# Patient Record
Sex: FEMALE | Race: WHITE | NOT HISPANIC OR LATINO | URBAN - METROPOLITAN AREA
[De-identification: names, ages, dates, MRNs, and addresses within clinical notes are randomized per-mention and may not be internally consistent; named-entity substitution may affect disease eponyms.]

---

## 2018-02-23 ENCOUNTER — EMERGENCY (EMERGENCY)
Facility: HOSPITAL | Age: 83
LOS: 0 days | Discharge: HOME | End: 2018-02-23
Attending: EMERGENCY MEDICINE

## 2018-02-23 VITALS
DIASTOLIC BLOOD PRESSURE: 85 MMHG | HEIGHT: 64 IN | RESPIRATION RATE: 18 BRPM | TEMPERATURE: 97 F | HEART RATE: 49 BPM | WEIGHT: 100.09 LBS | OXYGEN SATURATION: 97 % | SYSTOLIC BLOOD PRESSURE: 193 MMHG

## 2018-02-23 VITALS — HEART RATE: 42 BPM

## 2018-02-23 DIAGNOSIS — Z79.899 OTHER LONG TERM (CURRENT) DRUG THERAPY: ICD-10-CM

## 2018-02-23 DIAGNOSIS — M79.89 OTHER SPECIFIED SOFT TISSUE DISORDERS: ICD-10-CM

## 2018-02-23 DIAGNOSIS — I10 ESSENTIAL (PRIMARY) HYPERTENSION: ICD-10-CM

## 2018-02-23 DIAGNOSIS — I44.2 ATRIOVENTRICULAR BLOCK, COMPLETE: ICD-10-CM

## 2018-02-23 LAB
ALBUMIN SERPL ELPH-MCNC: 4 G/DL — SIGNIFICANT CHANGE UP (ref 3–5.5)
ALP SERPL-CCNC: 85 U/L — SIGNIFICANT CHANGE UP (ref 30–115)
ALT FLD-CCNC: 50 U/L — HIGH (ref 0–41)
ANION GAP SERPL CALC-SCNC: 9 MMOL/L — SIGNIFICANT CHANGE UP (ref 7–14)
AST SERPL-CCNC: 50 U/L — HIGH (ref 0–41)
B-TYPE NATRIURETIC PEPTIDE BNP RESULT: 926 PG/ML — HIGH (ref 0–99)
BASOPHILS # BLD AUTO: 0.06 K/UL — SIGNIFICANT CHANGE UP (ref 0–0.2)
BASOPHILS NFR BLD AUTO: 0.6 % — SIGNIFICANT CHANGE UP (ref 0–1)
BILIRUB SERPL-MCNC: 1.1 MG/DL — SIGNIFICANT CHANGE UP (ref 0.2–1.2)
BUN SERPL-MCNC: 23 MG/DL — HIGH (ref 10–20)
CALCIUM SERPL-MCNC: 9 MG/DL — SIGNIFICANT CHANGE UP (ref 8.5–10.1)
CHLORIDE SERPL-SCNC: 104 MMOL/L — SIGNIFICANT CHANGE UP (ref 98–110)
CO2 SERPL-SCNC: 22 MMOL/L — SIGNIFICANT CHANGE UP (ref 17–32)
CREAT SERPL-MCNC: 1.1 MG/DL — SIGNIFICANT CHANGE UP (ref 0.7–1.5)
EOSINOPHIL # BLD AUTO: 0.05 K/UL — SIGNIFICANT CHANGE UP (ref 0–0.7)
EOSINOPHIL NFR BLD AUTO: 0.5 % — SIGNIFICANT CHANGE UP (ref 0–8)
GLUCOSE SERPL-MCNC: 113 MG/DL — HIGH (ref 70–110)
HCT VFR BLD CALC: 38.8 % — SIGNIFICANT CHANGE UP (ref 37–47)
HGB BLD-MCNC: 12.4 G/DL — LOW (ref 14–18)
IMM GRANULOCYTES NFR BLD AUTO: 0.5 % — HIGH (ref 0.1–0.3)
LYMPHOCYTES # BLD AUTO: 1.18 K/UL — LOW (ref 1.2–3.4)
LYMPHOCYTES # BLD AUTO: 12.3 % — LOW (ref 20.5–51.1)
MCHC RBC-ENTMCNC: 28.6 PG — SIGNIFICANT CHANGE UP (ref 27–31)
MCHC RBC-ENTMCNC: 32 G/DL — SIGNIFICANT CHANGE UP (ref 32–37)
MCV RBC AUTO: 89.6 FL — SIGNIFICANT CHANGE UP (ref 81–91)
MONOCYTES # BLD AUTO: 0.68 K/UL — HIGH (ref 0.1–0.6)
MONOCYTES NFR BLD AUTO: 7.1 % — SIGNIFICANT CHANGE UP (ref 1.7–9.3)
NEUTROPHILS # BLD AUTO: 7.6 K/UL — HIGH (ref 1.4–6.5)
NEUTROPHILS NFR BLD AUTO: 79 % — HIGH (ref 42.2–75.2)
NRBC # BLD: 0 /100 WBCS — SIGNIFICANT CHANGE UP (ref 0–0)
PLATELET # BLD AUTO: 255 K/UL — SIGNIFICANT CHANGE UP (ref 130–400)
POTASSIUM SERPL-MCNC: 4.2 MMOL/L — SIGNIFICANT CHANGE UP (ref 3.5–5)
POTASSIUM SERPL-SCNC: 4.2 MMOL/L — SIGNIFICANT CHANGE UP (ref 3.5–5)
PROT SERPL-MCNC: 7.3 G/DL — SIGNIFICANT CHANGE UP (ref 6–8)
RBC # BLD: 4.33 M/UL — SIGNIFICANT CHANGE UP (ref 4.2–5.4)
RBC # FLD: 15.4 % — HIGH (ref 11.5–14.5)
SODIUM SERPL-SCNC: 135 MMOL/L — SIGNIFICANT CHANGE UP (ref 135–146)
WBC # BLD: 9.62 K/UL — SIGNIFICANT CHANGE UP (ref 4.8–10.8)
WBC # FLD AUTO: 9.62 K/UL — SIGNIFICANT CHANGE UP (ref 4.8–10.8)

## 2018-02-23 NOTE — ED PROVIDER NOTE - ATTENDING CONTRIBUTION TO CARE
87 yo F PMHx noted presents with c/o b/l LE swelling R>L x 3 weeks.  Pt was seen by PMD last week who recommended a LE duplex.  She was also given Rx for Lasix which she did not fill yet, no CP, no SOB, no fevers, no abd pain.  On exam pt in NAD AAO x 3, steady gait, + keenan, Lungs with slight exp wheeze at base, + b/l LE edema R>L, no calf tendeerness

## 2018-02-23 NOTE — ED PROVIDER NOTE - NS ED ROS FT
Review of Systems    Constitutional: (-) fever (-) night sweats (-) chills  Eyes: (-) change in vision (-) photophobia (-) eye pain  Cardiovascular: (-) syncope (-) chest pain (-) orthopnea (-) palpitations  Respiratory: (-) SOB (-) cough   GI: (-) abdominal pain (-) dark stools (-) N/V (-) diarrhea  Integumentary: (-) rash (-) redness   Neurological:  (-) focal deficit (-) altered mental status

## 2018-02-23 NOTE — ED ADULT NURSE NOTE - EXPLANATION OF PATIENT'S REASON FOR LEAVING
Pt. does not want to te admitted. Verbalized: "I have a beautiful life and when God wants to take me home, then I'll go."

## 2018-02-23 NOTE — ED PROVIDER NOTE - REFUSAL OF SERVICE, MDM
Patient here for peripheral edema. EKG showed third degree, understands diagnosis and plan for admission however refuses to stay. understands risk of death.

## 2018-02-23 NOTE — ED PROVIDER NOTE - PROGRESS NOTE DETAILS
EVERTON Contreras: I was directly involved in the care and management of this patient while supervising PA Fellow EKG shows new 3rd degree heart block. Plan to admit patient. Patient refuses to stay. Understands diagnosis and need for admission for pacemaker. Understands risks of leaving AMA including severe disability and death. Understands return precautions. EKG shows new 3rd degree heart block. Plan to admit patient. Patient refuses to stay. Understands diagnosis and need for admission for pacemaker. Understands risks of leaving AMA including severe disability and death. Understands return precautions. A+Ox3, capable of making own medical decisions.

## 2018-02-23 NOTE — ED PROVIDER NOTE - PHYSICAL EXAMINATION
Physical Exam    Vital Signs: I have reviewed the initial vital signs.  Constitutional: well-nourished, appears stated age, no acute distress  Eyes: PERRLA, EOM intact, RAPD absent, and symmetrical lids.  Cardiovascular: +extra heart sound, +S1/S2, no murmurs, regular rate, regular rhythm, well-perfused extremities  Respiratory: unlabored respiratory effort, clear to auscultation bilaterally, speaks in full sentences  Gastrointestinal: soft, non-tender abdomen, no pulsatile mass

## 2018-02-23 NOTE — ED PROVIDER NOTE - OBJECTIVE STATEMENT
Pt. is an 87 yo f with pmhx sig for HTN and previous h/o leg swelling reports with b/l leg swelling 3 weeks in duration she was previously seen by her PMD one week ago who wanted her to go for a doppler study. The leg swelling has not improved or gotten worse. It is made better by elevation. Not associated with CP, SOB, diaphoresis, or palpitations. Pt. is an 87 yo f with pmhx sig for HTN and previous h/o leg swelling reports with b/l leg swelling 3 weeks in duration she was previously seen by her PMD one week ago who wanted her to go for a doppler study. The leg swelling has not improved or gotten worse. It is made better by elevation. Not associated with CP, SOB, diaphoresis, syncope, or palpitations.

## 2018-02-23 NOTE — ED PROVIDER NOTE - MEDICAL DECISION MAKING DETAILS
EKG with 3rd degree heart block. Rec admission at this time for further work up, monitoring and pacemaker, The patient wishes to leave against medical advice.  I have discussed the risks, benefits and alternatives (including the possibility of worsening of disease, pain, permanent disability, and death) with the patient and her family: son and spouse ).  The patient voices understanding of these risks, benefits, and alternatives and still wishes to sign out against medical advice.  The patient is awake, alert, oriented  x 3 and has demonstrated capacity to refuse/direct care.  I have advised the patient that they can and should return immediately should they develop any worse/different/additional symptoms, or if they change their mind and want to continue their care. Pt was given a copy of results including EKG. Will stop her Calcium channel blocker

## 2018-03-21 ENCOUNTER — INPATIENT (INPATIENT)
Facility: HOSPITAL | Age: 83
LOS: 2 days | Discharge: HOME HEALTH | End: 2018-03-24
Attending: INTERNAL MEDICINE | Admitting: INTERNAL MEDICINE

## 2018-03-21 VITALS
RESPIRATION RATE: 20 BRPM | TEMPERATURE: 98 F | SYSTOLIC BLOOD PRESSURE: 196 MMHG | DIASTOLIC BLOOD PRESSURE: 79 MMHG | OXYGEN SATURATION: 97 % | HEART RATE: 42 BPM

## 2018-03-21 PROBLEM — Z00.00 ENCOUNTER FOR PREVENTIVE HEALTH EXAMINATION: Status: ACTIVE | Noted: 2018-03-21

## 2018-03-21 LAB
ALBUMIN SERPL ELPH-MCNC: 4.1 G/DL — SIGNIFICANT CHANGE UP (ref 3.5–5.2)
ALP SERPL-CCNC: 79 U/L — SIGNIFICANT CHANGE UP (ref 30–115)
ALT FLD-CCNC: 22 U/L — SIGNIFICANT CHANGE UP (ref 0–41)
ANION GAP SERPL CALC-SCNC: 13 MMOL/L — SIGNIFICANT CHANGE UP (ref 7–14)
APTT BLD: 21.8 SEC — CRITICAL LOW (ref 27–39.2)
AST SERPL-CCNC: 23 U/L — SIGNIFICANT CHANGE UP (ref 0–41)
BASOPHILS # BLD AUTO: 0.07 K/UL — SIGNIFICANT CHANGE UP (ref 0–0.2)
BASOPHILS NFR BLD AUTO: 0.7 % — SIGNIFICANT CHANGE UP (ref 0–1)
BILIRUB SERPL-MCNC: 0.5 MG/DL — SIGNIFICANT CHANGE UP (ref 0.2–1.2)
BUN SERPL-MCNC: 30 MG/DL — HIGH (ref 10–20)
CALCIUM SERPL-MCNC: 8.8 MG/DL — SIGNIFICANT CHANGE UP (ref 8.5–10.1)
CHLORIDE SERPL-SCNC: 100 MMOL/L — SIGNIFICANT CHANGE UP (ref 98–110)
CK MB CFR SERPL CALC: 2.7 NG/ML — SIGNIFICANT CHANGE UP (ref 0.6–6.3)
CK SERPL-CCNC: 74 U/L — SIGNIFICANT CHANGE UP (ref 0–225)
CO2 SERPL-SCNC: 27 MMOL/L — SIGNIFICANT CHANGE UP (ref 17–32)
CREAT SERPL-MCNC: 1.1 MG/DL — SIGNIFICANT CHANGE UP (ref 0.7–1.5)
EOSINOPHIL # BLD AUTO: 0.08 K/UL — SIGNIFICANT CHANGE UP (ref 0–0.7)
EOSINOPHIL NFR BLD AUTO: 0.8 % — SIGNIFICANT CHANGE UP (ref 0–8)
GLUCOSE SERPL-MCNC: 175 MG/DL — HIGH (ref 70–110)
HCT VFR BLD CALC: 38.5 % — SIGNIFICANT CHANGE UP (ref 37–47)
HGB BLD-MCNC: 12.3 G/DL — SIGNIFICANT CHANGE UP (ref 12–16)
IMM GRANULOCYTES NFR BLD AUTO: 0.5 % — HIGH (ref 0.1–0.3)
INR BLD: 1.03 RATIO — SIGNIFICANT CHANGE UP (ref 0.65–1.3)
LYMPHOCYTES # BLD AUTO: 1.1 K/UL — LOW (ref 1.2–3.4)
LYMPHOCYTES # BLD AUTO: 11.3 % — LOW (ref 20.5–51.1)
MCHC RBC-ENTMCNC: 28.6 PG — SIGNIFICANT CHANGE UP (ref 27–31)
MCHC RBC-ENTMCNC: 31.9 G/DL — LOW (ref 32–37)
MCV RBC AUTO: 89.5 FL — SIGNIFICANT CHANGE UP (ref 81–99)
MONOCYTES # BLD AUTO: 0.71 K/UL — HIGH (ref 0.1–0.6)
MONOCYTES NFR BLD AUTO: 7.3 % — SIGNIFICANT CHANGE UP (ref 1.7–9.3)
NEUTROPHILS # BLD AUTO: 7.71 K/UL — HIGH (ref 1.4–6.5)
NEUTROPHILS NFR BLD AUTO: 79.4 % — HIGH (ref 42.2–75.2)
NRBC # BLD: 0 /100 WBCS — SIGNIFICANT CHANGE UP (ref 0–0)
PLATELET # BLD AUTO: 235 K/UL — SIGNIFICANT CHANGE UP (ref 130–400)
POTASSIUM SERPL-MCNC: 3.6 MMOL/L — SIGNIFICANT CHANGE UP (ref 3.5–5)
POTASSIUM SERPL-SCNC: 3.6 MMOL/L — SIGNIFICANT CHANGE UP (ref 3.5–5)
PROT SERPL-MCNC: 6.9 G/DL — SIGNIFICANT CHANGE UP (ref 6–8)
PROTHROM AB SERPL-ACNC: 11.1 SEC — SIGNIFICANT CHANGE UP (ref 9.95–12.87)
RBC # BLD: 4.3 M/UL — SIGNIFICANT CHANGE UP (ref 4.2–5.4)
RBC # FLD: 15.3 % — HIGH (ref 11.5–14.5)
SODIUM SERPL-SCNC: 140 MMOL/L — SIGNIFICANT CHANGE UP (ref 135–146)
TROPONIN T SERPL-MCNC: <0.01 NG/ML — SIGNIFICANT CHANGE UP
WBC # BLD: 9.72 K/UL — SIGNIFICANT CHANGE UP (ref 4.8–10.8)
WBC # FLD AUTO: 9.72 K/UL — SIGNIFICANT CHANGE UP (ref 4.8–10.8)

## 2018-03-21 RX ORDER — SODIUM CHLORIDE 9 MG/ML
3 INJECTION INTRAMUSCULAR; INTRAVENOUS; SUBCUTANEOUS ONCE
Qty: 0 | Refills: 0 | Status: COMPLETED | OUTPATIENT
Start: 2018-03-21 | End: 2018-03-21

## 2018-03-21 RX ORDER — MORPHINE SULFATE 50 MG/1
2 CAPSULE, EXTENDED RELEASE ORAL ONCE
Qty: 0 | Refills: 0 | Status: DISCONTINUED | OUTPATIENT
Start: 2018-03-21 | End: 2018-03-21

## 2018-03-21 RX ORDER — FUROSEMIDE 40 MG
20 TABLET ORAL DAILY
Qty: 0 | Refills: 0 | Status: DISCONTINUED | OUTPATIENT
Start: 2018-03-21 | End: 2018-03-22

## 2018-03-21 RX ORDER — LOSARTAN POTASSIUM 100 MG/1
100 TABLET, FILM COATED ORAL DAILY
Qty: 0 | Refills: 0 | Status: DISCONTINUED | OUTPATIENT
Start: 2018-03-21 | End: 2018-03-22

## 2018-03-21 RX ORDER — VERAPAMIL HCL 240 MG
1 CAPSULE, EXTENDED RELEASE PELLETS 24 HR ORAL
Qty: 0 | Refills: 0 | COMMUNITY

## 2018-03-21 RX ORDER — OXYCODONE AND ACETAMINOPHEN 5; 325 MG/1; MG/1
1 TABLET ORAL ONCE
Qty: 0 | Refills: 0 | Status: DISCONTINUED | OUTPATIENT
Start: 2018-03-21 | End: 2018-03-21

## 2018-03-21 RX ORDER — SERTRALINE 25 MG/1
50 TABLET, FILM COATED ORAL DAILY
Qty: 0 | Refills: 0 | Status: DISCONTINUED | OUTPATIENT
Start: 2018-03-21 | End: 2018-03-22

## 2018-03-21 RX ORDER — KETOROLAC TROMETHAMINE 30 MG/ML
15 SYRINGE (ML) INJECTION EVERY 6 HOURS
Qty: 0 | Refills: 0 | Status: DISCONTINUED | OUTPATIENT
Start: 2018-03-21 | End: 2018-03-22

## 2018-03-21 RX ORDER — ACETAMINOPHEN 500 MG
650 TABLET ORAL EVERY 6 HOURS
Qty: 0 | Refills: 0 | Status: DISCONTINUED | OUTPATIENT
Start: 2018-03-21 | End: 2018-03-22

## 2018-03-21 RX ADMIN — SODIUM CHLORIDE 3 MILLILITER(S): 9 INJECTION INTRAMUSCULAR; INTRAVENOUS; SUBCUTANEOUS at 16:45

## 2018-03-21 RX ADMIN — Medication 20 MILLIGRAM(S): at 22:21

## 2018-03-21 RX ADMIN — OXYCODONE AND ACETAMINOPHEN 1 TABLET(S): 5; 325 TABLET ORAL at 16:47

## 2018-03-21 RX ADMIN — MORPHINE SULFATE 2 MILLIGRAM(S): 50 CAPSULE, EXTENDED RELEASE ORAL at 23:50

## 2018-03-21 RX ADMIN — OXYCODONE AND ACETAMINOPHEN 1 TABLET(S): 5; 325 TABLET ORAL at 22:56

## 2018-03-21 RX ADMIN — OXYCODONE AND ACETAMINOPHEN 1 TABLET(S): 5; 325 TABLET ORAL at 23:44

## 2018-03-21 RX ADMIN — LOSARTAN POTASSIUM 100 MILLIGRAM(S): 100 TABLET, FILM COATED ORAL at 22:21

## 2018-03-21 RX ADMIN — SERTRALINE 50 MILLIGRAM(S): 25 TABLET, FILM COATED ORAL at 22:20

## 2018-03-21 NOTE — ED ADULT TRIAGE NOTE - CHIEF COMPLAINT QUOTE
Pt S/P trip and fall at home injured left  shoulder no head injury no LOC ,no blood thinners medication .

## 2018-03-21 NOTE — H&P ADULT - NSHPPHYSICALEXAM_GEN_ALL_CORE
Physical Examination:   General: Patient laying in bed, NAD  Head: NC/AT  Neck: No midline neck tenderness or deformity to palpation  Resp: Lung sounds clear to auscultation  CVS: bradycardic, regular, S1 S2 no murmurs noted.  Abd: Soft, NT/ND  Neuro: Alert and oriented x 3, no focal deficits noted  Extrems: warm, trace edema to ankles. R shoulder with pain to palpation, no gross deformity or crepitus noted. All extrems with 2+ distal pulses.

## 2018-03-21 NOTE — H&P ADULT - ASSESSMENT
88 year old female w/ HTN now presents with third degree heart block    # Third degree heart block 88 year old female w/ HTN now presents with third degree heart block    # Third degree heart block  - BP stable, stop verapamil  - check TSH  - plan for permanent pacemaker tomorrow (as per Dr. Urbina)  - NPO after midnight  - pacing pads placed, keep atropine at the bedside  - 2d echo pending    # Right arm pain r/o fracture  - sling placed in ED  - pending xray read    # HTN  - c/w losartan, lasix    # DVT ppx    # FULL CODE

## 2018-03-21 NOTE — ED PROVIDER NOTE - OBJECTIVE STATEMENT
Patient is an 89 y/o female w/ pmhx of HTN, recently diagnosed complete heart block previously refusing pacemaker placement (signed out AMA from ED on 2/23/18 after presenting with heart failure symptoms and found to be in CHB), who presents to the ED s/p syncope/fall today. As per patient, she was ambulating and then suddenly woke up on floor. Does not know how she fell. After fall, has pain on right side of body/shoulder. No other complaints. Unknown if hit head. Not on anticoagulation/aspirin. Currently, denies headache, neck/back pain, numbness/tingling of extremities, current dizziness/lightheadedness, shortness of breath, chest pain, abdominal pain, or any other complaints.  Patient has been having discussions with family and cardiologist Dr. Rodriguez about eventual pacemaker placement. I had long discussion now with patient regarding her good quality of life for 88 year old and the importance of being admitted to hospital with likely pacemaker placement and she is agreeable.

## 2018-03-21 NOTE — CONSULT NOTE ADULT - ASSESSMENT
Complete heart block  - ongoing for one month  - syncopized today  - CCU monitoring  - NPO after midnight for DDD PPM placement tomorrow  - 2d echo to assess LV function Complete heart block  - ongoing for one month  - syncopized today  - CCU monitoring  - NPO after midnight for DDD PPM placement tomorrow  Risk and benefits explained to patient and daughter   - 2d echo to assess LV function

## 2018-03-21 NOTE — CONSULT NOTE ADULT - SUBJECTIVE AND OBJECTIVE BOX
Date of Admission: 3/21/2018    CHIEF COMPLAINT: syncope    HISTORY OF PRESENT ILLNESS: 88yFemalabril with PMH below presented to the hospital for syncopal episode today for which she hit the right side of her body. trauma workup revealed a fractured right humerus. She has had complete heart block as an outpatient for the last month. She had refusaed admission at that time but has been followed by her cardiologist Dr. Amin who has been urging her to come in for PPM. She denies shortness of breath, denies chest pain.     PAST MEDICAL & SURGICAL HISTORY:  Bradycardia  HTN (hypertension)    HEALTH ISSUES - PROBLEM Dx:        FAMILY HISTORY:    Allergies    No Known Allergies    Intolerances    	  Home Medications:  furosemide 20 mg oral tablet: 1 tab(s) orally once a day (21 Mar 2018 16:27)  losartan 100 mg oral tablet: 1 tab(s) orally once a day (21 Mar 2018 16:27)  mesalamine 800 mg oral delayed release tablet: 2 tab(s) orally 3 times a day (21 Mar 2018 16:27)  sertraline 50 mg oral tablet: 1 tab(s) orally once a day (21 Mar 2018 16:27)    MEDICATIONS  (STANDING):    MEDICATIONS  (PRN):              SOCIAL HISTORY:    [ ] Non-smoker  [ ] Smoker  [ ] Alcohol      REVIEW OF SYSTEMS:  CONSTITUTIONAL: No fever, weight loss, or fatigue  CARDIOLOGY: See HPI  RESPIRATORY: See HPI  NEUROLOGICAL: NO weakness, no focal deficits to report.  ENDOCRINOLOGICAL: no recent change in diabetic medications.   GI: no BRBPR, no N,V,diarrhea.    PSYCHIATRY: normal mood and affect  HEENT: no nasal discharge, no ecchymosis  SKIN: no ecchymosis, no breakdown  MUSCULOSKELETAL: Full range of motion x4.      PHYSICAL EXAM:  T(C): 36.7 (03-21-18 @ 16:17), Max: 36.7 (03-21-18 @ 16:17)  HR: 42 (03-21-18 @ 16:17) (42 - 42)  BP: 196/79 (03-21-18 @ 16:17) (196/79 - 196/79)  RR: 20 (03-21-18 @ 16:17) (20 - 20)  SpO2: 97% (03-21-18 @ 16:17) (97% - 97%)  Wt(kg): --  I&O's Summary    Daily     Daily     General Appearance: Normal	  Cardiovascular: regular but slow S1 S2, No JVD, No murmurs, No edema  Respiratory: Lungs clear to auscultation	  Psychiatry: A & O x 3, Mood & affect appropriate  Gastrointestinal:  Soft, Non-tender  Skin: No rashes, No ecchymoses, No cyanosis	  Neurologic: Non-focal  Extremities: Normal range of motion, No clubbing, cyanosis or edema  Vascular: Peripheral pulses palpable 2+ bilaterally        LABS:	 	                          12.3   9.72  )-----------( 235      ( 21 Mar 2018 16:53 )             38.5     03-21    140  |  100  |  30<H>  ----------------------------<  175<H>  3.6   |  27  |  1.1    Ca    8.8      21 Mar 2018 16:53    TPro  x   /  Alb  4.1  /  TBili  0.5  /  DBili  x   /  AST  23  /  ALT  22  /  AlkPhos  79  03-21    CARDIAC MARKERS ( 21 Mar 2018 16:53 )  x     / <0.01 ng/mL / 74 U/L / x     / 2.7 ng/mL      PT/INR - ( 21 Mar 2018 16:53 )   PT: 11.10 sec;   INR: 1.03 ratio         PTT - ( 21 Mar 2018 16:53 )  PTT:21.8 sec    proBNP:   Lipid Profile:   HgA1c:   TSH:       CARDIAC MARKERS:            TELEMETRY EVENTS: 	 complete heart block   ECG:  complete heart block with narrow QRS escape rhythm  RADIOLOGY: no cp dz  OTHER: 	    PREVIOUS DIAGNOSTIC TESTING:    [ ] Echocardiogram:  [ ]  Catheterization:  [ ] Stress Test:  	  	  ASSESSMENT/PLAN:

## 2018-03-21 NOTE — H&P ADULT - NSHPLABSRESULTS_GEN_ALL_CORE
12.3   9.72  )-----------( 235      ( 21 Mar 2018 16:53 )             38.5     140  |  100  |  30<H>  ----------------------------<  175<H>  3.6   |  27  |  1.1    Ca    8.8      21 Mar 2018 16:53    TPro  x   /  Alb  4.1  /  TBili  0.5  /  DBili  x   /  AST  23  /  ALT  22  /  AlkPhos  79  03-21          PT/INR - ( 21 Mar 2018 16:53 )   PT: 11.10 sec;   INR: 1.03 ratio         PTT - ( 21 Mar 2018 16:53 )  PTT:21.8 sec      CARDIAC MARKERS ( 21 Mar 2018 16:53 )  x     / <0.01 ng/mL / 74 U/L / x     / 2.7 ng/mL

## 2018-03-21 NOTE — H&P ADULT - ATTENDING COMMENTS
Pt seen and examined independently of resident, agree with above history, physical exam, assessment and plan  Addendum  1-Fall/Syncope    third degree AV block  seen by EP  need pacemaker  continue CCU monitoring  2- Rt Humeral head fracture  s/p sling  ortho eval  3- HTN  continue  Losartan

## 2018-03-21 NOTE — ED PROVIDER NOTE - CRITICAL CARE PROVIDED
interpretation of diagnostic studies/consultation with other physicians/documentation/consult w/ pt's family directly relating to pts condition/additional history taking/direct patient care (not related to procedure)

## 2018-03-21 NOTE — ED PROVIDER NOTE - PHYSICAL EXAMINATION
General: Patient laying in bed, NAD  Head: NC/AT  Neck: No midline neck tenderness or deformity to palpation  Resp: Lung sounds clear to auscultation  CVS: bradycardic, regular, S1 S2 no murmurs noted.  Abd: Soft, NT/ND  Neuro: Alert and oriented x 3, no focal deficits noted  Extrems: warm, trace edema to ankles. R shoulder with pain to palpation, no gross deformity or crepitus noted. All extrems with 2+ distal pulses.

## 2018-03-21 NOTE — H&P ADULT - HISTORY OF PRESENT ILLNESS
88 year old female w/ pmhx of HTN and recently diagnosed complete heart block 2 weeks prior at ED-[facility]. At that time pt presents w/ symptoms of heart failure and found to be in third degree heart block. However at the time pt left AMA      Patient is an 87 y/o female w/ pmhx of HTN, recently diagnosed complete heart block previously refusing pacemaker placement (signed out AMA from ED on 2/23/18 after presenting with heart failure symptoms and found to be in CHB), who presents to the ED s/p syncope/fall today. As per patient, she was ambulating and then suddenly woke up on floor. Does not know how she fell. After fall, has pain on right side of body/shoulder. No other complaints. Unknown if hit head. Not on anticoagulation/aspirin. Currently, denies headache, neck/back pain, numbness/tingling of extremities, current dizziness/lightheadedness, shortness of breath, chest pain, abdominal pain, or any other complaints.  Patient has been having discussions with family and cardiologist Dr. Rodriguez about eventual pacemaker placement. I had long discussion now with patient regarding her good quality of life for 88 year old and the importance of being admitted to hospital with likely pacemaker placement and she is agreeable. 88 year old female w/ pmhx of HTN and recently diagnosed complete heart block 2 weeks prior at ED-[facility]. At that time pt presents w/ symptoms of heart failure and found to be in third degree heart block. However at the time pt left AMA. Of note pt was taking verapamil / losartan / mesalamine / lasix / sertraline      Patient is an 89 y/o female w/ pmhx of HTN, recently diagnosed complete heart block previously refusing pacemaker placement (signed out AMA from ED on 2/23/18 after presenting with heart failure symptoms and found to be in CHB), who presents to the ED s/p syncope/fall today. As per patient, she was ambulating and then suddenly woke up on floor. Does not know how she fell. After fall, has pain on right side of body/shoulder. No other complaints. Unknown if hit head. Not on anticoagulation/aspirin. Currently, denies headache, neck/back pain, numbness/tingling of extremities, current dizziness/lightheadedness, shortness of breath, chest pain, abdominal pain, or any other complaints.  Patient has been having discussions with family and cardiologist Dr. Rodriguez about eventual pacemaker placement. I had long discussion now with patient regarding her good quality of life for 88 year old and the importance of being admitted to hospital with likely pacemaker placement and she is agreeable. 88 year old female w/ pmhx of HTN and recently diagnosed complete heart block 1 month prior at ED-[facility]. At that time pt presents w/ symptoms of heart failure (lower extremity edema) and found to be in third degree heart block. However at the time pt left AMA. Of note pt was taking verapamil / losartan / mesalamine / lasix / sertraline - which she has continued to take since. Since she states the she had been feeling fine, however today had an episode where she was talking to her  and then foudn herself on the floor. As per family she fell and has no recollection of the actual event or any prodromal symptoms. Denies any dizziness, chest pain, abdominal pain. Yesterday pt also saw her cardiology Dr. Rodriguez who noted she was still in third degree AV block and discussed pacemaker placement.     Dr. Urbina saw pt in ED and plans to place pacemaker tomorrow.  Pt has refused a TVP.

## 2018-03-21 NOTE — ED PROVIDER NOTE - CARE PLAN
Principal Discharge DX:	Complete heart block  Secondary Diagnosis:	Closed fracture of head of right humerus, initial encounter

## 2018-03-21 NOTE — ED PROVIDER NOTE - PROGRESS NOTE DETAILS
sound out to dr. moreno pending xrays, labs, and conversation with sarita/collette for possible PPM Patient with complete heart block, Dr. Urbina at bedside who will plan for pacemaker placement tomorrow, approves patient for CCU. Patient refusing TVP placement in the ED.

## 2018-03-21 NOTE — ED PROVIDER NOTE - MEDICAL DECISION MAKING DETAILS
recent history of bradycardia with referral yesterday for pacemaker, she used to be on verpamil for HTN but that was discontinued, her cardiologist is jacinta. Today she was walking, had unexplained fall that was per patient not perciptated by slipping/tripping but patient also denies lightheadedness, diaphroesis, or near sycnopal episodes. however she can not expalin how sh fell, did not hit head, no loc, fell onto right side. she has pain to posterior/right shoulder/humerus, but is not focally tender in those spots. she is able to have nml rom of fingers/ wrist/elbow but pain is exacerbated by doing so. no chest wall tendnerss, abd soft, no head injury, heart sounds are bradycardic. plan is to work up for keenan cardia as possible cause of fall, xrays of ext/sling if needed, ekg, and enzymes. Will discuss with collette for PPM placement.

## 2018-03-21 NOTE — CONSULT NOTE ADULT - SUBJECTIVE AND OBJECTIVE BOX
Date of Admission: 3/21/2018    CHIEF COMPLAINT: syncope    HISTORY OF PRESENT ILLNESS: 88yFemalabril with PMH below presented to the hospital for syncopal episode today for which she hit the right side of her body. trauma workup revealed a fractured right humerus. She has had complete heart block as an outpatient for the last month. She had refusaed admission at that time but has been followed by her cardiologist Dr. Amin who has been urging her to come in for PPM. She denies shortness of breath, denies chest pain.     PAST MEDICAL & SURGICAL HISTORY:  Bradycardia  HTN (hypertension)    HEALTH ISSUES - PROBLEM Dx:        FAMILY HISTORY:    Allergies    No Known Allergies    Intolerances    	  Home Medications:  furosemide 20 mg oral tablet: 1 tab(s) orally once a day (21 Mar 2018 16:27)  losartan 100 mg oral tablet: 1 tab(s) orally once a day (21 Mar 2018 16:27)  mesalamine 800 mg oral delayed release tablet: 2 tab(s) orally 3 times a day (21 Mar 2018 16:27)  sertraline 50 mg oral tablet: 1 tab(s) orally once a day (21 Mar 2018 16:27)    MEDICATIONS  (STANDING):    MEDICATIONS  (PRN):              SOCIAL HISTORY:    [ ] Non-smoker  [ ] Smoker  [ ] Alcohol      REVIEW OF SYSTEMS:  CONSTITUTIONAL: No fever, weight loss, or fatigue  CARDIOLOGY: See HPI  RESPIRATORY: See HPI  NEUROLOGICAL: NO weakness, no focal deficits to report.  ENDOCRINOLOGICAL: no recent change in diabetic medications.   GI: no BRBPR, no N,V,diarrhea.    PSYCHIATRY: normal mood and affect  HEENT: no nasal discharge, no ecchymosis  SKIN: no ecchymosis, no breakdown  MUSCULOSKELETAL: Full range of motion x4.      PHYSICAL EXAM:  T(C): 36.7 (03-21-18 @ 16:17), Max: 36.7 (03-21-18 @ 16:17)  HR: 42 (03-21-18 @ 16:17) (42 - 42)  BP: 196/79 (03-21-18 @ 16:17) (196/79 - 196/79)  RR: 20 (03-21-18 @ 16:17) (20 - 20)  SpO2: 97% (03-21-18 @ 16:17) (97% - 97%)  Wt(kg): --  I&O's Summary    Daily     Daily     General Appearance: Normal	  Cardiovascular: regular but slow S1 S2, No JVD, No murmurs, No edema  Respiratory: Lungs clear to auscultation	  Psychiatry: A & O x 3, Mood & affect appropriate  Gastrointestinal:  Soft, Non-tender  Skin: No rashes, No ecchymoses, No cyanosis	  Neurologic: Non-focal  Extremities: Normal range of motion, No clubbing, cyanosis or edema  Vascular: Peripheral pulses palpable 2+ bilaterally        LABS:	 	                          12.3   9.72  )-----------( 235      ( 21 Mar 2018 16:53 )             38.5     03-21    140  |  100  |  30<H>  ----------------------------<  175<H>  3.6   |  27  |  1.1    Ca    8.8      21 Mar 2018 16:53    TPro  x   /  Alb  4.1  /  TBili  0.5  /  DBili  x   /  AST  23  /  ALT  22  /  AlkPhos  79  03-21    CARDIAC MARKERS ( 21 Mar 2018 16:53 )  x     / <0.01 ng/mL / 74 U/L / x     / 2.7 ng/mL      PT/INR - ( 21 Mar 2018 16:53 )   PT: 11.10 sec;   INR: 1.03 ratio         PTT - ( 21 Mar 2018 16:53 )  PTT:21.8 sec    proBNP:   Lipid Profile:   HgA1c:   TSH:       CARDIAC MARKERS:            TELEMETRY EVENTS: 	 complete heart block   ECG:  complete heart block with narrow QRS escape rhythm  RADIOLOGY: no cp dz  OTHER: 	    PREVIOUS DIAGNOSTIC TESTING:    [ ] Echocardiogram:  [ ]  Catheterization:  [ ] Stress Test:  	  	  ASSESSMENT/PLAN: Date of Admission: 3/21/2018    CHIEF COMPLAINT: syncope    HISTORY OF PRESENT ILLNESS: 88yFemale with PMH below presented to the hospital for syncopal episode today for which she hit the right side of her body. trauma workup revealed a fractured right humerus. She has had complete heart block as an outpatient for the last month. She had refusaed admission at that time but has been followed by her cardiologist Dr. Amin who has been urging her to come in for PPM. She denies shortness of breath, denies chest pain.     EKG SR with CHB    PAST MEDICAL & SURGICAL HISTORY:  Bradycardia  HTN (hypertension)    HEALTH ISSUES - PROBLEM Dx:        FAMILY HISTORY: No SCD    Allergies    No Known Allergies    Intolerances    	  Home Medications:  furosemide 20 mg oral tablet: 1 tab(s) orally once a day (21 Mar 2018 16:27)  losartan 100 mg oral tablet: 1 tab(s) orally once a day (21 Mar 2018 16:27)  mesalamine 800 mg oral delayed release tablet: 2 tab(s) orally 3 times a day (21 Mar 2018 16:27)  sertraline 50 mg oral tablet: 1 tab(s) orally once a day (21 Mar 2018 16:27)    MEDICATIONS  (STANDING):    MEDICATIONS  (PRN):              SOCIAL HISTORY:    [X ] Non-smoker  [ ] Smoker  [ ] Alcohol      REVIEW OF SYSTEMS:  CONSTITUTIONAL: No fever, weight loss, or fatigue  CARDIOLOGY: See HPI  RESPIRATORY: See HPI  NEUROLOGICAL: NO weakness, no focal deficits to report.  ENDOCRINOLOGICAL: no recent change in diabetic medications.   GI: no BRBPR, no N,V,diarrhea.    PSYCHIATRY: normal mood and affect  HEENT: no nasal discharge, no ecchymosis  SKIN: no ecchymosis, no breakdown  MUSCULOSKELETAL: Full range of motion x4.      PHYSICAL EXAM:  T(C): 36.7 (03-21-18 @ 16:17), Max: 36.7 (03-21-18 @ 16:17)  HR: 42 (03-21-18 @ 16:17) (42 - 42)  BP: 196/79 (03-21-18 @ 16:17) (196/79 - 196/79)  RR: 20 (03-21-18 @ 16:17) (20 - 20)  SpO2: 97% (03-21-18 @ 16:17) (97% - 97%)  Wt(kg): --  I&O's Summary    Daily     Daily     General Appearance: Normal	  Cardiovascular: regular but slow S1 S2, No JVD, No murmurs, No edema  Respiratory: Lungs clear to auscultation	  Psychiatry: A & O x 3, Mood & affect appropriate  Gastrointestinal:  Soft, Non-tender  Skin: No rashes, No ecchymoses, No cyanosis	  Neurologic: Non-focal  Extremities: Normal range of motion, No clubbing, cyanosis or edema  Vascular: Peripheral pulses palpable 2+ bilaterally        LABS:	 	                          12.3   9.72  )-----------( 235      ( 21 Mar 2018 16:53 )             38.5     03-21    140  |  100  |  30<H>  ----------------------------<  175<H>  3.6   |  27  |  1.1    Ca    8.8      21 Mar 2018 16:53    TPro  x   /  Alb  4.1  /  TBili  0.5  /  DBili  x   /  AST  23  /  ALT  22  /  AlkPhos  79  03-21    CARDIAC MARKERS ( 21 Mar 2018 16:53 )  x     / <0.01 ng/mL / 74 U/L / x     / 2.7 ng/mL      PT/INR - ( 21 Mar 2018 16:53 )   PT: 11.10 sec;   INR: 1.03 ratio         PTT - ( 21 Mar 2018 16:53 )  PTT:21.8 sec    proBNP:   Lipid Profile:   HgA1c:   TSH:       CARDIAC MARKERS:            TELEMETRY EVENTS: 	 complete heart block   ECG:  complete heart block with narrow QRS escape rhythm  RADIOLOGY: no cp dz  OTHER: 	      	  ASSESSMENT/PLAN:

## 2018-03-22 LAB
ALBUMIN SERPL ELPH-MCNC: 3.6 G/DL — SIGNIFICANT CHANGE UP (ref 3.5–5.2)
ALP SERPL-CCNC: 67 U/L — SIGNIFICANT CHANGE UP (ref 30–115)
ALT FLD-CCNC: 18 U/L — SIGNIFICANT CHANGE UP (ref 0–41)
ANION GAP SERPL CALC-SCNC: 13 MMOL/L — SIGNIFICANT CHANGE UP (ref 7–14)
AST SERPL-CCNC: 21 U/L — SIGNIFICANT CHANGE UP (ref 0–41)
BASOPHILS # BLD AUTO: 0.04 K/UL — SIGNIFICANT CHANGE UP (ref 0–0.2)
BASOPHILS NFR BLD AUTO: 0.4 % — SIGNIFICANT CHANGE UP (ref 0–1)
BILIRUB SERPL-MCNC: 0.6 MG/DL — SIGNIFICANT CHANGE UP (ref 0.2–1.2)
BUN SERPL-MCNC: 29 MG/DL — HIGH (ref 10–20)
CALCIUM SERPL-MCNC: 8.3 MG/DL — LOW (ref 8.5–10.1)
CHLORIDE SERPL-SCNC: 101 MMOL/L — SIGNIFICANT CHANGE UP (ref 98–110)
CO2 SERPL-SCNC: 25 MMOL/L — SIGNIFICANT CHANGE UP (ref 17–32)
CREAT SERPL-MCNC: 1.1 MG/DL — SIGNIFICANT CHANGE UP (ref 0.7–1.5)
EOSINOPHIL # BLD AUTO: 0.1 K/UL — SIGNIFICANT CHANGE UP (ref 0–0.7)
EOSINOPHIL NFR BLD AUTO: 0.9 % — SIGNIFICANT CHANGE UP (ref 0–8)
GLUCOSE SERPL-MCNC: 129 MG/DL — HIGH (ref 70–110)
HCT VFR BLD CALC: 37.6 % — SIGNIFICANT CHANGE UP (ref 37–47)
HGB BLD-MCNC: 12 G/DL — SIGNIFICANT CHANGE UP (ref 12–16)
IMM GRANULOCYTES NFR BLD AUTO: 0.5 % — HIGH (ref 0.1–0.3)
LYMPHOCYTES # BLD AUTO: 1.42 K/UL — SIGNIFICANT CHANGE UP (ref 1.2–3.4)
LYMPHOCYTES # BLD AUTO: 12.9 % — LOW (ref 20.5–51.1)
MAGNESIUM SERPL-MCNC: 1.9 MG/DL — SIGNIFICANT CHANGE UP (ref 1.8–2.4)
MCHC RBC-ENTMCNC: 28.3 PG — SIGNIFICANT CHANGE UP (ref 27–31)
MCHC RBC-ENTMCNC: 31.9 G/DL — LOW (ref 32–37)
MCV RBC AUTO: 88.7 FL — SIGNIFICANT CHANGE UP (ref 81–99)
MONOCYTES # BLD AUTO: 0.93 K/UL — HIGH (ref 0.1–0.6)
MONOCYTES NFR BLD AUTO: 8.4 % — SIGNIFICANT CHANGE UP (ref 1.7–9.3)
NEUTROPHILS # BLD AUTO: 8.48 K/UL — HIGH (ref 1.4–6.5)
NEUTROPHILS NFR BLD AUTO: 76.9 % — HIGH (ref 42.2–75.2)
PLATELET # BLD AUTO: 193 K/UL — SIGNIFICANT CHANGE UP (ref 130–400)
POTASSIUM SERPL-MCNC: 4 MMOL/L — SIGNIFICANT CHANGE UP (ref 3.5–5)
POTASSIUM SERPL-SCNC: 4 MMOL/L — SIGNIFICANT CHANGE UP (ref 3.5–5)
PROT SERPL-MCNC: 6 G/DL — SIGNIFICANT CHANGE UP (ref 6–8)
RBC # BLD: 4.24 M/UL — SIGNIFICANT CHANGE UP (ref 4.2–5.4)
RBC # FLD: 15.4 % — HIGH (ref 11.5–14.5)
SODIUM SERPL-SCNC: 139 MMOL/L — SIGNIFICANT CHANGE UP (ref 135–146)
WBC # BLD: 11.02 K/UL — HIGH (ref 4.8–10.8)
WBC # FLD AUTO: 11.02 K/UL — HIGH (ref 4.8–10.8)

## 2018-03-22 RX ORDER — AMLODIPINE BESYLATE 2.5 MG/1
5 TABLET ORAL ONCE
Qty: 0 | Refills: 0 | Status: COMPLETED | OUTPATIENT
Start: 2018-03-22 | End: 2018-03-22

## 2018-03-22 RX ORDER — CEFAZOLIN SODIUM 1 G
1000 VIAL (EA) INJECTION EVERY 8 HOURS
Qty: 0 | Refills: 0 | Status: DISCONTINUED | OUTPATIENT
Start: 2018-03-22 | End: 2018-03-22

## 2018-03-22 RX ORDER — CEFAZOLIN SODIUM 1 G
VIAL (EA) INJECTION
Qty: 0 | Refills: 0 | Status: DISCONTINUED | OUTPATIENT
Start: 2018-03-22 | End: 2018-03-23

## 2018-03-22 RX ORDER — CEFAZOLIN SODIUM 1 G
1000 VIAL (EA) INJECTION ONCE
Qty: 0 | Refills: 0 | Status: COMPLETED | OUTPATIENT
Start: 2018-03-22 | End: 2018-03-22

## 2018-03-22 RX ORDER — LOSARTAN POTASSIUM 100 MG/1
100 TABLET, FILM COATED ORAL DAILY
Qty: 0 | Refills: 0 | Status: DISCONTINUED | OUTPATIENT
Start: 2018-03-22 | End: 2018-03-22

## 2018-03-22 RX ORDER — AMLODIPINE BESYLATE 2.5 MG/1
5 TABLET ORAL DAILY
Qty: 0 | Refills: 0 | Status: DISCONTINUED | OUTPATIENT
Start: 2018-03-22 | End: 2018-03-22

## 2018-03-22 RX ORDER — ACETAMINOPHEN 500 MG
325 TABLET ORAL EVERY 4 HOURS
Qty: 0 | Refills: 0 | Status: DISCONTINUED | OUTPATIENT
Start: 2018-03-22 | End: 2018-03-24

## 2018-03-22 RX ORDER — LOSARTAN POTASSIUM 100 MG/1
50 TABLET, FILM COATED ORAL EVERY 12 HOURS
Qty: 0 | Refills: 0 | Status: DISCONTINUED | OUTPATIENT
Start: 2018-03-22 | End: 2018-03-22

## 2018-03-22 RX ORDER — ALPRAZOLAM 0.25 MG
0.12 TABLET ORAL ONCE
Qty: 0 | Refills: 0 | Status: DISCONTINUED | OUTPATIENT
Start: 2018-03-22 | End: 2018-03-22

## 2018-03-22 RX ORDER — CEFAZOLIN SODIUM 1 G
1000 VIAL (EA) INJECTION EVERY 8 HOURS
Qty: 0 | Refills: 0 | Status: DISCONTINUED | OUTPATIENT
Start: 2018-03-23 | End: 2018-03-23

## 2018-03-22 RX ORDER — LOSARTAN POTASSIUM 100 MG/1
100 TABLET, FILM COATED ORAL DAILY
Qty: 0 | Refills: 0 | Status: DISCONTINUED | OUTPATIENT
Start: 2018-03-22 | End: 2018-03-24

## 2018-03-22 RX ORDER — OXYCODONE AND ACETAMINOPHEN 5; 325 MG/1; MG/1
1 TABLET ORAL EVERY 6 HOURS
Qty: 0 | Refills: 0 | Status: DISCONTINUED | OUTPATIENT
Start: 2018-03-22 | End: 2018-03-22

## 2018-03-22 RX ORDER — HEPARIN SODIUM 5000 [USP'U]/ML
5000 INJECTION INTRAVENOUS; SUBCUTANEOUS EVERY 12 HOURS
Qty: 0 | Refills: 0 | Status: DISCONTINUED | OUTPATIENT
Start: 2018-03-22 | End: 2018-03-24

## 2018-03-22 RX ADMIN — Medication 20 MILLIGRAM(S): at 05:09

## 2018-03-22 RX ADMIN — AMLODIPINE BESYLATE 5 MILLIGRAM(S): 2.5 TABLET ORAL at 16:54

## 2018-03-22 RX ADMIN — MORPHINE SULFATE 2 MILLIGRAM(S): 50 CAPSULE, EXTENDED RELEASE ORAL at 00:09

## 2018-03-22 RX ADMIN — SERTRALINE 50 MILLIGRAM(S): 25 TABLET, FILM COATED ORAL at 11:24

## 2018-03-22 RX ADMIN — LOSARTAN POTASSIUM 100 MILLIGRAM(S): 100 TABLET, FILM COATED ORAL at 10:32

## 2018-03-22 RX ADMIN — LOSARTAN POTASSIUM 100 MILLIGRAM(S): 100 TABLET, FILM COATED ORAL at 05:09

## 2018-03-22 RX ADMIN — Medication 15 MILLIGRAM(S): at 05:08

## 2018-03-22 RX ADMIN — Medication 100 MILLIGRAM(S): at 23:36

## 2018-03-22 RX ADMIN — Medication 15 MILLIGRAM(S): at 04:42

## 2018-03-22 RX ADMIN — Medication 0.12 MILLIGRAM(S): at 11:24

## 2018-03-22 RX ADMIN — AMLODIPINE BESYLATE 5 MILLIGRAM(S): 2.5 TABLET ORAL at 20:04

## 2018-03-22 NOTE — PRE-ANESTHESIA EVALUATION ADULT - NSANTHOSAYNRD_GEN_A_CORE
No. TIFFANY screening performed.  STOP BANG Legend: 0-2 = LOW Risk; 3-4 = INTERMEDIATE Risk; 5-8 = HIGH Risk

## 2018-03-22 NOTE — BRIEF OPERATIVE NOTE - PROCEDURE
<<-----Click on this checkbox to enter Procedure Pacemaker implantation, permanent  03/22/2018  DDD PPM IMPLANT  Active  ADENIKE

## 2018-03-22 NOTE — CONSULT NOTE ADULT - SUBJECTIVE AND OBJECTIVE BOX
Surgeon: /Josefina/ Andrae    Consult requesting by:    HISTORY OF PRESENT ILLNESS:  88y Female  HPI:  87 y/o Female with PMH below significant for known [facility] Patient presenting  to ED [place] for syncopal fall with LOC and no HT. Trauma workup revealed a fractured right humerus. Cardiologist Dr. Amin has been following outpatient and has been urging her to come in for PPM. She denies shortness of breath, denies chest pain.      PAST MEDICAL & SURGICAL HISTORY:  Bradycardia  HTN (hypertension)  No significant past surgical history      MEDICATIONS  (STANDING):  furosemide    Tablet 20 milliGRAM(s) Oral daily  losartan 100 milliGRAM(s) Oral daily  sertraline 50 milliGRAM(s) Oral daily    MEDICATIONS  (PRN):  ketorolac   Injectable 15 milliGRAM(s) IV Push every 6 hours PRN Mild Pain (1 - 3)  oxyCODONE    5 mG/acetaminophen 325 mG 1 Tablet(s) Oral every 6 hours PRN Moderate Pain (4 - 6)    Antiplatelet therapy:  N/A    Allergies    No Known Allergies          SOCIAL HISTORY:  Smoker: [ ] Yes  [x] No        PACK YEARS:                         WHEN QUIT?  ETOH use: [ ] Yes  [ ] No              FREQUENCY / QUANTITY:  Ilicit Drug use:  [ ] Yes  [ ] No  Occupation:  Lives with:      FAMILY HISTORY:  No pertinent family history in first degree relatives      Review of Systems  CONSTITUTIONAL:  Fevers[ ] chills[ ] sweats[ ] fatigue[ ] weight loss[ ] weight gain [ ]                                     NEGATIVE [X ]   NEURO:  paresthesias[ ] seizures [ ]  syncope [ ]  confusion [ ]                                                                                NEGATIVE[ X]   EYES: glasses[ ]  blurry vision[ ]  discharge[ ] pain[ ] glaucoma [ ]                                                                          NEGATIVE[X ]   ENMT:  difficulty hearing [ ]  vertigo[ ]  dysphagia[ ] epistaxis[ ] recent dental work [ ]                                    NEGATIVE[ X]   CV:  chest pain[ ] palpitations[ ] LOPEZ [ ] diaphoresis [ ]                                                                                           NEGATIVE[ X]   RESPIRATORY:  wheezing[ ] SOB[ ] cough [ ] sputum[ ] hemoptysis[ ]                                                                  NEGATIVE[ ]   GI:  nausea[ ]  vomiting [ ]  diarrhea[ ] constipation [ ] melena [ ]                                                                      NEGATIVE[ X]   : hematuria[ ]  dysuria[ ] urgency[ ] incontinence[ ]                                                                                            NEGATIVE[ X]   MUSKULOSKELETAL:  arthritis[ ]  joint swelling [ ] muscle weakness [ ] Hx vein stripping [ ]                             NEGATIVE[X ]   SKIN/BREAST:  rash[ ] itching [ ]  hair loss[ ] masses[ ]                                                                                              NEGATIVE[ X]   PSYCH:  dementia [ ] depression [ ] anxiety[ ]                                                                                                               NEGATIVE[X ]   HEME/LYMPH:  bruises easily[ ] enlarged lymph nodes[ ] tender lymph nodes[ ]                                               NEGATIVE[ X]   ENDOCRINE:  cold intolerance[ ] heat intolerance[ ] polydipsia[ ]                                                                          NEGATIVE[ X]     PHYSICAL EXAM  Vital Signs Last 24 Hrs  T(F): 98.1 (22 Mar 2018 08:00), Max: 98.1 (22 Mar 2018 08:00)  HR: 74 (22 Mar 2018 10:00) (38 - 74)  BP: 200/114 (22 Mar 2018 10:00) (162/70 - 202/69)  BP(mean): 116 (22 Mar 2018 06:00) (100 - 118)  RR: 14 (22 Mar 2018 08:00) (12 - 20)  SpO2: 99% (22 Mar 2018 10:00) (97% - 99%)      CONSTITUTIONAL:  WNL[ ]   Neuro: WNL [x] Normal exam oriented to person/place & time with no focal motor or sensory  deficits. Other                     Eyes:    WNL [x] Normal exam of conjunctiva & lids, pupils equally reactive. Other     ENT:     WNL [x] Normal exam of nasal/oral mucosa with absence of cyanosis. Other  Neck:   WNL [x] Normal exam of jugular veins, trachea & thyroid. Other  Chest:  WNL [x] Normal lung exam with good air movement absence of wheezes, rales, or rhonchi: Other                                                                                CV:  Auscultation: normal [xS3[ ] S4[ ] Irregular [ ] Rub[ ] Clicks[ ]    Murmurs none:[xsystolic [ ]  diastolic [ ] holosystolic [ ]  Carotids: No Bruits[x Other                 Abdominal Aorta: normal [ ] nonpalpable[ ]Other                                                                                      GI:           WNL[x]Normal exam of abdomen, liver & spleen with no noted masses or tenderness. Other                                                                                                        Extremities: WNL[ ] trace[]+1[x] 2+[ ]3+[ ]4+[ ]  SKIN :WNL[x] Normal exam to inspection & palpation. Other:                                                          LABS:                        12.0   11.02 )-----------( 193      ( 22 Mar 2018 04:55 )             37.6     03-22    139  |  101  |  29<H>  ----------------------------<  129<H>  4.0   |  25  |  1.1    Ca    8.3<L>      22 Mar 2018 04:55  Mg     1.9     03-22    TPro  6.0  /  Alb  3.6  /  TBili  0.6  /  DBili  x   /  AST  21  /  ALT  18  /  AlkPhos  67  03-22    PT/INR - ( 21 Mar 2018 16:53 )   PT: 11.10 sec;   INR: 1.03 ratio         PTT - ( 21 Mar 2018 16:53 )  PTT:21.8 sec    CARDIAC MARKERS ( 21 Mar 2018 16:53 )  x     / <0.01 ng/mL / 74 U/L / x     / 2.7 ng/mL      Impression:    CAD [ ]  Valvular  disease [ ]   Aortic Disease [ ]   VIRGIL: Yes[ ] No [ ]   CKD Stage I [ ] , Stage II [ ] , Stage III [ ], Stage IV [ ]   Anemia: Yes [ ], No [ ]  Diabetes :Yes [ ], No [ ]  Acute MI: Yes [ ], No [ ]   Heart Failure: Yes [ ] , No [ ] HFpEF [ ], HFrEF [ ]        Assessment/ Plan:  87 y/o female with recently diagnosed third degree heart block x 1month but has refused intervention. Now presents with syncopal fall and right hip fracture.   - Maintain NPO  - PPM today  - consents obtained Surgeon: /Josefina/ Andrae    Consult requesting by:    HISTORY OF PRESENT ILLNESS:    HPI:  87 y/o Female with PMH below significant for known CHB diagnosed 1 month prior at ED[facility] but pt left AMA. Patient presenting  to ED [place] for syncopal fall with LOC and no HT. Cardiologist Dr. Amin has been following outpatient and has been urging her to come in for PPM. She denies shortness of breath, denies chest pain.      PAST MEDICAL & SURGICAL HISTORY:  Bradycardia  HTN (hypertension)  No significant past surgical history      MEDICATIONS  (STANDING):  furosemide    Tablet 20 milliGRAM(s) Oral daily  losartan 100 milliGRAM(s) Oral daily  sertraline 50 milliGRAM(s) Oral daily    MEDICATIONS  (PRN):  ketorolac   Injectable 15 milliGRAM(s) IV Push every 6 hours PRN Mild Pain (1 - 3)  oxyCODONE    5 mG/acetaminophen 325 mG 1 Tablet(s) Oral every 6 hours PRN Moderate Pain (4 - 6)    Antiplatelet therapy:  N/A    Allergies    No Known Allergies          SOCIAL HISTORY:  Smoker: [ ] Yes  [x] No        PACK YEARS:                         WHEN QUIT?  ETOH use: [ ] Yes  [ ] No              FREQUENCY / QUANTITY:  Ilicit Drug use:  [ ] Yes  [ ] No  Occupation:  Lives with:      FAMILY HISTORY:  No pertinent family history in first degree relatives      Review of Systems  CONSTITUTIONAL:  Fevers[ ] chills[ ] sweats[ ] fatigue[ ] weight loss[ ] weight gain [ ]                                     NEGATIVE [X ]   NEURO:  paresthesias[ ] seizures [ ]  syncope [ ]  confusion [ ]                                                                                NEGATIVE[ X]   EYES: glasses[ ]  blurry vision[ ]  discharge[ ] pain[ ] glaucoma [ ]                                                                          NEGATIVE[X ]   ENMT:  difficulty hearing [ ]  vertigo[ ]  dysphagia[ ] epistaxis[ ] recent dental work [ ]                                    NEGATIVE[ X]   CV:  chest pain[ ] palpitations[ ] LOPZE [ ] diaphoresis [ ]                                                                                           NEGATIVE[ X]   RESPIRATORY:  wheezing[ ] SOB[ ] cough [ ] sputum[ ] hemoptysis[ ]                                                                  NEGATIVE[ ]   GI:  nausea[ ]  vomiting [ ]  diarrhea[ ] constipation [ ] melena [ ]                                                                      NEGATIVE[ X]   : hematuria[ ]  dysuria[ ] urgency[ ] incontinence[ ]                                                                                            NEGATIVE[ X]   MUSKULOSKELETAL:  arthritis[ ]  joint swelling [ ] muscle weakness [ ] Hx vein stripping [ ]                             NEGATIVE[X ]   SKIN/BREAST:  rash[ ] itching [ ]  hair loss[ ] masses[ ]                                                                                              NEGATIVE[ X]   PSYCH:  dementia [ ] depression [ ] anxiety[ ]                                                                                                               NEGATIVE[X ]   HEME/LYMPH:  bruises easily[ ] enlarged lymph nodes[ ] tender lymph nodes[ ]                                               NEGATIVE[ X]   ENDOCRINE:  cold intolerance[ ] heat intolerance[ ] polydipsia[ ]                                                                          NEGATIVE[ X]     PHYSICAL EXAM  Vital Signs Last 24 Hrs  T(F): 98.1 (22 Mar 2018 08:00), Max: 98.1 (22 Mar 2018 08:00)  HR: 74 (22 Mar 2018 10:00) (38 - 74)  BP: 200/114 (22 Mar 2018 10:00) (162/70 - 202/69)  BP(mean): 116 (22 Mar 2018 06:00) (100 - 118)  RR: 14 (22 Mar 2018 08:00) (12 - 20)  SpO2: 99% (22 Mar 2018 10:00) (97% - 99%)      CONSTITUTIONAL:  WNL[ ]   Neuro: WNL [x] Normal exam oriented to person/place & time with no focal motor or sensory  deficits. Other                     Eyes:    WNL [x] Normal exam of conjunctiva & lids, pupils equally reactive. Other     ENT:     WNL [x] Normal exam of nasal/oral mucosa with absence of cyanosis. Other  Neck:   WNL [x] Normal exam of jugular veins, trachea & thyroid. Other  Chest:  WNL [x] Normal lung exam with good air movement absence of wheezes, rales, or rhonchi: Other                                                                                CV:  Auscultation: normal [xS3[ ] S4[ ] Irregular [ ] Rub[ ] Clicks[ ]    Murmurs none:[xsystolic [ ]  diastolic [ ] holosystolic [ ]  Carotids: No Bruits[x Other                 Abdominal Aorta: normal [ ] nonpalpable[ ]Other                                                                                      GI:           WNL[x]Normal exam of abdomen, liver & spleen with no noted masses or tenderness. Other                                                                                                        Extremities: WNL[ ] trace[]+1[x] 2+[ ]3+[ ]4+[ ]  SKIN :WNL[x] Normal exam to inspection & palpation. Other:                                                          LABS:                        12.0   11.02 )-----------( 193      ( 22 Mar 2018 04:55 )             37.6     03-22    139  |  101  |  29<H>  ----------------------------<  129<H>  4.0   |  25  |  1.1    Ca    8.3<L>      22 Mar 2018 04:55  Mg     1.9     03-22    TPro  6.0  /  Alb  3.6  /  TBili  0.6  /  DBili  x   /  AST  21  /  ALT  18  /  AlkPhos  67  03-22    PT/INR - ( 21 Mar 2018 16:53 )   PT: 11.10 sec;   INR: 1.03 ratio         PTT - ( 21 Mar 2018 16:53 )  PTT:21.8 sec    CARDIAC MARKERS ( 21 Mar 2018 16:53 )  x     / <0.01 ng/mL / 74 U/L / x     / 2.7 ng/mL      Impression:    CAD [ ]  Valvular  disease [ ]   Aortic Disease [ ]   VIRGIL: Yes[ ] No [ ]   CKD Stage I [ ] , Stage II [ ] , Stage III [ ], Stage IV [ ]   Anemia: Yes [ ], No [ ]  Diabetes :Yes [ ], No [ ]  Acute MI: Yes [ ], No [ ]   Heart Failure: Yes [ ] , No [ ] HFpEF [ ], HFrEF [ ]        Assessment/ Plan:  87 y/o female with recently diagnosed third degree heart block x 1month but has refused intervention. Now presents with syncopal fall.   - Maintain NPO  - PPM today  - consents obtained

## 2018-03-22 NOTE — PRE-ANESTHESIA EVALUATION ADULT - NSANTHPEFT_GEN_ALL_CORE
CV:  RRR , +S1,S2  Carotids: No Bruits  lungs: CTA/BL                                                                                 GI:     Normal exam of abdomen, liver & spleen with no noted masses or tenderness.                                                                                                       Extremities: LE edma 2+ b/l

## 2018-03-22 NOTE — PROGRESS NOTE ADULT - SUBJECTIVE AND OBJECTIVE BOX
PGY I NOTE    LENGTH OF HOSPITAL STAY:  1d    CHIEF COMPLAINT:Patient is a 88y old  Female who presents with a chief complaint of s/p fall (21 Mar 2018 18:51)    HPI:HPI:  88 year old female w/ pmhx of HTN and recently diagnosed complete heart block 1 month prior at ED-[facility]. At that time pt presents w/ symptoms of heart failure (lower extremity edema) and found to be in third degree heart block. However at the time pt left AMA. Of note pt was taking verapamil / losartan / mesalamine / lasix / sertraline - which she has continued to take since. Since she states the she had been feeling fine, however today had an episode where she was talking to her  and then foudn herself on the floor. As per family she fell and has no recollection of the actual event or any prodromal symptoms. Denies any dizziness, chest pain, abdominal pain. Yesterday pt also saw her cardiology Dr. Rodriguez who noted she was still in third degree AV block and discussed pacemaker placement.     Dr. Urbina saw pt in ED and plans to place pacemaker tomorrow.  Pt has refused a TVP. (21 Mar 2018 18:51)    OVERNIGHT EVENTS/INTERVAL UPDATES:  NO acute events overnight.       PMH & PSH  PAST MEDICAL & SURGICAL HISTORY:  Bradycardia  HTN (hypertension)  No significant past surgical history    SOCIAL HISTORY: Negative    ALLERGIES: No Known Allergies    HOME MEDICATIONS  Home Medications:  furosemide 20 mg oral tablet: 1 tab(s) orally once a day (21 Mar 2018 16:27)  losartan 100 mg oral tablet: 1 tab(s) orally once a day (21 Mar 2018 16:27)  mesalamine 800 mg oral delayed release tablet: 2 tab(s) orally 3 times a day (21 Mar 2018 16:27)  sertraline 50 mg oral tablet: 1 tab(s) orally once a day (21 Mar 2018 16:27)    PHYSICAL EXAM:  T(F): 98.1 (03-22-18 @ 08:00), Max: 98.1 (03-22-18 @ 08:00)  HR: 74 (03-22-18 @ 10:00)  BP: 200/114 (03-22-18 @ 10:00)  RR: 14 (03-22-18 @ 08:00)  SpO2: 99% (03-22-18 @ 10:00)  CAPILLARY BLOOD GLUCOSE  100 (21 Mar 2018 20:00)          03-21-18 @ 07:01  -  03-22-18 @ 07:00  --------------------------------------------------------  IN:  Total IN: 0 mL    OUT:    Voided: 1100 mL  Total OUT: 1100 mL    Total NET: -1100 mL      03-22-18 @ 07:01  -  03-22-18 @ 11:16  --------------------------------------------------------  IN:  Total IN: 0 mL    OUT:    Voided: 550 mL  Total OUT: 550 mL    Total NET: -550 mL            General: NAD  HEENT: NCAT  CV: transcutaneous pacer pads on, currently NSR   RESP: CTAB  Abdominal: Soft, NTTP  Extremity: no c/c/e  Neuro: A&O x3    MEDICATIONS  STANDING MEDICATIONS  ALPRAZolam 0.125 milliGRAM(s) Oral once  furosemide    Tablet 20 milliGRAM(s) Oral daily  losartan 100 milliGRAM(s) Oral daily  sertraline 50 milliGRAM(s) Oral daily    PRN MEDICATIONS  ketorolac   Injectable 15 milliGRAM(s) IV Push every 6 hours PRN  oxyCODONE    5 mG/acetaminophen 325 mG 1 Tablet(s) Oral every 6 hours PRN    LABS:                        12.0   11.02 )-----------( 193      ( 22 Mar 2018 04:55 )             37.6              03-22    139  |  101  |  29<H>  ----------------------------<  129<H>  4.0   |  25  |  1.1    Ca    8.3<L>      22 Mar 2018 04:55  Mg     1.9     03-22    TPro  6.0  /  Alb  3.6  /  TBili  0.6  /  DBili  x   /  AST  21  /  ALT  18  /  AlkPhos  67  03-22    LIVER FUNCTIONS - ( 22 Mar 2018 04:55 )  Alb: 3.6 g/dL / Pro: 6.0 g/dL / ALK PHOS: 67 U/L / ALT: 18 U/L / AST: 21 U/L / GGT: x                      PT/INR - ( 21 Mar 2018 16:53 )   PT: 11.10 sec;   INR: 1.03 ratio         PTT - ( 21 Mar 2018 16:53 )  PTT:21.8 sec  CARDIAC MARKERS ( 21 Mar 2018 16:53 )  x     / <0.01 ng/mL / 74 U/L / x     / 2.7 ng/mL

## 2018-03-23 ENCOUNTER — TRANSCRIPTION ENCOUNTER (OUTPATIENT)
Age: 83
End: 2018-03-23

## 2018-03-23 DIAGNOSIS — I10 ESSENTIAL (PRIMARY) HYPERTENSION: ICD-10-CM

## 2018-03-23 DIAGNOSIS — S42.291A OTHER DISPLACED FRACTURE OF UPPER END OF RIGHT HUMERUS, INITIAL ENCOUNTER FOR CLOSED FRACTURE: ICD-10-CM

## 2018-03-23 DIAGNOSIS — I44.2 ATRIOVENTRICULAR BLOCK, COMPLETE: ICD-10-CM

## 2018-03-23 LAB
ALBUMIN SERPL ELPH-MCNC: 3.3 G/DL — LOW (ref 3.5–5.2)
ALP SERPL-CCNC: 62 U/L — SIGNIFICANT CHANGE UP (ref 30–115)
ALT FLD-CCNC: 13 U/L — SIGNIFICANT CHANGE UP (ref 0–41)
ANION GAP SERPL CALC-SCNC: 14 MMOL/L — SIGNIFICANT CHANGE UP (ref 7–14)
AST SERPL-CCNC: 22 U/L — SIGNIFICANT CHANGE UP (ref 0–41)
BASOPHILS # BLD AUTO: 0.03 K/UL — SIGNIFICANT CHANGE UP (ref 0–0.2)
BASOPHILS NFR BLD AUTO: 0.2 % — SIGNIFICANT CHANGE UP (ref 0–1)
BILIRUB SERPL-MCNC: 0.7 MG/DL — SIGNIFICANT CHANGE UP (ref 0.2–1.2)
BUN SERPL-MCNC: 24 MG/DL — HIGH (ref 10–20)
CALCIUM SERPL-MCNC: 7.9 MG/DL — LOW (ref 8.5–10.1)
CHLORIDE SERPL-SCNC: 100 MMOL/L — SIGNIFICANT CHANGE UP (ref 98–110)
CO2 SERPL-SCNC: 24 MMOL/L — SIGNIFICANT CHANGE UP (ref 17–32)
CREAT SERPL-MCNC: 0.9 MG/DL — SIGNIFICANT CHANGE UP (ref 0.7–1.5)
EOSINOPHIL # BLD AUTO: 0.02 K/UL — SIGNIFICANT CHANGE UP (ref 0–0.7)
EOSINOPHIL NFR BLD AUTO: 0.1 % — SIGNIFICANT CHANGE UP (ref 0–8)
GLUCOSE SERPL-MCNC: 116 MG/DL — HIGH (ref 70–110)
HCT VFR BLD CALC: 34.2 % — LOW (ref 37–47)
HCT VFR BLD CALC: 34.8 % — LOW (ref 37–47)
HGB BLD-MCNC: 11.3 G/DL — LOW (ref 12–16)
HGB BLD-MCNC: 11.3 G/DL — LOW (ref 12–16)
IMM GRANULOCYTES NFR BLD AUTO: 0.5 % — HIGH (ref 0.1–0.3)
LYMPHOCYTES # BLD AUTO: 0.74 K/UL — LOW (ref 1.2–3.4)
LYMPHOCYTES # BLD AUTO: 5.2 % — LOW (ref 20.5–51.1)
MCHC RBC-ENTMCNC: 28.4 PG — SIGNIFICANT CHANGE UP (ref 27–31)
MCHC RBC-ENTMCNC: 28.6 PG — SIGNIFICANT CHANGE UP (ref 27–31)
MCHC RBC-ENTMCNC: 32.5 G/DL — SIGNIFICANT CHANGE UP (ref 32–37)
MCHC RBC-ENTMCNC: 33 G/DL — SIGNIFICANT CHANGE UP (ref 32–37)
MCV RBC AUTO: 86.6 FL — SIGNIFICANT CHANGE UP (ref 81–99)
MCV RBC AUTO: 87.4 FL — SIGNIFICANT CHANGE UP (ref 81–99)
MONOCYTES # BLD AUTO: 0.77 K/UL — HIGH (ref 0.1–0.6)
MONOCYTES NFR BLD AUTO: 5.4 % — SIGNIFICANT CHANGE UP (ref 1.7–9.3)
NEUTROPHILS # BLD AUTO: 12.64 K/UL — HIGH (ref 1.4–6.5)
NEUTROPHILS NFR BLD AUTO: 88.6 % — HIGH (ref 42.2–75.2)
NRBC # BLD: 0 /100 WBCS — SIGNIFICANT CHANGE UP (ref 0–0)
NRBC # BLD: 0 /100 WBCS — SIGNIFICANT CHANGE UP (ref 0–0)
PLATELET # BLD AUTO: 188 K/UL — SIGNIFICANT CHANGE UP (ref 130–400)
PLATELET # BLD AUTO: 190 K/UL — SIGNIFICANT CHANGE UP (ref 130–400)
POTASSIUM SERPL-MCNC: 4 MMOL/L — SIGNIFICANT CHANGE UP (ref 3.5–5)
POTASSIUM SERPL-SCNC: 4 MMOL/L — SIGNIFICANT CHANGE UP (ref 3.5–5)
PROT SERPL-MCNC: 5.6 G/DL — LOW (ref 6–8)
RBC # BLD: 3.95 M/UL — LOW (ref 4.2–5.4)
RBC # BLD: 3.98 M/UL — LOW (ref 4.2–5.4)
RBC # FLD: 15.1 % — HIGH (ref 11.5–14.5)
RBC # FLD: 15.1 % — HIGH (ref 11.5–14.5)
SODIUM SERPL-SCNC: 138 MMOL/L — SIGNIFICANT CHANGE UP (ref 135–146)
TSH SERPL-MCNC: 3.17 UIU/ML — SIGNIFICANT CHANGE UP (ref 0.27–4.2)
WBC # BLD: 14.27 K/UL — HIGH (ref 4.8–10.8)
WBC # BLD: 14.59 K/UL — HIGH (ref 4.8–10.8)
WBC # FLD AUTO: 14.27 K/UL — HIGH (ref 4.8–10.8)
WBC # FLD AUTO: 14.59 K/UL — HIGH (ref 4.8–10.8)

## 2018-03-23 RX ORDER — CEPHALEXIN 500 MG
500 CAPSULE ORAL EVERY 12 HOURS
Qty: 0 | Refills: 0 | Status: DISCONTINUED | OUTPATIENT
Start: 2018-03-23 | End: 2018-03-24

## 2018-03-23 RX ORDER — AMLODIPINE BESYLATE 2.5 MG/1
1 TABLET ORAL
Qty: 30 | Refills: 0 | OUTPATIENT
Start: 2018-03-23 | End: 2018-04-21

## 2018-03-23 RX ORDER — CEPHALEXIN 500 MG
1 CAPSULE ORAL
Qty: 10 | Refills: 0 | OUTPATIENT
Start: 2018-03-23 | End: 2018-03-27

## 2018-03-23 RX ORDER — AMLODIPINE BESYLATE 2.5 MG/1
10 TABLET ORAL DAILY
Qty: 0 | Refills: 0 | Status: DISCONTINUED | OUTPATIENT
Start: 2018-03-23 | End: 2018-03-24

## 2018-03-23 RX ORDER — AMLODIPINE BESYLATE 2.5 MG/1
10 TABLET ORAL ONCE
Qty: 0 | Refills: 0 | Status: COMPLETED | OUTPATIENT
Start: 2018-03-23 | End: 2018-03-23

## 2018-03-23 RX ADMIN — Medication 325 MILLIGRAM(S): at 20:13

## 2018-03-23 RX ADMIN — LOSARTAN POTASSIUM 100 MILLIGRAM(S): 100 TABLET, FILM COATED ORAL at 05:23

## 2018-03-23 RX ADMIN — HEPARIN SODIUM 5000 UNIT(S): 5000 INJECTION INTRAVENOUS; SUBCUTANEOUS at 05:28

## 2018-03-23 RX ADMIN — Medication 325 MILLIGRAM(S): at 20:30

## 2018-03-23 RX ADMIN — HEPARIN SODIUM 5000 UNIT(S): 5000 INJECTION INTRAVENOUS; SUBCUTANEOUS at 19:18

## 2018-03-23 RX ADMIN — Medication 100 MILLIGRAM(S): at 06:11

## 2018-03-23 RX ADMIN — AMLODIPINE BESYLATE 10 MILLIGRAM(S): 2.5 TABLET ORAL at 03:41

## 2018-03-23 RX ADMIN — AMLODIPINE BESYLATE 10 MILLIGRAM(S): 2.5 TABLET ORAL at 08:49

## 2018-03-23 NOTE — DISCHARGE NOTE ADULT - MEDICATION SUMMARY - MEDICATIONS TO TAKE
I will START or STAY ON the medications listed below when I get home from the hospital:    mesalamine 800 mg oral delayed release tablet  -- 2 tab(s) by mouth 3 times a day  -- Indication: For UC    losartan 100 mg oral tablet  -- 1 tab(s) by mouth once a day  -- Indication: For HTN (hypertension)    sertraline 50 mg oral tablet  -- 1 tab(s) by mouth once a day  -- Indication: For Antidepressants    amLODIPine 10 mg oral tablet  -- 1 tab(s) by mouth once a day  -- Indication: For HTN (hypertension)    Keflex 500 mg oral capsule  -- 1 cap(s) by mouth 2 times a day   -- Finish all this medication unless otherwise directed by prescriber.    -- Indication: For Abx     furosemide 20 mg oral tablet  -- 1 tab(s) by mouth once a day  -- Indication: For HTN (hypertension)

## 2018-03-23 NOTE — DISCHARGE NOTE ADULT - MEDICATION SUMMARY - MEDICATIONS TO CHANGE
I will SWITCH the dose or number of times a day I take the medications listed below when I get home from the hospital:    Keflex 500 mg oral capsule  -- 1 cap(s) by mouth 2 times a day   -- Finish all this medication unless otherwise directed by prescriber.

## 2018-03-23 NOTE — PROGRESS NOTE ADULT - ASSESSMENT
88 year old female w/ HTN now presents with third degree heart block and Rt arm fracture    # Syncope/ Third degree heart block  - s/p Pacemaker placement  - Per Dr. Conrad, d/c patient on keflex 500 BID for 5 days       2-# Right arm pain sec to fracture  - sling placed in ED  - Spoke to Dr. Alvarado, ortho, states place patient on sling, have them follow up outpatient with Dr. Webster [phone]  - Fracture through the right humeral surgical neck   - ? non displaced fracture of the right greater tuberosity     3-# HTN  - c/w losartan, lasix  - c/w norvasc 10 mg     # DVT ppx    # FULL CODE  Discharge planning  Discussed with Housestaff

## 2018-03-23 NOTE — DISCHARGE NOTE ADULT - HOSPITAL COURSE
88 year old female w/ pmhx of HTN and recently diagnosed complete heart block 1 month prior at ED[facility]. At that time pt presents w/ symptoms of heart failure (lower extremity edema) and found to be in third degree heart block. However at the time pt left AMA. Of note pt was taking verapamil / losartan / mesalamine / lasix / sertraline - which she has continued to take since. Since she states the she had been feeling fine, however today had an episode where she was talking to her  and then found herself on the floor. As per family she fell and has no recollection of the actual event or any prodromal symptoms. Denies any dizziness, chest pain, abdominal pain. Yesterday pt also saw her cardiology Dr. Rodriguez who noted she was still in third degree AV block and discussed pacemaker placement.     Dr. Urbina place pacemaker in.     Follow up with ortho in 1 week. 88 year old female w/ pmhx of HTN and recently diagnosed complete heart block 1 month prior at ED[facility]. At that time pt presents w/ symptoms of heart failure (lower extremity edema) and found to be in third degree heart block. However at the time pt left AMA. Of note pt was taking verapamil / losartan / mesalamine / lasix / sertraline - which she has continued to take since. Since she states the she had been feeling fine, however today had an episode where she was talking to her  and then found herself on the floor. As per family she fell and has no recollection of the actual event or any prodromal symptoms. Denies any dizziness, chest pain, abdominal pain. Yesterday pt also saw her cardiology Dr. Rodriguez who noted she was still in third degree AV block and discussed pacemaker placement.     Dr. Urbina place pacemaker in.     Follow up with ortho in 1 week.     Patient had social work set up home PT.

## 2018-03-23 NOTE — PROGRESS NOTE ADULT - SUBJECTIVE AND OBJECTIVE BOX
PGY I NOTE    LENGTH OF HOSPITAL STAY:  2d    CHIEF COMPLAINT:Patient is a 88y old  Female who presents with a chief complaint of s/p fall /Syncope (21 Mar 2018 18:51)    HPI:HPI:  88 year old female w/ pmhx of HTN and recently diagnosed complete heart block 1 month prior at ED-[facility]. At that time pt presents w/ symptoms of heart failure (lower extremity edema) and found to be in third degree heart block. However at the time pt left AMA. Of note pt was taking verapamil / losartan / mesalamine / lasix / sertraline - which she has continued to take since. Since she states the she had been feeling fine, however today had an episode where she was talking to her  and then foudn herself on the floor. As per family she fell and has no recollection of the actual event or any prodromal symptoms. Denies any dizziness, chest pain, abdominal pain. Yesterday pt also saw her cardiology Dr. Rodriguez who noted she was still in third degree AV block and discussed pacemaker placement.     Dr. Urbina saw pt in ED and plans to place pacemaker tomorrow.  Pt has refused a TVP. (21 Mar 2018 18:51)    OVERNIGHT EVENTS/INTERVAL UPDATES:  No acute events overnight       PMH & PSH  PAST MEDICAL & SURGICAL HISTORY:  Bradycardia  HTN (hypertension)  No significant past surgical history    SOCIAL HISTORY: Negative    ALLERGIES: No Known Allergies    HOME MEDICATIONS  Home Medications:  furosemide 20 mg oral tablet: 1 tab(s) orally once a day (21 Mar 2018 16:27)  losartan 100 mg oral tablet: 1 tab(s) orally once a day (21 Mar 2018 16:27)  mesalamine 800 mg oral delayed release tablet: 2 tab(s) orally 3 times a day (21 Mar 2018 16:27)  sertraline 50 mg oral tablet: 1 tab(s) orally once a day (21 Mar 2018 16:27)    PHYSICAL EXAM:  T(F): 98.3 (03-23-18 @ 07:54), Max: 98.3 (03-23-18 @ 07:54)  HR: 80 (03-23-18 @ 07:54)  BP: 191/69 (03-23-18 @ 07:54)  RR: 15 (03-23-18 @ 07:54)  SpO2: 98% (03-23-18 @ 07:54)  CAPILLARY BLOOD GLUCOSE          03-22-18 @ 07:01  -  03-23-18 @ 07:00  --------------------------------------------------------  IN:    IV PiggyBack: 100 mL    Oral Fluid: 60 mL  Total IN: 160 mL    OUT:    Voided: 700 mL  Total OUT: 700 mL    Total NET: -540 mL            General: NAD  HEENT: NCAT  CV: RRR  RESP: CTAB  Abdominal: Soft, NTTP  Extremity: no c/c/e  Neuro: A&O x3    MEDICATIONS  STANDING MEDICATIONS  amLODIPine   Tablet 10 milliGRAM(s) Oral daily  ceFAZolin   IVPB      ceFAZolin   IVPB 1000 milliGRAM(s) IV Intermittent every 8 hours  heparin  Injectable 5000 Unit(s) SubCutaneous every 12 hours  losartan 100 milliGRAM(s) Oral daily    PRN MEDICATIONS  acetaminophen   Tablet. 325 milliGRAM(s) Oral every 4 hours PRN    LABS:                        11.3   14.27 )-----------( 190      ( 23 Mar 2018 05:29 )             34.2              03-23    138  |  100  |  24<H>  ----------------------------<  116<H>  4.0   |  24  |  0.9    Ca    7.9<L>      23 Mar 2018 05:29  Mg     1.9     03-22    TPro  5.6<L>  /  Alb  3.3<L>  /  TBili  0.7  /  DBili  x   /  AST  22  /  ALT  13  /  AlkPhos  62  03-23    LIVER FUNCTIONS - ( 23 Mar 2018 05:29 )  Alb: 3.3 g/dL / Pro: 5.6 g/dL / ALK PHOS: 62 U/L / ALT: 13 U/L / AST: 22 U/L / GGT: x                      PT/INR - ( 21 Mar 2018 16:53 )   PT: 11.10 sec;   INR: 1.03 ratio         PTT - ( 21 Mar 2018 16:53 )  PTT:21.8 sec  CARDIAC MARKERS ( 21 Mar 2018 16:53 )  x     / <0.01 ng/mL / 74 U/L / x     / 2.7 ng/mL

## 2018-03-23 NOTE — DISCHARGE NOTE ADULT - CARE PROVIDER_API CALL
Michael Webster), Orthopaedic Surgery; Sports Medicine  [address] [place] [address]  Phone: [phone]  Fax: [phone]    Manav Urbina; CHELLY), Cardiac Electrophysiology; Cardiovascular Disease  [address] [place] [address]  Phone: [phone]  Fax: [phone]

## 2018-03-23 NOTE — CHART NOTE - NSCHARTNOTEFT_GEN_A_CORE
pt .discharge is being cancelled  family is concerned about pt care at home  no one is available to take care of her fractured arm  social sevices contacted  discharge cancelled and pt is down graded to floor

## 2018-03-23 NOTE — DISCHARGE NOTE ADULT - PATIENT PORTAL LINK FT
You can access the [facility] Patient Portal, offered by [facility], by registering with the following website: [email or web address]

## 2018-03-23 NOTE — PROGRESS NOTE ADULT - SUBJECTIVE AND OBJECTIVE BOX
INTERVAL HPI/OVERNIGHT EVENTS:  88 year old female w/ pmhx of HTN and recently diagnosed complete heart block 1 month prior at ED-[facility]. At that time pt presents w/ symptoms of heart failure (lower extremity edema) and found to be in third degree heart block. However at the time pt left AMA. Of note pt was taking verapamil / losartan / mesalamine / lasix / sertraline - which she has continued to take since. Since she states the she had been feeling fine, however today had an episode where she was talking to her  and then foudn herself on the floor. found to have Rt humeral fracture  Pt seen and examined for follow up of complete heart block  s/p Pacemaker placement  feels better, no chest pain or sob  c/o Rt arm pain at the fracture site      REVIEW OF SYSTEMS:  CONSTITUTIONAL: No fever, weight loss, or fatigue  EYES: No eye pain, visual disturbances, or discharge  ENMT:  No difficulty hearing, tinnitus, vertigo; No sinus or throat pain  NECK: No pain or stiffness  BREASTS: No pain, masses, or nipple discharge  RESPIRATORY: No cough, wheezing, chills or hemoptysis; No shortness of breath  CARDIOVASCULAR: No chest pain, palpitations, dizziness, or leg swelling  GASTROINTESTINAL: No abdominal or epigastric pain. No nausea, vomiting, or hematemesis; No diarrhea or constipation. No melena or hematochezia.  GENITOURINARY: No dysuria, frequency, hematuria, or incontinence  NEUROLOGICAL: No headaches, memory loss, loss of strength, numbness, or tremors  SKIN: No itching, burning, rashes, or lesions   LYMPH NODES: No enlarged glands  ENDOCRINE: No heat or cold intolerance; No hair loss  MUSCULOSKELETAL: Rt arm pain  PSYCHIATRIC: No depression, anxiety, mood swings, or difficulty sleeping  HEME/LYMPH: No easy bruising, or bleeding gums  ALLERY AND IMMUNOLOGIC: No hives or eczema    VITALS:  T(F): 97.7, Max: 98.3 (03-23-18 @ 07:54)  HR: 84  BP: 115/57  RR: 23  SpO2: 98%    PHYSICAL EXAM:  GENERAL: NAD,   EYES: EOMI, PERRLA, conjunctiva and sclera clear  ENMT: No tonsillar erythema, exudates, or enlargement; Moist mucous membranes, Good dentition, No lesions  NECK: Supple, No JVD, Normal thyroid  NERVOUS SYSTEM:  Alert & Oriented X3, Good concentration; Motor Strength 5/5 B/L upper and lower extremities; DTRs 2+ intact and symmetric  CHEST/LUNG: Clear to percussion bilaterally; No rales, rhonchi, wheezing, or rubs  HEART: Regular rate and rhythm; No murmurs, rubs, or gallops  ABDOMEN: Soft, Nontender, Nondistended; Bowel sounds present  EXTREMITIES:  2+ Peripheral Pulses, No clubbing, cyanosis, or edema  Rt arm in sling  LYMPH: No lymphadenopathy noted  SKIN: No rashes or lesions      LABS:    03-23    138  |  100  |  24<H>  ----------------------------<  116<H>  4.0   |  24  |  0.9    Ca    7.9<L>      23 Mar 2018 05:29  Mg     1.9     03-22    TPro  5.6<L>  /  Alb  3.3<L>  /  TBili  0.7  /  DBili  x   /  AST  22  /  ALT  13  /  AlkPhos  62  03-23                          11.3   14.27 )-----------( 190      ( 23 Mar 2018 05:29 )             34.2           RADIOLOGY & ADDITIONAL TESTS:    Imaging Personally Reviewed:  [ ] YES  [ ] NO    MEDICATIONS:     MEDICATIONS  (STANDING):  amLODIPine   Tablet 10 milliGRAM(s) Oral daily  ceFAZolin   IVPB      ceFAZolin   IVPB 1000 milliGRAM(s) IV Intermittent every 8 hours  heparin  Injectable 5000 Unit(s) SubCutaneous every 12 hours  losartan 100 milliGRAM(s) Oral daily    MEDICATIONS  (PRN):  acetaminophen   Tablet. 325 milliGRAM(s) Oral every 4 hours PRN Mild Pain (1 - 3)

## 2018-03-23 NOTE — DISCHARGE NOTE ADULT - CARE PLAN
Principal Discharge DX:	Complete heart block  Goal:	resolved  Assessment and plan of treatment:	Pacemaker placed  Secondary Diagnosis:	Bradycardia  Secondary Diagnosis:	HTN (hypertension)

## 2018-03-23 NOTE — DISCHARGE NOTE ADULT - CARE PROVIDERS DIRECT ADDRESSES
,DirectAddress_Unknown,[email or web address]

## 2018-03-23 NOTE — PROGRESS NOTE ADULT - SUBJECTIVE AND OBJECTIVE BOX
INTERVAL HPI/OVERNIGHT EVENTS:    Patietn s/p PPM  No event over night. Pt without complains    MEDICATIONS  (STANDING):  amLODIPine   Tablet 10 milliGRAM(s) Oral daily  ceFAZolin   IVPB      ceFAZolin   IVPB 1000 milliGRAM(s) IV Intermittent every 8 hours  heparin  Injectable 5000 Unit(s) SubCutaneous every 12 hours  losartan 100 milliGRAM(s) Oral daily    MEDICATIONS  (PRN):  acetaminophen   Tablet. 325 milliGRAM(s) Oral every 4 hours PRN Mild Pain (1 - 3)      Allergies    No Known Allergies    Intolerances      Vital Signs Last 24 Hrs  T(C): 36.7 (23 Mar 2018 16:00), Max: 36.8 (23 Mar 2018 07:54)  T(F): 98 (23 Mar 2018 16:00), Max: 98.3 (23 Mar 2018 07:54)  HR: 94 (23 Mar 2018 18:06) (76 - 101)  BP: 146/60 (23 Mar 2018 18:06) (115/57 - 196/88)  BP(mean): 90 (23 Mar 2018 18:06) (77 - 139)  RR: 26 (23 Mar 2018 18:06) (11 - 26)  SpO2: 98% (23 Mar 2018 07:54) (95% - 98%)    Wound healing well; No hematoma; no bleeding      RADIOLOGY & ADDITIONAL TESTS:      A/P   Patietn s/p ICD/ PPM/ BiV- ICD implant    - CXR PA/lat  - Device interrigation  - Keflex 500 mg PO q12 h x 5 days  - FU in 3-4 weeks with me

## 2018-03-23 NOTE — DISCHARGE NOTE ADULT - NS AS ACTIVITY OBS
Walking-Outdoors allowed/Stairs allowed/Return to Work/School allowed/Bathing allowed/Sex allowed/Showering allowed/Driving allowed/Walking-Indoors allowed

## 2018-03-23 NOTE — PROGRESS NOTE ADULT - SUBJECTIVE AND OBJECTIVE BOX
Subjective:pt feels ok with left shoulder soreness and some tenderness over ppm site      MEDICATIONS  (STANDING):  amLODIPine   Tablet 10 milliGRAM(s) Oral daily  ceFAZolin   IVPB      ceFAZolin   IVPB 1000 milliGRAM(s) IV Intermittent every 8 hours  heparin  Injectable 5000 Unit(s) SubCutaneous every 12 hours  losartan 100 milliGRAM(s) Oral daily    MEDICATIONS  (PRN):  acetaminophen   Tablet. 325 milliGRAM(s) Oral every 4 hours PRN Mild Pain (1 - 3)            Vital Signs Last 24 Hrs  T(C): 36.8 (23 Mar 2018 07:54), Max: 36.8 (22 Mar 2018 16:00)  T(F): 98.3 (23 Mar 2018 07:54), Max: 98.3 (23 Mar 2018 07:54)  HR: 80 (23 Mar 2018 07:54) (71 - 101)  BP: 191/69 (23 Mar 2018 07:54) (158/54 - 200/114)  BP(mean): 109 (23 Mar 2018 07:54) (109 - 139)  RR: 15 (23 Mar 2018 07:54) (11 - 21)  SpO2: 98% (23 Mar 2018 07:54) (95% - 99%)             REVIEW OF SYSTEMS:  CONSTITUTIONAL: no fever, no chills, no diaphoresis  CARDIOLOGY: no chest pain, no SOB, no palpitation, no diaphoresis, no faint   RESPIRATORY: no dyspnea, no wheeze, no orthopnea, no PND   NEUROLOGICAL: no dizziness, headache, focal deficits to report.  GI: no abdominal pain, no dyspepsia, no nausea, no vomiting, no diarrhea.    HEENT: no congestion, no nasal bleeding               PHYSICAL EXAM:  · CONSTITUTIONAL: Looks stable  . NECK: Supple, no JVD, no bruit on either carotid side   · RESPIRATORY: Normal air entry to lung base, no wheeze, no crackle, no wet rales  · CARDIOVASCULAR: Normal S1, A2, P2, no murmur, no click, regular rate,  no rub,  · EXTREMITIES: No cyanosis, no clubbing, no edema rt arm in a sling  · VASCULAR: Pulses are regular, equal, bilateral in upper and lower extremities  pacer site bandages, clean intact.   	  TELEMETRY: nsr,, v paced.     ECG: pending from this am     TTE:  < from: Transthoracic Echocardiogram (03.22.18 @ 07:03) >  Summary:   1.Left ventricular ejection fraction, by visual estimation, is 60 to   65%.   2. Mildly increased LV wall thickness.   3. Spectral Doppler shows impaired relaxation pattern of left   ventricular myocardial filling (Grade I diastolic dysfunction).   4. Mild aortic regurgitation.   5. Aortic sclerosis.   6. Estimated pulmonary artery systolic pressure is 46.0 mmHg assuming a   right atrial pressure of 10 mmHg, which is consistent with mild pulmonary   hypertension.        LABS:                        11.3   14.27 )-----------( 190      ( 23 Mar 2018 05:29 )             34.2     03-23    138  |  100  |  24<H>  ----------------------------<  116<H>  4.0   |  24  |  0.9    Ca    7.9<L>      23 Mar 2018 05:29  Mg     1.9     03-22    TPro  5.6<L>  /  Alb  3.3<L>  /  TBili  0.7  /  DBili  x   /  AST  22  /  ALT  13  /  AlkPhos  62  03-23    CARDIAC MARKERS ( 21 Mar 2018 16:53 )  x     / <0.01 ng/mL / 74 U/L / x     / 2.7 ng/mL      PT/INR - ( 21 Mar 2018 16:53 )   PT: 11.10 sec;   INR: 1.03 ratio         PTT - ( 21 Mar 2018 16:53 )  PTT:21.8 sec    I&O's Summary    22 Mar 2018 07:01  -  23 Mar 2018 07:00  --------------------------------------------------------  IN: 160 mL / OUT: 700 mL / NET: -540 mL        IMPRESSION AND PLAN: Subjective:pt feels ok with left shoulder soreness and some tenderness over ppm site      MEDICATIONS  (STANDING):  amLODIPine   Tablet 10 milliGRAM(s) Oral daily  ceFAZolin   IVPB      ceFAZolin   IVPB 1000 milliGRAM(s) IV Intermittent every 8 hours  heparin  Injectable 5000 Unit(s) SubCutaneous every 12 hours  losartan 100 milliGRAM(s) Oral daily    MEDICATIONS  (PRN):  acetaminophen   Tablet. 325 milliGRAM(s) Oral every 4 hours PRN Mild Pain (1 - 3)            Vital Signs Last 24 Hrs  T(C): 36.8 (23 Mar 2018 07:54), Max: 36.8 (22 Mar 2018 16:00)  T(F): 98.3 (23 Mar 2018 07:54), Max: 98.3 (23 Mar 2018 07:54)  HR: 80 (23 Mar 2018 07:54) (71 - 101)  BP: 191/69 (23 Mar 2018 07:54) (158/54 - 200/114)  BP(mean): 109 (23 Mar 2018 07:54) (109 - 139)  RR: 15 (23 Mar 2018 07:54) (11 - 21)  SpO2: 98% (23 Mar 2018 07:54) (95% - 99%)             REVIEW OF SYSTEMS:  CONSTITUTIONAL: no fever, no chills, no diaphoresis  CARDIOLOGY: no chest pain, no SOB, no palpitation, no diaphoresis, no faint   RESPIRATORY: no dyspnea, no wheeze, no orthopnea, no PND   NEUROLOGICAL: no dizziness, headache, focal deficits to report.  GI: no abdominal pain, no dyspepsia, no nausea, no vomiting, no diarrhea.    HEENT: no congestion, no nasal bleeding               PHYSICAL EXAM:  · CONSTITUTIONAL: Looks stable  . NECK: Supple, no JVD, no bruit on either carotid side   · RESPIRATORY: Normal air entry to lung base, no wheeze, no crackle, no wet rales  · CARDIOVASCULAR: Normal S1, A2, P2, 2/6 samuel rsb, no click, regular rate,  no rub,  · EXTREMITIES: No cyanosis, no clubbing, no edema rt arm in a sling  · VASCULAR: Pulses are regular, equal, bilateral in upper and lower extremities  pacer site bandages, clean intact.   	  TELEMETRY: nsr,, v paced.     ECG: pending from this am     TTE:  < from: Transthoracic Echocardiogram (03.22.18 @ 07:03) >  Summary:   1.Left ventricular ejection fraction, by visual estimation, is 60 to   65%.   2. Mildly increased LV wall thickness.   3. Spectral Doppler shows impaired relaxation pattern of left   ventricular myocardial filling (Grade I diastolic dysfunction).   4. Mild aortic regurgitation.   5. Aortic sclerosis.   6. Estimated pulmonary artery systolic pressure is 46.0 mmHg assuming a   right atrial pressure of 10 mmHg, which is consistent with mild pulmonary   hypertension.        LABS:                        11.3   14.27 )-----------( 190      ( 23 Mar 2018 05:29 )             34.2     03-23    138  |  100  |  24<H>  ----------------------------<  116<H>  4.0   |  24  |  0.9    Ca    7.9<L>      23 Mar 2018 05:29  Mg     1.9     03-22    TPro  5.6<L>  /  Alb  3.3<L>  /  TBili  0.7  /  DBili  x   /  AST  22  /  ALT  13  /  AlkPhos  62  03-23    CARDIAC MARKERS ( 21 Mar 2018 16:53 )  x     / <0.01 ng/mL / 74 U/L / x     / 2.7 ng/mL      PT/INR - ( 21 Mar 2018 16:53 )   PT: 11.10 sec;   INR: 1.03 ratio         PTT - ( 21 Mar 2018 16:53 )  PTT:21.8 sec    I&O's Summary    22 Mar 2018 07:01  -  23 Mar 2018 07:00  --------------------------------------------------------  IN: 160 mL / OUT: 700 mL / NET: -540 mL        IMPRESSION AND PLAN:

## 2018-03-23 NOTE — PROGRESS NOTE ADULT - ASSESSMENT
88 year old female w/ HTN now presents with third degree heart block    # Third degree heart block  - BP stable, c/w losartan c/w norvasc 10   - check TSH  - Echo done   - Pacemaker place   - Per Dr. Conrad, d/c patient on keflex 500 BID for 5 days   - Possible discharge today     # Right arm pain r/o fracture  - sling placed in ED  - Spoke to Dr. Alvarado, ortho, states place patient on sling, have them follow up outpatient with Dr. Webster [phone]  - Fracture through the right humeral surgical neck   - ? non displaced fracture of the right greater tuberosity     # HTN  - c/w losartan, lasix  - c/w norvasc 10 mg     # DVT ppx    # FULL CODE

## 2018-03-24 VITALS
RESPIRATION RATE: 15 BRPM | DIASTOLIC BLOOD PRESSURE: 93 MMHG | OXYGEN SATURATION: 98 % | HEART RATE: 90 BPM | TEMPERATURE: 97 F | SYSTOLIC BLOOD PRESSURE: 126 MMHG

## 2018-03-24 RX ORDER — DOCUSATE SODIUM 100 MG
100 CAPSULE ORAL EVERY 8 HOURS
Qty: 0 | Refills: 0 | Status: DISCONTINUED | OUTPATIENT
Start: 2018-03-24 | End: 2018-03-24

## 2018-03-24 RX ORDER — CEPHALEXIN 500 MG
500 CAPSULE ORAL EVERY 12 HOURS
Qty: 0 | Refills: 0 | Status: DISCONTINUED | OUTPATIENT
Start: 2018-03-24 | End: 2018-03-24

## 2018-03-24 RX ORDER — SENNA PLUS 8.6 MG/1
2 TABLET ORAL AT BEDTIME
Qty: 0 | Refills: 0 | Status: DISCONTINUED | OUTPATIENT
Start: 2018-03-24 | End: 2018-03-24

## 2018-03-24 RX ADMIN — HEPARIN SODIUM 5000 UNIT(S): 5000 INJECTION INTRAVENOUS; SUBCUTANEOUS at 05:41

## 2018-03-24 RX ADMIN — Medication 500 MILLIGRAM(S): at 05:40

## 2018-03-24 RX ADMIN — AMLODIPINE BESYLATE 10 MILLIGRAM(S): 2.5 TABLET ORAL at 05:40

## 2018-03-24 RX ADMIN — LOSARTAN POTASSIUM 100 MILLIGRAM(S): 100 TABLET, FILM COATED ORAL at 05:41

## 2018-03-24 RX ADMIN — Medication 500 MILLIGRAM(S): at 10:23

## 2018-03-24 NOTE — PROGRESS NOTE ADULT - SUBJECTIVE AND OBJECTIVE BOX
INTERVAL HPI/OVERNIGHT EVENTS:  88 year old female w/ pmhx of HTN and recently diagnosed complete heart block 1 month prior at ED-[facility]. At that time pt presents w/ symptoms of heart failure (lower extremity edema) and found to be in third degree heart block. However at the time pt left AMA. Of note pt was taking verapamil / losartan / mesalamine / lasix / sertraline - which she has continued to take since. Since she states the she had been feeling fine, however today had an episode where she was talking to her  and then foudn herself on the floor. found to have Rt humeral fracture  Pt seen and examined for follow up of complete heart block  s/p Pacemaker placement  Pt charmaine nd examined in CCU for follow up of complete Heart block/ fall/syncope and rt arm fractiure  feels better, no chest pain or sob  c/o mild pain at Rt arm  fracture site  unable to go home sec to no help/support at home      REVIEW OF SYSTEMS:  CONSTITUTIONAL: No fever, weight loss, or fatigue  EYES: No eye pain, visual disturbances, or discharge  ENMT:  No difficulty hearing, tinnitus, vertigo; No sinus or throat pain  NECK: No pain or stiffness  BREASTS: No pain, masses, or nipple discharge  RESPIRATORY: No cough, wheezing, chills or hemoptysis; No shortness of breath  CARDIOVASCULAR: No chest pain, palpitations, dizziness, or leg swelling  GASTROINTESTINAL: No abdominal or epigastric pain. No nausea, vomiting, or hematemesis; No diarrhea or constipation. No melena or hematochezia.  GENITOURINARY: No dysuria, frequency, hematuria, or incontinence  NEUROLOGICAL: No headaches, memory loss, loss of strength, numbness, or tremors  SKIN: No itching, burning, rashes, or lesions   LYMPH NODES: No enlarged glands  ENDOCRINE: No heat or cold intolerance; No hair loss  MUSCULOSKELETAL: Rt arm pain  PSYCHIATRIC: No depression, anxiety, mood swings, or difficulty sleeping  HEME/LYMPH: No easy bruising, or bleeding gums  ALLERY AND IMMUNOLOGIC: No hives or eczema    VITALS:  T(F): 96.9, Max: 99.3 (03-23-18 @ 20:00)  HR: 88  BP: 145/72  RR: 18  SpO2: 98%    PHYSICAL EXAM:  GENERAL: NAD, well-groomed, well-developed  HEAD:  Atraumatic, Normocephalic  EYES: EOMI, PERRLA, conjunctiva and sclera clear  ENMT: No tonsillar erythema, exudates, or enlargement; Moist mucous membranes, Good dentition, No lesions  NECK: Supple, No JVD, Normal thyroid  NERVOUS SYSTEM:  Alert & Oriented X3, Good concentration;   CHEST/LUNG: Clear to percussion bilaterally; No rales, rhonchi, wheezing, or rubs  HEART: Regular rate and rhythm; No murmurs, rubs, or gallops  ABDOMEN: Soft, Nontender, Nondistended; Bowel sounds present  EXTREMITIES: Rt arm in sling  LYMPH: No lymphadenopathy noted  SKIN: No rashes or lesions      LABS:    03-23    138  |  100  |  24<H>  ----------------------------<  116<H>  4.0   |  24  |  0.9    Ca    7.9<L>      23 Mar 2018 05:29    TPro  5.6<L>  /  Alb  3.3<L>  /  TBili  0.7  /  DBili  x   /  AST  22  /  ALT  13  /  AlkPhos  62  03-23                          11.3   14.27 )-----------( 190      ( 23 Mar 2018 05:29 )             34.2           RADIOLOGY & ADDITIONAL TESTS:    Imaging Personally Reviewed:  [ ] YES  [ ] NO    MEDICATIONS:     MEDICATIONS  (STANDING):  amLODIPine   Tablet 10 milliGRAM(s) Oral daily  cephalexin 500 milliGRAM(s) Oral every 12 hours  heparin  Injectable 5000 Unit(s) SubCutaneous every 12 hours  losartan 100 milliGRAM(s) Oral daily    MEDICATIONS  (PRN):  acetaminophen   Tablet. 325 milliGRAM(s) Oral every 4 hours PRN Mild Pain (1 - 3)

## 2018-03-24 NOTE — PROGRESS NOTE ADULT - SUBJECTIVE AND OBJECTIVE BOX
PGY I NOTE    LENGTH OF HOSPITAL STAY:  3d    CHIEF COMPLAINT:Patient is a 88y old  Female who presents with a chief complaint of s/p fall /Syncope (23 Mar 2018 14:58)    HPI:HPI:  88 year old female w/ pmhx of HTN and recently diagnosed complete heart block 1 month prior at ED-[facility]. At that time pt presents w/ symptoms of heart failure (lower extremity edema) and found to be in third degree heart block. However at the time pt left AMA. Of note pt was taking verapamil / losartan / mesalamine / lasix / sertraline - which she has continued to take since. Since she states the she had been feeling fine, however today had an episode where she was talking to her  and then foudn herself on the floor. As per family she fell and has no recollection of the actual event or any prodromal symptoms. Denies any dizziness, chest pain, abdominal pain. Yesterday pt also saw her cardiology Dr. Rodriguez who noted she was still in third degree AV block and discussed pacemaker placement.     Dr. Urbina saw pt in ED and plans to place pacemaker tomorrow.  Pt has refused a TVP. (21 Mar 2018 18:51)    OVERNIGHT EVENTS/INTERVAL UPDATES:  No acute events overnight. Awaiting social work.       PMH & PSH  PAST MEDICAL & SURGICAL HISTORY:  Bradycardia  HTN (hypertension)  No significant past surgical history    SOCIAL HISTORY: Negative    ALLERGIES: No Known Allergies    HOME MEDICATIONS  Home Medications:  furosemide 20 mg oral tablet: 1 tab(s) orally once a day (23 Mar 2018 14:58)  losartan 100 mg oral tablet: 1 tab(s) orally once a day (23 Mar 2018 14:58)  mesalamine 800 mg oral delayed release tablet: 2 tab(s) orally 3 times a day (23 Mar 2018 14:58)  sertraline 50 mg oral tablet: 1 tab(s) orally once a day (23 Mar 2018 14:58)    PHYSICAL EXAM:  T(F): 96.9 (03-24-18 @ 08:01), Max: 99.3 (03-23-18 @ 20:00)  HR: 88 (03-24-18 @ 10:00)  BP: 145/72 (03-24-18 @ 10:00)  RR: 18 (03-24-18 @ 10:00)  SpO2: 98% (03-24-18 @ 10:00)  CAPILLARY BLOOD GLUCOSE          03-23-18 @ 07:01  -  03-24-18 @ 07:00  --------------------------------------------------------  IN:  Total IN: 0 mL    OUT:    Voided: 650 mL  Total OUT: 650 mL    Total NET: -650 mL      03-24-18 @ 07:01  -  03-24-18 @ 11:01  --------------------------------------------------------  IN:    Oral Fluid: 240 mL  Total IN: 240 mL    OUT:    Voided: 350 mL  Total OUT: 350 mL    Total NET: -110 mL            General: NAD  HEENT: NCAT  CV: RRR  RESP: CTAB  Abdominal: Soft, NTTP  Extremity: no c/c/e  Neuro: A&O x3    MEDICATIONS  STANDING MEDICATIONS  amLODIPine   Tablet 10 milliGRAM(s) Oral daily  cephalexin 500 milliGRAM(s) Oral every 12 hours  heparin  Injectable 5000 Unit(s) SubCutaneous every 12 hours  losartan 100 milliGRAM(s) Oral daily    PRN MEDICATIONS  acetaminophen   Tablet. 325 milliGRAM(s) Oral every 4 hours PRN    LABS:                        11.3   14.27 )-----------( 190      ( 23 Mar 2018 05:29 )             34.2              03-23    138  |  100  |  24<H>  ----------------------------<  116<H>  4.0   |  24  |  0.9    Ca    7.9<L>      23 Mar 2018 05:29    TPro  5.6<L>  /  Alb  3.3<L>  /  TBili  0.7  /  DBili  x   /  AST  22  /  ALT  13  /  AlkPhos  62  03-23    LIVER FUNCTIONS - ( 23 Mar 2018 05:29 )  Alb: 3.3 g/dL / Pro: 5.6 g/dL / ALK PHOS: 62 U/L / ALT: 13 U/L / AST: 22 U/L / GGT: x

## 2018-03-24 NOTE — PROGRESS NOTE ADULT - ASSESSMENT
88 year old female w/ HTN now presents with third degree heart block and Rt arm fracture    # Syncope/ Third degree complete heart block  - s/p Pacemaker placement  site look clean  continue tele monitoring  - Per Dr. Conrad, d/c patient on keflex 500 BID for 5 days     2-# Right arm pain sec to fracture  - sling placed in ED  - Spoke to Dr. Alvarado, ortho, states place patient on sling, have them follow up outpatient with Dr. Webstre [phone]  - Fracture through the right humeral surgical neck   - ? non displaced fracture of the right greater tuberosity     3-# HTN  - c/w losartan, lasix  - c/w norvasc 10 mg     # DVT ppx    # FULL CODE     eval for arranging home care and services  Pt wants to go home not to STR    Discussed with Housestaff

## 2018-03-24 NOTE — PROGRESS NOTE ADULT - ASSESSMENT
88 year old female w/ HTN now presents with third degree heart block    # Third degree heart block  - BP stable, c/w losartan c/w norvasc 10   - check TSH  - Echo done   - Pacemaker place   - Per Dr. Conrad, d/c patient on keflex 500 BID for 5 days   - Downgrade vs discharge after social work sees patient.     # Right arm pain r/o fracture  - sling placed in ED  - Spoke to Dr. Alvarado, ortho, states place patient on sling, have them follow up outpatient with Dr. Webster [phone]  - Fracture through the right humeral surgical neck   - ? non displaced fracture of the right greater tuberosity     # HTN  - c/w losartan, lasix  - c/w norvasc 10 mg     # DVT ppx    # FULL CODE

## 2018-03-26 ENCOUNTER — TRANSCRIPTION ENCOUNTER (OUTPATIENT)
Age: 83
End: 2018-03-26

## 2018-03-26 PROBLEM — R00.1 BRADYCARDIA, UNSPECIFIED: Chronic | Status: ACTIVE | Noted: 2018-03-21

## 2018-03-27 DIAGNOSIS — I44.2 ATRIOVENTRICULAR BLOCK, COMPLETE: ICD-10-CM

## 2018-03-27 DIAGNOSIS — I10 ESSENTIAL (PRIMARY) HYPERTENSION: ICD-10-CM
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
## 2018-03-27 DIAGNOSIS — R55 SYNCOPE AND COLLAPSE: ICD-10-CM

## 2018-03-27 DIAGNOSIS — S42.211A UNSPECIFIED DISPLACED FRACTURE OF SURGICAL NECK OF RIGHT HUMERUS, INITIAL ENCOUNTER FOR CLOSED FRACTURE: ICD-10-CM
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
## 2018-03-27 DIAGNOSIS — R00.1 BRADYCARDIA, UNSPECIFIED: ICD-10-CM
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
## 2018-05-04 ENCOUNTER — APPOINTMENT (OUTPATIENT)
Dept: CARDIOLOGY | Facility: CLINIC | Age: 83
End: 2018-05-04

## 2018-05-04 VITALS
WEIGHT: 100 LBS | OXYGEN SATURATION: 96 % | SYSTOLIC BLOOD PRESSURE: 129 MMHG | HEIGHT: 58 IN | BODY MASS INDEX: 20.99 KG/M2 | DIASTOLIC BLOOD PRESSURE: 82 MMHG | HEART RATE: 87 BPM

## 2018-05-04 DIAGNOSIS — I44.2 ATRIOVENTRICULAR BLOCK, COMPLETE: ICD-10-CM

## 2018-05-04 DIAGNOSIS — Z95.0 PRESENCE OF CARDIAC PACEMAKER: ICD-10-CM

## 2018-05-04 DIAGNOSIS — Z80.9 FAMILY HISTORY OF MALIGNANT NEOPLASM, UNSPECIFIED: ICD-10-CM

## 2018-05-04 DIAGNOSIS — Z78.9 OTHER SPECIFIED HEALTH STATUS: ICD-10-CM

## 2018-05-04 DIAGNOSIS — I10 ESSENTIAL (PRIMARY) HYPERTENSION: ICD-10-CM

## 2018-05-04 RX ORDER — FUROSEMIDE 20 MG/1
20 TABLET ORAL
Qty: 30 | Refills: 0 | Status: ACTIVE | COMMUNITY
Start: 2018-03-08

## 2018-05-04 RX ORDER — LOSARTAN POTASSIUM 100 MG/1
100 TABLET, FILM COATED ORAL
Qty: 90 | Refills: 0 | Status: ACTIVE | COMMUNITY
Start: 2018-02-08

## 2018-05-04 RX ORDER — AMLODIPINE BESYLATE 10 MG/1
10 TABLET ORAL
Qty: 30 | Refills: 0 | Status: ACTIVE | COMMUNITY
Start: 2018-05-01

## 2018-05-27 ENCOUNTER — EMERGENCY (EMERGENCY)
Facility: HOSPITAL | Age: 83
LOS: 0 days | Discharge: HOME | End: 2018-05-27
Attending: STUDENT IN AN ORGANIZED HEALTH CARE EDUCATION/TRAINING PROGRAM | Admitting: STUDENT IN AN ORGANIZED HEALTH CARE EDUCATION/TRAINING PROGRAM

## 2018-05-27 VITALS
HEART RATE: 71 BPM | DIASTOLIC BLOOD PRESSURE: 83 MMHG | OXYGEN SATURATION: 98 % | SYSTOLIC BLOOD PRESSURE: 178 MMHG | RESPIRATION RATE: 18 BRPM | TEMPERATURE: 98 F

## 2018-05-27 VITALS
SYSTOLIC BLOOD PRESSURE: 171 MMHG | WEIGHT: 100.09 LBS | RESPIRATION RATE: 18 BRPM | OXYGEN SATURATION: 99 % | HEART RATE: 74 BPM | DIASTOLIC BLOOD PRESSURE: 108 MMHG | TEMPERATURE: 96 F

## 2018-05-27 DIAGNOSIS — I10 ESSENTIAL (PRIMARY) HYPERTENSION: ICD-10-CM

## 2018-05-27 DIAGNOSIS — Z79.2 LONG TERM (CURRENT) USE OF ANTIBIOTICS: ICD-10-CM

## 2018-05-27 DIAGNOSIS — M54.16 RADICULOPATHY, LUMBAR REGION: ICD-10-CM

## 2018-05-27 DIAGNOSIS — Z79.899 OTHER LONG TERM (CURRENT) DRUG THERAPY: ICD-10-CM

## 2018-05-27 DIAGNOSIS — Z95.0 PRESENCE OF CARDIAC PACEMAKER: ICD-10-CM

## 2018-05-27 DIAGNOSIS — M54.9 DORSALGIA, UNSPECIFIED: ICD-10-CM

## 2018-05-27 LAB
ANION GAP SERPL CALC-SCNC: 12 MMOL/L — SIGNIFICANT CHANGE UP (ref 7–14)
BUN SERPL-MCNC: 20 MG/DL — SIGNIFICANT CHANGE UP (ref 10–20)
CALCIUM SERPL-MCNC: 8.9 MG/DL — SIGNIFICANT CHANGE UP (ref 8.5–10.1)
CHLORIDE SERPL-SCNC: 102 MMOL/L — SIGNIFICANT CHANGE UP (ref 98–110)
CO2 SERPL-SCNC: 24 MMOL/L — SIGNIFICANT CHANGE UP (ref 17–32)
CREAT SERPL-MCNC: 0.9 MG/DL — SIGNIFICANT CHANGE UP (ref 0.7–1.5)
GLUCOSE SERPL-MCNC: 109 MG/DL — HIGH (ref 70–99)
HCT VFR BLD CALC: 34.7 % — LOW (ref 37–47)
HGB BLD-MCNC: 11.7 G/DL — LOW (ref 12–16)
MCHC RBC-ENTMCNC: 29.6 PG — SIGNIFICANT CHANGE UP (ref 27–31)
MCHC RBC-ENTMCNC: 33.7 G/DL — SIGNIFICANT CHANGE UP (ref 32–37)
MCV RBC AUTO: 87.8 FL — SIGNIFICANT CHANGE UP (ref 81–99)
NRBC # BLD: 0 /100 WBCS — SIGNIFICANT CHANGE UP (ref 0–0)
PLATELET # BLD AUTO: 254 K/UL — SIGNIFICANT CHANGE UP (ref 130–400)
POTASSIUM SERPL-MCNC: 4 MMOL/L — SIGNIFICANT CHANGE UP (ref 3.5–5)
POTASSIUM SERPL-SCNC: 4 MMOL/L — SIGNIFICANT CHANGE UP (ref 3.5–5)
RBC # BLD: 3.95 M/UL — LOW (ref 4.2–5.4)
RBC # FLD: 13.5 % — SIGNIFICANT CHANGE UP (ref 11.5–14.5)
SODIUM SERPL-SCNC: 138 MMOL/L — SIGNIFICANT CHANGE UP (ref 135–146)
WBC # BLD: 13 K/UL — HIGH (ref 4.8–10.8)
WBC # FLD AUTO: 13 K/UL — HIGH (ref 4.8–10.8)

## 2018-05-27 RX ORDER — ACETAMINOPHEN 500 MG
1 TABLET ORAL
Qty: 28 | Refills: 0 | OUTPATIENT
Start: 2018-05-27 | End: 2018-06-02

## 2018-05-27 RX ORDER — ACETAMINOPHEN 500 MG
650 TABLET ORAL ONCE
Qty: 0 | Refills: 0 | Status: COMPLETED | OUTPATIENT
Start: 2018-05-27 | End: 2018-05-27

## 2018-05-27 RX ORDER — KETOROLAC TROMETHAMINE 30 MG/ML
30 SYRINGE (ML) INJECTION ONCE
Qty: 0 | Refills: 0 | Status: DISCONTINUED | OUTPATIENT
Start: 2018-05-27 | End: 2018-05-27

## 2018-05-27 RX ADMIN — Medication 650 MILLIGRAM(S): at 15:35

## 2018-05-27 RX ADMIN — Medication 30 MILLIGRAM(S): at 13:06

## 2018-05-27 RX ADMIN — Medication 30 MILLIGRAM(S): at 15:36

## 2018-05-27 NOTE — ED PROVIDER NOTE - PHYSICAL EXAMINATION
CONSTITUTIONAL: Well-developed; well-nourished; in no acute distress.   SKIN: warm, dry  HEAD: Normocephalic; atraumatic.  EYES: no conj injection  NECK: Supple; non tender.  CARD: S1, S2 normal; no murmurs, gallops, or rubs. Regular rate and rhythm.   RESP: No wheezes, rales or rhonchi.  ABD: soft ntnd  EXT: Normal ROM.  +Straight leg test. No midline ttp. No saddle anasthesia. No droop foot. 5/5 motor in all ext.   NEURO: Alert, oriented, grossly unremarkable  PSYCH: Cooperative, appropriate.

## 2018-05-27 NOTE — ED PROVIDER NOTE - OBJECTIVE STATEMENT
89 yo F presents to ED with back pain. Pt states she has chronic sciatic back pain that she feels is worsened over the last weeks. Denies precipitating event. Pain better when resting, worse with movement and activity. Denies urinary symptoms, cp, sob, loc, blood in urine, dysuria, fever, chills.

## 2018-05-27 NOTE — ED PROVIDER NOTE - ATTENDING CONTRIBUTION TO CARE
87 y/o F pmh sciatica, and as noted, p/w lower back pain w/ rad down leg, "just like" prior back pain.  NO trauma, bowel or bladder issue, weakness or numbness.  NO fever.  ROS PE as noted; spine non ttp; + SLR; n/v intact, abd s/nt no pulsatile mass. IMP: MSK pain/ radicular pain.  P: analgesia, mm relaxant reassess.

## 2018-05-27 NOTE — ED PROVIDER NOTE - PROGRESS NOTE DETAILS
Pt feeling significantly better. Discussed results in detail with her and son. Has been voiding without difficulty and denies dysuria and frequency. Plan for DC home. Pt and family acknowledge and agree with plan. Family to  walker for pt, will send pain meds to pharmacy. Pt to FU with doctor this week.

## 2018-05-27 NOTE — ED PROVIDER NOTE - NS ED ROS FT
Eyes:  No visual changes  ENMT:  No neck pain or stiffness.  Cardiac:  No chest pain, SOB   Respiratory:  No cough   GI:  No nausea, vomiting, diarrhea or abdominal pain.  :  No dysuria, frequency or burning.  MS:  +back pain.  Neuro:  No headache or weakness.  No LOC.  Skin:  No skin rash.   Endocrine: No history of thyroid disease or diabetes.

## 2018-07-02 ENCOUNTER — INPATIENT (INPATIENT)
Facility: HOSPITAL | Age: 83
LOS: 3 days | Discharge: NURSING, REHAB OR LONG-TERM CARE | End: 2018-07-06
Attending: INTERNAL MEDICINE | Admitting: INTERNAL MEDICINE

## 2018-07-02 VITALS
OXYGEN SATURATION: 98 % | HEART RATE: 90 BPM | DIASTOLIC BLOOD PRESSURE: 98 MMHG | WEIGHT: 89.95 LBS | HEIGHT: 60 IN | SYSTOLIC BLOOD PRESSURE: 217 MMHG | TEMPERATURE: 98 F | RESPIRATION RATE: 18 BRPM

## 2018-07-02 LAB
ALBUMIN SERPL ELPH-MCNC: 4.3 G/DL — SIGNIFICANT CHANGE UP (ref 3.5–5.2)
ALLERGY+IMMUNOLOGY DIAG STUDY NOTE: SIGNIFICANT CHANGE UP
ALP SERPL-CCNC: 100 U/L — SIGNIFICANT CHANGE UP (ref 30–115)
ALT FLD-CCNC: 15 U/L — SIGNIFICANT CHANGE UP (ref 0–41)
ANION GAP SERPL CALC-SCNC: 16 MMOL/L — HIGH (ref 7–14)
APTT BLD: 22 SEC — CRITICAL LOW (ref 27–39.2)
AST SERPL-CCNC: 21 U/L — SIGNIFICANT CHANGE UP (ref 0–41)
BILIRUB SERPL-MCNC: 0.3 MG/DL — SIGNIFICANT CHANGE UP (ref 0.2–1.2)
BUN SERPL-MCNC: 30 MG/DL — HIGH (ref 10–20)
CALCIUM SERPL-MCNC: 9.3 MG/DL — SIGNIFICANT CHANGE UP (ref 8.5–10.1)
CHLORIDE SERPL-SCNC: 100 MMOL/L — SIGNIFICANT CHANGE UP (ref 98–110)
CK MB CFR SERPL CALC: 3.7 NG/ML — SIGNIFICANT CHANGE UP (ref 0.6–6.3)
CK SERPL-CCNC: 97 U/L — SIGNIFICANT CHANGE UP (ref 0–225)
CO2 SERPL-SCNC: 23 MMOL/L — SIGNIFICANT CHANGE UP (ref 17–32)
CREAT SERPL-MCNC: 1.4 MG/DL — SIGNIFICANT CHANGE UP (ref 0.7–1.5)
GLUCOSE SERPL-MCNC: 126 MG/DL — HIGH (ref 70–99)
HCT VFR BLD CALC: 38.6 % — SIGNIFICANT CHANGE UP (ref 37–47)
HGB BLD-MCNC: 12.8 G/DL — SIGNIFICANT CHANGE UP (ref 12–16)
INR BLD: 0.96 RATIO — SIGNIFICANT CHANGE UP (ref 0.65–1.3)
MCHC RBC-ENTMCNC: 29.8 PG — SIGNIFICANT CHANGE UP (ref 27–31)
MCHC RBC-ENTMCNC: 33.2 G/DL — SIGNIFICANT CHANGE UP (ref 32–37)
MCV RBC AUTO: 89.8 FL — SIGNIFICANT CHANGE UP (ref 81–99)
NRBC # BLD: 0 /100 WBCS — SIGNIFICANT CHANGE UP (ref 0–0)
PLATELET # BLD AUTO: 290 K/UL — SIGNIFICANT CHANGE UP (ref 130–400)
POTASSIUM SERPL-MCNC: 4.4 MMOL/L — SIGNIFICANT CHANGE UP (ref 3.5–5)
POTASSIUM SERPL-SCNC: 4.4 MMOL/L — SIGNIFICANT CHANGE UP (ref 3.5–5)
PROT SERPL-MCNC: 7.3 G/DL — SIGNIFICANT CHANGE UP (ref 6–8)
PROTHROM AB SERPL-ACNC: 10.4 SEC — SIGNIFICANT CHANGE UP (ref 9.95–12.87)
RBC # BLD: 4.3 M/UL — SIGNIFICANT CHANGE UP (ref 4.2–5.4)
RBC # FLD: 13.8 % — SIGNIFICANT CHANGE UP (ref 11.5–14.5)
SODIUM SERPL-SCNC: 139 MMOL/L — SIGNIFICANT CHANGE UP (ref 135–146)
TROPONIN T SERPL-MCNC: 0.06 NG/ML — CRITICAL HIGH
TYPE + AB SCN PNL BLD: SIGNIFICANT CHANGE UP
WBC # BLD: 11.41 K/UL — HIGH (ref 4.8–10.8)
WBC # FLD AUTO: 11.41 K/UL — HIGH (ref 4.8–10.8)

## 2018-07-02 RX ORDER — MORPHINE SULFATE 50 MG/1
4 CAPSULE, EXTENDED RELEASE ORAL ONCE
Qty: 0 | Refills: 0 | Status: DISCONTINUED | OUTPATIENT
Start: 2018-07-02 | End: 2018-07-02

## 2018-07-02 RX ORDER — MORPHINE SULFATE 50 MG/1
2 CAPSULE, EXTENDED RELEASE ORAL EVERY 6 HOURS
Qty: 0 | Refills: 0 | Status: DISCONTINUED | OUTPATIENT
Start: 2018-07-02 | End: 2018-07-03

## 2018-07-02 RX ADMIN — MORPHINE SULFATE 4 MILLIGRAM(S): 50 CAPSULE, EXTENDED RELEASE ORAL at 21:03

## 2018-07-02 RX ADMIN — MORPHINE SULFATE 4 MILLIGRAM(S): 50 CAPSULE, EXTENDED RELEASE ORAL at 19:56

## 2018-07-02 NOTE — ED ADULT TRIAGE NOTE - CHIEF COMPLAINT QUOTE
Pt with pain to right groin, right knee and right ankle after falling while trying to get out of her chair.

## 2018-07-02 NOTE — ED PROVIDER NOTE - PHYSICAL EXAMINATION
Vital Signs: I have reviewed the initial vital signs.  Constitutional: well-nourished, no acute distress, normocephalic  Eyes: PERRLA, EOMI, no nystagmus, clear conjunctiva  ENT: MMM, TM b/l clear , no nasal congestion  Cardiovascular: regular rate, regular rhythm, no murmur appreciated  Respiratory: unlabored respiratory effort, clear to auscultation bilaterally  Gastrointestinal: soft, non-tender, non-distended  abdomen, no pulsatile mass  Musculoskeletal: (+) right hip TTP, right leg externally rotated and shortened. decreased ROM secondary to pain  Integumentary: warm, dry, no rash  Neurologic: awake, alert, cranial nerves II-XII grossly intact, extremities’ motor and sensory functions grossly intact, no focal deficits, GCS 15  Psychiatric: appropriate mood, appropriate affect Vital Signs: I have reviewed the initial vital signs.  Constitutional: well-nourished, no acute distress, normocephalic  Eyes: PERRLA, EOMI, no nystagmus, clear conjunctiva  ENT: MMM, TM b/l clear , no nasal congestion  Cardiovascular: regular rate, regular rhythm, no murmur appreciated  Respiratory: unlabored respiratory effort, clear to auscultation bilaterally  Gastrointestinal: soft, non-tender, non-distended  abdomen, no pulsatile mass  Musculoskeletal: (+) right hip TTP, right leg externally rotated and shortened. decreased ROM secondary to pain. DP 2+. Sensation intact.   Integumentary: warm, dry, no rash  Neurologic: awake, alert, cranial nerves II-XII grossly intact, extremities’ motor and sensory functions grossly intact, no focal deficits, GCS 15  Psychiatric: appropriate mood, appropriate affect

## 2018-07-02 NOTE — PROVIDER CONTACT NOTE (OTHER) - SITUATION
BP just arrived from [facility] ED /90. electronically. rechecked manually and it was 160/ 90

## 2018-07-02 NOTE — ED PROVIDER NOTE - OBJECTIVE STATEMENT
Pt BIBA s/p fall at home. Pt states that she was sitting on a chair and leaned over to  a piece of paper when she lost her balance and fell over on her right side. Pt c/o right hip pain. Pt denies any head injury, no LOC, nausea, vomiting, CP, SOB, numbness or tingling.

## 2018-07-02 NOTE — ED PROVIDER NOTE - ATTENDING CONTRIBUTION TO CARE
88 y/o F with hx of HTN, pacemaker presents s/p fall from the chair. No head trauma. Pt c/o of right hip pain. EXAM: R hip extr rotated, shortened. Distal pulses intact. Lungs CTA. No head trauma. No midline tenderness. Abd soft. No guarding or rigidity. A/P: concerned for hip fx. labs, imaging, admit

## 2018-07-02 NOTE — PROVIDER CONTACT NOTE (OTHER) - REASON
pt's bp 199/100 manually. no orders in the computer at this time. Pt came from [facility] ER

## 2018-07-02 NOTE — ED PROVIDER NOTE - NS ED ROS FT
Review of Systems    Constitutional: (-) fever/ chills (-) weight loss  Eyes/ENT: (-) blurry vision, (-) epistaxis (-) sore throat (-) ear pain  Cardiovascular: (-) chest pain, (-) syncope (-) palpitations  Respiratory: (-) cough, (-) shortness of breath  Gastrointestinal: (-) vomiting, (-) diarrhea (-) abdominal pain  Musculoskeletal: (-) neck pain, (-) back pain, (+) right hip pain (-) pedal edema   Integumentary: (-) rash, (-) swelling  Neurological: (-) headache, (-) altered mental status  Psychiatric: (-) hallucinations or depression   Allergic/Immunologic: (-) pruritus

## 2018-07-02 NOTE — ED PROVIDER NOTE - PROGRESS NOTE DETAILS
orthopedic resident aware of the patient at the [facility]. Dr. Dykes is not on the list of accepting physicians. Will admit under merrick

## 2018-07-03 LAB
ANION GAP SERPL CALC-SCNC: 14 MMOL/L — SIGNIFICANT CHANGE UP (ref 7–14)
ANION GAP SERPL CALC-SCNC: 16 MMOL/L — HIGH (ref 7–14)
APTT BLD: 27.6 SEC — SIGNIFICANT CHANGE UP (ref 27–39.2)
BASOPHILS # BLD AUTO: 0.05 K/UL — SIGNIFICANT CHANGE UP (ref 0–0.2)
BASOPHILS NFR BLD AUTO: 0.2 % — SIGNIFICANT CHANGE UP (ref 0–1)
BLD GP AB SCN SERPL QL: SIGNIFICANT CHANGE UP
BUN SERPL-MCNC: 21 MG/DL — HIGH (ref 10–20)
BUN SERPL-MCNC: 23 MG/DL — HIGH (ref 10–20)
CALCIUM SERPL-MCNC: 8.5 MG/DL — SIGNIFICANT CHANGE UP (ref 8.5–10.1)
CALCIUM SERPL-MCNC: 8.7 MG/DL — SIGNIFICANT CHANGE UP (ref 8.5–10.1)
CHLORIDE SERPL-SCNC: 98 MMOL/L — SIGNIFICANT CHANGE UP (ref 98–110)
CHLORIDE SERPL-SCNC: 99 MMOL/L — SIGNIFICANT CHANGE UP (ref 98–110)
CO2 SERPL-SCNC: 21 MMOL/L — SIGNIFICANT CHANGE UP (ref 17–32)
CO2 SERPL-SCNC: 23 MMOL/L — SIGNIFICANT CHANGE UP (ref 17–32)
CREAT SERPL-MCNC: 0.9 MG/DL — SIGNIFICANT CHANGE UP (ref 0.7–1.5)
CREAT SERPL-MCNC: 0.9 MG/DL — SIGNIFICANT CHANGE UP (ref 0.7–1.5)
EOSINOPHIL # BLD AUTO: 0 K/UL — SIGNIFICANT CHANGE UP (ref 0–0.7)
EOSINOPHIL NFR BLD AUTO: 0 % — SIGNIFICANT CHANGE UP (ref 0–8)
GLUCOSE SERPL-MCNC: 137 MG/DL — HIGH (ref 70–99)
GLUCOSE SERPL-MCNC: 187 MG/DL — HIGH (ref 70–99)
HCT VFR BLD CALC: 32.7 % — LOW (ref 37–47)
HCT VFR BLD CALC: 33.4 % — LOW (ref 37–47)
HGB BLD-MCNC: 10.6 G/DL — LOW (ref 12–16)
HGB BLD-MCNC: 11 G/DL — LOW (ref 12–16)
IMM GRANULOCYTES NFR BLD AUTO: 0.9 % — HIGH (ref 0.1–0.3)
INR BLD: 1.07 RATIO — SIGNIFICANT CHANGE UP (ref 0.65–1.3)
LYMPHOCYTES # BLD AUTO: 0.63 K/UL — LOW (ref 1.2–3.4)
LYMPHOCYTES # BLD AUTO: 3.1 % — LOW (ref 20.5–51.1)
MAGNESIUM SERPL-MCNC: 1.9 MG/DL — SIGNIFICANT CHANGE UP (ref 1.8–2.4)
MCHC RBC-ENTMCNC: 28.5 PG — SIGNIFICANT CHANGE UP (ref 27–31)
MCHC RBC-ENTMCNC: 28.7 PG — SIGNIFICANT CHANGE UP (ref 27–31)
MCHC RBC-ENTMCNC: 32.4 G/DL — SIGNIFICANT CHANGE UP (ref 32–37)
MCHC RBC-ENTMCNC: 32.9 G/DL — SIGNIFICANT CHANGE UP (ref 32–37)
MCV RBC AUTO: 87.2 FL — SIGNIFICANT CHANGE UP (ref 81–99)
MCV RBC AUTO: 87.9 FL — SIGNIFICANT CHANGE UP (ref 81–99)
MONOCYTES # BLD AUTO: 1.25 K/UL — HIGH (ref 0.1–0.6)
MONOCYTES NFR BLD AUTO: 6.1 % — SIGNIFICANT CHANGE UP (ref 1.7–9.3)
NEUTROPHILS # BLD AUTO: 18.27 K/UL — HIGH (ref 1.4–6.5)
NEUTROPHILS NFR BLD AUTO: 89.7 % — HIGH (ref 42.2–75.2)
NRBC # BLD: 0 /100 WBCS — SIGNIFICANT CHANGE UP (ref 0–0)
NRBC # BLD: 0 /100 WBCS — SIGNIFICANT CHANGE UP (ref 0–0)
PLATELET # BLD AUTO: 230 K/UL — SIGNIFICANT CHANGE UP (ref 130–400)
PLATELET # BLD AUTO: 232 K/UL — SIGNIFICANT CHANGE UP (ref 130–400)
POTASSIUM SERPL-MCNC: 4.2 MMOL/L — SIGNIFICANT CHANGE UP (ref 3.5–5)
POTASSIUM SERPL-MCNC: 4.4 MMOL/L — SIGNIFICANT CHANGE UP (ref 3.5–5)
POTASSIUM SERPL-SCNC: 4.2 MMOL/L — SIGNIFICANT CHANGE UP (ref 3.5–5)
POTASSIUM SERPL-SCNC: 4.4 MMOL/L — SIGNIFICANT CHANGE UP (ref 3.5–5)
PROTHROM AB SERPL-ACNC: 11.6 SEC — SIGNIFICANT CHANGE UP (ref 9.95–12.87)
RBC # BLD: 3.72 M/UL — LOW (ref 4.2–5.4)
RBC # BLD: 3.83 M/UL — LOW (ref 4.2–5.4)
RBC # FLD: 13.8 % — SIGNIFICANT CHANGE UP (ref 11.5–14.5)
RBC # FLD: 14 % — SIGNIFICANT CHANGE UP (ref 11.5–14.5)
SODIUM SERPL-SCNC: 135 MMOL/L — SIGNIFICANT CHANGE UP (ref 135–146)
SODIUM SERPL-SCNC: 136 MMOL/L — SIGNIFICANT CHANGE UP (ref 135–146)
TROPONIN T SERPL-MCNC: 0.06 NG/ML — CRITICAL HIGH
TYPE + AB SCN PNL BLD: SIGNIFICANT CHANGE UP
WBC # BLD: 14.59 K/UL — HIGH (ref 4.8–10.8)
WBC # BLD: 20.39 K/UL — HIGH (ref 4.8–10.8)
WBC # FLD AUTO: 14.59 K/UL — HIGH (ref 4.8–10.8)
WBC # FLD AUTO: 20.39 K/UL — HIGH (ref 4.8–10.8)

## 2018-07-03 RX ORDER — LOSARTAN POTASSIUM 100 MG/1
100 TABLET, FILM COATED ORAL DAILY
Qty: 0 | Refills: 0 | Status: DISCONTINUED | OUTPATIENT
Start: 2018-07-03 | End: 2018-07-03

## 2018-07-03 RX ORDER — MESALAMINE 400 MG
2 TABLET, DELAYED RELEASE (ENTERIC COATED) ORAL
Qty: 0 | Refills: 0 | COMMUNITY

## 2018-07-03 RX ORDER — SODIUM CHLORIDE 9 MG/ML
1000 INJECTION INTRAMUSCULAR; INTRAVENOUS; SUBCUTANEOUS
Qty: 0 | Refills: 0 | Status: DISCONTINUED | OUTPATIENT
Start: 2018-07-03 | End: 2018-07-04

## 2018-07-03 RX ORDER — AMLODIPINE BESYLATE 2.5 MG/1
10 TABLET ORAL DAILY
Qty: 0 | Refills: 0 | Status: DISCONTINUED | OUTPATIENT
Start: 2018-07-03 | End: 2018-07-03

## 2018-07-03 RX ORDER — FUROSEMIDE 40 MG
1 TABLET ORAL
Qty: 0 | Refills: 0 | COMMUNITY

## 2018-07-03 RX ORDER — HYDROMORPHONE HYDROCHLORIDE 2 MG/ML
1 INJECTION INTRAMUSCULAR; INTRAVENOUS; SUBCUTANEOUS ONCE
Qty: 0 | Refills: 0 | Status: DISCONTINUED | OUTPATIENT
Start: 2018-07-03 | End: 2018-07-03

## 2018-07-03 RX ORDER — LOSARTAN POTASSIUM 100 MG/1
100 TABLET, FILM COATED ORAL DAILY
Qty: 0 | Refills: 0 | Status: DISCONTINUED | OUTPATIENT
Start: 2018-07-03 | End: 2018-07-06

## 2018-07-03 RX ORDER — HEPARIN SODIUM 5000 [USP'U]/ML
5000 INJECTION INTRAVENOUS; SUBCUTANEOUS EVERY 8 HOURS
Qty: 0 | Refills: 0 | Status: DISCONTINUED | OUTPATIENT
Start: 2018-07-03 | End: 2018-07-06

## 2018-07-03 RX ORDER — ONDANSETRON 8 MG/1
4 TABLET, FILM COATED ORAL ONCE
Qty: 0 | Refills: 0 | Status: DISCONTINUED | OUTPATIENT
Start: 2018-07-03 | End: 2018-07-03

## 2018-07-03 RX ORDER — MORPHINE SULFATE 50 MG/1
2 CAPSULE, EXTENDED RELEASE ORAL
Qty: 0 | Refills: 0 | Status: DISCONTINUED | OUTPATIENT
Start: 2018-07-03 | End: 2018-07-03

## 2018-07-03 RX ORDER — SERTRALINE 25 MG/1
50 TABLET, FILM COATED ORAL DAILY
Qty: 0 | Refills: 0 | Status: DISCONTINUED | OUTPATIENT
Start: 2018-07-03 | End: 2018-07-03

## 2018-07-03 RX ORDER — HEPARIN SODIUM 5000 [USP'U]/ML
5000 INJECTION INTRAVENOUS; SUBCUTANEOUS EVERY 8 HOURS
Qty: 0 | Refills: 0 | Status: DISCONTINUED | OUTPATIENT
Start: 2018-07-03 | End: 2018-07-03

## 2018-07-03 RX ORDER — CEFAZOLIN SODIUM 1 G
1000 VIAL (EA) INJECTION EVERY 8 HOURS
Qty: 0 | Refills: 0 | Status: COMPLETED | OUTPATIENT
Start: 2018-07-04 | End: 2018-07-04

## 2018-07-03 RX ORDER — ACETAMINOPHEN 500 MG
650 TABLET ORAL EVERY 6 HOURS
Qty: 0 | Refills: 0 | Status: DISCONTINUED | OUTPATIENT
Start: 2018-07-03 | End: 2018-07-03

## 2018-07-03 RX ORDER — SODIUM CHLORIDE 9 MG/ML
1000 INJECTION, SOLUTION INTRAVENOUS
Qty: 0 | Refills: 0 | Status: DISCONTINUED | OUTPATIENT
Start: 2018-07-03 | End: 2018-07-03

## 2018-07-03 RX ORDER — SERTRALINE 25 MG/1
50 TABLET, FILM COATED ORAL DAILY
Qty: 0 | Refills: 0 | Status: DISCONTINUED | OUTPATIENT
Start: 2018-07-03 | End: 2018-07-06

## 2018-07-03 RX ORDER — AMLODIPINE BESYLATE 2.5 MG/1
10 TABLET ORAL DAILY
Qty: 0 | Refills: 0 | Status: DISCONTINUED | OUTPATIENT
Start: 2018-07-03 | End: 2018-07-06

## 2018-07-03 RX ORDER — DOCUSATE SODIUM 100 MG
100 CAPSULE ORAL THREE TIMES A DAY
Qty: 0 | Refills: 0 | Status: DISCONTINUED | OUTPATIENT
Start: 2018-07-03 | End: 2018-07-06

## 2018-07-03 RX ORDER — MORPHINE SULFATE 50 MG/1
2 CAPSULE, EXTENDED RELEASE ORAL EVERY 6 HOURS
Qty: 0 | Refills: 0 | Status: DISCONTINUED | OUTPATIENT
Start: 2018-07-03 | End: 2018-07-05

## 2018-07-03 RX ORDER — ACETAMINOPHEN 500 MG
650 TABLET ORAL EVERY 6 HOURS
Qty: 0 | Refills: 0 | Status: DISCONTINUED | OUTPATIENT
Start: 2018-07-03 | End: 2018-07-04

## 2018-07-03 RX ADMIN — HEPARIN SODIUM 5000 UNIT(S): 5000 INJECTION INTRAVENOUS; SUBCUTANEOUS at 05:03

## 2018-07-03 RX ADMIN — SODIUM CHLORIDE 50 MILLILITER(S): 9 INJECTION INTRAMUSCULAR; INTRAVENOUS; SUBCUTANEOUS at 19:01

## 2018-07-03 RX ADMIN — MORPHINE SULFATE 2 MILLIGRAM(S): 50 CAPSULE, EXTENDED RELEASE ORAL at 00:43

## 2018-07-03 RX ADMIN — HEPARIN SODIUM 5000 UNIT(S): 5000 INJECTION INTRAVENOUS; SUBCUTANEOUS at 22:06

## 2018-07-03 RX ADMIN — AMLODIPINE BESYLATE 10 MILLIGRAM(S): 2.5 TABLET ORAL at 05:03

## 2018-07-03 RX ADMIN — Medication 100 MILLIGRAM(S): at 22:05

## 2018-07-03 RX ADMIN — SODIUM CHLORIDE 50 MILLILITER(S): 9 INJECTION, SOLUTION INTRAVENOUS at 18:19

## 2018-07-03 RX ADMIN — MORPHINE SULFATE 2 MILLIGRAM(S): 50 CAPSULE, EXTENDED RELEASE ORAL at 00:28

## 2018-07-03 RX ADMIN — SERTRALINE 50 MILLIGRAM(S): 25 TABLET, FILM COATED ORAL at 11:05

## 2018-07-03 RX ADMIN — LOSARTAN POTASSIUM 100 MILLIGRAM(S): 100 TABLET, FILM COATED ORAL at 05:03

## 2018-07-03 NOTE — H&P ADULT - ATTENDING COMMENTS
Pt seen and examined independently with daughter in law at bedside. She has right hip pain after the mechanical fall (she fell forward while sitting on a chair with wheels to  a paper on the floor). No head trauma or LOC.   She denies chest pain, SOB, N/V, abdominal pain, headache or dizziness.   She ambulates with a walker at home at times.   She walks up steps at home and denies any symptoms with exertion.   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   Current EKG reviewed and she has NSR at 80 with no acute ischemic changes.   She denies h/o CAD, CHF, PVD, DM. Creatinine is 0.9  CK and CKMB wnl but troponin is 0.06 - doubt ACS.   repeat troponin being drawn.   Initial elevated troponin could be due to elevated BP on admission - now better controlled on her home meds.    If repeat troponin is stable or improved, then pt is a moderate risk for cardiovascular complications perioperatively and she may proceed to surgery for right hip fracture.   Continue DVT prophylaxis.   Pt is NPO for surgery later today.   Case discussed with medical staff and orthopedic PAs today.   Monitor H/H.    I reviewed the resident's note and I agree with the physical exam, assessment and plan with additions/corrections as above.

## 2018-07-03 NOTE — PROGRESS NOTE ADULT - SUBJECTIVE AND OBJECTIVE BOX
24H events:    Patient is a 89y old Female who presents with a chief complaint of Acute Right Hip Fracture (03 Jul 2018 00:04); admitted for tentative ORIF on 7/3/18.    Primary diagnosis of Hip fracture     Today is hospital day 1d. Patient examined by bedside and is not in any acute distress. Pt is currently on bedrest, unable to move right hip due to pain. She denies SOB, chest pain, or difficulty breathing. She has some pain in her leg, but is otherwise comfortable and awaiting surgery.    PAST MEDICAL & SURGICAL HISTORY  Pacemaker  Bradycardia  HTN (hypertension)  No significant past surgical history    SOCIAL HISTORY:  Negative for smoking/alcohol/drug use.     ALLERGIES:  No Known Allergies    MEDICATIONS:  STANDING MEDICATIONS  amLODIPine   Tablet 10 milliGRAM(s) Oral daily  heparin  Injectable 5000 Unit(s) SubCutaneous every 8 hours  losartan 100 milliGRAM(s) Oral daily  sertraline 50 milliGRAM(s) Oral daily    PRN MEDICATIONS  acetaminophen    Suspension 650 milliGRAM(s) Oral every 6 hours PRN  morphine  - Injectable 2 milliGRAM(s) IV Push every 6 hours PRN    VITALS:   T(F): 97.9  HR: 77  BP: 165/70  RR: 18  SpO2: 98%    LABS:                        11.0   14.59 )-----------( 232      ( 03 Jul 2018 06:57 )             33.4     07-03    136  |  99  |  23<H>  ----------------------------<  137<H>  4.2   |  23  |  0.9    Ca    8.7      03 Jul 2018 06:57  Mg     1.9     07-03    TPro  7.3  /  Alb  4.3  /  TBili  0.3  /  DBili  x   /  AST  21  /  ALT  15  /  AlkPhos  100  07-02    PT/INR - ( 03 Jul 2018 06:57 )   PT: 11.60 sec;   INR: 1.07 ratio         PTT - ( 03 Jul 2018 06:57 )  PTT:27.6 sec      Creatine Kinase, Serum: 97 U/L (07-02-18 @ 19:25)  Troponin T, Serum: 0.06 ng/mL <HH> (07-02-18 @ 19:25)      CARDIAC MARKERS ( 02 Jul 2018 19:25 )  x     / 0.06 ng/mL / 97 U/L / x     / 3.7 ng/mL      RADIOLOGY:  CT Abdomen and Pelvis No Cont (05.27.18 @ 14:03) >IMPRESSION:  No definite urinary tract calculi present. Mild bilateral hydronephrosis of unclear etiology. Recommend follow-up retroperitoneal ultrasound with empty bladder.     Xray Hip 1 View, Right (07.02.18 @ 19:39) > Impression: acute right intertrochanteric femoral fracture. >    Xray Pelvis AP only (07.02.18 @ 19:38) >Impression: Acute right intertrochanteric    PHYSICAL EXAM:  GEN: NAD, Awake in bed, alone.  LUNGS: Clear to auscultation bilaterally.   HEART: S1/S2 present. Pacemaker present; no MGR   ABD: Soft, NT,ND, BS+  EXT: no c/c/e noted, 2+PP, right hip abducted and externally rotated and ttp.  NEURO: AAOX3. No focal Deficits noted at this time

## 2018-07-03 NOTE — CHART NOTE - NSCHARTNOTEFT_GEN_A_CORE
Pt and grand-daughter in the room.  Pt reported UBW of 120# about 7 months ago and now 95# (about 43.1kg per bedscale measure). 20.8% weight loss unintentional  HOWEVER, pt is unclear with her weight history and unsure about her height, predicting about 4'11 inch. BMI is currently 19.3    Therefore, pt will be as normal patient as a result.

## 2018-07-03 NOTE — BRIEF OPERATIVE NOTE - PRE-OP DX
Closed displaced intertrochanteric fracture of right femur, initial encounter  07/03/2018    Active  Hong Abel

## 2018-07-03 NOTE — BRIEF OPERATIVE NOTE - POST-OP DX
Closed nondisplaced intertrochanteric fracture of right femur, initial encounter  07/03/2018    Active  Hong Abel

## 2018-07-03 NOTE — CHART NOTE - NSCHARTNOTEFT_GEN_A_CORE
PACU ANESTHESIA ADMISSION NOTE      Procedure:   Post op diagnosis:      ____  Intubated  TV:______       Rate: ______      FiO2: ______    _x___  Patent Airway    __x__  Full return of protective reflexes    __x__  Full recovery from anesthesia / back to baseline     Vitals:   T:  99         R: 16                 BP: 133/64                Sat: 99%                  P: 81      Mental Status:  _x___ Awake   _x____ Alert   _____ Drowsy   _____ Sedated    Nausea/Vomiting:  ____ NO  ____x__Yes,   See Post - Op Orders          Pain Scale (0-10):  _____    Treatment: ____ None   x ____ See Post - Op/PCA Orders    Post - Operative Fluids:   ____ Oral   ___x_ See Post - Op Orders    Plan: Discharge:   ____Home       _x____Floor     _____Critical Care    _____  Other:_________________    Comments:  no post anesthesia complications  V/S stable  D/C when criteria met

## 2018-07-03 NOTE — PROGRESS NOTE ADULT - ASSESSMENT
POSTOP  CHECK    Pt s/e at bedside. Denies sob/chp, f/ch, n/v. Pain controlled.    NAD, unlabored breathing on RA  RLE  dressing c.d.i  ehl/fhl/ta intact  compartments soft and compressible  silt throughout  DP palpable, toes wwp x 5, cap ref wnl    ICU Vital Signs Last 24 Hrs  T(C): 35.9 (03 Jul 2018 20:00), Max: 37.2 (03 Jul 2018 18:14)  T(F): 96.6 (03 Jul 2018 20:00), Max: 99 (03 Jul 2018 18:14)  HR: 74 (03 Jul 2018 20:00) (64 - 80)  BP: 119/65 (03 Jul 2018 20:00) (117/58 - 199/100)  RR: 20 (03 Jul 2018 20:00) (12 - 21)  SpO2: 96% (03 Jul 2018 19:45) (96% - 100%)                          10.6   20.39 )-----------( 230      ( 03 Jul 2018 19:00 )             32.7       89F s/p CMN after R IT fx. Doing well, no complaints.  -pain control  -abx for 24h postop  -AM CBC  -SCD b/l LE and chem DVT ppx  -PT  -RLE WBAT

## 2018-07-03 NOTE — PROGRESS NOTE ADULT - ASSESSMENT
88 yo F with PMH of Bradycardia s/p PPM placement (placed by EP team 3/2018), HTN, Arthritis, and Sciatic Back Pain presented to the ED with right hip pain after a mechanical fall. She was found to have an acute right intertrochanteric femoral fracture on imaging, and was scheduled for ORIF today. Pt had a slightly elevated troponin in the ED, so we repeated it and it remained stable. Patient is asymptomatic, and there si no concern for ACS. Patient will likely receive medical clearance for the OR today.     Acute Right Intertrochanteric Femoral Fracture  -ORIF today  -Slightly elevated troponin in the ED, patient is asymptomatic  -Repeat Troponin =was 0.06 (unchanged from ED)  -EKG performed (read by attending)  -Attending clearance pending  -f/u ortho recommendations for post-op     Pain control  -2mg Morphine IV push q6, Tylenol 600 mg q6 PRN    HTN  - controlled with 10 mg amlodipine QD, 100 mg losartan QD    Diet: NPO for surgery    DVT prophylaxis: Heparin SC 5000u q8h    Disposition: pending PT/ Rehab evaluation post procedure. 89 yo F with PMH of Bradycardia s/p PPM placement (placed by EP team 3/2018), HTN, Arthritis, and Sciatic Back Pain presented to the ED with right hip pain after a mechanical fall. She was found to have an acute right intertrochanteric femoral fracture on imaging, and was scheduled for ORIF today. Pt had a slightly elevated troponin in the ED, so we repeated it and it remained stable. Patient is asymptomatic, and there si no concern for ACS. Patient will likely receive medical clearance for the OR today.     Acute Right Intertrochanteric Femoral Fracture  -ORIF today  -Slightly elevated troponin in the ED, patient is asymptomatic  -Repeat Troponin =was 0.06 (unchanged from ED)  -EKG performed (read by attending)  -Attending has cleared the patient medically, Ortho is aware  -f/u ortho recommendations for post-op     Pain control  -2mg Morphine IV push q6, Tylenol 600 mg q6 PRN    HTN  - controlled with 10 mg amlodipine QD, 100 mg losartan QD    Diet: NPO for surgery    DVT prophylaxis: Heparin SC 5000u q8h    Disposition: pending PT/ Rehab evaluation post procedure. 89 yo F with PMH of Bradycardia s/p PPM placement (placed by EP team 3/2018), HTN, Arthritis, and Sciatic Back Pain presented to the ED with right hip pain after a mechanical fall. She was found to have an acute right intertrochanteric femoral fracture on imaging, and was scheduled for ORIF today. Pt had a slightly elevated troponin in the ED, so we repeated it and it remained stable. Patient is asymptomatic, and there si no concern for ACS. Patient will likely receive medical clearance for the OR today.     Acute Right Intertrochanteric Femoral Fracture  -ORIF today  -Slightly elevated troponin in the ED, patient is asymptomatic  -Repeat Troponin =was 0.06 (unchanged from ED)  -EKG performed (read by attending)  -Attending has cleared the patient medically, Ortho is aware  -f/u ortho recommendations for post-op     Pain control  -2mg Morphine IV push q6, Tylenol 600 mg q6 PRN    Bilateral hydronephrosis on ct scan on 6/29  -unknown cause  -consider f/u renal us with empty bladder when patient is no longer acute as long as she remains asymptomatic.    HTN  - controlled with 10 mg amlodipine QD, 100 mg losartan QD    Diet: NPO for surgery    DVT prophylaxis: Heparin SC 5000u q8h    Disposition: pending PT/ Rehab evaluation post procedure.

## 2018-07-03 NOTE — H&P ADULT - HISTORY OF PRESENT ILLNESS
88 yo F with PMH of Bradycardia s/p PPM placement, HTN, Arthritis, and Sciatic Back Pain presented to the ED for the evaluation s/p Fall. Patient states she was sitting on a chair with wheels attach to it. A piece of paper had fallen onto the floor and she bent over to pick it up, lost balance, and fell to the ground. Her  was in a different room, heard the fall, and called EMS for further help. She was initially sent to [facility] where she was found to have an acute Right intertrochanteric femoral fracture. She was subsequently transferred to [facility] for further orthopedic management. Currently denies f/n/v/d/c, chills, cp, palpitations, sob, abdominal or back pain. Patient admits to right leg pain currently present at rest and increases with movement. Patient states she lives at home with  alone and was fully independent and functional prior to fall. 89 yo F with PMH of Third Degree Heart Block s/p PPM placement, HTN, Arthritis, and Sciatic Back Pain presented to the ED for the evaluation s/p Fall. Patient states she was sitting on a chair with wheels attach to it. A piece of paper had fallen onto the floor and she bent over to pick it up, lost balance, and fell to the ground. Her  was in a different room, heard the fall, and called EMS for further help. She was initially sent to [facility] where she was found to have an acute Right intertrochanteric femoral fracture. She was subsequently transferred to [facility] for further orthopedic management. Currently denies f/n/v/d/c, chills, cp, palpitations, sob, abdominal or back pain. Patient admits to right leg pain currently present at rest and increases with movement. Patient states she lives at home with  alone and was fully independent and functional prior to fall. 88 yo F with PMH of Third Degree Heart Block s/p PPM placement, HTN, Arthritis, and Sciatic Back Pain presented to the ED for the evaluation s/p Fall. Patient states she was sitting on a chair with wheels attached to it. A piece of paper had fallen onto the floor and she bent over to pick it up, lost balance, and fell to the ground. Her  was in a different room, heard the fall, and called EMS for further help. She was initially sent to [facility] where she was found to have an acute Right intertrochanteric femoral fracture. She was subsequently transferred to [facility] for further orthopedic management. Currently denies f/n/v/d/c, chills, cp, palpitations, sob, abdominal or back pain. Patient admits to right leg pain currently present at rest and increases with movement. Patient states she lives at home with  alone and was fully independent and functional prior to fall.

## 2018-07-03 NOTE — H&P ADULT - NSHPLABSRESULTS_GEN_ALL_CORE
12.8   11.41 )-----------( 290      ( 02 Jul 2018 19:25 )             38.6     07-02    139  |  100  |  30<H>  ----------------------------<  126<H>  4.4   |  23  |  1.4    Ca    9.3      02 Jul 2018 19:25    TPro  7.3  /  Alb  4.3  /  TBili  0.3  /  DBili  x   /  AST  21  /  ALT  15  /  AlkPhos  100  07-02      < from: CT Abdomen and Pelvis No Cont (05.27.18 @ 14:03) >IMPRESSION:  No definite urinary tract calculi present. Mild bilateral hydronephrosis of unclear etiology. Recommend follow-up retroperitoneal ultrasound with empty bladder. < end of copied text >    < from: Xray Hip 1 View, Right (07.02.18 @ 19:39) > Impression: acute right intertrochanteric femoral fracture. < end of copied text >    < from: Xray Pelvis AP only (07.02.18 @ 19:38) >Impression: Acute right intertrochanteric femoral fracture.

## 2018-07-03 NOTE — BRIEF OPERATIVE NOTE - PROCEDURE
<<-----Click on this checkbox to enter Procedure ORIF, fracture, femur, greater trochanter, or intertrochanteric hip fracture  07/03/2018    Active  ZENAIDA

## 2018-07-03 NOTE — H&P ADULT - ASSESSMENT
89 yo F with PMH of Bradycardia s/p PPM placement, HTN, Arthritis, and Sciatic Back Pain presented to the ED for the evaluation s/p Fall. She was found to have an acute right intertrochanteric femoral fracture.     Impression:    # Acute Right Intertrochanteric Femoral Fracture:  - Orthopedics following: Spoke with Ortho [id number],  They can possibly intervene surgically tomorrow.  - Patient placed NPO after midnight  - Medical Clearance needed by attending  - 2 Units of PRBC's placed on hold  - CBC in am    - Patients pharmacy is [facility]. 89 yo F with PMH of Bradycardia s/p PPM placement, HTN, Arthritis, and Sciatic Back Pain presented to the ED for the evaluation s/p Fall. She was found to have an acute right intertrochanteric femoral fracture.     Impression:    # Acute Right Intertrochanteric Femoral Fracture:  - Orthopedics following: Spoke with Ortho [id number],  They can possibly intervene surgically tomorrow.  - Patient placed NPO after midnight  - Medical Clearance needed by attending  - 2 Units of PRBC's placed on hold  - CBC in am    - Patients pharmacy is [facility] on [address]. Spoke with 24 hour [facility] pharmacist. The following are the most recent medications:  Losartan 100mg po qd  Sertraline 50mg 1.5 tabs po qd  Amlodipine 10mg po qd   Lasix 20mg PO QD 3/8/18 30 days supply no more refills after that - possibly discontinued?  Mesalamine 800mg  3 tabs BID     Please re-reconcile medications with  in AM 88 yo F with PMH of Bradycardia s/p PPM placement (placed by EP team 3/2018), HTN, Arthritis, and Sciatic Back Pain presented to the ED for the evaluation s/p Fall. She was found to have an acute right intertrochanteric femoral fracture.     IMPRESSION:    # Acute Right Intertrochanteric Femoral Fracture:  - Orthopedics following: Spoke with Ortho [id number],  They can possibly intervene surgically tomorrow.  - Patient placed NPO after midnight  - Medical Clearance needed by attending  - 2 Units of PRBC's placed on hold  - CBC in am  - Pain control with Morphine 2mg IV q6h prn    # Dispo: PT Rehab     Diet: npo after midnight  dvt ppx: Heparin SC 5000u q8h      ----------------------------------------------------------------------------------------------------------  - Patients pharmacy is [facility] on [address]. Spoke with 24 hour [facility] pharmacist. The following are the most recent medications:  Losartan 100mg po qd  Sertraline 50mg 1.5 tabs po qd  Amlodipine 10mg po qd   Lasix 20mg PO QD 3/8/18 30 days supply no more refills after that - possibly discontinued?  Mesalamine 800mg  3 tabs BID     Please re-reconcile medications with  in AM 89 yo F with PMH of Bradycardia s/p PPM placement (placed by EP team 3/2018), HTN, Arthritis, and Sciatic Back Pain presented to the ED for the evaluation s/p Fall. She was found to have an acute right intertrochanteric femoral fracture.     IMPRESSION:    # Acute Right Intertrochanteric Femoral Fracture:  - Orthopedics following: Spoke with Ortho [id number],  They can possibly intervene surgically tomorrow. Continue with dvt ppx.  - Patient placed NPO after midnight  - Medical Clearance needed by attending  - 2 Units of PRBC's placed on hold  - CBC in am  - Pain control with Morphine 2mg IV q6h prn    # Dispo: PT Rehab     Diet: npo after midnight  dvt ppx: Heparin SC 5000u q8h      ----------------------------------------------------------------------------------------------------------  - Patients pharmacy is [facility] on [address]. Spoke with 24 hour [facility] pharmacist. The following are the most recent medications:  Losartan 100mg po qd  Sertraline 50mg 1.5 tabs po qd  Amlodipine 10mg po qd   Lasix 20mg PO QD 3/8/18 30 days supply no more refills after that - possibly discontinued?  Mesalamine 800mg  3 tabs BID     Please re-reconcile medications with  in AM

## 2018-07-03 NOTE — H&P ADULT - NSHPPHYSICALEXAM_GEN_ALL_CORE
GENERAL: Mild distress due to pain, AAOX3  HEENT: NCAT   CARDIO: S1S2 AUDIBLE  PULM: CTA B/L   GI: SOFT NTND  EXTREMITIES: NO B/L LE EDEMA, NO ECCHYMOSIS OF RLE NOTED HOWEVER IT IS TENDER TO PALPATION GENERAL: Mild distress due to pain, AAOX3  HEENT: NCAT   Neck: no bruits  CARDIO: S1S2 AUDIBLE 2/6 systolic murmur  PULM: CTA B/L   GI: SOFT NTND  EXTREMITIES: NO B/L LE EDEMA, NO ECCHYMOSIS OF RLE NOTED HOWEVER IT IS TENDER TO PALPATION  Right LE externally rotated with DP pulse 2+  Neuro: awake, alert, cooperative

## 2018-07-03 NOTE — PROGRESS NOTE ADULT - SUBJECTIVE AND OBJECTIVE BOX
24H events:    Patient is a 89y old Female who presents with a chief complaint of Acute Right Hip Fracture (03 Jul 2018 00:04); admitted for tentative ORIF on 7/3/18.    Primary diagnosis of Hip fracture     Today is hospital day 1d. Patient is not in any acute distress. Pt on bedrest, unable to move right hip due to pain. She denies any n/v/d, fevers, chest discomfort, SOB, or weakness.     PAST MEDICAL & SURGICAL HISTORY  Pacemaker  Bradycardia  HTN (hypertension)  No significant past surgical history    SOCIAL HISTORY:  Negative for smoking/alcohol/drug use.     ALLERGIES:  No Known Allergies    MEDICATIONS:  STANDING MEDICATIONS  amLODIPine   Tablet 10 milliGRAM(s) Oral daily  heparin  Injectable 5000 Unit(s) SubCutaneous every 8 hours  losartan 100 milliGRAM(s) Oral daily  sertraline 50 milliGRAM(s) Oral daily    PRN MEDICATIONS  acetaminophen    Suspension 650 milliGRAM(s) Oral every 6 hours PRN  morphine  - Injectable 2 milliGRAM(s) IV Push every 6 hours PRN    VITALS:   T(F): 97.9  HR: 77  BP: 165/70  RR: 18  SpO2: 98%    LABS:                        11.0   14.59 )-----------( 232      ( 03 Jul 2018 06:57 )             33.4     07-03    136  |  99  |  23<H>  ----------------------------<  137<H>  4.2   |  23  |  0.9    Ca    8.7      03 Jul 2018 06:57  Mg     1.9     07-03    TPro  7.3  /  Alb  4.3  /  TBili  0.3  /  DBili  x   /  AST  21  /  ALT  15  /  AlkPhos  100  07-02    PT/INR - ( 03 Jul 2018 06:57 )   PT: 11.60 sec;   INR: 1.07 ratio         PTT - ( 03 Jul 2018 06:57 )  PTT:27.6 sec      Creatine Kinase, Serum: 97 U/L (07-02-18 @ 19:25)  Troponin T, Serum: 0.06 ng/mL <HH> (07-02-18 @ 19:25)      CARDIAC MARKERS ( 02 Jul 2018 19:25 )  x     / 0.06 ng/mL / 97 U/L / x     / 3.7 ng/mL      RADIOLOGY:  CT Abdomen and Pelvis No Cont (05.27.18 @ 14:03) >IMPRESSION:  No definite urinary tract calculi present. Mild bilateral hydronephrosis of unclear etiology. Recommend follow-up retroperitoneal ultrasound with empty bladder.     Xray Hip 1 View, Right (07.02.18 @ 19:39) > Impression: acute right intertrochanteric femoral fracture. >    Xray Pelvis AP only (07.02.18 @ 19:38) >Impression: Acute right intertrochanteric    PHYSICAL EXAM:  GEN: No acute distress  LUNGS: Clear to auscultation bilaterally   HEART: S1/S2 present. RRR.   ABD: Soft, non-tender, non-distended. Bowel sounds present  EXT: No edema; right hip abducted and externally rotated; no erythema or edema noted; positive tenderness to palpation; peripheral pulses 2+ bilaterally  NEURO: AAOX3 24H events:    Patient is a 89y old Female who presents with a chief complaint of Acute Right Hip Fracture (03 Jul 2018 00:04); admitted for tentative ORIF on 7/3/18.    Primary diagnosis of Hip fracture     Today is hospital day 1d. Patient is not in any acute distress. Pt on bedrest, unable to move right hip due to pain. She denies any n/v/d, fevers, chest discomfort, SOB, or weakness.     PAST MEDICAL & SURGICAL HISTORY  Pacemaker  Bradycardia  HTN (hypertension)  No significant past surgical history    SOCIAL HISTORY:  Negative for smoking/alcohol/drug use.     ALLERGIES:  No Known Allergies    MEDICATIONS:  STANDING MEDICATIONS  amLODIPine   Tablet 10 milliGRAM(s) Oral daily  heparin  Injectable 5000 Unit(s) SubCutaneous every 8 hours  losartan 100 milliGRAM(s) Oral daily  sertraline 50 milliGRAM(s) Oral daily    PRN MEDICATIONS  acetaminophen    Suspension 650 milliGRAM(s) Oral every 6 hours PRN  morphine  - Injectable 2 milliGRAM(s) IV Push every 6 hours PRN    VITALS:   T(F): 97.9  HR: 77  BP: 165/70  RR: 18  SpO2: 98%    LABS:                        11.0   14.59 )-----------( 232      ( 03 Jul 2018 06:57 )             33.4     07-03    136  |  99  |  23<H>  ----------------------------<  137<H>  4.2   |  23  |  0.9    Ca    8.7      03 Jul 2018 06:57  Mg     1.9     07-03    TPro  7.3  /  Alb  4.3  /  TBili  0.3  /  DBili  x   /  AST  21  /  ALT  15  /  AlkPhos  100  07-02    PT/INR - ( 03 Jul 2018 06:57 )   PT: 11.60 sec;   INR: 1.07 ratio         PTT - ( 03 Jul 2018 06:57 )  PTT:27.6 sec      Creatine Kinase, Serum: 97 U/L (07-02-18 @ 19:25)  Troponin T, Serum: 0.06 ng/mL <HH> (07-02-18 @ 19:25)      CARDIAC MARKERS ( 02 Jul 2018 19:25 )  x     / 0.06 ng/mL / 97 U/L / x     / 3.7 ng/mL      RADIOLOGY:  CT Abdomen and Pelvis No Cont (05.27.18 @ 14:03) >IMPRESSION:  No definite urinary tract calculi present. Mild bilateral hydronephrosis of unclear etiology. Recommend follow-up retroperitoneal ultrasound with empty bladder.     Xray Hip 1 View, Right (07.02.18 @ 19:39) > Impression: acute right intertrochanteric femoral fracture. >    Xray Pelvis AP only (07.02.18 @ 19:38) >Impression: Acute right intertrochanteric    PHYSICAL EXAM:  GEN: No acute distress  LUNGS: Clear to auscultation bilaterally   HEART: S1/S2 present. Pacemaker present; no MGR   ABD: Soft, non-tender, non-distended. Bowel sounds present  EXT: No edema; right hip abducted and externally rotated; no erythema or edema noted; positive tenderness to palpation; peripheral pulses 2+ bilaterally  NEURO: AAOX3 24H events:    Patient is a 89y old Female who presents with a chief complaint of Acute Right Hip Fracture (03 Jul 2018 00:04); admitted for tentative ORIF on 7/3/18.    Primary diagnosis of Hip fracture     Today is hospital day 1d. Patient examined by bedside and is not in any acute distress. Pt on bedrest, unable to move right hip due to pain. She denies any n/v/d, fevers, chest discomfort, SOB, or weakness.     PAST MEDICAL & SURGICAL HISTORY  Pacemaker  Bradycardia  HTN (hypertension)  No significant past surgical history    SOCIAL HISTORY:  Negative for smoking/alcohol/drug use.     ALLERGIES:  No Known Allergies    MEDICATIONS:  STANDING MEDICATIONS  amLODIPine   Tablet 10 milliGRAM(s) Oral daily  heparin  Injectable 5000 Unit(s) SubCutaneous every 8 hours  losartan 100 milliGRAM(s) Oral daily  sertraline 50 milliGRAM(s) Oral daily    PRN MEDICATIONS  acetaminophen    Suspension 650 milliGRAM(s) Oral every 6 hours PRN  morphine  - Injectable 2 milliGRAM(s) IV Push every 6 hours PRN    VITALS:   T(F): 97.9  HR: 77  BP: 165/70  RR: 18  SpO2: 98%    LABS:                        11.0   14.59 )-----------( 232      ( 03 Jul 2018 06:57 )             33.4     07-03    136  |  99  |  23<H>  ----------------------------<  137<H>  4.2   |  23  |  0.9    Ca    8.7      03 Jul 2018 06:57  Mg     1.9     07-03    TPro  7.3  /  Alb  4.3  /  TBili  0.3  /  DBili  x   /  AST  21  /  ALT  15  /  AlkPhos  100  07-02    PT/INR - ( 03 Jul 2018 06:57 )   PT: 11.60 sec;   INR: 1.07 ratio         PTT - ( 03 Jul 2018 06:57 )  PTT:27.6 sec      Creatine Kinase, Serum: 97 U/L (07-02-18 @ 19:25)  Troponin T, Serum: 0.06 ng/mL <HH> (07-02-18 @ 19:25)      CARDIAC MARKERS ( 02 Jul 2018 19:25 )  x     / 0.06 ng/mL / 97 U/L / x     / 3.7 ng/mL      RADIOLOGY:  CT Abdomen and Pelvis No Cont (05.27.18 @ 14:03) >IMPRESSION:  No definite urinary tract calculi present. Mild bilateral hydronephrosis of unclear etiology. Recommend follow-up retroperitoneal ultrasound with empty bladder.     Xray Hip 1 View, Right (07.02.18 @ 19:39) > Impression: acute right intertrochanteric femoral fracture. >    Xray Pelvis AP only (07.02.18 @ 19:38) >Impression: Acute right intertrochanteric    PHYSICAL EXAM:  GEN: No acute distress  LUNGS: Clear to auscultation bilaterally   HEART: S1/S2 present. Pacemaker present; no MGR   ABD: Soft, non-tender, non-distended. Bowel sounds present  EXT: No edema; right hip abducted and externally rotated; no erythema or edema noted; positive tenderness to palpation; peripheral pulses 2+ bilaterally  NEURO: AAOX3 24H events:    Patient is a 89y old Female who presents with a chief complaint of Acute Right Hip Fracture (03 Jul 2018 00:04); admitted for tentative ORIF on 7/3/18.    Primary diagnosis of Hip fracture     Today is hospital day 1d. Patient examined by bedside and is not in any acute distress. Pt is currently on bedrest, unable to move right hip due to pain. She denies any n/v/d, fevers, chest discomfort, SOB, or weakness.     PAST MEDICAL & SURGICAL HISTORY  Pacemaker  Bradycardia  HTN (hypertension)  No significant past surgical history    SOCIAL HISTORY:  Negative for smoking/alcohol/drug use.     ALLERGIES:  No Known Allergies    MEDICATIONS:  STANDING MEDICATIONS  amLODIPine   Tablet 10 milliGRAM(s) Oral daily  heparin  Injectable 5000 Unit(s) SubCutaneous every 8 hours  losartan 100 milliGRAM(s) Oral daily  sertraline 50 milliGRAM(s) Oral daily    PRN MEDICATIONS  acetaminophen    Suspension 650 milliGRAM(s) Oral every 6 hours PRN  morphine  - Injectable 2 milliGRAM(s) IV Push every 6 hours PRN    VITALS:   T(F): 97.9  HR: 77  BP: 165/70  RR: 18  SpO2: 98%    LABS:                        11.0   14.59 )-----------( 232      ( 03 Jul 2018 06:57 )             33.4     07-03    136  |  99  |  23<H>  ----------------------------<  137<H>  4.2   |  23  |  0.9    Ca    8.7      03 Jul 2018 06:57  Mg     1.9     07-03    TPro  7.3  /  Alb  4.3  /  TBili  0.3  /  DBili  x   /  AST  21  /  ALT  15  /  AlkPhos  100  07-02    PT/INR - ( 03 Jul 2018 06:57 )   PT: 11.60 sec;   INR: 1.07 ratio         PTT - ( 03 Jul 2018 06:57 )  PTT:27.6 sec      Creatine Kinase, Serum: 97 U/L (07-02-18 @ 19:25)  Troponin T, Serum: 0.06 ng/mL <HH> (07-02-18 @ 19:25)      CARDIAC MARKERS ( 02 Jul 2018 19:25 )  x     / 0.06 ng/mL / 97 U/L / x     / 3.7 ng/mL      RADIOLOGY:  CT Abdomen and Pelvis No Cont (05.27.18 @ 14:03) >IMPRESSION:  No definite urinary tract calculi present. Mild bilateral hydronephrosis of unclear etiology. Recommend follow-up retroperitoneal ultrasound with empty bladder.     Xray Hip 1 View, Right (07.02.18 @ 19:39) > Impression: acute right intertrochanteric femoral fracture. >    Xray Pelvis AP only (07.02.18 @ 19:38) >Impression: Acute right intertrochanteric    PHYSICAL EXAM:  GEN: No acute distress  LUNGS: Clear to auscultation bilaterally   HEART: S1/S2 present. Pacemaker present; no MGR   ABD: Soft, non-tender, non-distended. Bowel sounds present  EXT: No edema; right hip abducted and externally rotated; no erythema or edema noted; positive tenderness to palpation; peripheral pulses 2+ bilaterally  NEURO: AAOX3

## 2018-07-03 NOTE — PROGRESS NOTE ADULT - ASSESSMENT
89 yo F with PMH of Bradycardia s/p PPM placement (placed by EP team 3/2018), HTN, Arthritis, and Sciatic Back Pain presented to the ED for the evaluation s/p Fall. She was found to have an acute right intertrochanteric femoral fracture, scheduled for ORIF today. Pt is currently awaiting Medical risk assessment clearance.      Acute Right Intertrochanteric Femoral Fracture  -scheduled for ORIF today  - Awaiting medical clearance  - EKG repeated (read by attending)  - pain controlled with 2mg Morphine IV push q6, Tylenol 600 mg q6 PRN      HTN  - controlled with 10 mg amlodipine QD, 100 mg losartan QD    Diet NPO   DVT prophylaxis: Heparin SC 5000u q8h  Code status:     Disposition: Tenative PT/ Rehab 89 yo F with PMH of Bradycardia s/p PPM placement (placed by EP team 3/2018), HTN, Arthritis, and Sciatic Back Pain presented to the ED for the evaluation s/p Fall. She was found to have an acute right intertrochanteric femoral fracture, scheduled for ORIF today. Pt is currently awaiting Medical risk assessment clearance.      Acute Right Intertrochanteric Femoral Fracture  - scheduled for ORIF today  - EKG repeated (read by attending)  - pain controlled with 2mg Morphine IV push q6, Tylenol 600 mg q6 PRN    HTN  - controlled with 10 mg amlodipine QD, 100 mg losartan QD    Diet: NPO for surgery  DVT prophylaxis: Heparin SC 5000u q8h  Code status:     Disposition: Ten[facility] PT/ Rehab 89 yo F with PMH of Bradycardia s/p PPM placement (placed by EP team 3/2018), HTN, Arthritis, and Sciatic Back Pain presented to the ED for the evaluation s/p Fall. She was found to have an acute right intertrochanteric femoral fracture, scheduled for ORIF today. Pt is currently awaiting Medical risk assessment clearance.      Acute Right Intertrochanteric Femoral Fracture  - scheduled for ORIF today  - Repeat Troponin 0.06 (unchanged from 8/2)  - EKG repeated (read by attending)  - pain controlled with 2mg Morphine IV push q6, Tylenol 600 mg q6 PRN    HTN  - controlled with 10 mg amlodipine QD, 100 mg losartan QD    Diet: NPO for surgery  DVT prophylaxis: Heparin SC 5000u q8h  Code status:     Disposition: Tentative PT/ Rehab

## 2018-07-04 LAB
ANION GAP SERPL CALC-SCNC: 16 MMOL/L — HIGH (ref 7–14)
BASOPHILS # BLD AUTO: 0.02 K/UL — SIGNIFICANT CHANGE UP (ref 0–0.2)
BASOPHILS NFR BLD AUTO: 0.1 % — SIGNIFICANT CHANGE UP (ref 0–1)
BUN SERPL-MCNC: 24 MG/DL — HIGH (ref 10–20)
CALCIUM SERPL-MCNC: 8.3 MG/DL — LOW (ref 8.5–10.1)
CHLORIDE SERPL-SCNC: 99 MMOL/L — SIGNIFICANT CHANGE UP (ref 98–110)
CO2 SERPL-SCNC: 22 MMOL/L — SIGNIFICANT CHANGE UP (ref 17–32)
CREAT SERPL-MCNC: 0.9 MG/DL — SIGNIFICANT CHANGE UP (ref 0.7–1.5)
EOSINOPHIL # BLD AUTO: 0 K/UL — SIGNIFICANT CHANGE UP (ref 0–0.7)
EOSINOPHIL NFR BLD AUTO: 0 % — SIGNIFICANT CHANGE UP (ref 0–8)
GLUCOSE SERPL-MCNC: 117 MG/DL — HIGH (ref 70–99)
HCT VFR BLD CALC: 29.8 % — LOW (ref 37–47)
HGB BLD-MCNC: 9.8 G/DL — LOW (ref 12–16)
IMM GRANULOCYTES NFR BLD AUTO: 0.4 % — HIGH (ref 0.1–0.3)
LYMPHOCYTES # BLD AUTO: 0.89 K/UL — LOW (ref 1.2–3.4)
LYMPHOCYTES # BLD AUTO: 6.5 % — LOW (ref 20.5–51.1)
MAGNESIUM SERPL-MCNC: 1.9 MG/DL — SIGNIFICANT CHANGE UP (ref 1.8–2.4)
MCHC RBC-ENTMCNC: 28.8 PG — SIGNIFICANT CHANGE UP (ref 27–31)
MCHC RBC-ENTMCNC: 32.9 G/DL — SIGNIFICANT CHANGE UP (ref 32–37)
MCV RBC AUTO: 87.6 FL — SIGNIFICANT CHANGE UP (ref 81–99)
MONOCYTES # BLD AUTO: 1.3 K/UL — HIGH (ref 0.1–0.6)
MONOCYTES NFR BLD AUTO: 9.4 % — HIGH (ref 1.7–9.3)
NEUTROPHILS # BLD AUTO: 11.53 K/UL — HIGH (ref 1.4–6.5)
NEUTROPHILS NFR BLD AUTO: 83.6 % — HIGH (ref 42.2–75.2)
NRBC # BLD: 0 /100 WBCS — SIGNIFICANT CHANGE UP (ref 0–0)
PLATELET # BLD AUTO: 216 K/UL — SIGNIFICANT CHANGE UP (ref 130–400)
POTASSIUM SERPL-MCNC: 4.5 MMOL/L — SIGNIFICANT CHANGE UP (ref 3.5–5)
POTASSIUM SERPL-SCNC: 4.5 MMOL/L — SIGNIFICANT CHANGE UP (ref 3.5–5)
RBC # BLD: 3.4 M/UL — LOW (ref 4.2–5.4)
RBC # FLD: 14.2 % — SIGNIFICANT CHANGE UP (ref 11.5–14.5)
SODIUM SERPL-SCNC: 137 MMOL/L — SIGNIFICANT CHANGE UP (ref 135–146)
WBC # BLD: 13.79 K/UL — HIGH (ref 4.8–10.8)
WBC # FLD AUTO: 13.79 K/UL — HIGH (ref 4.8–10.8)

## 2018-07-04 RX ORDER — ACETAMINOPHEN 500 MG
650 TABLET ORAL EVERY 6 HOURS
Qty: 0 | Refills: 0 | Status: DISCONTINUED | OUTPATIENT
Start: 2018-07-04 | End: 2018-07-06

## 2018-07-04 RX ADMIN — Medication 100 MILLIGRAM(S): at 17:30

## 2018-07-04 RX ADMIN — HEPARIN SODIUM 5000 UNIT(S): 5000 INJECTION INTRAVENOUS; SUBCUTANEOUS at 14:43

## 2018-07-04 RX ADMIN — Medication 100 MILLIGRAM(S): at 14:43

## 2018-07-04 RX ADMIN — HEPARIN SODIUM 5000 UNIT(S): 5000 INJECTION INTRAVENOUS; SUBCUTANEOUS at 06:32

## 2018-07-04 RX ADMIN — Medication 100 MILLIGRAM(S): at 21:46

## 2018-07-04 RX ADMIN — Medication 100 MILLIGRAM(S): at 01:21

## 2018-07-04 RX ADMIN — Medication 100 MILLIGRAM(S): at 06:32

## 2018-07-04 RX ADMIN — Medication 100 MILLIGRAM(S): at 08:20

## 2018-07-04 RX ADMIN — Medication 650 MILLIGRAM(S): at 11:51

## 2018-07-04 RX ADMIN — LOSARTAN POTASSIUM 100 MILLIGRAM(S): 100 TABLET, FILM COATED ORAL at 06:35

## 2018-07-04 RX ADMIN — SERTRALINE 50 MILLIGRAM(S): 25 TABLET, FILM COATED ORAL at 11:51

## 2018-07-04 RX ADMIN — HEPARIN SODIUM 5000 UNIT(S): 5000 INJECTION INTRAVENOUS; SUBCUTANEOUS at 21:46

## 2018-07-04 RX ADMIN — AMLODIPINE BESYLATE 10 MILLIGRAM(S): 2.5 TABLET ORAL at 06:35

## 2018-07-04 NOTE — PROGRESS NOTE ADULT - ASSESSMENT
88 yo F with PMH of Bradycardia s/p PPM placement (placed by EP team 3/2018), HTN, Arthritis, and Sciatic Back Pain presented to the ED with right hip pain after a mechanical fall. She was found to have an acute right intertrochanteric femoral fracture on imaging, and was scheduled for ORIF today. Pt had a slightly elevated troponin in the ED, so we repeated it and it remained stable. Patient is asymptomatic, and there si no concern for ACS. Patient will likely receive medical clearance for the OR today.     1-Acute Right Intertrochanteric Femoral Fracture  s/p ORIf  continue pain controll and DVT prophylaxis  PT/Rehab    2-HTN  - controlled with 10 mg amlodipine QD, 100 mg losartan QD    DVT prophylaxis: Heparin SC 5000u q8h    Disposition: pending PT/ Rehab evaluation post procedure.

## 2018-07-04 NOTE — PROVIDER CONTACT NOTE (OTHER) - SITUATION
md notified, pt voided 20cc, bladder scan shows over 400cc . as per MD, have pt try again in 1hr and follow up

## 2018-07-04 NOTE — CONSULT NOTE ADULT - ASSESSMENT
IMPRESSION: Rehab of right hip fx    PRECAUTIONS: [  x  ] Cardiac  [    ] Respiratory  [    ] Seizures [    ] Contact Isolation  [    ] Droplet Isolation  [ x   ] falls  Weight Bearing Status: see ortho note    RECOMMENDATION:    Out of Bed to Chair     DVT/Decubiti Prophylaxis    REHAB PLAN:     [ x    ] Bedside P/T 3-5 times a week   [ x    ] Bedside O/T  2-3 times a week     Conditioning/ROM                                 ADL  Bed Mobility                                            Conditioning/ROM  Transfers                                                  Bed Mobility  Sitting /Standing Balance                      Transfers                                        Gait Training                                            Sitting/Standing Balance  Stair Training [   ]Applicable                 Home equipment Eval                                                                     Splinting  [   ] Only      GOALS:   ADL   [    ]   Independent         Transfers  [    ] Independent            Ambulation  [     ] Independent     [  x   ] With device                            [    ]  CG                                               [    ]  CG                                                    [     ] CG                            [  x  ] Min A                                          [x    ] Min A                                                [   x  ] Min  A          DISCHARGE PLAN:   [  x   ]  Good candidate for Intensive Rehabilitation/Hospital based-[facility]A [facility]                                             Will tolerate 3hrs Intensive Rehab Daily                                       [      ]  Short Term Rehab in Skilled Nursing Facility                                       [      ]  Home with Outpatient or VN services                                         [      ]  Possible Candidate for Intensive Hospital based Rehab

## 2018-07-04 NOTE — PROGRESS NOTE ADULT - SUBJECTIVE AND OBJECTIVE BOX
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 Patient states she was sitting on a chair with wheels attached to it. A piece of paper had fallen onto the floor and she bent over to pick it up, lost balance, and fell to the ground. Her  was in a different room, heard the fall, and called EMS for further help. She was initially sent to [facility] where she was found to have an acute Right intertrochanteric femoral fracture.   pt was admitted for further treatment    CC pt sseen and examined for follow up of rt Hip fracture  s/p surgery  feels ok  mild pain  REVIEW OF SYSTEMS:  CONSTITUTIONAL: No fever, weight loss, or fatigue  EYES: No eye pain, visual disturbances, or discharge  ENMT:  No difficulty hearing, tinnitus, vertigo; No sinus or throat pain  NECK: No pain or stiffness  BREASTS: No pain, masses, or nipple discharge  RESPIRATORY: No cough, wheezing, chills or hemoptysis; No shortness of breath  CARDIOVASCULAR: No chest pain, palpitations, dizziness, or leg swelling  GASTROINTESTINAL: No abdominal or epigastric pain. No nausea, vomiting, or hematemesis; No diarrhea or constipation. No melena or hematochezia.  GENITOURINARY: No dysuria, frequency, hematuria, or incontinence  NEUROLOGICAL: No headaches, memory loss, loss of strength, numbness, or tremors  SKIN: No itching, burning, rashes, or lesions   LYMPH NODES: No enlarged glands  ENDOCRINE: No heat or cold intolerance; No hair loss  MUSCULOSKELETAL: Rt hip surgery site pain  PSYCHIATRIC: No depression, anxiety, mood swings, or difficulty sleeping  HEME/LYMPH: No easy bruising, or bleeding gums  ALLERY AND IMMUNOLOGIC: No hives or eczema    VITALS:  T(F): 98.8, Max: 99 (07-03-18 @ 18:14)  HR: 80  BP: 149/64  RR: 18  SpO2: 96%    PHYSICAL EXAM:  GENERAL: NAD,   HEAD:  Atraumatic, Normocephalic  EYES: EOMI, PERRLA, conjunctiva and sclera clear  ENMT: No tonsillar erythema, exudates, or enlargement; Moist mucous membranes, Good dentition, No lesions  NECK: Supple, No JVD, Normal thyroid  NERVOUS SYSTEM:  Alert & Oriented X2  CHEST/LUNG: Clear to percussion bilaterally; No rales, rhonchi, wheezing, or rubs  HEART: Regular rate and rhythm; No murmurs, rubs, or gallops  ABDOMEN: Soft, Nontender, Nondistended; Bowel sounds present  EXTREMITIES:  no edema  LYMPH: No lymphadenopathy noted  SKIN: No rashes or lesions      LABS:    07-04    137  |  99  |  24<H>  ----------------------------<  117<H>  4.5   |  22  |  0.9    Ca    8.3<L>      04 Jul 2018 05:23  Mg     1.9     07-04    TPro  7.3  /  Alb  4.3  /  TBili  0.3  /  DBili  x   /  AST  21  /  ALT  15  /  AlkPhos  100  07-02                          9.8    13.79 )-----------( 216      ( 04 Jul 2018 05:23 )             29.8           RADIOLOGY & ADDITIONAL TESTS:    Imaging Personally Reviewed:  [ ] YES  [ ] NO    MEDICATIONS:     MEDICATIONS  (STANDING):  amLODIPine   Tablet 10 milliGRAM(s) Oral daily  ceFAZolin   IVPB 1000 milliGRAM(s) IV Intermittent every 8 hours  docusate sodium 100 milliGRAM(s) Oral three times a day  heparin  Injectable 5000 Unit(s) SubCutaneous every 8 hours  losartan 100 milliGRAM(s) Oral daily  sertraline 50 milliGRAM(s) Oral daily  sodium chloride 0.9%. 1000 milliLiter(s) (50 mL/Hr) IV Continuous <Continuous>    MEDICATIONS  (PRN):  acetaminophen   Tablet. 650 milliGRAM(s) Oral every 6 hours PRN Moderate Pain (4 - 6)  morphine  - Injectable 2 milliGRAM(s) IV Push every 6 hours PRN Severe Pain (7 - 10)

## 2018-07-04 NOTE — PROGRESS NOTE ADULT - ASSESSMENT
Pt s/e at bedside during AM rounds. No acute events ON. Denies sob/chp, f/ch, n/v. Pain controlled.    NAD, unlabored breathing on RA  RLE  dressing c.d.i  ehl/fhl/ta intact  compartments soft and compressible  silt throughout  DP palpable, toes wwp x 5, cap ref wnl      Vital Signs Last 24 Hrs  T(C): 37.1 (04 Jul 2018 08:02), Max: 37.2 (03 Jul 2018 18:14)  T(F): 98.8 (04 Jul 2018 08:02), Max: 99 (03 Jul 2018 18:14)  HR: 80 (04 Jul 2018 08:02) (64 - 80)  BP: 149/64 (04 Jul 2018 08:02) (109/57 - 149/64)  BP(mean): --  RR: 18 (04 Jul 2018 08:02) (12 - 21)  SpO2: 96% (04 Jul 2018 06:25) (96% - 100%)                                     9.8    13.79 )-----------( 216      ( 04 Jul 2018 05:23 )             29.8       89F pod1 s/p CMN after R IT fx. Doing well, no complaints.  -pain control  -abx for 24h postop  -SCD b/l LE and chem DVT ppx  -PT  -RLE WBAT

## 2018-07-04 NOTE — CONSULT NOTE ADULT - SUBJECTIVE AND OBJECTIVE BOX
RIGHT INTERTROCHANTERIC HIP FRACTURE CONSULT  T(C): 36.1 (07-02-18 @ 22:01), Max: 36.8 (07-02-18 @ 18:41)  HR: 80 (07-02-18 @ 22:01) (72 - 90)  BP: 160/90 (07-02-18 @ 22:01) (160/90 - 231/109)  RR: 18 (07-02-18 @ 22:01) (17 - 18)  SpO2: 98% (07-02-18 @ 22:01) (98% - 98%)    HIP FRACTURE  ^FALL  H/o or current diagnosis of HF- Contraindication to ACEI/ARBs  H/o or current diagnosis of HF- ACEI/ARB contraindication unknown  No pertinent family history in first degree relatives  Handoff  MEWS Score  Pacemaker  Bradycardia  HTN (hypertension)  Hip fracture  No significant past surgical history  FALL  36                          12.8   11.41 )-----------( 290      ( 02 Jul 2018 19:25 )             38.6     07-02    139  |  100  |  30<H>  ----------------------------<  126<H>  4.4   |  23  |  1.4    Ca    9.3      02 Jul 2018 19:25    TPro  7.3  /  Alb  4.3  /  TBili  0.3  /  DBili  x   /  AST  21  /  ALT  15  /  AlkPhos  100  07-02    PT/INR - ( 02 Jul 2018 19:25 )   PT: 10.40 sec;   INR: 0.96 ratio         PTT - ( 02 Jul 2018 19:25 )  PTT:22.0 sec  morphine  - Injectable 2 milliGRAM(s) IV Push every 6 hours PRN    No Known Allergies    89y    Pt. is aox3 s/p mechanical fall at home c/o pain and inability to ambulate after the fall. C/O  pain to the     RIght    hip region   Prior to the fall the Pt was  a      community  ambulator with  an assistive device.   Xrays reveal a displaced  Intertrochanteric Hip fracture.  The Pt. is in  agreement with open reduction internal fixation of this hip fracture .  Risks, benefits and alternatives have been discussed and understood  Physical exam :  The     right      lower extremity is shortened, externally rotated and adducted .  Severe pain on motion.  N/V/I  The remainder of the musculoskeletal exam fails to reveal any acute deformity , or new areas of pain or sts.    Assessment  Right   Intertrochanteric hip fracture  Plan:  Bed rest  Continue mechanical DVT prophylaxis  / heparin for DVT prophylaxis, Medical risk assessment rpt hgb, 2 units prbc on hold for surgery.  NPO for surgery in the [place] of 7/3
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 Patient states she was sitting on a chair with wheels attached to it. A piece of paper had fallen onto the floor and she bent over to pick it up, lost balance, and fell to the ground. Her  was in a different room, heard the fall, and called EMS for further help. She was initially sent to [facility] where she was found to have an acute Right intertrochanteric femoral fracture. She was subsequently transferred to [facility] for further orthopedic management. Currently denies f/n/v/d/c, chills, cp, palpitations, sob, abdominal or back pain. Patient admits to right leg pain currently present at rest and increases with movement. Patient states she lives at home with  alone and was fully independent and functional prior to fall. (03 Jul 2018 00:04)      PAST MEDICAL & SURGICAL HISTORY:  Pacemaker  Bradycardia  HTN (hypertension)  No significant past surgical history      Hospital Course:post right hip surgery    TODAY'S SUBJECTIVE & REVIEW OF SYMPTOMS:     Constitutional weakness, uses a walker at home   Cardio PPM   Resp WNL   GI WNL  Heme WNL  Endo WNL  Skin WNL  MSK fracture  Neuro WNL  Cognitive WNL  Psych WNL      MEDICATIONS  (STANDING):  amLODIPine   Tablet 10 milliGRAM(s) Oral daily  ceFAZolin   IVPB 1000 milliGRAM(s) IV Intermittent every 8 hours  docusate sodium 100 milliGRAM(s) Oral three times a day  heparin  Injectable 5000 Unit(s) SubCutaneous every 8 hours  losartan 100 milliGRAM(s) Oral daily  sertraline 50 milliGRAM(s) Oral daily  sodium chloride 0.9%. 1000 milliLiter(s) (50 mL/Hr) IV Continuous <Continuous>    MEDICATIONS  (PRN):  acetaminophen    Suspension 650 milliGRAM(s) Oral every 6 hours PRN For Pain  morphine  - Injectable 2 milliGRAM(s) IV Push every 6 hours PRN Severe Pain (7 - 10)      FAMILY HISTORY:  No pertinent family history in first degree relatives      Allergies    No Known Allergies    Intolerances        SOCIAL HISTORY:    [ -  ] Etoh  [ - ] Smoking  [-   ] Substance abuse     Home Environment:     [x    ] Lives with Family     Dwelling:    [ x   ] Private House HIGH RANCH  [  x  ] Stairs     12 to main level                   FUNCTIONAL STATUS PTA: (Check all that apply)  Ambulation: [  x   ]Independent    [    ] Dependent     [    ] Non-Ambulatory  Assistive Device: [    ] SA Cane  [    ]  Q Cane  [ x   ] Walker  [    ]  Wheelchair  ADL : [    ] Independent  [    ]  Dependent       Vital Signs Last 24 Hrs  T(C): 37.1 (04 Jul 2018 08:02), Max: 37.2 (03 Jul 2018 18:14)  T(F): 98.8 (04 Jul 2018 08:02), Max: 99 (03 Jul 2018 18:14)  HR: 80 (04 Jul 2018 08:02) (64 - 80)  BP: 149/64 (04 Jul 2018 08:02) (109/57 - 149/64)  BP(mean): --  RR: 18 (04 Jul 2018 08:02) (12 - 21)  SpO2: 96% (04 Jul 2018 06:25) (96% - 100%)      PHYSICAL EXAM: Alert & Oriented X3  GENERAL: NAD, well-groomed, well-developed  HEAD:  Atraumatic, Normocephalic  EYES: EOMI, PERRLA, conjunctiva and sclera clear  NECK: Supple, No JVD, Normal thyroid  CHEST/LUNG: Clear  bilaterally;   HEART: Regular rate and rhythm;   ABDOMEN: Soft, Nontender,   EXTREMITIES:  2+ Peripheral Pulses, No clubbing, cyanosis, or edema    NERVOUS SYSTEM:  Cranial Nerves 2-12 intact   ROM: WFL all extremities -EXCEPT RT HIP  Motor Strength: WFL all extremities _EXCEPT RT HIP    FUNCTIONAL STATUS:  Bed Mobility: [   ]  Independent [    ]  Supervision [  x  ]  Needs Assistance [  ]  N/A  Transfers: [    ]  Independent [    ]  Supervision [  x  ]  Needs Assistance [    ]  N/A    Ambulation:  [    ]  Independent [    ]  Supervision [ x   ]  Needs Assistance [    ]  N/A   ADL:  [    ]   Independent [   x ] Requires Assistance [    ] N/A       LABS:                        9.8    13.79 )-----------( 216      ( 04 Jul 2018 05:23 )             29.8     07-04    137  |  99  |  24<H>  ----------------------------<  117<H>  4.5   |  22  |  0.9    Ca    8.3<L>      04 Jul 2018 05:23  Mg     1.9     07-04    TPro  7.3  /  Alb  4.3  /  TBili  0.3  /  DBili  x   /  AST  21  /  ALT  15  /  AlkPhos  100  07-02    PT/INR - ( 03 Jul 2018 06:57 )   PT: 11.60 sec;   INR: 1.07 ratio         PTT - ( 03 Jul 2018 06:57 )  PTT:27.6 sec      RADIOLOGY & ADDITIONAL STUDIES:

## 2018-07-05 ENCOUNTER — TRANSCRIPTION ENCOUNTER (OUTPATIENT)
Age: 83
End: 2018-07-05

## 2018-07-05 LAB
ANION GAP SERPL CALC-SCNC: 13 MMOL/L — SIGNIFICANT CHANGE UP (ref 7–14)
BUN SERPL-MCNC: 23 MG/DL — HIGH (ref 10–20)
CALCIUM SERPL-MCNC: 8 MG/DL — LOW (ref 8.5–10.1)
CHLORIDE SERPL-SCNC: 98 MMOL/L — SIGNIFICANT CHANGE UP (ref 98–110)
CO2 SERPL-SCNC: 22 MMOL/L — SIGNIFICANT CHANGE UP (ref 17–32)
CREAT SERPL-MCNC: 0.9 MG/DL — SIGNIFICANT CHANGE UP (ref 0.7–1.5)
GLUCOSE SERPL-MCNC: 130 MG/DL — HIGH (ref 70–99)
HCT VFR BLD CALC: 28.1 % — LOW (ref 37–47)
HGB BLD-MCNC: 9.2 G/DL — LOW (ref 12–16)
MAGNESIUM SERPL-MCNC: 1.8 MG/DL — SIGNIFICANT CHANGE UP (ref 1.8–2.4)
MCHC RBC-ENTMCNC: 28.8 PG — SIGNIFICANT CHANGE UP (ref 27–31)
MCHC RBC-ENTMCNC: 32.7 G/DL — SIGNIFICANT CHANGE UP (ref 32–37)
MCV RBC AUTO: 88.1 FL — SIGNIFICANT CHANGE UP (ref 81–99)
NRBC # BLD: 0 /100 WBCS — SIGNIFICANT CHANGE UP (ref 0–0)
PLATELET # BLD AUTO: 188 K/UL — SIGNIFICANT CHANGE UP (ref 130–400)
POTASSIUM SERPL-MCNC: 4.2 MMOL/L — SIGNIFICANT CHANGE UP (ref 3.5–5)
POTASSIUM SERPL-SCNC: 4.2 MMOL/L — SIGNIFICANT CHANGE UP (ref 3.5–5)
RBC # BLD: 3.19 M/UL — LOW (ref 4.2–5.4)
RBC # FLD: 14.2 % — SIGNIFICANT CHANGE UP (ref 11.5–14.5)
SODIUM SERPL-SCNC: 133 MMOL/L — LOW (ref 135–146)
TROPONIN T SERPL-MCNC: 0.04 NG/ML — CRITICAL HIGH
WBC # BLD: 13.49 K/UL — HIGH (ref 4.8–10.8)
WBC # FLD AUTO: 13.49 K/UL — HIGH (ref 4.8–10.8)

## 2018-07-05 RX ORDER — HEPARIN SODIUM 5000 [USP'U]/ML
5000 INJECTION INTRAVENOUS; SUBCUTANEOUS
Qty: 0 | Refills: 0 | COMMUNITY
Start: 2018-07-05

## 2018-07-05 RX ORDER — OXYCODONE AND ACETAMINOPHEN 5; 325 MG/1; MG/1
1 TABLET ORAL EVERY 6 HOURS
Qty: 0 | Refills: 0 | Status: DISCONTINUED | OUTPATIENT
Start: 2018-07-05 | End: 2018-07-06

## 2018-07-05 RX ORDER — DOCUSATE SODIUM 100 MG
1 CAPSULE ORAL
Qty: 0 | Refills: 0 | COMMUNITY
Start: 2018-07-05

## 2018-07-05 RX ADMIN — Medication 100 MILLIGRAM(S): at 14:10

## 2018-07-05 RX ADMIN — Medication 100 MILLIGRAM(S): at 05:00

## 2018-07-05 RX ADMIN — HEPARIN SODIUM 5000 UNIT(S): 5000 INJECTION INTRAVENOUS; SUBCUTANEOUS at 14:10

## 2018-07-05 RX ADMIN — SERTRALINE 50 MILLIGRAM(S): 25 TABLET, FILM COATED ORAL at 14:09

## 2018-07-05 RX ADMIN — HEPARIN SODIUM 5000 UNIT(S): 5000 INJECTION INTRAVENOUS; SUBCUTANEOUS at 21:40

## 2018-07-05 RX ADMIN — Medication 100 MILLIGRAM(S): at 21:40

## 2018-07-05 RX ADMIN — HEPARIN SODIUM 5000 UNIT(S): 5000 INJECTION INTRAVENOUS; SUBCUTANEOUS at 05:00

## 2018-07-05 RX ADMIN — LOSARTAN POTASSIUM 100 MILLIGRAM(S): 100 TABLET, FILM COATED ORAL at 05:00

## 2018-07-05 RX ADMIN — AMLODIPINE BESYLATE 10 MILLIGRAM(S): 2.5 TABLET ORAL at 05:00

## 2018-07-05 NOTE — DISCHARGE NOTE ADULT - HOSPITAL COURSE
88 yo F with PMH of Bradycardia s/p PPM placement (placed by EP team 3/2018), HTN, Arthritis, and Sciatic Back Pain presented to the ED with right hip pain after a mechanical fall. She was found to have an acute right intertrochanteric femoral fracture on imaging,; s/p right IT ORIF on 07/03. She was doing well post-op and was evaluated as a good candidate for acute rehab in 4A. Pt complained of mild chest pain and tenderness reproducible on palpation, not associated with dyspnea, squeezing sensation, diaphoresis or other signs/symptoms of ACS, likely due to costochondritis. EKG was unchanged, and the pain resolved completely on repeat examination. Patient is now stable for discharge, and is awaiting disposition to rehab when a bed is available. 88 yo F with PMH of Bradycardia s/p PPM placement (placed by EP team 3/2018), HTN, Arthritis, and Sciatic Back Pain presented to the ED with right hip pain after a mechanical fall. She was found to have an acute right intertrochanteric femoral fracture on imaging,; s/p right IT ORIF on 07/03. She was doing well post-op and was evaluated as a good candidate for acute rehab in 4A. Pt complained of mild chest pain and tenderness reproducible on palpation, not associated with dyspnea, squeezing sensation, diaphoresis or other signs/symptoms of ACS, likely due to costochondritis. EKG was unchanged, and a follow up troponin came back at .04, which was lower than her stable troponin prior to surgery, and may have been due to only some transient demand ischemia. The pain resolved completely on repeat examination. Patient is now stable for discharge, and is awaiting disposition to rehab when a bed is available.

## 2018-07-05 NOTE — PROGRESS NOTE ADULT - SUBJECTIVE AND OBJECTIVE BOX
24H events:    Patient is a 89y old Female who presents with a chief complaint of Acute Right Hip Fracture (03 Jul 2018) s/p Right ORIF of intratrochanteric fx pod 2.    Today is Hospital day 3.    Patient was resting in bed this morning, and admitted to mild, non radiating substernal point chest tenderness that was exacerbated by movement. She denies any SOB, diaphoresis, n/v, dizziness. Repeat EKG was unchanged from previous, and we will repeat troponin as follow up. Pt denies any acute hip/leg pain. She is tolerating her diet and voiding appropriately.        PAST MEDICAL & SURGICAL HISTORY  Pacemaker  Bradycardia  HTN (hypertension)  No significant past surgical history    SOCIAL HISTORY:  Negative for smoking/alcohol/drug use.     ALLERGIES:  No Known Allergies    MEDICATIONS:  STANDING MEDICATIONS  amLODIPine   Tablet 10 milliGRAM(s) Oral daily  docusate sodium 100 milliGRAM(s) Oral three times a day  heparin  Injectable 5000 Unit(s) SubCutaneous every 8 hours  losartan 100 milliGRAM(s) Oral daily  sertraline 50 milliGRAM(s) Oral daily    PRN MEDICATIONS  acetaminophen   Tablet. 650 milliGRAM(s) Oral every 6 hours PRN  oxyCODONE    5 mG/acetaminophen 325 mG 1 Tablet(s) Oral every 6 hours PRN    VITALS:   T(F): 97.2  HR: 87  BP: 135/63  RR: 18  SpO2: 97%    LABS:                        9.2    13.49 )-----------( 188      ( 05 Jul 2018 05:51 )             28.1     07-05    133<L>  |  98  |  23<H>  ----------------------------<  130<H>  4.2   |  22  |  0.9    Ca    8.0<L>      05 Jul 2018 05:51  Mg     1.8     07-05      CARDIAC MARKERS ( 03 Jul 2018 11:44 )  x     / 0.06 ng/mL / x     / x     / x          RADIOLOGY:    PHYSICAL EXAM:  GEN: No acute distress  LUNGS: Clear to auscultation bilaterally   Chest: no labored breathing, tenderness to palpation left substernal area  HEART: S1/S2 present. no M/R/G, pacemaker palpated on left upper chest  ABD: Soft, non-tender, non-distended. Bowel sounds present  EXT: No edema, peripheral pulses intact 2+ b/l; dressing intact on right hip; no erythema, drainage, or purulence noted  NEURO: AAOX3

## 2018-07-05 NOTE — PROGRESS NOTE ADULT - ASSESSMENT
88 yo F with PMH of Bradycardia s/p PPM placement (placed by EP team 3/2018), HTN, Arthritis, and Sciatic Back Pain presented to the ED with right hip pain after a mechanical fall. She was found to have an acute right intertrochanteric femoral fracture on imaging,; s/p right IT ORIF on 07/03. Pt c/o of mild chest pain and tenderness, likely due to costochondritis: repeat EKG was negative for ACS. Patient is stable, pain controlled, and awaiting disposition to rehab.      1-Acute Right Intertrochanteric Femoral Fracture  -s/p ORIf; POD 2  - Pain controlled with Oxycodone 5 mg PO q6, acetaminophen 325 mg PRN  - Evaluated by bedside OT/PT    2. Chest pain  - repeat EKG normal and comparable to EKG at admission  - Cardiac enzymes negative  - Pain relieved with oxycodone/acetomenophen    3. HTN  - - controlled with 10 mg amlodipine QD, 100 mg losartan QD    Diet: DASH diet    DVT prophylaxis: Heparin SC 5000u q8h    Disposition: Awaiting acceptance to acute rehab in 4A    PT/Rehab 89 yo F with PMH of Bradycardia s/p PPM placement (placed by EP team 3/2018), HTN, Arthritis, and Sciatic Back Pain presented to the ED with right hip pain after a mechanical fall. She was found to have an acute right intertrochanteric femoral fracture on imaging,; s/p right IT ORIF on 07/03. Pt c/o of mild chest pain and tenderness, likely due to costochondritis, r/o ACS. Cardiolgy consulted for further cardiac workup.  Patient is currently stable, pain controlled, and awaiting disposition to rehab.    1-Acute Right Intertrochanteric Femoral Fracture  -s/p ORIf; POD 2  - Pain controlled with Oxycodone 5 mg PO q6, acetaminophen 325 mg PRN  - Evaluated by bedside OT/PT    2. Chest pain: Costochondritis vs ACS  - repeat EKG normal and comparable to EKG at admission  - Initial troponin negative x2; Follow repeat troponins  - Cardiology evaluation appreciated   - Pain controlled with oxycodone/acetomenophen    3. HTN   - controlled with 10 mg amlodipine QD, 100 mg losartan QD    Diet: DASH diet  DVT prophylaxis: Heparin SC 5000u q8h  Disposition: Awaiting possible dispo to 4A (social work in contact with family) 89 yo F with PMH of Bradycardia s/p PPM placement (placed by EP team 3/2018), HTN, Arthritis, and Sciatic Back Pain presented to the ED with right hip pain after a mechanical fall. She was found to have an acute right intertrochanteric femoral fracture on imaging,; s/p right IT ORIF on 07/03. Pt c/o of mild chest pain and tenderness, likely due to costochondritis, r/o ACS. Patient is currently stable, pain controlled, and awaiting disposition to rehab.    1-Acute Right Intertrochanteric Femoral Fracture  - s/p ORIf; POD 2  - Pain controlled with Oxycodone 5 mg PO q6, acetaminophen 325 mg PRN  - Evaluated by bedside OT/PT    2. Chest pain: Costochondritis vs ACS  - repeat EKG normal and comparable to EKG at admission  - Initial troponin negative x2; Follow repeat troponins  - Pain controlled with oxycodone/acetomenophen    3. HTN   - controlled with 10 mg amlodipine QD, 100 mg losartan QD    Diet: DASH diet  DVT prophylaxis: Heparin SC 5000u q8h  Disposition: Awaiting possible dispo to 4A (social work in contact with family)

## 2018-07-05 NOTE — DISCHARGE NOTE ADULT - PATIENT PORTAL LINK FT
You can access the [facility], offered by [facility], by registering with the following website: [email or web address]

## 2018-07-05 NOTE — PROGRESS NOTE ADULT - ASSESSMENT
88 yo F with PMH of Bradycardia s/p PPM placement (placed by EP team 3/2018), HTN, Arthritis, and Sciatic Back Pain presented to the ED with right hip pain after a mechanical fall. She was found to have an acute right intertrochanteric femoral fracture on imaging, and was scheduled for ORIF today. Pt had a slightly elevated troponin in the ED, so we repeated it and it remained stable. Patient is asymptomatic, and there si no concern for ACS. Patient will likely receive medical clearance for the OR today.     1-Acute Right Intertrochanteric Femoral Fracture  s/p surgery by ortho  continue pain controll and DVT prophylaxis  PT/Rehab    2-HTN  - controlled with 10 mg amlodipine QD, 100 mg losartan QD    3- Chest pain : EKG no change  get troponin  likely musculoskeletal    DVT prophylaxis: Heparin SC 5000u q8h    Discussed with House staff  Resident note reviewed

## 2018-07-05 NOTE — DISCHARGE NOTE ADULT - CARE PROVIDER_API CALL
Brian Zhu (DO), Medicine  [facility] [place] [address]  Phone: [phone]  Fax: [phone] Brian Zhu (DO), Medicine  [address] [place] [address]  Phone: [phone]  Fax: [phone]    [facility] for [facility] cardiologist,   [address] [place] [address]  Phone: [phone]  Fax: (   )    -

## 2018-07-05 NOTE — PROGRESS NOTE ADULT - SUBJECTIVE AND OBJECTIVE BOX
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 Patient states she was sitting on a chair with wheels attached to it. A piece of paper had fallen onto the floor and she bent over to pick it up, lost balance, and fell to the ground. Her  was in a different room, heard the fall, and called EMS for further help. She was initially sent to [facility] where she was found to have an acute Right intertrochanteric femoral fracture.   pt was admitted for further treatment    CC   pt seen and examined for follow up of rt Hip fracture  s/p surgery for IT fracture  had chest pain this morning    REVIEW OF SYSTEMS:  CONSTITUTIONAL: No fever, weight loss, or fatigue  EYES: No eye pain, visual disturbances, or discharge  ENMT:  No difficulty hearing, tinnitus, vertigo; No sinus or throat pain  NECK: No pain or stiffness  BREASTS: No pain, masses, or nipple discharge  RESPIRATORY: No cough, wheezing, chills or hemoptysis; No shortness of breath  CARDIOVASCULAR: chest pain  GASTROINTESTINAL: No abdominal or epigastric pain. No nausea, vomiting, or hematemesis; No diarrhea or constipation. No melena or hematochezia.  GENITOURINARY: No dysuria, frequency, hematuria, or incontinence  NEUROLOGICAL: No headaches, memory loss, loss of strength, numbness, or tremors  SKIN: No itching, burning, rashes, or lesions   LYMPH NODES: No enlarged glands  ENDOCRINE: No heat or cold intolerance; No hair loss  MUSCULOSKELETAL: rt thigh pain at the surgery site  PSYCHIATRIC: No depression, anxiety, mood swings, or difficulty sleeping  HEME/LYMPH: No easy bruising, or bleeding gums  ALLERY AND IMMUNOLOGIC: No hives or eczema    VITALS:  T(F): 97.2, Max: 98.8 (07-05-18 @ 00:00)  HR: 87  BP: 135/63  RR: 18  SpO2: --    PHYSICAL EXAM:  GENERAL: NAD  HEAD:  Atraumatic, Normocephalic  EYES: EOMI, PERRLA, conjunctiva and sclera clear  ENMT: No tonsillar erythema, exudates, or enlargement; Moist mucous membranes, Good dentition, No lesions  NECK: Supple, No JVD, Normal thyroid  NERVOUS SYSTEM:  Alert & Oriented X3,   CHEST/LUNG: Clear to percussion bilaterally; No rales, rhonchi, wheezing, or rubs  HEART: Regular rate and rhythm; No murmurs, rubs, or gallops  ABDOMEN: Soft, Nontender, Nondistended; Bowel sounds present  EXTREMITIES:  no edema  LYMPH: No lymphadenopathy noted  SKIN: No rashes or lesions      LABS:    07-05    133<L>  |  98  |  23<H>  ----------------------------<  130<H>  4.2   |  22  |  0.9    Ca    8.0<L>      05 Jul 2018 05:51  Mg     1.8     07-05                            9.2    13.49 )-----------( 188      ( 05 Jul 2018 05:51 )             28.1           RADIOLOGY & ADDITIONAL TESTS:    Imaging Personally Reviewed:  [ ] YES  [ ] NO    MEDICATIONS:     MEDICATIONS  (STANDING):  amLODIPine   Tablet 10 milliGRAM(s) Oral daily  docusate sodium 100 milliGRAM(s) Oral three times a day  heparin  Injectable 5000 Unit(s) SubCutaneous every 8 hours  losartan 100 milliGRAM(s) Oral daily  sertraline 50 milliGRAM(s) Oral daily    MEDICATIONS  (PRN):  acetaminophen   Tablet. 650 milliGRAM(s) Oral every 6 hours PRN Moderate Pain (4 - 6)  oxyCODONE    5 mG/acetaminophen 325 mG 1 Tablet(s) Oral every 6 hours PRN Moderate Pain (4 - 6)

## 2018-07-05 NOTE — PROGRESS NOTE ADULT - SUBJECTIVE AND OBJECTIVE BOX
89 y o F s/p right IT fx ORIF pod 2. Pt s/e at bedside  No acute events ON. Denies sob/chp, n/v. Pain controlled.    Vital Signs Last 24 Hrs  T(C): 37.1 (05 Jul 2018 00:00), Max: 37.1 (04 Jul 2018 08:02)  T(F): 98.8 (05 Jul 2018 00:00), Max: 98.8 (04 Jul 2018 08:02)  HR: 94 (05 Jul 2018 04:45) (80 - 94)  BP: 139/62 (05 Jul 2018 04:45) (105/50 - 149/64)  BP(mean): --  RR: 18 (05 Jul 2018 04:45) (18 - 19)  SpO2: 97% (04 Jul 2018 12:15) (97% - 97%)      NAD, unlabored breathing on RA  RLE  dressing c.d.i  ehl/fhl/ta intact  compartments soft and compressible  silt throughout  DP palpable, toes wwp x 5, cap ref wnl                          9.2    13.49 )-----------( 188      ( 05 Jul 2018 05:51 )             28.1     07-05    133<L>  |  98  |  23<H>  ----------------------------<  130<H>  4.2   |  22  |  0.9    Ca    8.0<L>      05 Jul 2018 05:51  Mg     1.8     07-05

## 2018-07-05 NOTE — PROGRESS NOTE ADULT - ASSESSMENT
88 yo F with PMH of Bradycardia s/p PPM placement (placed by EP team 3/2018), HTN, Arthritis, and Sciatic Back Pain presented to the ED with right hip pain after a mechanical fall. She was found to have an acute right intertrochanteric femoral fracture on imaging,; s/p right IT ORIF on 07/03. The aptient did well after surgery and was evaluated as a good candidate for 4A should a bed become available. Today she c/o of mild chest pain and tenderness, likely due to costochondritis, though we are r/o ACS. Pain resolved on repeat examination and EKG was unchanged from previous. F/U troponin is pending. Patient is currently stable, pain controlled, and awaiting disposition to rehab.    Acute Right Intertrochanteric Femoral Fracture  - s/p ORIf on 7/03  - Pain controlled with Oxycodone 5 mg PO q6, acetaminophen 325 mg PRN  - PT said good candidate for 4A  -DVT prophylaxis  -WBAT    Chest pain: Costochondritis vs ACS  - repeat EKG was unchanged from previous  - troponin stable at .06 x2 prior to surgery  -f/u repeat troponin  -pain resolve don repeat exam    HTN  - controlled with 10 mg amlodipine QD, 100 mg losartan QD      DVT prophylaxis: Heparin SC 5000u q8h    Disposition: Awaiting possible dispo to 4A (social work in contact with family).

## 2018-07-05 NOTE — PROGRESS NOTE ADULT - ASSESSMENT
89F pod 2 s/p CMN after R IT fx. Doing well, no complaints.  -pain control  -DVT ppx  -PT  -RLE WBAT  -d/c planning

## 2018-07-05 NOTE — DISCHARGE NOTE ADULT - ADDITIONAL INSTRUCTIONS
Please follow up with your primary care doctor 1-2 weeks after you leave the hospital.   Please take all medications as directed.  Please follow up with your cardiologist, or if you do not have one please follow up with a new cardiologist from the [facility] (contact information listed below) to arrange an outpatient routine echo as part of follow up.

## 2018-07-05 NOTE — DISCHARGE NOTE ADULT - MEDICATION SUMMARY - MEDICATIONS TO TAKE
I will START or STAY ON the medications listed below when I get home from the hospital:    Walker  -- One Walker  -- Indication: For ambulation    mesalamine 800 mg oral delayed release tablet  -- 2 tab(s) by mouth 3 times a day  -- Indication: For bowel inflammation    oxyCODONE-acetaminophen 5 mg-325 mg oral tablet  -- 1 tab(s) by mouth every 6 hours, As needed, Moderate Pain (4 - 6)  -- Indication: For pain    acetaminophen 650 mg oral tablet, extended release  -- 1 tab(s) by mouth every 6 hours   -- This product contains acetaminophen.  Do not use  with any other product containing acetaminophen to prevent possible liver damage.    -- Indication: For pain    losartan 100 mg oral tablet  -- 1 tab(s) by mouth once a day  -- Indication: For HTN (hypertension)    heparin  -- 5000 unit(s) subcutaneous every 8 hours  -- Indication: For dvt prophylaxis    sertraline 50 mg oral tablet  -- 1 tab(s) by mouth once a day  -- Indication: For depression    amLODIPine 10 mg oral tablet  -- 1 tab(s) by mouth once a day  -- Indication: For HTN (hypertension)    docusate sodium 100 mg oral capsule  -- 1 cap(s) by mouth 3 times a day  -- Indication: For constipation

## 2018-07-05 NOTE — DISCHARGE NOTE ADULT - CARE PLAN
Principal Discharge DX:	Hip fracture  Goal:	manage and prevent complications  Assessment and plan of treatment:	take all medications as directed, follow up in PT as directed  Secondary Diagnosis:	HTN (hypertension)

## 2018-07-05 NOTE — DISCHARGE NOTE ADULT - PROVIDER TOKENS
PAOLO:'[id number]' TOKEN:'[id number]',FREE:[LAST:[[facility] clinic for new cardiologist],PHONE:[phone]],FAX:[(   )    -],ADDRESS:[[address] [place] [address]]

## 2018-07-05 NOTE — PROGRESS NOTE ADULT - SUBJECTIVE AND OBJECTIVE BOX
24H events:    Patient is a 89y old Female who presents with a chief complaint of Acute Right Hip Fracture (03 Jul 2018) s/p Right ORIF of intratrochanteric fx pod 2. Patient was examined at bedside, no acute events reported. Pt admitted to mild, non radiating substernal point chest tenderness, exacerbated by movement. Pt denies any SOB, diaphoresis, n/v, dizziness. Repeat EKG was normal and comparative to EKG on day of admission, cardiac enzymes negative. Pt denies any acute hip pain, was observed oob in a chair. Tolerating DASH diet, and is voiding appropriately.     Primary diagnosis of Hip fracture     Today is hospital day 3d. Patient is awaiting disposition to acute rehab.     PAST MEDICAL & SURGICAL HISTORY  Pacemaker  Bradycardia  HTN (hypertension)  No significant past surgical history    SOCIAL HISTORY:  Negative for smoking/alcohol/drug use.     ALLERGIES:  No Known Allergies    MEDICATIONS:  STANDING MEDICATIONS  amLODIPine   Tablet 10 milliGRAM(s) Oral daily  docusate sodium 100 milliGRAM(s) Oral three times a day  heparin  Injectable 5000 Unit(s) SubCutaneous every 8 hours  losartan 100 milliGRAM(s) Oral daily  sertraline 50 milliGRAM(s) Oral daily    PRN MEDICATIONS  acetaminophen   Tablet. 650 milliGRAM(s) Oral every 6 hours PRN  oxyCODONE    5 mG/acetaminophen 325 mG 1 Tablet(s) Oral every 6 hours PRN    VITALS:   T(F): 97.2  HR: 87  BP: 135/63  RR: 18  SpO2: 97%    LABS:                        9.2    13.49 )-----------( 188      ( 05 Jul 2018 05:51 )             28.1     07-05    133<L>  |  98  |  23<H>  ----------------------------<  130<H>  4.2   |  22  |  0.9    Ca    8.0<L>      05 Jul 2018 05:51  Mg     1.8     07-05                CARDIAC MARKERS ( 03 Jul 2018 11:44 )  x     / 0.06 ng/mL / x     / x     / x          RADIOLOGY:    PHYSICAL EXAM:  GEN: No acute distress  LUNGS: Clear to auscultation bilaterally   Chest: no labored breathing, tenderness to palpation left substernal area  HEART: S1/S2 present. no M/R/G, pacemaker palpated on left upper chest  ABD: Soft, non-tender, non-distended. Bowel sounds present  EXT: No edema, peripheral pulses intact 2+ b/l; dressing intact on right hip; no erythema, drainage, or purulence noted  NEURO: AAOX3 24H events:    Patient is a 89y old Female who presents with a chief complaint of Acute Right Hip Fracture (03 Jul 2018) s/p Right ORIF of intratrochanteric fx pod 2. Patient was examined at bedside, no acute events reported overnight. However, pt admitted to mild, non radiating substernal point chest tenderness, exacerbated by movement. Pt denies any SOB, diaphoresis, n/v, dizziness. Repeat EKG was normal and comparative to EKG on day of admission, will follow up with cardiology consult and repeat troponin to r/o ACS. Pt denies any acute hip/leg pain, was observed oob in a chair. Tolerating DASH diet, and is voiding appropriately.     Primary diagnosis of Hip fracture     Today is hospital day 3d. Patient is awaiting disposition to rehab    PAST MEDICAL & SURGICAL HISTORY  Pacemaker  Bradycardia  HTN (hypertension)  No significant past surgical history    SOCIAL HISTORY:  Negative for smoking/alcohol/drug use.     ALLERGIES:  No Known Allergies    MEDICATIONS:  STANDING MEDICATIONS  amLODIPine   Tablet 10 milliGRAM(s) Oral daily  docusate sodium 100 milliGRAM(s) Oral three times a day  heparin  Injectable 5000 Unit(s) SubCutaneous every 8 hours  losartan 100 milliGRAM(s) Oral daily  sertraline 50 milliGRAM(s) Oral daily    PRN MEDICATIONS  acetaminophen   Tablet. 650 milliGRAM(s) Oral every 6 hours PRN  oxyCODONE    5 mG/acetaminophen 325 mG 1 Tablet(s) Oral every 6 hours PRN    VITALS:   T(F): 97.2  HR: 87  BP: 135/63  RR: 18  SpO2: 97%    LABS:                        9.2    13.49 )-----------( 188      ( 05 Jul 2018 05:51 )             28.1     07-05    133<L>  |  98  |  23<H>  ----------------------------<  130<H>  4.2   |  22  |  0.9    Ca    8.0<L>      05 Jul 2018 05:51  Mg     1.8     07-05                CARDIAC MARKERS ( 03 Jul 2018 11:44 )  x     / 0.06 ng/mL / x     / x     / x          RADIOLOGY:    PHYSICAL EXAM:  GEN: No acute distress  LUNGS: Clear to auscultation bilaterally   Chest: no labored breathing, tenderness to palpation left substernal area  HEART: S1/S2 present. no M/R/G, pacemaker palpated on left upper chest  ABD: Soft, non-tender, non-distended. Bowel sounds present  EXT: No edema, peripheral pulses intact 2+ b/l; dressing intact on right hip; no erythema, drainage, or purulence noted  NEURO: AAOX3 24H events:    Patient is a 89y old Female who presents with a chief complaint of Acute Right Hip Fracture (03 Jul 2018) s/p Right ORIF of intratrochanteric fx pod 2. Patient was examined at bedside, no acute events reported overnight. However, pt admitted to mild, non radiating substernal point chest tenderness, exacerbated by movement. Pt denies any SOB, diaphoresis, n/v, dizziness. Repeat EKG was normal and comparative to EKG on day of admission, will repeat troponin to r/o ACS. Pt denies any acute hip/leg pain, was observed oob in a chair. Tolerating DASH diet, and is voiding appropriately.     Primary diagnosis of Hip fracture     Today is hospital day 3d. Patient is awaiting disposition to rehab    PAST MEDICAL & SURGICAL HISTORY  Pacemaker  Bradycardia  HTN (hypertension)  No significant past surgical history    SOCIAL HISTORY:  Negative for smoking/alcohol/drug use.     ALLERGIES:  No Known Allergies    MEDICATIONS:  STANDING MEDICATIONS  amLODIPine   Tablet 10 milliGRAM(s) Oral daily  docusate sodium 100 milliGRAM(s) Oral three times a day  heparin  Injectable 5000 Unit(s) SubCutaneous every 8 hours  losartan 100 milliGRAM(s) Oral daily  sertraline 50 milliGRAM(s) Oral daily    PRN MEDICATIONS  acetaminophen   Tablet. 650 milliGRAM(s) Oral every 6 hours PRN  oxyCODONE    5 mG/acetaminophen 325 mG 1 Tablet(s) Oral every 6 hours PRN    VITALS:   T(F): 97.2  HR: 87  BP: 135/63  RR: 18  SpO2: 97%    LABS:                        9.2    13.49 )-----------( 188      ( 05 Jul 2018 05:51 )             28.1     07-05    133<L>  |  98  |  23<H>  ----------------------------<  130<H>  4.2   |  22  |  0.9    Ca    8.0<L>      05 Jul 2018 05:51  Mg     1.8     07-05                CARDIAC MARKERS ( 03 Jul 2018 11:44 )  x     / 0.06 ng/mL / x     / x     / x          RADIOLOGY:    PHYSICAL EXAM:  GEN: No acute distress  LUNGS: Clear to auscultation bilaterally   Chest: no labored breathing, tenderness to palpation left substernal area  HEART: S1/S2 present. no M/R/G, pacemaker palpated on left upper chest  ABD: Soft, non-tender, non-distended. Bowel sounds present  EXT: No edema, peripheral pulses intact 2+ b/l; dressing intact on right hip; no erythema, drainage, or purulence noted  NEURO: AAOX3

## 2018-07-06 ENCOUNTER — INPATIENT (INPATIENT)
Facility: HOSPITAL | Age: 83
LOS: 13 days | Discharge: HOME HEALTH | End: 2018-07-20
Attending: PHYSICAL MEDICINE & REHABILITATION | Admitting: PHYSICAL MEDICINE & REHABILITATION
Payer: MEDICARE

## 2018-07-06 VITALS
TEMPERATURE: 98 F | DIASTOLIC BLOOD PRESSURE: 60 MMHG | WEIGHT: 108.69 LBS | RESPIRATION RATE: 18 BRPM | SYSTOLIC BLOOD PRESSURE: 123 MMHG | HEART RATE: 86 BPM

## 2018-07-06 LAB
ANION GAP SERPL CALC-SCNC: 15 MMOL/L — HIGH (ref 7–14)
BASOPHILS # BLD AUTO: 0.03 K/UL — SIGNIFICANT CHANGE UP (ref 0–0.2)
BASOPHILS NFR BLD AUTO: 0.3 % — SIGNIFICANT CHANGE UP (ref 0–1)
BUN SERPL-MCNC: 32 MG/DL — HIGH (ref 10–20)
CALCIUM SERPL-MCNC: 8.2 MG/DL — LOW (ref 8.5–10.1)
CHLORIDE SERPL-SCNC: 99 MMOL/L — SIGNIFICANT CHANGE UP (ref 98–110)
CO2 SERPL-SCNC: 23 MMOL/L — SIGNIFICANT CHANGE UP (ref 17–32)
CREAT SERPL-MCNC: 0.9 MG/DL — SIGNIFICANT CHANGE UP (ref 0.7–1.5)
EOSINOPHIL # BLD AUTO: 0.06 K/UL — SIGNIFICANT CHANGE UP (ref 0–0.7)
EOSINOPHIL NFR BLD AUTO: 0.5 % — SIGNIFICANT CHANGE UP (ref 0–8)
GLUCOSE SERPL-MCNC: 102 MG/DL — HIGH (ref 70–99)
HCT VFR BLD CALC: 27.9 % — LOW (ref 37–47)
HGB BLD-MCNC: 9.1 G/DL — LOW (ref 12–16)
IMM GRANULOCYTES NFR BLD AUTO: 0.8 % — HIGH (ref 0.1–0.3)
LYMPHOCYTES # BLD AUTO: 1.11 K/UL — LOW (ref 1.2–3.4)
LYMPHOCYTES # BLD AUTO: 10.1 % — LOW (ref 20.5–51.1)
MAGNESIUM SERPL-MCNC: 2.1 MG/DL — SIGNIFICANT CHANGE UP (ref 1.8–2.4)
MCHC RBC-ENTMCNC: 28.8 PG — SIGNIFICANT CHANGE UP (ref 27–31)
MCHC RBC-ENTMCNC: 32.6 G/DL — SIGNIFICANT CHANGE UP (ref 32–37)
MCV RBC AUTO: 88.3 FL — SIGNIFICANT CHANGE UP (ref 81–99)
MONOCYTES # BLD AUTO: 1.16 K/UL — HIGH (ref 0.1–0.6)
MONOCYTES NFR BLD AUTO: 10.6 % — HIGH (ref 1.7–9.3)
NEUTROPHILS # BLD AUTO: 8.53 K/UL — HIGH (ref 1.4–6.5)
NEUTROPHILS NFR BLD AUTO: 77.7 % — HIGH (ref 42.2–75.2)
NRBC # BLD: 0 /100 WBCS — SIGNIFICANT CHANGE UP (ref 0–0)
PLATELET # BLD AUTO: 192 K/UL — SIGNIFICANT CHANGE UP (ref 130–400)
POTASSIUM SERPL-MCNC: 4.2 MMOL/L — SIGNIFICANT CHANGE UP (ref 3.5–5)
POTASSIUM SERPL-SCNC: 4.2 MMOL/L — SIGNIFICANT CHANGE UP (ref 3.5–5)
RBC # BLD: 3.16 M/UL — LOW (ref 4.2–5.4)
RBC # FLD: 14 % — SIGNIFICANT CHANGE UP (ref 11.5–14.5)
SODIUM SERPL-SCNC: 137 MMOL/L — SIGNIFICANT CHANGE UP (ref 135–146)
WBC # BLD: 10.98 K/UL — HIGH (ref 4.8–10.8)
WBC # FLD AUTO: 10.98 K/UL — HIGH (ref 4.8–10.8)

## 2018-07-06 RX ORDER — ACETAMINOPHEN 500 MG
650 TABLET ORAL EVERY 6 HOURS
Qty: 0 | Refills: 0 | Status: DISCONTINUED | OUTPATIENT
Start: 2018-07-06 | End: 2018-07-09

## 2018-07-06 RX ORDER — LOSARTAN POTASSIUM 100 MG/1
100 TABLET, FILM COATED ORAL DAILY
Qty: 0 | Refills: 0 | Status: DISCONTINUED | OUTPATIENT
Start: 2018-07-06 | End: 2018-07-20

## 2018-07-06 RX ORDER — HEPARIN SODIUM 5000 [USP'U]/ML
5000 INJECTION INTRAVENOUS; SUBCUTANEOUS EVERY 8 HOURS
Qty: 0 | Refills: 0 | Status: DISCONTINUED | OUTPATIENT
Start: 2018-07-06 | End: 2018-07-20

## 2018-07-06 RX ORDER — OXYCODONE AND ACETAMINOPHEN 5; 325 MG/1; MG/1
1 TABLET ORAL EVERY 6 HOURS
Qty: 0 | Refills: 0 | Status: DISCONTINUED | OUTPATIENT
Start: 2018-07-06 | End: 2018-07-09

## 2018-07-06 RX ORDER — SERTRALINE 25 MG/1
50 TABLET, FILM COATED ORAL DAILY
Qty: 0 | Refills: 0 | Status: DISCONTINUED | OUTPATIENT
Start: 2018-07-06 | End: 2018-07-20

## 2018-07-06 RX ORDER — DOCUSATE SODIUM 100 MG
100 CAPSULE ORAL THREE TIMES A DAY
Qty: 0 | Refills: 0 | Status: DISCONTINUED | OUTPATIENT
Start: 2018-07-06 | End: 2018-07-20

## 2018-07-06 RX ORDER — OXYCODONE AND ACETAMINOPHEN 5; 325 MG/1; MG/1
2 TABLET ORAL EVERY 6 HOURS
Qty: 0 | Refills: 0 | Status: DISCONTINUED | OUTPATIENT
Start: 2018-07-06 | End: 2018-07-09

## 2018-07-06 RX ORDER — AMLODIPINE BESYLATE 2.5 MG/1
10 TABLET ORAL DAILY
Qty: 0 | Refills: 0 | Status: DISCONTINUED | OUTPATIENT
Start: 2018-07-06 | End: 2018-07-20

## 2018-07-06 RX ADMIN — Medication 100 MILLIGRAM(S): at 05:02

## 2018-07-06 RX ADMIN — HEPARIN SODIUM 5000 UNIT(S): 5000 INJECTION INTRAVENOUS; SUBCUTANEOUS at 05:02

## 2018-07-06 RX ADMIN — Medication 650 MILLIGRAM(S): at 16:14

## 2018-07-06 RX ADMIN — HEPARIN SODIUM 5000 UNIT(S): 5000 INJECTION INTRAVENOUS; SUBCUTANEOUS at 21:43

## 2018-07-06 RX ADMIN — HEPARIN SODIUM 5000 UNIT(S): 5000 INJECTION INTRAVENOUS; SUBCUTANEOUS at 13:55

## 2018-07-06 RX ADMIN — SERTRALINE 50 MILLIGRAM(S): 25 TABLET, FILM COATED ORAL at 11:31

## 2018-07-06 RX ADMIN — Medication 100 MILLIGRAM(S): at 13:55

## 2018-07-06 RX ADMIN — LOSARTAN POTASSIUM 100 MILLIGRAM(S): 100 TABLET, FILM COATED ORAL at 05:02

## 2018-07-06 RX ADMIN — Medication 100 MILLIGRAM(S): at 21:42

## 2018-07-06 RX ADMIN — AMLODIPINE BESYLATE 10 MILLIGRAM(S): 2.5 TABLET ORAL at 05:02

## 2018-07-06 NOTE — PROGRESS NOTE ADULT - ASSESSMENT
89 yo F with PMH of Bradycardia s/p PPM placement (placed by EP team 3/2018), HTN, Arthritis, and Sciatic Back Pain presented to the ED with right hip pain after a mechanical fall. She was found to have an acute right intertrochanteric femoral fracture on imaging, and was scheduled for ORIF today. Pt had a slightly elevated troponin in the ED, so we repeated it and it remained stable. Patient is asymptomatic, and there si no concern for ACS. Patient will likely receive medical clearance for the OR today.     1-Acute Right Intertrochanteric Femoral Fracture  s/p surgery by ortho  continue pain controll and DVT prophylaxis  PT/Rehab    2-HTN  - controlled with 10 mg amlodipine QD, 100 mg losartan QD    3- Chest pain : EKG no change  likely musculoskeletal  no more pain now    DVT prophylaxis: Heparin SC 5000u q8h    Discussed with House staff  Resident note reviewed    clear for discharge to Rehab  Med rec discussed with House staff

## 2018-07-06 NOTE — PROGRESS NOTE ADULT - PROVIDER SPECIALTY LIST ADULT
Internal Medicine
Orthopedics
Internal Medicine

## 2018-07-06 NOTE — PROGRESS NOTE ADULT - SUBJECTIVE AND OBJECTIVE BOX
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 Patient states she was sitting on a chair with wheels attached to it. A piece of paper had fallen onto the floor and she bent over to pick it up, lost balance, and fell to the ground. Her  was in a different room, heard the fall, and called EMS for further help. She was initially sent to [facility] where she was found to have an acute Right intertrochanteric femoral fracture.   pt was admitted for further treatment    CC   pt seen and examined for follow up of rt Hip fracture  s/p surgery for Rt IT fracture  no chest pain or shortness of breath  Rt hip surgery site pain better      REVIEW OF SYSTEMS:  CONSTITUTIONAL: No fever, weight loss, or fatigue  EYES: No eye pain, visual disturbances, or discharge  ENMT:  No difficulty hearing, tinnitus, vertigo; No sinus or throat pain  NECK: No pain or stiffness  BREASTS: No pain, masses, or nipple discharge  RESPIRATORY: No cough, wheezing, chills or hemoptysis; No shortness of breath  CARDIOVASCULAR: No chest pain, palpitations, dizziness, or leg swelling  GASTROINTESTINAL: No abdominal or epigastric pain. No nausea, vomiting, or hematemesis; No diarrhea or constipation. No melena or hematochezia.  GENITOURINARY: No dysuria, frequency, hematuria, or incontinence  NEUROLOGICAL: No headaches, memory loss, loss of strength, numbness, or tremors  SKIN: No itching, burning, rashes, or lesions   LYMPH NODES: No enlarged glands  ENDOCRINE: No heat or cold intolerance; No hair loss  MUSCULOSKELETAL: Rt hip surgery site pain  PSYCHIATRIC: No depression, anxiety, mood swings, or difficulty sleeping  HEME/LYMPH: No easy bruising, or bleeding gums  ALLERY AND IMMUNOLOGIC: No hives or eczema    VITALS:  T(F): 98, Max: 98.9 (07-05-18 @ 16:00)  HR: 86  BP: 123/60  RR: 18  SpO2: --    PHYSICAL EXAM:  GENERAL: NAD,  HEAD:  Atraumatic, Normocephalic  EYES: EOMI, PERRLA, conjunctiva and sclera clear  ENMT: No tonsillar erythema, exudates, or enlargement; Moist mucous membranes, Good dentition, No lesions  NECK: Supple, No JVD, Normal thyroid  NERVOUS SYSTEM:  Alert & Oriented X3,   CHEST/LUNG: Clear to percussion bilaterally; No rales, rhonchi, wheezing, or rubs  HEART: Regular rate and rhythm; No murmurs, rubs, or gallops  ABDOMEN: Soft, Nontender, Nondistended; Bowel sounds present  EXTREMITIES:  no edema  LYMPH: No lymphadenopathy noted  SKIN: No rashes or lesions      LABS:    07-06    137  |  99  |  32<H>  ----------------------------<  102<H>  4.2   |  23  |  0.9    Ca    8.2<L>      06 Jul 2018 06:34  Mg     2.1     07-06                            9.1    10.98 )-----------( 192      ( 06 Jul 2018 06:34 )             27.9           RADIOLOGY & ADDITIONAL TESTS:    Imaging Personally Reviewed:  [ ] YES  [ ] NO    MEDICATIONS:     MEDICATIONS  (STANDING):  amLODIPine   Tablet 10 milliGRAM(s) Oral daily  docusate sodium 100 milliGRAM(s) Oral three times a day  heparin  Injectable 5000 Unit(s) SubCutaneous every 8 hours  losartan 100 milliGRAM(s) Oral daily  sertraline 50 milliGRAM(s) Oral daily    MEDICATIONS  (PRN):  acetaminophen   Tablet. 650 milliGRAM(s) Oral every 6 hours PRN Moderate Pain (4 - 6)  oxyCODONE    5 mG/acetaminophen 325 mG 1 Tablet(s) Oral every 6 hours PRN Moderate Pain (4 - 6)

## 2018-07-07 LAB
ALBUMIN SERPL ELPH-MCNC: 3.4 G/DL — LOW (ref 3.5–5.2)
ALP SERPL-CCNC: 70 U/L — SIGNIFICANT CHANGE UP (ref 30–115)
ALT FLD-CCNC: 8 U/L — SIGNIFICANT CHANGE UP (ref 0–41)
ANION GAP SERPL CALC-SCNC: 12 MMOL/L — SIGNIFICANT CHANGE UP (ref 7–14)
AST SERPL-CCNC: 22 U/L — SIGNIFICANT CHANGE UP (ref 0–41)
BASOPHILS # BLD AUTO: 0.04 K/UL — SIGNIFICANT CHANGE UP (ref 0–0.2)
BASOPHILS NFR BLD AUTO: 0.4 % — SIGNIFICANT CHANGE UP (ref 0–1)
BILIRUB SERPL-MCNC: 0.5 MG/DL — SIGNIFICANT CHANGE UP (ref 0.2–1.2)
BUN SERPL-MCNC: 33 MG/DL — HIGH (ref 10–20)
CALCIUM SERPL-MCNC: 8.4 MG/DL — LOW (ref 8.5–10.1)
CHLORIDE SERPL-SCNC: 99 MMOL/L — SIGNIFICANT CHANGE UP (ref 98–110)
CO2 SERPL-SCNC: 25 MMOL/L — SIGNIFICANT CHANGE UP (ref 17–32)
CREAT SERPL-MCNC: 0.8 MG/DL — SIGNIFICANT CHANGE UP (ref 0.7–1.5)
EOSINOPHIL # BLD AUTO: 0.07 K/UL — SIGNIFICANT CHANGE UP (ref 0–0.7)
EOSINOPHIL NFR BLD AUTO: 0.7 % — SIGNIFICANT CHANGE UP (ref 0–8)
GLUCOSE SERPL-MCNC: 99 MG/DL — SIGNIFICANT CHANGE UP (ref 70–99)
HCT VFR BLD CALC: 28.9 % — LOW (ref 37–47)
HGB BLD-MCNC: 9.2 G/DL — LOW (ref 12–16)
IMM GRANULOCYTES NFR BLD AUTO: 0.8 % — HIGH (ref 0.1–0.3)
LYMPHOCYTES # BLD AUTO: 0.74 K/UL — LOW (ref 1.2–3.4)
LYMPHOCYTES # BLD AUTO: 7 % — LOW (ref 20.5–51.1)
MAGNESIUM SERPL-MCNC: 2.1 MG/DL — SIGNIFICANT CHANGE UP (ref 1.8–2.4)
MCHC RBC-ENTMCNC: 28.7 PG — SIGNIFICANT CHANGE UP (ref 27–31)
MCHC RBC-ENTMCNC: 31.8 G/DL — LOW (ref 32–37)
MCV RBC AUTO: 90 FL — SIGNIFICANT CHANGE UP (ref 81–99)
MONOCYTES # BLD AUTO: 0.96 K/UL — HIGH (ref 0.1–0.6)
MONOCYTES NFR BLD AUTO: 9 % — SIGNIFICANT CHANGE UP (ref 1.7–9.3)
NEUTROPHILS # BLD AUTO: 8.73 K/UL — HIGH (ref 1.4–6.5)
NEUTROPHILS NFR BLD AUTO: 82.1 % — HIGH (ref 42.2–75.2)
NRBC # BLD: 0 /100 WBCS — SIGNIFICANT CHANGE UP (ref 0–0)
PLATELET # BLD AUTO: 230 K/UL — SIGNIFICANT CHANGE UP (ref 130–400)
POTASSIUM SERPL-MCNC: 4.1 MMOL/L — SIGNIFICANT CHANGE UP (ref 3.5–5)
POTASSIUM SERPL-SCNC: 4.1 MMOL/L — SIGNIFICANT CHANGE UP (ref 3.5–5)
PROT SERPL-MCNC: 5.9 G/DL — LOW (ref 6–8)
RBC # BLD: 3.21 M/UL — LOW (ref 4.2–5.4)
RBC # FLD: 13.7 % — SIGNIFICANT CHANGE UP (ref 11.5–14.5)
SODIUM SERPL-SCNC: 136 MMOL/L — SIGNIFICANT CHANGE UP (ref 135–146)
WBC # BLD: 10.62 K/UL — SIGNIFICANT CHANGE UP (ref 4.8–10.8)
WBC # FLD AUTO: 10.62 K/UL — SIGNIFICANT CHANGE UP (ref 4.8–10.8)

## 2018-07-07 RX ADMIN — Medication 100 MILLIGRAM(S): at 21:57

## 2018-07-07 RX ADMIN — SERTRALINE 50 MILLIGRAM(S): 25 TABLET, FILM COATED ORAL at 13:10

## 2018-07-07 RX ADMIN — HEPARIN SODIUM 5000 UNIT(S): 5000 INJECTION INTRAVENOUS; SUBCUTANEOUS at 13:10

## 2018-07-07 RX ADMIN — LOSARTAN POTASSIUM 100 MILLIGRAM(S): 100 TABLET, FILM COATED ORAL at 05:38

## 2018-07-07 RX ADMIN — HEPARIN SODIUM 5000 UNIT(S): 5000 INJECTION INTRAVENOUS; SUBCUTANEOUS at 05:38

## 2018-07-07 RX ADMIN — Medication 100 MILLIGRAM(S): at 13:10

## 2018-07-07 RX ADMIN — OXYCODONE AND ACETAMINOPHEN 1 TABLET(S): 5; 325 TABLET ORAL at 15:50

## 2018-07-07 RX ADMIN — Medication 100 MILLIGRAM(S): at 05:38

## 2018-07-07 RX ADMIN — AMLODIPINE BESYLATE 10 MILLIGRAM(S): 2.5 TABLET ORAL at 05:38

## 2018-07-07 RX ADMIN — HEPARIN SODIUM 5000 UNIT(S): 5000 INJECTION INTRAVENOUS; SUBCUTANEOUS at 21:58

## 2018-07-08 LAB
24R-OH-CALCIDIOL SERPL-MCNC: 21 NG/ML — LOW (ref 30–80)
VIT B12 SERPL-MCNC: 481 PG/ML — SIGNIFICANT CHANGE UP (ref 232–1245)

## 2018-07-08 RX ADMIN — Medication 100 MILLIGRAM(S): at 21:31

## 2018-07-08 RX ADMIN — AMLODIPINE BESYLATE 10 MILLIGRAM(S): 2.5 TABLET ORAL at 05:47

## 2018-07-08 RX ADMIN — HEPARIN SODIUM 5000 UNIT(S): 5000 INJECTION INTRAVENOUS; SUBCUTANEOUS at 05:47

## 2018-07-08 RX ADMIN — Medication 100 MILLIGRAM(S): at 05:47

## 2018-07-08 RX ADMIN — SERTRALINE 50 MILLIGRAM(S): 25 TABLET, FILM COATED ORAL at 17:19

## 2018-07-08 RX ADMIN — HEPARIN SODIUM 5000 UNIT(S): 5000 INJECTION INTRAVENOUS; SUBCUTANEOUS at 21:31

## 2018-07-08 RX ADMIN — HEPARIN SODIUM 5000 UNIT(S): 5000 INJECTION INTRAVENOUS; SUBCUTANEOUS at 14:04

## 2018-07-08 RX ADMIN — Medication 650 MILLIGRAM(S): at 14:03

## 2018-07-08 RX ADMIN — Medication 100 MILLIGRAM(S): at 14:05

## 2018-07-08 RX ADMIN — LOSARTAN POTASSIUM 100 MILLIGRAM(S): 100 TABLET, FILM COATED ORAL at 05:47

## 2018-07-08 RX ADMIN — Medication 650 MILLIGRAM(S): at 14:04

## 2018-07-09 LAB
APPEARANCE UR: CLEAR — SIGNIFICANT CHANGE UP
BILIRUB UR-MCNC: NEGATIVE — SIGNIFICANT CHANGE UP
COLOR SPEC: YELLOW — SIGNIFICANT CHANGE UP
DIFF PNL FLD: NEGATIVE — SIGNIFICANT CHANGE UP
EPI CELLS # UR: (no result) /HPF
GLUCOSE UR QL: NEGATIVE MG/DL — SIGNIFICANT CHANGE UP
KETONES UR-MCNC: NEGATIVE — SIGNIFICANT CHANGE UP
LEUKOCYTE ESTERASE UR-ACNC: (no result)
NITRITE UR-MCNC: NEGATIVE — SIGNIFICANT CHANGE UP
PH UR: 6.5 — SIGNIFICANT CHANGE UP (ref 5–8)
PROT UR-MCNC: NEGATIVE MG/DL — SIGNIFICANT CHANGE UP
SP GR SPEC: 1.01 — SIGNIFICANT CHANGE UP (ref 1.01–1.03)
UROBILINOGEN FLD QL: 1 MG/DL (ref 0.2–0.2)
WBC UR QL: SIGNIFICANT CHANGE UP /HPF

## 2018-07-09 RX ORDER — PANTOPRAZOLE SODIUM 20 MG/1
40 TABLET, DELAYED RELEASE ORAL
Qty: 0 | Refills: 0 | Status: DISCONTINUED | OUTPATIENT
Start: 2018-07-09 | End: 2018-07-20

## 2018-07-09 RX ORDER — TRAMADOL HYDROCHLORIDE 50 MG/1
25 TABLET ORAL EVERY 4 HOURS
Qty: 0 | Refills: 0 | Status: DISCONTINUED | OUTPATIENT
Start: 2018-07-09 | End: 2018-07-14

## 2018-07-09 RX ORDER — ERGOCALCIFEROL 1.25 MG/1
50000 CAPSULE ORAL
Qty: 0 | Refills: 0 | Status: DISCONTINUED | OUTPATIENT
Start: 2018-07-09 | End: 2018-07-20

## 2018-07-09 RX ORDER — OXYCODONE HYDROCHLORIDE 5 MG/1
5 TABLET ORAL EVERY 6 HOURS
Qty: 0 | Refills: 0 | Status: DISCONTINUED | OUTPATIENT
Start: 2018-07-09 | End: 2018-07-14

## 2018-07-09 RX ORDER — SENNA PLUS 8.6 MG/1
2 TABLET ORAL AT BEDTIME
Qty: 0 | Refills: 0 | Status: DISCONTINUED | OUTPATIENT
Start: 2018-07-09 | End: 2018-07-20

## 2018-07-09 RX ORDER — POLYETHYLENE GLYCOL 3350 17 G/17G
17 POWDER, FOR SOLUTION ORAL
Qty: 0 | Refills: 0 | Status: DISCONTINUED | OUTPATIENT
Start: 2018-07-09 | End: 2018-07-20

## 2018-07-09 RX ORDER — ACETAMINOPHEN 500 MG
975 TABLET ORAL EVERY 8 HOURS
Qty: 0 | Refills: 0 | Status: COMPLETED | OUTPATIENT
Start: 2018-07-09 | End: 2018-07-16

## 2018-07-09 RX ADMIN — HEPARIN SODIUM 5000 UNIT(S): 5000 INJECTION INTRAVENOUS; SUBCUTANEOUS at 15:13

## 2018-07-09 RX ADMIN — HEPARIN SODIUM 5000 UNIT(S): 5000 INJECTION INTRAVENOUS; SUBCUTANEOUS at 21:48

## 2018-07-09 RX ADMIN — HEPARIN SODIUM 5000 UNIT(S): 5000 INJECTION INTRAVENOUS; SUBCUTANEOUS at 05:31

## 2018-07-09 RX ADMIN — Medication 975 MILLIGRAM(S): at 21:49

## 2018-07-09 RX ADMIN — Medication 650 MILLIGRAM(S): at 05:36

## 2018-07-09 RX ADMIN — LOSARTAN POTASSIUM 100 MILLIGRAM(S): 100 TABLET, FILM COATED ORAL at 05:30

## 2018-07-09 RX ADMIN — Medication 975 MILLIGRAM(S): at 15:09

## 2018-07-09 RX ADMIN — Medication 100 MILLIGRAM(S): at 21:49

## 2018-07-09 RX ADMIN — AMLODIPINE BESYLATE 10 MILLIGRAM(S): 2.5 TABLET ORAL at 05:30

## 2018-07-09 RX ADMIN — ERGOCALCIFEROL 50000 UNIT(S): 1.25 CAPSULE ORAL at 15:10

## 2018-07-09 RX ADMIN — POLYETHYLENE GLYCOL 3350 17 GRAM(S): 17 POWDER, FOR SOLUTION ORAL at 18:29

## 2018-07-09 RX ADMIN — SERTRALINE 50 MILLIGRAM(S): 25 TABLET, FILM COATED ORAL at 12:25

## 2018-07-09 RX ADMIN — PANTOPRAZOLE SODIUM 40 MILLIGRAM(S): 20 TABLET, DELAYED RELEASE ORAL at 12:25

## 2018-07-09 RX ADMIN — Medication 100 MILLIGRAM(S): at 05:30

## 2018-07-09 RX ADMIN — SENNA PLUS 2 TABLET(S): 8.6 TABLET ORAL at 21:49

## 2018-07-09 RX ADMIN — Medication 650 MILLIGRAM(S): at 05:31

## 2018-07-09 RX ADMIN — Medication 100 MILLIGRAM(S): at 15:10

## 2018-07-10 LAB
ANION GAP SERPL CALC-SCNC: 13 MMOL/L — SIGNIFICANT CHANGE UP (ref 7–14)
BASOPHILS # BLD AUTO: 0.05 K/UL — SIGNIFICANT CHANGE UP (ref 0–0.2)
BASOPHILS NFR BLD AUTO: 0.3 % — SIGNIFICANT CHANGE UP (ref 0–1)
BUN SERPL-MCNC: 27 MG/DL — HIGH (ref 10–20)
CALCIUM SERPL-MCNC: 8.7 MG/DL — SIGNIFICANT CHANGE UP (ref 8.5–10.1)
CHLORIDE SERPL-SCNC: 98 MMOL/L — SIGNIFICANT CHANGE UP (ref 98–110)
CO2 SERPL-SCNC: 24 MMOL/L — SIGNIFICANT CHANGE UP (ref 17–32)
CREAT SERPL-MCNC: 0.9 MG/DL — SIGNIFICANT CHANGE UP (ref 0.7–1.5)
EOSINOPHIL # BLD AUTO: 0.13 K/UL — SIGNIFICANT CHANGE UP (ref 0–0.7)
EOSINOPHIL NFR BLD AUTO: 0.9 % — SIGNIFICANT CHANGE UP (ref 0–8)
GLUCOSE SERPL-MCNC: 112 MG/DL — HIGH (ref 70–99)
HCT VFR BLD CALC: 27.8 % — LOW (ref 37–47)
HGB BLD-MCNC: 9.1 G/DL — LOW (ref 12–16)
IMM GRANULOCYTES NFR BLD AUTO: 0.9 % — HIGH (ref 0.1–0.3)
LYMPHOCYTES # BLD AUTO: 0.91 K/UL — LOW (ref 1.2–3.4)
LYMPHOCYTES # BLD AUTO: 6 % — LOW (ref 20.5–51.1)
MCHC RBC-ENTMCNC: 28.8 PG — SIGNIFICANT CHANGE UP (ref 27–31)
MCHC RBC-ENTMCNC: 32.7 G/DL — SIGNIFICANT CHANGE UP (ref 32–37)
MCV RBC AUTO: 88 FL — SIGNIFICANT CHANGE UP (ref 81–99)
MONOCYTES # BLD AUTO: 1.26 K/UL — HIGH (ref 0.1–0.6)
MONOCYTES NFR BLD AUTO: 8.3 % — SIGNIFICANT CHANGE UP (ref 1.7–9.3)
NEUTROPHILS # BLD AUTO: 12.69 K/UL — HIGH (ref 1.4–6.5)
NEUTROPHILS NFR BLD AUTO: 83.6 % — HIGH (ref 42.2–75.2)
NRBC # BLD: 0 /100 WBCS — SIGNIFICANT CHANGE UP (ref 0–0)
PLATELET # BLD AUTO: 284 K/UL — SIGNIFICANT CHANGE UP (ref 130–400)
POTASSIUM SERPL-MCNC: 4.5 MMOL/L — SIGNIFICANT CHANGE UP (ref 3.5–5)
POTASSIUM SERPL-SCNC: 4.5 MMOL/L — SIGNIFICANT CHANGE UP (ref 3.5–5)
RBC # BLD: 3.16 M/UL — LOW (ref 4.2–5.4)
RBC # FLD: 13.4 % — SIGNIFICANT CHANGE UP (ref 11.5–14.5)
SODIUM SERPL-SCNC: 135 MMOL/L — SIGNIFICANT CHANGE UP (ref 135–146)
WBC # BLD: 15.18 K/UL — HIGH (ref 4.8–10.8)
WBC # FLD AUTO: 15.18 K/UL — HIGH (ref 4.8–10.8)

## 2018-07-10 PROCEDURE — 93970 EXTREMITY STUDY: CPT | Mod: 26

## 2018-07-10 RX ADMIN — SERTRALINE 50 MILLIGRAM(S): 25 TABLET, FILM COATED ORAL at 12:14

## 2018-07-10 RX ADMIN — Medication 975 MILLIGRAM(S): at 06:13

## 2018-07-10 RX ADMIN — Medication 100 MILLIGRAM(S): at 21:21

## 2018-07-10 RX ADMIN — SENNA PLUS 2 TABLET(S): 8.6 TABLET ORAL at 21:21

## 2018-07-10 RX ADMIN — LOSARTAN POTASSIUM 100 MILLIGRAM(S): 100 TABLET, FILM COATED ORAL at 06:13

## 2018-07-10 RX ADMIN — PANTOPRAZOLE SODIUM 40 MILLIGRAM(S): 20 TABLET, DELAYED RELEASE ORAL at 08:12

## 2018-07-10 RX ADMIN — HEPARIN SODIUM 5000 UNIT(S): 5000 INJECTION INTRAVENOUS; SUBCUTANEOUS at 14:58

## 2018-07-10 RX ADMIN — HEPARIN SODIUM 5000 UNIT(S): 5000 INJECTION INTRAVENOUS; SUBCUTANEOUS at 06:13

## 2018-07-10 RX ADMIN — POLYETHYLENE GLYCOL 3350 17 GRAM(S): 17 POWDER, FOR SOLUTION ORAL at 17:39

## 2018-07-10 RX ADMIN — Medication 100 MILLIGRAM(S): at 06:13

## 2018-07-10 RX ADMIN — Medication 975 MILLIGRAM(S): at 14:56

## 2018-07-10 RX ADMIN — Medication 100 MILLIGRAM(S): at 14:57

## 2018-07-10 RX ADMIN — AMLODIPINE BESYLATE 10 MILLIGRAM(S): 2.5 TABLET ORAL at 06:13

## 2018-07-10 RX ADMIN — Medication 975 MILLIGRAM(S): at 21:21

## 2018-07-10 RX ADMIN — HEPARIN SODIUM 5000 UNIT(S): 5000 INJECTION INTRAVENOUS; SUBCUTANEOUS at 21:21

## 2018-07-11 RX ADMIN — Medication 100 MILLIGRAM(S): at 06:26

## 2018-07-11 RX ADMIN — Medication 975 MILLIGRAM(S): at 21:25

## 2018-07-11 RX ADMIN — SENNA PLUS 2 TABLET(S): 8.6 TABLET ORAL at 21:25

## 2018-07-11 RX ADMIN — Medication 975 MILLIGRAM(S): at 06:26

## 2018-07-11 RX ADMIN — Medication 100 MILLIGRAM(S): at 21:25

## 2018-07-11 RX ADMIN — POLYETHYLENE GLYCOL 3350 17 GRAM(S): 17 POWDER, FOR SOLUTION ORAL at 17:21

## 2018-07-11 RX ADMIN — AMLODIPINE BESYLATE 10 MILLIGRAM(S): 2.5 TABLET ORAL at 06:26

## 2018-07-11 RX ADMIN — Medication 100 MILLIGRAM(S): at 14:45

## 2018-07-11 RX ADMIN — PANTOPRAZOLE SODIUM 40 MILLIGRAM(S): 20 TABLET, DELAYED RELEASE ORAL at 08:00

## 2018-07-11 RX ADMIN — SERTRALINE 50 MILLIGRAM(S): 25 TABLET, FILM COATED ORAL at 11:55

## 2018-07-11 RX ADMIN — OXYCODONE HYDROCHLORIDE 5 MILLIGRAM(S): 5 TABLET ORAL at 20:38

## 2018-07-11 RX ADMIN — Medication 975 MILLIGRAM(S): at 14:44

## 2018-07-11 RX ADMIN — HEPARIN SODIUM 5000 UNIT(S): 5000 INJECTION INTRAVENOUS; SUBCUTANEOUS at 14:45

## 2018-07-11 RX ADMIN — HEPARIN SODIUM 5000 UNIT(S): 5000 INJECTION INTRAVENOUS; SUBCUTANEOUS at 06:26

## 2018-07-11 RX ADMIN — HEPARIN SODIUM 5000 UNIT(S): 5000 INJECTION INTRAVENOUS; SUBCUTANEOUS at 21:25

## 2018-07-11 RX ADMIN — LOSARTAN POTASSIUM 100 MILLIGRAM(S): 100 TABLET, FILM COATED ORAL at 06:26
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
## 2018-07-12 DIAGNOSIS — Z95.0 PRESENCE OF CARDIAC PACEMAKER: ICD-10-CM

## 2018-07-12 DIAGNOSIS — I10 ESSENTIAL (PRIMARY) HYPERTENSION: ICD-10-CM
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
## 2018-07-12 DIAGNOSIS — R07.89 OTHER CHEST PAIN: ICD-10-CM

## 2018-07-12 DIAGNOSIS — N13.30 UNSPECIFIED HYDRONEPHROSIS: ICD-10-CM

## 2018-07-12 DIAGNOSIS — S72.141A DISPLACED INTERTROCHANTERIC FRACTURE OF RIGHT FEMUR, INITIAL ENCOUNTER FOR CLOSED FRACTURE: ICD-10-CM

## 2018-07-12 LAB
-  AMPICILLIN: SIGNIFICANT CHANGE UP
-  CIPROFLOXACIN: SIGNIFICANT CHANGE UP
-  LEVOFLOXACIN: SIGNIFICANT CHANGE UP
-  NITROFURANTOIN: SIGNIFICANT CHANGE UP
-  TETRACYCLINE: SIGNIFICANT CHANGE UP
-  VANCOMYCIN: SIGNIFICANT CHANGE UP
CULTURE RESULTS: SIGNIFICANT CHANGE UP
METHOD TYPE: SIGNIFICANT CHANGE UP
ORGANISM # SPEC MICROSCOPIC CNT: SIGNIFICANT CHANGE UP
ORGANISM # SPEC MICROSCOPIC CNT: SIGNIFICANT CHANGE UP
SPECIMEN SOURCE: SIGNIFICANT CHANGE UP
TSH SERPL-MCNC: 2.75 UIU/ML — SIGNIFICANT CHANGE UP (ref 0.27–4.2)

## 2018-07-12 RX ADMIN — LOSARTAN POTASSIUM 100 MILLIGRAM(S): 100 TABLET, FILM COATED ORAL at 05:28

## 2018-07-12 RX ADMIN — Medication 975 MILLIGRAM(S): at 05:28

## 2018-07-12 RX ADMIN — TRAMADOL HYDROCHLORIDE 25 MILLIGRAM(S): 50 TABLET ORAL at 19:00

## 2018-07-12 RX ADMIN — Medication 975 MILLIGRAM(S): at 13:18

## 2018-07-12 RX ADMIN — Medication 100 MILLIGRAM(S): at 21:34

## 2018-07-12 RX ADMIN — SERTRALINE 50 MILLIGRAM(S): 25 TABLET, FILM COATED ORAL at 11:08

## 2018-07-12 RX ADMIN — HEPARIN SODIUM 5000 UNIT(S): 5000 INJECTION INTRAVENOUS; SUBCUTANEOUS at 21:34

## 2018-07-12 RX ADMIN — OXYCODONE HYDROCHLORIDE 5 MILLIGRAM(S): 5 TABLET ORAL at 10:21

## 2018-07-12 RX ADMIN — AMLODIPINE BESYLATE 10 MILLIGRAM(S): 2.5 TABLET ORAL at 05:28

## 2018-07-12 RX ADMIN — Medication 975 MILLIGRAM(S): at 21:34

## 2018-07-12 RX ADMIN — OXYCODONE HYDROCHLORIDE 5 MILLIGRAM(S): 5 TABLET ORAL at 09:14

## 2018-07-12 RX ADMIN — POLYETHYLENE GLYCOL 3350 17 GRAM(S): 17 POWDER, FOR SOLUTION ORAL at 17:13

## 2018-07-12 RX ADMIN — OXYCODONE HYDROCHLORIDE 5 MILLIGRAM(S): 5 TABLET ORAL at 07:53

## 2018-07-12 RX ADMIN — Medication 100 MILLIGRAM(S): at 05:28

## 2018-07-12 RX ADMIN — Medication 100 MILLIGRAM(S): at 14:50

## 2018-07-12 RX ADMIN — HEPARIN SODIUM 5000 UNIT(S): 5000 INJECTION INTRAVENOUS; SUBCUTANEOUS at 05:29

## 2018-07-12 RX ADMIN — SENNA PLUS 2 TABLET(S): 8.6 TABLET ORAL at 21:34

## 2018-07-12 RX ADMIN — PANTOPRAZOLE SODIUM 40 MILLIGRAM(S): 20 TABLET, DELAYED RELEASE ORAL at 06:05

## 2018-07-12 RX ADMIN — TRAMADOL HYDROCHLORIDE 25 MILLIGRAM(S): 50 TABLET ORAL at 18:29

## 2018-07-12 RX ADMIN — HEPARIN SODIUM 5000 UNIT(S): 5000 INJECTION INTRAVENOUS; SUBCUTANEOUS at 14:51

## 2018-07-13 LAB
ALBUMIN SERPL ELPH-MCNC: 3.2 G/DL — LOW (ref 3.5–5.2)
ALP SERPL-CCNC: 124 U/L — HIGH (ref 30–115)
ALT FLD-CCNC: 14 U/L — SIGNIFICANT CHANGE UP (ref 0–41)
ANION GAP SERPL CALC-SCNC: 16 MMOL/L — HIGH (ref 7–14)
AST SERPL-CCNC: 25 U/L — SIGNIFICANT CHANGE UP (ref 0–41)
BASOPHILS # BLD AUTO: 0 K/UL — SIGNIFICANT CHANGE UP (ref 0–0.2)
BASOPHILS NFR BLD AUTO: 0 % — SIGNIFICANT CHANGE UP (ref 0–1)
BILIRUB SERPL-MCNC: 0.4 MG/DL — SIGNIFICANT CHANGE UP (ref 0.2–1.2)
BUN SERPL-MCNC: 26 MG/DL — HIGH (ref 10–20)
CALCIUM SERPL-MCNC: 9 MG/DL — SIGNIFICANT CHANGE UP (ref 8.5–10.1)
CHLORIDE SERPL-SCNC: 100 MMOL/L — SIGNIFICANT CHANGE UP (ref 98–110)
CO2 SERPL-SCNC: 22 MMOL/L — SIGNIFICANT CHANGE UP (ref 17–32)
CREAT SERPL-MCNC: 0.9 MG/DL — SIGNIFICANT CHANGE UP (ref 0.7–1.5)
EOSINOPHIL # BLD AUTO: 0 K/UL — SIGNIFICANT CHANGE UP (ref 0–0.7)
EOSINOPHIL NFR BLD AUTO: 0 % — SIGNIFICANT CHANGE UP (ref 0–8)
GLUCOSE SERPL-MCNC: 119 MG/DL — HIGH (ref 70–99)
HCT VFR BLD CALC: 27.6 % — LOW (ref 37–47)
HGB BLD-MCNC: 8.9 G/DL — LOW (ref 12–16)
LYMPHOCYTES # BLD AUTO: 1.88 K/UL — SIGNIFICANT CHANGE UP (ref 1.2–3.4)
LYMPHOCYTES # BLD AUTO: 10 % — LOW (ref 20.5–51.1)
MAGNESIUM SERPL-MCNC: 2 MG/DL — SIGNIFICANT CHANGE UP (ref 1.8–2.4)
MANUAL SMEAR VERIFICATION: SIGNIFICANT CHANGE UP
MCHC RBC-ENTMCNC: 28.7 PG — SIGNIFICANT CHANGE UP (ref 27–31)
MCHC RBC-ENTMCNC: 32.2 G/DL — SIGNIFICANT CHANGE UP (ref 32–37)
MCV RBC AUTO: 89 FL — SIGNIFICANT CHANGE UP (ref 81–99)
MONOCYTES # BLD AUTO: 1.13 K/UL — HIGH (ref 0.1–0.6)
MONOCYTES NFR BLD AUTO: 6 % — SIGNIFICANT CHANGE UP (ref 1.7–9.3)
NEUTROPHILS # BLD AUTO: 15.64 K/UL — HIGH (ref 1.4–6.5)
NEUTROPHILS NFR BLD AUTO: 81 % — HIGH (ref 42.2–75.2)
NEUTS BAND # BLD: 2 % — SIGNIFICANT CHANGE UP (ref 0–6)
NRBC # BLD: 0 /100 — SIGNIFICANT CHANGE UP (ref 0–0)
NRBC # BLD: SIGNIFICANT CHANGE UP /100 WBCS (ref 0–0)
PLAT MORPH BLD: NORMAL — SIGNIFICANT CHANGE UP
PLATELET # BLD AUTO: 385 K/UL — SIGNIFICANT CHANGE UP (ref 130–400)
POTASSIUM SERPL-MCNC: 4.4 MMOL/L — SIGNIFICANT CHANGE UP (ref 3.5–5)
POTASSIUM SERPL-SCNC: 4.4 MMOL/L — SIGNIFICANT CHANGE UP (ref 3.5–5)
PROMYELOCYTES # FLD: 1 % — HIGH (ref 0–0)
PROT SERPL-MCNC: 6 G/DL — SIGNIFICANT CHANGE UP (ref 6–8)
RBC # BLD: 3.1 M/UL — LOW (ref 4.2–5.4)
RBC # FLD: 13.3 % — SIGNIFICANT CHANGE UP (ref 11.5–14.5)
RBC BLD AUTO: NORMAL — SIGNIFICANT CHANGE UP
SODIUM SERPL-SCNC: 138 MMOL/L — SIGNIFICANT CHANGE UP (ref 135–146)
WBC # BLD: 18.84 K/UL — HIGH (ref 4.8–10.8)
WBC # FLD AUTO: 18.84 K/UL — HIGH (ref 4.8–10.8)

## 2018-07-13 RX ORDER — AMOXICILLIN 250 MG/5ML
500 SUSPENSION, RECONSTITUTED, ORAL (ML) ORAL EVERY 12 HOURS
Qty: 0 | Refills: 0 | Status: COMPLETED | OUTPATIENT
Start: 2018-07-13 | End: 2018-07-20

## 2018-07-13 RX ADMIN — HEPARIN SODIUM 5000 UNIT(S): 5000 INJECTION INTRAVENOUS; SUBCUTANEOUS at 05:27

## 2018-07-13 RX ADMIN — Medication 975 MILLIGRAM(S): at 22:46

## 2018-07-13 RX ADMIN — Medication 975 MILLIGRAM(S): at 05:27

## 2018-07-13 RX ADMIN — HEPARIN SODIUM 5000 UNIT(S): 5000 INJECTION INTRAVENOUS; SUBCUTANEOUS at 22:45

## 2018-07-13 RX ADMIN — OXYCODONE HYDROCHLORIDE 5 MILLIGRAM(S): 5 TABLET ORAL at 17:39

## 2018-07-13 RX ADMIN — Medication 100 MILLIGRAM(S): at 14:56

## 2018-07-13 RX ADMIN — HEPARIN SODIUM 5000 UNIT(S): 5000 INJECTION INTRAVENOUS; SUBCUTANEOUS at 14:57

## 2018-07-13 RX ADMIN — PANTOPRAZOLE SODIUM 40 MILLIGRAM(S): 20 TABLET, DELAYED RELEASE ORAL at 06:03

## 2018-07-13 RX ADMIN — Medication 100 MILLIGRAM(S): at 05:27

## 2018-07-13 RX ADMIN — LOSARTAN POTASSIUM 100 MILLIGRAM(S): 100 TABLET, FILM COATED ORAL at 05:27

## 2018-07-13 RX ADMIN — SERTRALINE 50 MILLIGRAM(S): 25 TABLET, FILM COATED ORAL at 14:56

## 2018-07-13 RX ADMIN — Medication 500 MILLIGRAM(S): at 18:56

## 2018-07-13 RX ADMIN — Medication 975 MILLIGRAM(S): at 14:54

## 2018-07-13 RX ADMIN — AMLODIPINE BESYLATE 10 MILLIGRAM(S): 2.5 TABLET ORAL at 05:27

## 2018-07-13 RX ADMIN — POLYETHYLENE GLYCOL 3350 17 GRAM(S): 17 POWDER, FOR SOLUTION ORAL at 17:15

## 2018-07-13 RX ADMIN — OXYCODONE HYDROCHLORIDE 5 MILLIGRAM(S): 5 TABLET ORAL at 15:43

## 2018-07-13 RX ADMIN — Medication 100 MILLIGRAM(S): at 22:49

## 2018-07-13 RX ADMIN — SENNA PLUS 2 TABLET(S): 8.6 TABLET ORAL at 22:45

## 2018-07-14 RX ORDER — TRAMADOL HYDROCHLORIDE 50 MG/1
25 TABLET ORAL EVERY 4 HOURS
Qty: 0 | Refills: 0 | Status: DISCONTINUED | OUTPATIENT
Start: 2018-07-14 | End: 2018-07-20

## 2018-07-14 RX ORDER — OXYCODONE HYDROCHLORIDE 5 MG/1
5 TABLET ORAL EVERY 6 HOURS
Qty: 0 | Refills: 0 | Status: DISCONTINUED | OUTPATIENT
Start: 2018-07-14 | End: 2018-07-20

## 2018-07-14 RX ADMIN — Medication 500 MILLIGRAM(S): at 05:43

## 2018-07-14 RX ADMIN — POLYETHYLENE GLYCOL 3350 17 GRAM(S): 17 POWDER, FOR SOLUTION ORAL at 17:34

## 2018-07-14 RX ADMIN — Medication 100 MILLIGRAM(S): at 05:43

## 2018-07-14 RX ADMIN — SENNA PLUS 2 TABLET(S): 8.6 TABLET ORAL at 21:42

## 2018-07-14 RX ADMIN — Medication 975 MILLIGRAM(S): at 13:52

## 2018-07-14 RX ADMIN — Medication 975 MILLIGRAM(S): at 05:42

## 2018-07-14 RX ADMIN — HEPARIN SODIUM 5000 UNIT(S): 5000 INJECTION INTRAVENOUS; SUBCUTANEOUS at 13:52

## 2018-07-14 RX ADMIN — Medication 100 MILLIGRAM(S): at 21:42

## 2018-07-14 RX ADMIN — LOSARTAN POTASSIUM 100 MILLIGRAM(S): 100 TABLET, FILM COATED ORAL at 05:43

## 2018-07-14 RX ADMIN — Medication 500 MILLIGRAM(S): at 17:35

## 2018-07-14 RX ADMIN — HEPARIN SODIUM 5000 UNIT(S): 5000 INJECTION INTRAVENOUS; SUBCUTANEOUS at 21:41

## 2018-07-14 RX ADMIN — HEPARIN SODIUM 5000 UNIT(S): 5000 INJECTION INTRAVENOUS; SUBCUTANEOUS at 05:44

## 2018-07-14 RX ADMIN — SERTRALINE 50 MILLIGRAM(S): 25 TABLET, FILM COATED ORAL at 13:51

## 2018-07-14 RX ADMIN — PANTOPRAZOLE SODIUM 40 MILLIGRAM(S): 20 TABLET, DELAYED RELEASE ORAL at 05:46

## 2018-07-14 RX ADMIN — AMLODIPINE BESYLATE 10 MILLIGRAM(S): 2.5 TABLET ORAL at 05:43

## 2018-07-14 RX ADMIN — Medication 975 MILLIGRAM(S): at 21:42

## 2018-07-14 RX ADMIN — Medication 100 MILLIGRAM(S): at 13:52

## 2018-07-15 LAB
CULTURE RESULTS: SIGNIFICANT CHANGE UP
CULTURE RESULTS: SIGNIFICANT CHANGE UP
SPECIMEN SOURCE: SIGNIFICANT CHANGE UP
SPECIMEN SOURCE: SIGNIFICANT CHANGE UP

## 2018-07-15 RX ADMIN — POLYETHYLENE GLYCOL 3350 17 GRAM(S): 17 POWDER, FOR SOLUTION ORAL at 17:11

## 2018-07-15 RX ADMIN — HEPARIN SODIUM 5000 UNIT(S): 5000 INJECTION INTRAVENOUS; SUBCUTANEOUS at 21:45

## 2018-07-15 RX ADMIN — HEPARIN SODIUM 5000 UNIT(S): 5000 INJECTION INTRAVENOUS; SUBCUTANEOUS at 06:06

## 2018-07-15 RX ADMIN — Medication 100 MILLIGRAM(S): at 06:07

## 2018-07-15 RX ADMIN — Medication 975 MILLIGRAM(S): at 21:46

## 2018-07-15 RX ADMIN — AMLODIPINE BESYLATE 10 MILLIGRAM(S): 2.5 TABLET ORAL at 06:06

## 2018-07-15 RX ADMIN — Medication 500 MILLIGRAM(S): at 17:11

## 2018-07-15 RX ADMIN — PANTOPRAZOLE SODIUM 40 MILLIGRAM(S): 20 TABLET, DELAYED RELEASE ORAL at 08:19

## 2018-07-15 RX ADMIN — SERTRALINE 50 MILLIGRAM(S): 25 TABLET, FILM COATED ORAL at 14:03

## 2018-07-15 RX ADMIN — Medication 975 MILLIGRAM(S): at 14:03

## 2018-07-15 RX ADMIN — Medication 500 MILLIGRAM(S): at 06:06

## 2018-07-15 RX ADMIN — LOSARTAN POTASSIUM 100 MILLIGRAM(S): 100 TABLET, FILM COATED ORAL at 06:07

## 2018-07-15 RX ADMIN — Medication 100 MILLIGRAM(S): at 21:46

## 2018-07-15 RX ADMIN — SENNA PLUS 2 TABLET(S): 8.6 TABLET ORAL at 21:46

## 2018-07-15 RX ADMIN — HEPARIN SODIUM 5000 UNIT(S): 5000 INJECTION INTRAVENOUS; SUBCUTANEOUS at 14:13

## 2018-07-15 RX ADMIN — Medication 100 MILLIGRAM(S): at 14:04

## 2018-07-15 RX ADMIN — Medication 975 MILLIGRAM(S): at 06:06

## 2018-07-16 LAB
ALBUMIN SERPL ELPH-MCNC: 3.5 G/DL — SIGNIFICANT CHANGE UP (ref 3.5–5.2)
ALP SERPL-CCNC: 171 U/L — HIGH (ref 30–115)
ALT FLD-CCNC: 19 U/L — SIGNIFICANT CHANGE UP (ref 0–41)
ANION GAP SERPL CALC-SCNC: 16 MMOL/L — HIGH (ref 7–14)
AST SERPL-CCNC: 29 U/L — SIGNIFICANT CHANGE UP (ref 0–41)
BASOPHILS # BLD AUTO: 0 K/UL — SIGNIFICANT CHANGE UP (ref 0–0.2)
BASOPHILS NFR BLD AUTO: 0 % — SIGNIFICANT CHANGE UP (ref 0–1)
BILIRUB DIRECT SERPL-MCNC: <0.2 MG/DL — SIGNIFICANT CHANGE UP (ref 0–0.2)
BILIRUB INDIRECT FLD-MCNC: >0.1 MG/DL — LOW (ref 0.2–1.2)
BILIRUB SERPL-MCNC: 0.3 MG/DL — SIGNIFICANT CHANGE UP (ref 0.2–1.2)
BUN SERPL-MCNC: 27 MG/DL — HIGH (ref 10–20)
CALCIUM SERPL-MCNC: 9.1 MG/DL — SIGNIFICANT CHANGE UP (ref 8.5–10.1)
CHLORIDE SERPL-SCNC: 102 MMOL/L — SIGNIFICANT CHANGE UP (ref 98–110)
CO2 SERPL-SCNC: 23 MMOL/L — SIGNIFICANT CHANGE UP (ref 17–32)
CREAT SERPL-MCNC: 1 MG/DL — SIGNIFICANT CHANGE UP (ref 0.7–1.5)
EOSINOPHIL # BLD AUTO: 0 K/UL — SIGNIFICANT CHANGE UP (ref 0–0.7)
EOSINOPHIL NFR BLD AUTO: 0 % — SIGNIFICANT CHANGE UP (ref 0–8)
GLUCOSE SERPL-MCNC: 125 MG/DL — HIGH (ref 70–99)
HCT VFR BLD CALC: 28.8 % — LOW (ref 37–47)
HGB BLD-MCNC: 9.2 G/DL — LOW (ref 12–16)
LYMPHOCYTES # BLD AUTO: 1.34 K/UL — SIGNIFICANT CHANGE UP (ref 1.2–3.4)
LYMPHOCYTES # BLD AUTO: 9.5 % — LOW (ref 20.5–51.1)
LYMPHOCYTES # SPEC AUTO: 6 % — HIGH (ref 0–0)
MCHC RBC-ENTMCNC: 28.4 PG — SIGNIFICANT CHANGE UP (ref 27–31)
MCHC RBC-ENTMCNC: 31.9 G/DL — LOW (ref 32–37)
MCV RBC AUTO: 88.9 FL — SIGNIFICANT CHANGE UP (ref 81–99)
METAMYELOCYTES # FLD: 0.9 % — HIGH (ref 0–0)
MONOCYTES # BLD AUTO: 0.61 K/UL — HIGH (ref 0.1–0.6)
MONOCYTES NFR BLD AUTO: 4.3 % — SIGNIFICANT CHANGE UP (ref 1.7–9.3)
MYELOCYTES NFR BLD: 4.3 % — HIGH (ref 0–0)
NEUTROPHILS # BLD AUTO: 10.31 K/UL — HIGH (ref 1.4–6.5)
NEUTROPHILS NFR BLD AUTO: 70.7 % — SIGNIFICANT CHANGE UP (ref 42.2–75.2)
NEUTS BAND # BLD: 2.6 % — SIGNIFICANT CHANGE UP (ref 0–6)
NRBC # BLD: 0 /100 WBCS — SIGNIFICANT CHANGE UP (ref 0–0)
PLAT MORPH BLD: NORMAL — SIGNIFICANT CHANGE UP
PLATELET # BLD AUTO: 442 K/UL — HIGH (ref 130–400)
POTASSIUM SERPL-MCNC: 4.5 MMOL/L — SIGNIFICANT CHANGE UP (ref 3.5–5)
POTASSIUM SERPL-SCNC: 4.5 MMOL/L — SIGNIFICANT CHANGE UP (ref 3.5–5)
PROMYELOCYTES # FLD: 1.7 % — HIGH (ref 0–0)
PROT SERPL-MCNC: 6.5 G/DL — SIGNIFICANT CHANGE UP (ref 6–8)
RBC # BLD: 3.24 M/UL — LOW (ref 4.2–5.4)
RBC # FLD: 13.5 % — SIGNIFICANT CHANGE UP (ref 11.5–14.5)
RBC BLD AUTO: NORMAL — SIGNIFICANT CHANGE UP
SODIUM SERPL-SCNC: 141 MMOL/L — SIGNIFICANT CHANGE UP (ref 135–146)
WBC # BLD: 14.07 K/UL — HIGH (ref 4.8–10.8)
WBC # FLD AUTO: 14.07 K/UL — HIGH (ref 4.8–10.8)

## 2018-07-16 RX ORDER — MINERAL OIL
30 OIL (ML) MISCELLANEOUS
Qty: 0 | Refills: 0 | Status: COMPLETED | OUTPATIENT
Start: 2018-07-16 | End: 2018-07-17

## 2018-07-16 RX ADMIN — ERGOCALCIFEROL 50000 UNIT(S): 1.25 CAPSULE ORAL at 12:55

## 2018-07-16 RX ADMIN — POLYETHYLENE GLYCOL 3350 17 GRAM(S): 17 POWDER, FOR SOLUTION ORAL at 16:57

## 2018-07-16 RX ADMIN — HEPARIN SODIUM 5000 UNIT(S): 5000 INJECTION INTRAVENOUS; SUBCUTANEOUS at 22:26

## 2018-07-16 RX ADMIN — HEPARIN SODIUM 5000 UNIT(S): 5000 INJECTION INTRAVENOUS; SUBCUTANEOUS at 15:09

## 2018-07-16 RX ADMIN — HEPARIN SODIUM 5000 UNIT(S): 5000 INJECTION INTRAVENOUS; SUBCUTANEOUS at 06:28

## 2018-07-16 RX ADMIN — AMLODIPINE BESYLATE 10 MILLIGRAM(S): 2.5 TABLET ORAL at 06:28

## 2018-07-16 RX ADMIN — Medication 500 MILLIGRAM(S): at 16:57

## 2018-07-16 RX ADMIN — Medication 100 MILLIGRAM(S): at 06:28

## 2018-07-16 RX ADMIN — SERTRALINE 50 MILLIGRAM(S): 25 TABLET, FILM COATED ORAL at 12:55

## 2018-07-16 RX ADMIN — SENNA PLUS 2 TABLET(S): 8.6 TABLET ORAL at 22:26

## 2018-07-16 RX ADMIN — Medication 100 MILLIGRAM(S): at 22:26

## 2018-07-16 RX ADMIN — Medication 500 MILLIGRAM(S): at 06:28

## 2018-07-16 RX ADMIN — LOSARTAN POTASSIUM 100 MILLIGRAM(S): 100 TABLET, FILM COATED ORAL at 06:28

## 2018-07-16 RX ADMIN — Medication 30 MILLILITER(S): at 22:26

## 2018-07-16 RX ADMIN — PANTOPRAZOLE SODIUM 40 MILLIGRAM(S): 20 TABLET, DELAYED RELEASE ORAL at 12:56

## 2018-07-16 RX ADMIN — Medication 975 MILLIGRAM(S): at 06:28

## 2018-07-17 RX ADMIN — Medication 100 MILLIGRAM(S): at 06:49

## 2018-07-17 RX ADMIN — Medication 100 MILLIGRAM(S): at 21:56

## 2018-07-17 RX ADMIN — SERTRALINE 50 MILLIGRAM(S): 25 TABLET, FILM COATED ORAL at 12:18

## 2018-07-17 RX ADMIN — Medication 100 MILLIGRAM(S): at 12:18

## 2018-07-17 RX ADMIN — OXYCODONE HYDROCHLORIDE 5 MILLIGRAM(S): 5 TABLET ORAL at 13:23

## 2018-07-17 RX ADMIN — LOSARTAN POTASSIUM 100 MILLIGRAM(S): 100 TABLET, FILM COATED ORAL at 06:49

## 2018-07-17 RX ADMIN — SENNA PLUS 2 TABLET(S): 8.6 TABLET ORAL at 21:56

## 2018-07-17 RX ADMIN — Medication 30 MILLILITER(S): at 17:34

## 2018-07-17 RX ADMIN — PANTOPRAZOLE SODIUM 40 MILLIGRAM(S): 20 TABLET, DELAYED RELEASE ORAL at 06:49

## 2018-07-17 RX ADMIN — HEPARIN SODIUM 5000 UNIT(S): 5000 INJECTION INTRAVENOUS; SUBCUTANEOUS at 06:49

## 2018-07-17 RX ADMIN — OXYCODONE HYDROCHLORIDE 5 MILLIGRAM(S): 5 TABLET ORAL at 13:24

## 2018-07-17 RX ADMIN — HEPARIN SODIUM 5000 UNIT(S): 5000 INJECTION INTRAVENOUS; SUBCUTANEOUS at 13:22

## 2018-07-17 RX ADMIN — Medication 500 MILLIGRAM(S): at 06:49

## 2018-07-17 RX ADMIN — POLYETHYLENE GLYCOL 3350 17 GRAM(S): 17 POWDER, FOR SOLUTION ORAL at 17:34

## 2018-07-17 RX ADMIN — Medication 30 MILLILITER(S): at 06:49

## 2018-07-17 RX ADMIN — Medication 500 MILLIGRAM(S): at 17:35

## 2018-07-17 RX ADMIN — AMLODIPINE BESYLATE 10 MILLIGRAM(S): 2.5 TABLET ORAL at 06:48

## 2018-07-17 RX ADMIN — HEPARIN SODIUM 5000 UNIT(S): 5000 INJECTION INTRAVENOUS; SUBCUTANEOUS at 21:56

## 2018-07-18 PROCEDURE — 93970 EXTREMITY STUDY: CPT | Mod: 26

## 2018-07-18 RX ADMIN — HEPARIN SODIUM 5000 UNIT(S): 5000 INJECTION INTRAVENOUS; SUBCUTANEOUS at 06:04

## 2018-07-18 RX ADMIN — Medication 100 MILLIGRAM(S): at 06:04

## 2018-07-18 RX ADMIN — Medication 100 MILLIGRAM(S): at 21:33

## 2018-07-18 RX ADMIN — Medication 500 MILLIGRAM(S): at 06:04

## 2018-07-18 RX ADMIN — POLYETHYLENE GLYCOL 3350 17 GRAM(S): 17 POWDER, FOR SOLUTION ORAL at 17:22

## 2018-07-18 RX ADMIN — SERTRALINE 50 MILLIGRAM(S): 25 TABLET, FILM COATED ORAL at 12:55

## 2018-07-18 RX ADMIN — PANTOPRAZOLE SODIUM 40 MILLIGRAM(S): 20 TABLET, DELAYED RELEASE ORAL at 06:04

## 2018-07-18 RX ADMIN — LOSARTAN POTASSIUM 100 MILLIGRAM(S): 100 TABLET, FILM COATED ORAL at 06:04

## 2018-07-18 RX ADMIN — AMLODIPINE BESYLATE 10 MILLIGRAM(S): 2.5 TABLET ORAL at 06:05

## 2018-07-18 RX ADMIN — SENNA PLUS 2 TABLET(S): 8.6 TABLET ORAL at 21:33

## 2018-07-18 RX ADMIN — HEPARIN SODIUM 5000 UNIT(S): 5000 INJECTION INTRAVENOUS; SUBCUTANEOUS at 21:32

## 2018-07-18 RX ADMIN — Medication 500 MILLIGRAM(S): at 17:22

## 2018-07-19 RX ADMIN — Medication 100 MILLIGRAM(S): at 05:54

## 2018-07-19 RX ADMIN — AMLODIPINE BESYLATE 10 MILLIGRAM(S): 2.5 TABLET ORAL at 05:54

## 2018-07-19 RX ADMIN — HEPARIN SODIUM 5000 UNIT(S): 5000 INJECTION INTRAVENOUS; SUBCUTANEOUS at 05:54

## 2018-07-19 RX ADMIN — Medication 100 MILLIGRAM(S): at 21:41

## 2018-07-19 RX ADMIN — Medication 500 MILLIGRAM(S): at 17:19

## 2018-07-19 RX ADMIN — Medication 100 MILLIGRAM(S): at 13:21

## 2018-07-19 RX ADMIN — PANTOPRAZOLE SODIUM 40 MILLIGRAM(S): 20 TABLET, DELAYED RELEASE ORAL at 05:54

## 2018-07-19 RX ADMIN — LOSARTAN POTASSIUM 100 MILLIGRAM(S): 100 TABLET, FILM COATED ORAL at 05:53

## 2018-07-19 RX ADMIN — Medication 500 MILLIGRAM(S): at 05:53

## 2018-07-19 RX ADMIN — HEPARIN SODIUM 5000 UNIT(S): 5000 INJECTION INTRAVENOUS; SUBCUTANEOUS at 21:46

## 2018-07-19 RX ADMIN — SERTRALINE 50 MILLIGRAM(S): 25 TABLET, FILM COATED ORAL at 13:21

## 2018-07-19 RX ADMIN — HEPARIN SODIUM 5000 UNIT(S): 5000 INJECTION INTRAVENOUS; SUBCUTANEOUS at 13:22

## 2018-07-19 RX ADMIN — POLYETHYLENE GLYCOL 3350 17 GRAM(S): 17 POWDER, FOR SOLUTION ORAL at 17:19

## 2018-07-19 RX ADMIN — SENNA PLUS 2 TABLET(S): 8.6 TABLET ORAL at 21:41

## 2018-07-20 ENCOUNTER — TRANSCRIPTION ENCOUNTER (OUTPATIENT)
Age: 83
End: 2018-07-20

## 2018-07-20 RX ORDER — ASPIRIN/CALCIUM CARB/MAGNESIUM 324 MG
1 TABLET ORAL
Qty: 30 | Refills: 0 | OUTPATIENT
Start: 2018-07-20 | End: 2018-08-18

## 2018-07-20 RX ORDER — SERTRALINE 25 MG/1
1 TABLET, FILM COATED ORAL
Qty: 0 | Refills: 0 | COMMUNITY

## 2018-07-20 RX ORDER — PANTOPRAZOLE SODIUM 20 MG/1
1 TABLET, DELAYED RELEASE ORAL
Qty: 30 | Refills: 0 | OUTPATIENT
Start: 2018-07-20 | End: 2018-08-18

## 2018-07-20 RX ORDER — OXYCODONE HYDROCHLORIDE 5 MG/1
1 TABLET ORAL
Qty: 21 | Refills: 0 | OUTPATIENT
Start: 2018-07-20 | End: 2018-07-26

## 2018-07-20 RX ORDER — LOSARTAN POTASSIUM 100 MG/1
1 TABLET, FILM COATED ORAL
Qty: 0 | Refills: 0 | COMMUNITY

## 2018-07-20 RX ORDER — SERTRALINE 25 MG/1
1 TABLET, FILM COATED ORAL
Qty: 30 | Refills: 0 | OUTPATIENT
Start: 2018-07-20 | End: 2018-08-18

## 2018-07-20 RX ORDER — AMLODIPINE BESYLATE 2.5 MG/1
1 TABLET ORAL
Qty: 30 | Refills: 0 | OUTPATIENT
Start: 2018-07-20 | End: 2018-08-18

## 2018-07-20 RX ORDER — ERGOCALCIFEROL 1.25 MG/1
1 CAPSULE ORAL
Qty: 6 | Refills: 0 | OUTPATIENT
Start: 2018-07-20 | End: 2018-09-02

## 2018-07-20 RX ORDER — POLYETHYLENE GLYCOL 3350 17 G/17G
17 POWDER, FOR SOLUTION ORAL
Qty: 0 | Refills: 0 | COMMUNITY
Start: 2018-07-20

## 2018-07-20 RX ORDER — LOSARTAN POTASSIUM 100 MG/1
1 TABLET, FILM COATED ORAL
Qty: 30 | Refills: 0 | OUTPATIENT
Start: 2018-07-20 | End: 2018-08-18

## 2018-07-20 RX ADMIN — HEPARIN SODIUM 5000 UNIT(S): 5000 INJECTION INTRAVENOUS; SUBCUTANEOUS at 06:22

## 2018-07-20 RX ADMIN — AMLODIPINE BESYLATE 10 MILLIGRAM(S): 2.5 TABLET ORAL at 06:21

## 2018-07-20 RX ADMIN — Medication 100 MILLIGRAM(S): at 13:51

## 2018-07-20 RX ADMIN — PANTOPRAZOLE SODIUM 40 MILLIGRAM(S): 20 TABLET, DELAYED RELEASE ORAL at 06:21

## 2018-07-20 RX ADMIN — LOSARTAN POTASSIUM 100 MILLIGRAM(S): 100 TABLET, FILM COATED ORAL at 06:21

## 2018-07-20 RX ADMIN — HEPARIN SODIUM 5000 UNIT(S): 5000 INJECTION INTRAVENOUS; SUBCUTANEOUS at 13:52

## 2018-07-20 RX ADMIN — Medication 500 MILLIGRAM(S): at 06:21

## 2018-07-20 RX ADMIN — SERTRALINE 50 MILLIGRAM(S): 25 TABLET, FILM COATED ORAL at 13:51

## 2018-07-20 RX ADMIN — Medication 100 MILLIGRAM(S): at 06:21

## 2018-07-20 NOTE — DISCHARGE NOTE ADULT - PLAN OF CARE
full healing and free of pain, ability to do ADLs with mild help as possible continue physical therapy as advised  ,home care , please report any worsening pain, swelling, or drainage , breathing difficulty occurs , continue ecotrin for 30 days and take Protonix with it, follow with your ortho doctor in 2 weeks to hav enormal blood pressure control continue meds and pmd follow up in 2 weeks to have a functional heart rate s/p interrogation of pacemaker  during the rehab stay, please follow with your cardiologist as advised /routine check up advised  every 3 months

## 2018-07-20 NOTE — DISCHARGE NOTE ADULT - MEDICATION SUMMARY - MEDICATIONS TO TAKE
I will START or STAY ON the medications listed below when I get home from the hospital:    mesalamine 800 mg oral delayed release tablet  -- 2 tab(s) by mouth 3 times a day  -- Indication: For home  med may continue as per your pmd     acetaminophen 650 mg oral tablet, extended release  -- 1 tab(s) by mouth every 6 hours   -- This product contains acetaminophen.  Do not use  with any other product containing acetaminophen to prevent possible liver damage.    -- Indication: For mild pain     oxyCODONE 5 mg oral tablet  -- 1 tab(s) by mouth every 8 hours, As Needed -very severe pain (9 to 10/10) MDD 3  -- Indication: For for severe pain, take as needed     aspirin 325 mg oral delayed release tablet  -- 1 tab(s) by mouth once a day   -- Swallow whole.  Do not crush.  Take with food or milk.    -- Indication: For for blood clot prevention, please inform your doctor if you develop breathing difficulty , fever . swelling or chio n    losartan 100 mg oral tablet  -- 1 tab(s) by mouth once a day  -- Indication: For blood pressure control    sertraline 50 mg oral tablet  -- 1 tab(s) by mouth once a day  -- Indication: For for depression    amLODIPine 10 mg oral tablet  -- 1 tab(s) by mouth once a day  -- Indication: For blood pressure control    polyethylene glycol 3350 oral powder for reconstitution  -- 17 gram(s) by mouth   -- Indication: For bowel med, over the counter , take as needed     pantoprazole 40 mg oral delayed release tablet  -- 1 tab(s) by mouth once a day (before a meal)  -- Indication: For to prevent stomach ulcer due to stress and aspirin     ergocalciferol 50,000 intl units (1.25 mg) oral capsule  -- 1 cap(s) by mouth once a week  -- Indication: For low vit d level, take for 6 more weeks, next dose due on 7/23/18

## 2018-07-20 NOTE — DISCHARGE NOTE ADULT - REASON FOR ADMISSION
88 yo lady with HTN, hx CHF(?), s/p PPM done by EPS at [facility] in March 2018 for 3rd degree HB, sciatica, admitted 7/2 after she fell off a chair while bending down to  a piece of paper. She fx right hip (intertrochanteric). She underwent ORIF by Dr. Webster on 7/3/18, she is WBAT RLE. Vascular consult called 7/10 for left gastroc DVT- and repeated on 7/18 showed resolution of dvt-preliminary: Find out who put in  PPM placed  in March 2018  had it interrogated in rehab, sh eis in NSR a sper dr Gallagher 7/10 urine culture showed  E coli, treated  with abx 89 yo lady with HTN, hx CHF(?), s/p PPM done by EPS at [facility] in March 2018 for 3rd degree HB, sciatica, admitted 7/2 after she fell off a chair while bending down to  a piece of paper. She fx right hip (intertrochanteric). She underwent ORIF by Dr. Webster on 7/3/18, she is WBAT RLE. Vascular consult called 7/10 for left gastroc DVT- and repeated on 7/18 showed resolution of dvt. : her  PPM was  March 2018 and   had it interrogated in rehab by dr Gallagher ,, she is in NSR a sper dr Gallagher. on 7/10 urine culture showed  E coli, she is treated  with abx 88 yo lady with HTN, hx CHF(?), s/p PPM ( was placed at [facility] in March 2018 for 3rd degree HB,) sciatica, admitted 7/2 after she fell off a chair while bending down to  a piece of paper. She is found to have  right hip (intertrochanteric). She underwent ORIF by Dr. Webster on 7/3/18, she is WBAT RLE. Vascular consult called 7/10 for left gastroc DVT-and continued on dvt prophylaxic dose of hepain,  and repeated  venous doppler on 7/18 showed resolution of dvt. : her  PPM was  interrogated in rehab by dr Gallagher ,, she is in NSR . on 7/10 urine culture showed  E coli, she is treated  with abx for e coli uti

## 2018-07-20 NOTE — DISCHARGE NOTE ADULT - CARE PROVIDER_API CALL
Dominguez Max), Orthopaedic Surgery  [address] [place] [address]  Phone: [phone]  Fax: [phone]    Brian Zhu (), Medicine  [address] [place] [address]  Phone: [phone]  Fax: [phone]    Manav Urbina (MD; CHELLY), Cardiac Electrophysiology; Cardiovascular Disease  [address] [place] [address]  Phone: [phone]  Fax: [phone]

## 2018-07-20 NOTE — DISCHARGE NOTE ADULT - MEDICATION SUMMARY - MEDICATIONS TO STOP TAKING
I will STOP taking the medications listed below when I get home from the hospital:    Walker  -- One Walker    oxyCODONE-acetaminophen 5 mg-325 mg oral tablet  -- 1 tab(s) by mouth every 6 hours, As needed, Moderate Pain (4 - 6)    heparin  -- 5000 unit(s) subcutaneous every 8 hours    docusate sodium 100 mg oral capsule  -- 1 cap(s) by mouth 3 times a day

## 2018-07-20 NOTE — DISCHARGE NOTE ADULT - CARE PLAN
Principal Discharge DX:	Closed hip fracture requiring operative repair  Goal:	full healing and free of pain, ability to do ADLs with mild help as possible  Assessment and plan of treatment:	continue physical therapy as advised  ,home care , please report any worsening pain, swelling, or drainage , breathing difficulty occurs , continue ecotrin for 30 days and take Protonix with it, follow with your ortho doctor in 2 weeks  Secondary Diagnosis:	HTN (hypertension)  Goal:	to hav enormal blood pressure control  Assessment and plan of treatment:	continue meds and pmd follow up in 2 weeks  Secondary Diagnosis:	Pacemaker  Goal:	to have a functional heart rate  Assessment and plan of treatment:	s/p interrogation of pacemaker  during the rehab stay, please follow with your cardiologist as advised /routine check up advised  every 3 months

## 2018-07-20 NOTE — DISCHARGE NOTE ADULT - PATIENT PORTAL LINK FT
You can access the [facility] Patient Portal, offered by [facility], by registering with the following website: [email or web address]

## 2018-07-25 DIAGNOSIS — I10 ESSENTIAL (PRIMARY) HYPERTENSION: ICD-10-CM
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
## 2018-07-25 DIAGNOSIS — S72.141D DISPLACED INTERTROCHANTERIC FRACTURE OF RIGHT FEMUR, SUBSEQUENT ENCOUNTER FOR CLOSED FRACTURE WITH ROUTINE HEALING: ICD-10-CM

## 2018-07-25 DIAGNOSIS — Z51.89 ENCOUNTER FOR OTHER SPECIFIED AFTERCARE: ICD-10-CM

## 2018-07-25 DIAGNOSIS — Z95.0 PRESENCE OF CARDIAC PACEMAKER: ICD-10-CM

## 2018-09-10 NOTE — ED ADULT NURSE NOTE - NS PRO AD PATIENT TYPE
SINGLE VIEW CHEST:

 

HISTORY:

Chest pain for several days.

 

COMPARISON:

None.

 

FINDINGS:

Single view of the chest show normal sized cardiomediastinal silhouette. There is no evidence of cons
olidation, mass, or pleural effusion. The bones are unremarkable.

 

IMPRESSION:

No evidence of acute cardiopulmonary disease.

 

POS: [facility]
Living Will/at home

## 2018-11-08 ENCOUNTER — APPOINTMENT (OUTPATIENT)
Dept: CARDIOLOGY | Facility: CLINIC | Age: 83
End: 2018-11-08

## 2018-12-04 NOTE — ED ADULT TRIAGE NOTE - MODE OF ARRIVAL
Telephone Encounter by Asia Harrington at 05/01/18 08:05 AM     Author:  Asia Harrington Service:  (none) Author Type:       Filed:  05/01/18 08:07 AM Encounter Date:  5/1/2018 Status:  Signed     :  Asia Harrington ()            Pt is requesting to know if he need lab work for his appointment on 5/4/18 and is requesting a call back at [phone].[[email or web address]]    Message confirmed with caller.[[email or web address]]   Message routed to [employer] [facility][[email or web address]]         Revision History        User Key Date/Time User Provider Type Action    > RI1.1 05/01/18 08:07 AM Asia Harrington  Sign    M - Manual, T - Template             [facility]

## 2019-04-09 PROBLEM — Z95.0 PRESENCE OF CARDIAC PACEMAKER: Chronic | Status: ACTIVE | Noted: 2018-05-27

## 2019-04-09 PROBLEM — I10 ESSENTIAL (PRIMARY) HYPERTENSION: Chronic | Status: ACTIVE | Noted: 2018-02-23

## 2019-07-30 ENCOUNTER — APPOINTMENT (OUTPATIENT)
Dept: NEUROLOGY | Facility: CLINIC | Age: 84
End: 2019-07-30

## 2019-08-07 ENCOUNTER — APPOINTMENT (OUTPATIENT)
Dept: CARDIOLOGY | Facility: CLINIC | Age: 84
End: 2019-08-07
Payer: MEDICARE

## 2019-08-07 PROCEDURE — 93294 REM INTERROG EVL PM/LDLS PM: CPT

## 2019-08-07 PROCEDURE — 93296 REM INTERROG EVL PM/IDS: CPT

## 2019-09-05 ENCOUNTER — APPOINTMENT (OUTPATIENT)
Dept: NEUROLOGY | Facility: CLINIC | Age: 84
End: 2019-09-05
Payer: MEDICARE

## 2019-09-05 VITALS
DIASTOLIC BLOOD PRESSURE: 83 MMHG | BODY MASS INDEX: 22.04 KG/M2 | HEART RATE: 78 BPM | SYSTOLIC BLOOD PRESSURE: 169 MMHG | HEIGHT: 58 IN | WEIGHT: 105 LBS

## 2019-09-05 PROCEDURE — 99214 OFFICE O/P EST MOD 30 MIN: CPT

## 2019-09-05 NOTE — DISCUSSION/SUMMARY
[FreeTextEntry1] : Ms. Hartley is a 89yo woman with mild-moderate dementia slight progression since last visit.\par 1. Continue current medications\par 2. Continue with aide for assistance\par 3. f/u in 6 months

## 2019-09-05 NOTE — HISTORY OF PRESENT ILLNESS
[FreeTextEntry1] : Ms. Hartley is an 89-year-old woman last seen by me on February 25, 2019 for her dementia.  She was started on Aricept 5 mg daily.  The daughter and some believe she has gotten slightly worse in the last few months and she is forgetting more things, sometimes forgets taking her medications. She can no longer handle the bills and there is an aide to help with cooking and cleaning.

## 2019-09-05 NOTE — PHYSICAL EXAM
[FreeTextEntry1] : alert and oriented to person and place.  She doesn’t know the year, doesn’t know who is the president is, but knows the holidays for this month.  She is able to do simple addition and subtraction.  She is able to copy complex hand gestures.  She is able to follow 2-3 step commands.  Her recall is 2/3 in five minutes.\par Cranial nerves, cranial nerves II through XII are normal.\par Motor, power is 5/5 in extremities.\par Sensory, pinprick, temperature, and vibration are normal and symmetric throughout.\par Deep tendon reflexes, reflexes are 1+ and symmetric throughout.\par Cerebellar, finger-to-nose is normal.\par Gait, gait appears to be slow and cautious.  She does have walker, however is not using it and appears to be somewhat stable\par

## 2019-11-06 ENCOUNTER — APPOINTMENT (OUTPATIENT)
Dept: CARDIOLOGY | Facility: CLINIC | Age: 84
End: 2019-11-06
Payer: MEDICARE

## 2019-11-06 PROCEDURE — 93294 REM INTERROG EVL PM/LDLS PM: CPT

## 2019-11-06 PROCEDURE — 93296 REM INTERROG EVL PM/IDS: CPT

## 2020-03-31 ENCOUNTER — APPOINTMENT (OUTPATIENT)
Dept: NEUROLOGY | Facility: CLINIC | Age: 85
End: 2020-03-31

## 2020-05-07 ENCOUNTER — APPOINTMENT (OUTPATIENT)
Dept: CARDIOLOGY | Facility: CLINIC | Age: 85
End: 2020-05-07
Payer: MEDICARE

## 2020-05-07 PROCEDURE — 93294 REM INTERROG EVL PM/LDLS PM: CPT

## 2020-05-07 PROCEDURE — 93296 REM INTERROG EVL PM/IDS: CPT

## 2020-06-05 ENCOUNTER — APPOINTMENT (OUTPATIENT)
Dept: NEUROLOGY | Facility: CLINIC | Age: 85
End: 2020-06-05
Payer: MEDICARE

## 2020-06-05 PROCEDURE — 99213 OFFICE O/P EST LOW 20 MIN: CPT | Mod: 95

## 2020-06-05 NOTE — DISCUSSION/SUMMARY
[FreeTextEntry1] : Ms. Hartley is a 89yo woman with very mild dementia on aricept 5mg QD.  She has been stable with no significant worsening.\par 1. Continue current medications\par 2. Continue following with PMD\par 3. f/u in 6-9 months

## 2020-06-05 NOTE — HISTORY OF PRESENT ILLNESS
[FreeTextEntry1] : Patient was seen using 2 way audio/video services with PENNY at their home of [address] [place] [id number]\par I was located at home using [employer] workstation\par Patient and daughter gave consent for the visit \par \par Ms. Hartley is a 89yo woman being followed for her dementia.  Since the last visit on 9/5/2019 she has been doing well with no new medical issues.  According to her daughter her short term memory is still bad but she still taks care of her ADLs.  Daughter takes care of bills but patient still helps with cooking sometimes but has an aide that helps with cleaning and some cooking.\par \par

## 2020-06-05 NOTE — PHYSICAL EXAM
[FreeTextEntry1] : alert oriented x3, language and attention normal.  Knows who the president is and abot the current state of the Coronoavirus situation.\par NO facial, tongue midline, EOMI\par RAY symmetric\par

## 2020-06-26 NOTE — H&P ADULT - PSH
No significant past surgical history 87 yo M PMHx of CAD s/p MI and stents to RCA and LAD, COPD (on bipap at night), HTN, HLD, BPH presents to the ED with 2 days of abdominal pain and N/V. Admit for SBO

## 2020-06-28 NOTE — ED PROVIDER NOTE - ATTENDING CONTRIBUTION TO CARE
I personally evaluated the patient. I reviewed the Resident’s or Physician Assistant’s note (as assigned above), and agree with the findings and plan except as documented in my note. full range of motion in all extremities

## 2020-08-04 ENCOUNTER — APPOINTMENT (OUTPATIENT)
Dept: NEUROLOGY | Facility: CLINIC | Age: 85
End: 2020-08-04
Payer: MEDICARE

## 2020-08-04 PROCEDURE — 99213 OFFICE O/P EST LOW 20 MIN: CPT | Mod: 95

## 2020-08-04 NOTE — PHYSICAL EXAM
[FreeTextEntry1] : Drowsy\par Oriented to person place and daughter\par No facial \par RAY symmetric\par FTN NL\par Gait deferred

## 2020-08-04 NOTE — DISCUSSION/SUMMARY
[FreeTextEntry1] : Ms. Hartley is a 89yo woman with mild-moderate dementia now with some behavioral changes.\par 1. Will do trial of seroquel 12.5 QHS to see if this helps her sleep atnight\par 2. Continue exercise\par 3. Call for any side effects or worsening behavior\par

## 2020-08-04 NOTE — HISTORY OF PRESENT ILLNESS
[Home] : at home, [unfilled] , at the time of the visit. [Other Location: e.g. Home (Enter Location, City,State)___] : at [unfilled] [Other:____] : [unfilled] [Verbal consent obtained from patient] : the patient, [unfilled] [FreeTextEntry3] : Daughter [FreeTextEntry1] : Ms. Hartley is being seen for follow up of her dementia.  She was last seen in June 5th 2020 and at that time was stable.  However recently she has been having more changes to her behavior and has been waking her  in middle of night confused.  As her  has severe dementia this leads to both of them being confused and sometimes agitation.  She has not had any blood work in more than 6 months.\par She is sleeping frequently during the day.

## 2020-08-06 ENCOUNTER — LABORATORY RESULT (OUTPATIENT)
Age: 85
End: 2020-08-06

## 2020-08-06 ENCOUNTER — APPOINTMENT (OUTPATIENT)
Dept: CARDIOLOGY | Facility: CLINIC | Age: 85
End: 2020-08-06
Payer: MEDICARE

## 2020-08-06 PROCEDURE — 93296 REM INTERROG EVL PM/IDS: CPT

## 2020-08-06 PROCEDURE — 93294 REM INTERROG EVL PM/LDLS PM: CPT

## 2020-08-07 LAB
ALBUMIN SERPL ELPH-MCNC: 4.1 G/DL
ALP BLD-CCNC: 86 U/L
ALT SERPL-CCNC: 8 U/L
ANION GAP SERPL CALC-SCNC: 14 MMOL/L
APPEARANCE: CLEAR
APTT IMM NP/PRE NP PPP: NORMAL
APTT INV RATIO PPP: 29.1 SEC
AST SERPL-CCNC: 16 U/L
BASOPHILS # BLD AUTO: 0.08 K/UL
BASOPHILS NFR BLD AUTO: 1 %
BILIRUB SERPL-MCNC: 0.3 MG/DL
BILIRUBIN URINE: NEGATIVE
BLOOD URINE: NEGATIVE
BUN SERPL-MCNC: 24 MG/DL
CALCIUM SERPL-MCNC: 9.2 MG/DL
CHLORIDE SERPL-SCNC: 103 MMOL/L
CO2 SERPL-SCNC: 23 MMOL/L
COLOR: NORMAL
CREAT SERPL-MCNC: 1.3 MG/DL
EOSINOPHIL # BLD AUTO: 0.19 K/UL
EOSINOPHIL NFR BLD AUTO: 2.4 %
GLUCOSE QUALITATIVE U: NEGATIVE
GLUCOSE SERPL-MCNC: 97 MG/DL
HCT VFR BLD CALC: 36.8 %
HGB BLD-MCNC: 11.4 G/DL
IMM GRANULOCYTES NFR BLD AUTO: 0.4 %
INR PPP: 0.93 RATIO
KETONES URINE: NEGATIVE
LEUKOCYTE ESTERASE URINE: NEGATIVE
LYMPHOCYTES # BLD AUTO: 1.74 K/UL
LYMPHOCYTES NFR BLD AUTO: 22.4 %
MAN DIFF?: NORMAL
MCHC RBC-ENTMCNC: 28.3 PG
MCHC RBC-ENTMCNC: 31 G/DL
MCV RBC AUTO: 91.3 FL
MONOCYTES # BLD AUTO: 0.62 K/UL
MONOCYTES NFR BLD AUTO: 8 %
NEUTROPHILS # BLD AUTO: 5.11 K/UL
NEUTROPHILS NFR BLD AUTO: 65.8 %
NITRITE URINE: NEGATIVE
NPP NORMAL POOLED PLASMA: NORMAL SECS
PH URINE: 6
PLATELET # BLD AUTO: 286 K/UL
POTASSIUM SERPL-SCNC: 4.7 MMOL/L
PROT SERPL-MCNC: 6.4 G/DL
PROTEIN URINE: NEGATIVE
PT BLD: 10.7 SEC
RBC # BLD: 4.03 M/UL
RBC # FLD: 13.6 %
SODIUM SERPL-SCNC: 140 MMOL/L
SPECIFIC GRAVITY URINE: 1.01
TSH SERPL-ACNC: 3.87 UIU/ML
UROBILINOGEN URINE: NORMAL
VIT B12 SERPL-MCNC: 359 PG/ML
WBC # FLD AUTO: 7.77 K/UL

## 2020-11-05 ENCOUNTER — APPOINTMENT (OUTPATIENT)
Dept: CARDIOLOGY | Facility: CLINIC | Age: 85
End: 2020-11-05
Payer: MEDICARE

## 2020-11-05 PROCEDURE — 93296 REM INTERROG EVL PM/IDS: CPT

## 2020-11-05 PROCEDURE — 93294 REM INTERROG EVL PM/LDLS PM: CPT

## 2021-02-04 ENCOUNTER — APPOINTMENT (OUTPATIENT)
Dept: CARDIOLOGY | Facility: CLINIC | Age: 86
End: 2021-02-04
Payer: MEDICARE

## 2021-02-04 PROCEDURE — 93296 REM INTERROG EVL PM/IDS: CPT

## 2021-02-04 PROCEDURE — 93294 REM INTERROG EVL PM/LDLS PM: CPT

## 2021-02-16 ENCOUNTER — NON-APPOINTMENT (OUTPATIENT)
Age: 86
End: 2021-02-16

## 2021-02-24 RX ORDER — QUETIAPINE FUMARATE 25 MG/1
25 TABLET ORAL TWICE DAILY
Qty: 90 | Refills: 3 | Status: ACTIVE | COMMUNITY
Start: 2020-08-04 | End: 1900-01-01

## 2021-03-18 NOTE — H&P ADULT - DOES THIS PATIENT HAVE A HISTORY OF OR HAS BEEN DX WITH HEART FAILURE?
What Type Of Note Output Would You Prefer (Optional)?: Standard Output How Severe Is Your Skin Lesion?: mild Has Your Skin Lesion Been Treated?: not been treated Is This A New Presentation, Or A Follow-Up?: Skin Lesion Additional History: Pt c/o skin lesions on right arm and upper back for months that are enlarging. The one on the back is irritated and not healing. Pt has hx of scc and AK. Pt has family hx of skin cancer and can not recall what kind (sister) Which Family Member (Optional)?: Sister yes

## 2021-05-06 ENCOUNTER — NON-APPOINTMENT (OUTPATIENT)
Age: 86
End: 2021-05-06

## 2021-05-06 ENCOUNTER — APPOINTMENT (OUTPATIENT)
Dept: CARDIOLOGY | Facility: CLINIC | Age: 86
End: 2021-05-06
Payer: MEDICARE

## 2021-05-06 PROCEDURE — 93296 REM INTERROG EVL PM/IDS: CPT

## 2021-05-06 PROCEDURE — 93294 REM INTERROG EVL PM/LDLS PM: CPT

## 2021-07-01 NOTE — DISCHARGE NOTE ADULT - DEVELOP COPING SKILLS. FOR EXAMPLE, STRATEGIES AND LIFESTYLE CHANGES THAT REDUCE NEGATIVE MOODS, STRESS, AND EXPOSURE TO SMOKING CUES
Gastroenterology Outpatient History and Physical    Patient: Dennis Bella    Physician: Nikki Howe MD    Chief Complaint: fecal incontinence  History of Present Illness: 71yo F with fecal incontinence. Off of Eliquis x4d    History:  Past Medical History:   Diagnosis Date    Arrhythmia     afib    Arthritis     right knee, left shoulder    Diverticulosis     Frequency of urination     High cholesterol     Hypertension     Morbid obesity ([address]     Osteoarthritis     Peripheral neuropathy     bilateral LE    Thyroid disease     hypothyroid    Unspecified adverse effect of anesthesia     low BP      Past Surgical History:   Procedure Laterality Date    HX GYN      myomectomy on uterus    HX GYN  2011    hystereoscopy D&C    HX LYMPH NODE DISSECTION      biopsy    HX OTHER SURGICAL      lymph node bx    AR BREAST SURGERY PROCEDURE UNLISTED  1982    breast bx rt    AR CARDIAC SURG PROCEDURE UNLIST  01/2015    cardiac catheterization, no intervention, medical management    AR CARDIAC SURG PROCEDURE UNLIST  03/2017    ablation    UPPER GI ENDOSCOPY,BIOPSY  10/24/2019         UPPER GI ENDOSCOPY,FN NEEDLE BX,GUIDED  10/24/2019           Social History     Socioeconomic History    Marital status:      Spouse name: Not on file    Number of children: Not on file    Years of education: Not on file    Highest education level: Not on file   Tobacco Use    Smoking status: Never Smoker    Smokeless tobacco: Never Used   Vaping Use    Vaping Use: Never used   Substance and Sexual Activity    Alcohol use: No    Drug use: No     Social Determinants of Health     Financial Resource Strain:     Difficulty of Paying Living Expenses:    Food Insecurity:     Worried About Running Out of Food in the Last Year:     Ran Out of Food in the Last Year:    Transportation Needs:     Lack of Transportation (Medical):      Lack of Transportation (Non-Medical):    Physical Activity:     Days of Exercise per Week:     Minutes of Exercise per Session:    Stress:     Feeling of Stress :    Social Connections:     Frequency of Communication with Friends and Family:     Frequency of Social Gatherings with Friends and Family:     Attends Zoroastrianism Services:     Active Member of Clubs or Organizations:     Attends Club or Organization Meetings:     Marital Status:       Family History   Problem Relation Age of Onset    Alzheimer Mother     Heart Disease Mother     Heart Disease Father     Hypertension Father     Cancer Paternal Aunt     Diabetes Paternal Grandmother       Patient Active Problem List   Diagnosis Code    Postmenopausal bleeding N95.0    Hyperlipidemia E78.5    Hypokalemia E87.6    Morbid obesity ([address] E66.01    Atrial fibrillation ([facility] [address] I48.91    HTN (hypertension) I10    Acquired hypothyroidism E03.9    S/P ablation of atrial fibrillation Z98.890, Z86.79    Pericardial effusion with cardiac tamponade I31.3, I31.4    Anxiety F41.9       Allergies: Allergies   Allergen Reactions    Adhesive Hives     Paper tape fine    Amoxicillin Unknown (comments)    Lipitor [Atorvastatin] Myalgia     Gets the flu     Medications:   Prior to Admission medications    Medication Sig Start Date End Date Taking?  Authorizing Provider   hydroCHLOROthiazide (HYDRODIURIL) 25 mg tablet TAKE 1 TABLET BY MOUTH EVERY DAY 6/24/21  Yes Marleny Joseph MD   Synthroid 75 mcg tablet TAKE 1 TABLET BY MOUTH EVERY DAY BEFORE BREAKFAST 5/31/21  Yes Marleny Joseph MD   verapamil ER (CALAN-SR) 240 mg CR tablet TAKE 1 TABLET BY MOUTH EVERY MORNING FOR BLOOD PRESSURE 5/21/21  Yes Marleny Joseph MD   cloNIDine HCL (CATAPRES) 0.2 mg tablet TAKE 1 TABLET BY MOUTH TWICE A DAY 5/2/21  Yes Marleny Joseph MD   rosuvastatin (CRESTOR) 20 mg tablet TAKE 1 TABLET BY MOUTH EVERY DAY 4/13/21  Yes Marleny Joseph MD   potassium chloride SR (KLOR-CON 10) 10 mEq tablet TAKE 2 TABLETS IN MORNING AND 2 TABLETS IN THE EVENING 4/4/21  Yes Loco Clement MD   sertraline (ZOLOFT) 50 mg tablet Take 1 Tab by mouth daily. 3/9/21  Yes Loco Clement MD   losartan (COZAAR) 50 mg tablet Take 1 Tab by mouth daily. 3/9/21  Yes Loco Clement MD   CALCIUM CARBONATE/VITAMIN D3 (CALTRATE 600 + D PO) Take 1 Tab by mouth daily. Yes Provider, Historical   MULTIVITAMIN W-MINERALS/LUTEIN (CENTRUM SILVER PO) Take 1 Tab by mouth daily (after lunch). Yes Provider, Historical   Eliquis 5 mg tablet TAKE 1 TABLET BY MOUTH TWICE A DAY 4/1/21   Maria Dolores Kovacs, ERIK     Physical Exam:   Vital Signs: There were no vitals taken for this visit.   General: well developed, well nourished   HEENT: unremarkable   Heart: regular rhythm no mumur    Lungs: clear   Abdominal:  benign   Neurological: unremarkable   Extremities: no edema     Findings/Diagnosis: Fecal incontinence   Plan of Care/Planned Procedure: colonoscopy with conscious/deep sedation    Signed:  Renata Stevens MD 7/1/2021 Statement Selected

## 2021-08-06 ENCOUNTER — NON-APPOINTMENT (OUTPATIENT)
Age: 86
End: 2021-08-06

## 2021-08-06 ENCOUNTER — APPOINTMENT (OUTPATIENT)
Dept: CARDIOLOGY | Facility: CLINIC | Age: 86
End: 2021-08-06
Payer: MEDICARE

## 2021-08-06 PROCEDURE — 93296 REM INTERROG EVL PM/IDS: CPT

## 2021-08-06 PROCEDURE — 93294 REM INTERROG EVL PM/LDLS PM: CPT

## 2021-10-06 PROBLEM — I10 ESSENTIAL HYPERTENSION: Status: ACTIVE | Noted: 2018-05-04

## 2021-11-08 ENCOUNTER — APPOINTMENT (OUTPATIENT)
Dept: CARDIOLOGY | Facility: CLINIC | Age: 86
End: 2021-11-08
Payer: MEDICARE

## 2021-11-08 ENCOUNTER — NON-APPOINTMENT (OUTPATIENT)
Age: 86
End: 2021-11-08

## 2021-11-08 PROCEDURE — 93294 REM INTERROG EVL PM/LDLS PM: CPT

## 2021-11-08 PROCEDURE — 93296 REM INTERROG EVL PM/IDS: CPT

## 2021-11-18 ENCOUNTER — APPOINTMENT (OUTPATIENT)
Dept: NEUROLOGY | Facility: CLINIC | Age: 86
End: 2021-11-18
Payer: MEDICARE

## 2021-11-18 VITALS
SYSTOLIC BLOOD PRESSURE: 152 MMHG | TEMPERATURE: 96.7 F | OXYGEN SATURATION: 96 % | DIASTOLIC BLOOD PRESSURE: 81 MMHG | BODY MASS INDEX: 20.99 KG/M2 | HEIGHT: 58 IN | HEART RATE: 86 BPM | WEIGHT: 100 LBS

## 2021-11-18 PROCEDURE — 99214 OFFICE O/P EST MOD 30 MIN: CPT

## 2021-11-18 NOTE — HISTORY OF PRESENT ILLNESS
[FreeTextEntry1] : Ms. Hartley is being seen for follow up of her dementia.  She was last seen in 8/4/2020 and at that time was stable.  However recently she has been having more changes to her behavior and worsening of her memory.  She has increased need for assistance throughout the day and has an aide for a total of 3 hours a day.\par She does not remember meals, medications, hygeine and needs direction from family and aide for these activities.\par Her short term memory has gotten to the point where she has no recollection of activities during the day and can call the family >40 times per day asking the same quesiotns\par

## 2021-11-18 NOTE — DISCUSSION/SUMMARY
[FreeTextEntry1] : Ms. Hartley is a 89yo woman with mild-moderate dementia now with some behavioral changes.\par 1. Would increase time with aide\par 2. Continue current medications\par 3. f/u in 6 months

## 2021-11-18 NOTE — PHYSICAL EXAM
[FreeTextEntry1] : MOCA 11/30\par Registration was 1/3 in 30 seconds and 0/3 in 5 minutes\par CN 2-12 normal \par NO drift power 5/5\par Temp, Vib, LT symmetric\par Gait slow needs assistance with walker

## 2021-12-13 NOTE — DISCHARGE NOTE ADULT - NSTOBACCONEVERSMOKERY/N_GEN_A
[FreeTextEntry1] : Rapid Strep: Negative\par Throat culture sent to lab \par Treat symptoms with acetaminophen or ibuprofen as needed, increase fluids\par Discussed likely viral illness and expected course\par Call if no better 3 days, sooner for change/concerns/worsening\par recheck PRN\par Phone follow-up after throat culture back\par At this time patient is not suspected of having COVID-19. Answered patient questions about COVID-19 including signs and symptoms, self home care and warning signs to look for especially the worsening of symptoms and respiratory distress on day 8/9. Advised if seeks care to call first to allow for proper isolation precautions.\par REMOVE EARING APPLY BACTROBAN TID X 7D No

## 2021-12-30 NOTE — PRE-OP CHECKLIST - PATIENT'S PERSONAL PROPERTY GIVEN TO
Thank you for choosing the Pulmonary Services Department, at [facility], as your Pulmonary Specialists.  We actively use feedback to constantly improve and deliver the best care possible. To provide the best experience, we are collecting feedback from you on how we performed.  You may receive a survey in the mail or through your e-mail to evaluate how we did. Please take a moment and share your thoughts.      If for any reason you feel that we did not meet your expectations or you want to share a positive experience, please give us a call. Your feedback helps us know how we are doing and what we can be doing better.    Office hours: 8:00 am to 4:30 pm, Monday - Friday  Phone: [phone] Option #2      YOUR TEST RESULTS    If you have any concerns regarding any testing ordered with your visit, please contact our office at the above number.    Otherwise, your test results will be communicated to you in one of the following ways:    - Follow-up office visit  - Phone call  - Through [facility]  - Mailed letter      If you are prescribed an inhaler or a medicine that you find to be too expensive, please be aware that your physician has no way to know what your cost will be. All copayments are specific to you and your insurance plan.    If you find the medication prescribed to be too expensive, please consider:  • Do you have a deductible you need to meet?  • Are you in your coverage gap (or donut hole)?  • Inhalers are very expensive, and most are only available as brand name (costing more than $500 without insurance)    If you are unable to afford your copay, please:  • Call your insurance company and ask which medication would be the least expensive for you (the telephone number is often located on the back of your insurance card under “Member Services”)  • Contact our office with the name or names of alternate medications so we can consider prescribing for you    Thank You,  Pulmonary Services                                                                                                                                                                                                                                                                      [phone]     on unit

## 2022-01-01 ENCOUNTER — APPOINTMENT (OUTPATIENT)
Dept: CARDIOLOGY | Facility: CLINIC | Age: 87
End: 2022-01-01
Payer: MEDICARE

## 2022-01-01 ENCOUNTER — INPATIENT (INPATIENT)
Facility: HOSPITAL | Age: 87
LOS: 16 days | End: 2022-12-04
Attending: INTERNAL MEDICINE | Admitting: INTERNAL MEDICINE
Payer: MEDICARE

## 2022-01-01 ENCOUNTER — OUTPATIENT (OUTPATIENT)
Dept: OUTPATIENT SERVICES | Facility: HOSPITAL | Age: 87
LOS: 1 days | Discharge: HOME | End: 2022-01-01
Payer: MEDICARE

## 2022-01-01 ENCOUNTER — NON-APPOINTMENT (OUTPATIENT)
Age: 87
End: 2022-01-01

## 2022-01-01 ENCOUNTER — APPOINTMENT (OUTPATIENT)
Dept: CARDIOLOGY | Facility: CLINIC | Age: 87
End: 2022-01-01

## 2022-01-01 ENCOUNTER — APPOINTMENT (OUTPATIENT)
Dept: NEUROLOGY | Facility: CLINIC | Age: 87
End: 2022-01-01

## 2022-01-01 ENCOUNTER — TRANSCRIPTION ENCOUNTER (OUTPATIENT)
Age: 87
End: 2022-01-01

## 2022-01-01 VITALS — OXYGEN SATURATION: 71 % | RESPIRATION RATE: 1 BRPM

## 2022-01-01 VITALS
RESPIRATION RATE: 20 BRPM | SYSTOLIC BLOOD PRESSURE: 129 MMHG | TEMPERATURE: 96 F | HEART RATE: 89 BPM | OXYGEN SATURATION: 97 % | DIASTOLIC BLOOD PRESSURE: 56 MMHG

## 2022-01-01 VITALS
HEIGHT: 58 IN | DIASTOLIC BLOOD PRESSURE: 79 MMHG | BODY MASS INDEX: 22.46 KG/M2 | SYSTOLIC BLOOD PRESSURE: 140 MMHG | HEART RATE: 71 BPM | WEIGHT: 107 LBS

## 2022-01-01 DIAGNOSIS — Z51.5 ENCOUNTER FOR PALLIATIVE CARE: ICD-10-CM

## 2022-01-01 DIAGNOSIS — G30.9 ALZHEIMER'S DISEASE, UNSPECIFIED: ICD-10-CM

## 2022-01-01 DIAGNOSIS — R60.9 EDEMA, UNSPECIFIED: ICD-10-CM

## 2022-01-01 DIAGNOSIS — K92.2 GASTROINTESTINAL HEMORRHAGE, UNSPECIFIED: ICD-10-CM

## 2022-01-01 DIAGNOSIS — F03.90 UNSPECIFIED DEMENTIA W/OUT BEHAVIORAL DISTURBANCE: ICD-10-CM

## 2022-01-01 LAB
-  AMIKACIN: SIGNIFICANT CHANGE UP
-  AMOXICILLIN/CLAVULANIC ACID: SIGNIFICANT CHANGE UP
-  AMPICILLIN/SULBACTAM: SIGNIFICANT CHANGE UP
-  AMPICILLIN: SIGNIFICANT CHANGE UP
-  AZTREONAM: SIGNIFICANT CHANGE UP
-  CEFAZOLIN: SIGNIFICANT CHANGE UP
-  CEFEPIME: SIGNIFICANT CHANGE UP
-  CEFOXITIN: SIGNIFICANT CHANGE UP
-  CEFTRIAXONE: SIGNIFICANT CHANGE UP
-  CIPROFLOXACIN: SIGNIFICANT CHANGE UP
-  ERTAPENEM: SIGNIFICANT CHANGE UP
-  GENTAMICIN: SIGNIFICANT CHANGE UP
-  IMIPENEM: SIGNIFICANT CHANGE UP
-  LEVOFLOXACIN: SIGNIFICANT CHANGE UP
-  MEROPENEM: SIGNIFICANT CHANGE UP
-  NITROFURANTOIN: SIGNIFICANT CHANGE UP
-  PIPERACILLIN/TAZOBACTAM: SIGNIFICANT CHANGE UP
-  STAPHYLOCOCCUS EPIDERMIDIS, METHICILLIN RESISTANT: SIGNIFICANT CHANGE UP
-  TOBRAMYCIN: SIGNIFICANT CHANGE UP
-  TRIMETHOPRIM/SULFAMETHOXAZOLE: SIGNIFICANT CHANGE UP
A1C WITH ESTIMATED AVERAGE GLUCOSE RESULT: 5.6 % — SIGNIFICANT CHANGE UP (ref 4–5.6)
ALBUMIN SERPL ELPH-MCNC: 1.9 G/DL — LOW (ref 3.5–5.2)
ALBUMIN SERPL ELPH-MCNC: 2 G/DL — LOW (ref 3.5–5.2)
ALBUMIN SERPL ELPH-MCNC: 2.1 G/DL — LOW (ref 3.5–5.2)
ALBUMIN SERPL ELPH-MCNC: 2.1 G/DL — LOW (ref 3.5–5.2)
ALBUMIN SERPL ELPH-MCNC: 2.2 G/DL — LOW (ref 3.5–5.2)
ALBUMIN SERPL ELPH-MCNC: 2.3 G/DL — LOW (ref 3.5–5.2)
ALBUMIN SERPL ELPH-MCNC: 2.4 G/DL — LOW (ref 3.5–5.2)
ALBUMIN SERPL ELPH-MCNC: 2.4 G/DL — LOW (ref 3.5–5.2)
ALBUMIN SERPL ELPH-MCNC: 2.5 G/DL — LOW (ref 3.5–5.2)
ALBUMIN SERPL ELPH-MCNC: 2.5 G/DL — LOW (ref 3.5–5.2)
ALBUMIN SERPL ELPH-MCNC: 2.6 G/DL — LOW (ref 3.5–5.2)
ALBUMIN SERPL ELPH-MCNC: 2.7 G/DL — LOW (ref 3.5–5.2)
ALBUMIN SERPL ELPH-MCNC: 2.7 G/DL — LOW (ref 3.5–5.2)
ALBUMIN SERPL ELPH-MCNC: 3.1 G/DL — LOW (ref 3.5–5.2)
ALBUMIN SERPL ELPH-MCNC: 3.5 G/DL — SIGNIFICANT CHANGE UP (ref 3.5–5.2)
ALP SERPL-CCNC: 112 U/L — SIGNIFICANT CHANGE UP (ref 30–115)
ALP SERPL-CCNC: 43 U/L — SIGNIFICANT CHANGE UP (ref 30–115)
ALP SERPL-CCNC: 48 U/L — SIGNIFICANT CHANGE UP (ref 30–115)
ALP SERPL-CCNC: 50 U/L — SIGNIFICANT CHANGE UP (ref 30–115)
ALP SERPL-CCNC: 53 U/L — SIGNIFICANT CHANGE UP (ref 30–115)
ALP SERPL-CCNC: 53 U/L — SIGNIFICANT CHANGE UP (ref 30–115)
ALP SERPL-CCNC: 54 U/L — SIGNIFICANT CHANGE UP (ref 30–115)
ALP SERPL-CCNC: 55 U/L — SIGNIFICANT CHANGE UP (ref 30–115)
ALP SERPL-CCNC: 56 U/L — SIGNIFICANT CHANGE UP (ref 30–115)
ALP SERPL-CCNC: 57 U/L — SIGNIFICANT CHANGE UP (ref 30–115)
ALP SERPL-CCNC: 58 U/L — SIGNIFICANT CHANGE UP (ref 30–115)
ALP SERPL-CCNC: 59 U/L — SIGNIFICANT CHANGE UP (ref 30–115)
ALP SERPL-CCNC: 60 U/L — SIGNIFICANT CHANGE UP (ref 30–115)
ALP SERPL-CCNC: 62 U/L — SIGNIFICANT CHANGE UP (ref 30–115)
ALP SERPL-CCNC: 63 U/L — SIGNIFICANT CHANGE UP (ref 30–115)
ALP SERPL-CCNC: 69 U/L — SIGNIFICANT CHANGE UP (ref 30–115)
ALP SERPL-CCNC: 74 U/L — SIGNIFICANT CHANGE UP (ref 30–115)
ALP SERPL-CCNC: 83 U/L — SIGNIFICANT CHANGE UP (ref 30–115)
ALP SERPL-CCNC: 83 U/L — SIGNIFICANT CHANGE UP (ref 30–115)
ALP SERPL-CCNC: 84 U/L — SIGNIFICANT CHANGE UP (ref 30–115)
ALP SERPL-CCNC: 89 U/L — SIGNIFICANT CHANGE UP (ref 30–115)
ALP SERPL-CCNC: 94 U/L — SIGNIFICANT CHANGE UP (ref 30–115)
ALT FLD-CCNC: 106 U/L — HIGH (ref 0–41)
ALT FLD-CCNC: 143 U/L — HIGH (ref 0–41)
ALT FLD-CCNC: 165 U/L — HIGH (ref 0–41)
ALT FLD-CCNC: 17 U/L — SIGNIFICANT CHANGE UP (ref 0–41)
ALT FLD-CCNC: 177 U/L — HIGH (ref 0–41)
ALT FLD-CCNC: 19 U/L — SIGNIFICANT CHANGE UP (ref 0–41)
ALT FLD-CCNC: 19 U/L — SIGNIFICANT CHANGE UP (ref 0–41)
ALT FLD-CCNC: 25 U/L — SIGNIFICANT CHANGE UP (ref 0–41)
ALT FLD-CCNC: 33 U/L — SIGNIFICANT CHANGE UP (ref 0–41)
ALT FLD-CCNC: 37 U/L — SIGNIFICANT CHANGE UP (ref 0–41)
ALT FLD-CCNC: 37 U/L — SIGNIFICANT CHANGE UP (ref 0–41)
ALT FLD-CCNC: 5 U/L — SIGNIFICANT CHANGE UP (ref 0–41)
ALT FLD-CCNC: 53 U/L — HIGH (ref 0–41)
ALT FLD-CCNC: 56 U/L — HIGH (ref 0–41)
ALT FLD-CCNC: 6 U/L — SIGNIFICANT CHANGE UP (ref 0–41)
ALT FLD-CCNC: 6 U/L — SIGNIFICANT CHANGE UP (ref 0–41)
ALT FLD-CCNC: 65 U/L — HIGH (ref 0–41)
ALT FLD-CCNC: 7 U/L — SIGNIFICANT CHANGE UP (ref 0–41)
ALT FLD-CCNC: 71 U/L — HIGH (ref 0–41)
ALT FLD-CCNC: 8 U/L — SIGNIFICANT CHANGE UP (ref 0–41)
ALT FLD-CCNC: 8 U/L — SIGNIFICANT CHANGE UP (ref 0–41)
ALT FLD-CCNC: <5 U/L — SIGNIFICANT CHANGE UP (ref 0–41)
ANION GAP SERPL CALC-SCNC: 10 MMOL/L — SIGNIFICANT CHANGE UP (ref 7–14)
ANION GAP SERPL CALC-SCNC: 11 MMOL/L — SIGNIFICANT CHANGE UP (ref 7–14)
ANION GAP SERPL CALC-SCNC: 12 MMOL/L — SIGNIFICANT CHANGE UP (ref 7–14)
ANION GAP SERPL CALC-SCNC: 12 MMOL/L — SIGNIFICANT CHANGE UP (ref 7–14)
ANION GAP SERPL CALC-SCNC: 13 MMOL/L — SIGNIFICANT CHANGE UP (ref 7–14)
ANION GAP SERPL CALC-SCNC: 14 MMOL/L — SIGNIFICANT CHANGE UP (ref 7–14)
ANION GAP SERPL CALC-SCNC: 15 MMOL/L — HIGH (ref 7–14)
ANION GAP SERPL CALC-SCNC: 15 MMOL/L — HIGH (ref 7–14)
ANION GAP SERPL CALC-SCNC: 6 MMOL/L — LOW (ref 7–14)
ANION GAP SERPL CALC-SCNC: 7 MMOL/L — SIGNIFICANT CHANGE UP (ref 7–14)
ANION GAP SERPL CALC-SCNC: 8 MMOL/L — SIGNIFICANT CHANGE UP (ref 7–14)
ANION GAP SERPL CALC-SCNC: 9 MMOL/L — SIGNIFICANT CHANGE UP (ref 7–14)
APPEARANCE UR: CLEAR — SIGNIFICANT CHANGE UP
APPEARANCE UR: CLEAR — SIGNIFICANT CHANGE UP
APTT BLD: 19.1 SEC — CRITICAL LOW (ref 27–39.2)
APTT BLD: 22.2 SEC — CRITICAL LOW (ref 27–39.2)
APTT BLD: 23.1 SEC — CRITICAL LOW (ref 27–39.2)
APTT BLD: 23.4 SEC — CRITICAL LOW (ref 27–39.2)
APTT BLD: 24.3 SEC — LOW (ref 27–39.2)
APTT BLD: 24.9 SEC — LOW (ref 27–39.2)
APTT BLD: 26.8 SEC — LOW (ref 27–39.2)
AST SERPL-CCNC: 11 U/L — SIGNIFICANT CHANGE UP (ref 0–41)
AST SERPL-CCNC: 12 U/L — SIGNIFICANT CHANGE UP (ref 0–41)
AST SERPL-CCNC: 13 U/L — SIGNIFICANT CHANGE UP (ref 0–41)
AST SERPL-CCNC: 13 U/L — SIGNIFICANT CHANGE UP (ref 0–41)
AST SERPL-CCNC: 14 U/L — SIGNIFICANT CHANGE UP (ref 0–41)
AST SERPL-CCNC: 15 U/L — SIGNIFICANT CHANGE UP (ref 0–41)
AST SERPL-CCNC: 16 U/L — SIGNIFICANT CHANGE UP (ref 0–41)
AST SERPL-CCNC: 164 U/L — HIGH (ref 0–41)
AST SERPL-CCNC: 17 U/L — SIGNIFICANT CHANGE UP (ref 0–41)
AST SERPL-CCNC: 17 U/L — SIGNIFICANT CHANGE UP (ref 0–41)
AST SERPL-CCNC: 18 U/L — SIGNIFICANT CHANGE UP (ref 0–41)
AST SERPL-CCNC: 20 U/L — SIGNIFICANT CHANGE UP (ref 0–41)
AST SERPL-CCNC: 24 U/L — SIGNIFICANT CHANGE UP (ref 0–41)
AST SERPL-CCNC: 253 U/L — HIGH (ref 0–41)
AST SERPL-CCNC: 26 U/L — SIGNIFICANT CHANGE UP (ref 0–41)
AST SERPL-CCNC: 42 U/L — HIGH (ref 0–41)
AST SERPL-CCNC: 85 U/L — HIGH (ref 0–41)
BACTERIA # UR AUTO: ABNORMAL
BACTERIA # UR AUTO: ABNORMAL
BASE EXCESS BLDA CALC-SCNC: -0.7 MMOL/L — SIGNIFICANT CHANGE UP (ref -2–3)
BASE EXCESS BLDA CALC-SCNC: -10.8 MMOL/L — LOW (ref -2–3)
BASE EXCESS BLDA CALC-SCNC: -14.7 MMOL/L — LOW (ref -2–3)
BASE EXCESS BLDA CALC-SCNC: -2.5 MMOL/L — LOW (ref -2–3)
BASE EXCESS BLDA CALC-SCNC: -2.7 MMOL/L — LOW (ref -2–3)
BASE EXCESS BLDA CALC-SCNC: -3.6 MMOL/L — LOW (ref -2–3)
BASE EXCESS BLDA CALC-SCNC: -4.4 MMOL/L — LOW (ref -2–3)
BASE EXCESS BLDA CALC-SCNC: -5.8 MMOL/L — LOW (ref -2–3)
BASE EXCESS BLDA CALC-SCNC: -6.6 MMOL/L — LOW (ref -2–3)
BASE EXCESS BLDA CALC-SCNC: -9.1 MMOL/L — LOW (ref -2–3)
BASE EXCESS BLDA CALC-SCNC: 1 MMOL/L — SIGNIFICANT CHANGE UP (ref -2–3)
BASE EXCESS BLDA CALC-SCNC: 4.9 MMOL/L — HIGH (ref -2–3)
BASOPHILS # BLD AUTO: 0.03 K/UL — SIGNIFICANT CHANGE UP (ref 0–0.2)
BASOPHILS # BLD AUTO: 0.04 K/UL — SIGNIFICANT CHANGE UP (ref 0–0.2)
BASOPHILS # BLD AUTO: 0.04 K/UL — SIGNIFICANT CHANGE UP (ref 0–0.2)
BASOPHILS # BLD AUTO: 0.05 K/UL — SIGNIFICANT CHANGE UP (ref 0–0.2)
BASOPHILS # BLD AUTO: 0.06 K/UL — SIGNIFICANT CHANGE UP (ref 0–0.2)
BASOPHILS # BLD AUTO: 0.07 K/UL — SIGNIFICANT CHANGE UP (ref 0–0.2)
BASOPHILS # BLD AUTO: 0.08 K/UL — SIGNIFICANT CHANGE UP (ref 0–0.2)
BASOPHILS # BLD AUTO: 0.08 K/UL — SIGNIFICANT CHANGE UP (ref 0–0.2)
BASOPHILS # BLD AUTO: 0.09 K/UL — SIGNIFICANT CHANGE UP (ref 0–0.2)
BASOPHILS # BLD AUTO: 0.09 K/UL — SIGNIFICANT CHANGE UP (ref 0–0.2)
BASOPHILS # BLD AUTO: 0.1 K/UL — SIGNIFICANT CHANGE UP (ref 0–0.2)
BASOPHILS # BLD AUTO: 0.1 K/UL — SIGNIFICANT CHANGE UP (ref 0–0.2)
BASOPHILS NFR BLD AUTO: 0.2 % — SIGNIFICANT CHANGE UP (ref 0–1)
BASOPHILS NFR BLD AUTO: 0.3 % — SIGNIFICANT CHANGE UP (ref 0–1)
BASOPHILS NFR BLD AUTO: 0.4 % — SIGNIFICANT CHANGE UP (ref 0–1)
BASOPHILS NFR BLD AUTO: 0.5 % — SIGNIFICANT CHANGE UP (ref 0–1)
BASOPHILS NFR BLD AUTO: 0.5 % — SIGNIFICANT CHANGE UP (ref 0–1)
BILIRUB SERPL-MCNC: 0.2 MG/DL — SIGNIFICANT CHANGE UP (ref 0.2–1.2)
BILIRUB SERPL-MCNC: 0.4 MG/DL — SIGNIFICANT CHANGE UP (ref 0.2–1.2)
BILIRUB SERPL-MCNC: 0.5 MG/DL — SIGNIFICANT CHANGE UP (ref 0.2–1.2)
BILIRUB SERPL-MCNC: 0.6 MG/DL — SIGNIFICANT CHANGE UP (ref 0.2–1.2)
BILIRUB SERPL-MCNC: 0.7 MG/DL — SIGNIFICANT CHANGE UP (ref 0.2–1.2)
BILIRUB SERPL-MCNC: 0.9 MG/DL — SIGNIFICANT CHANGE UP (ref 0.2–1.2)
BILIRUB SERPL-MCNC: 1 MG/DL — SIGNIFICANT CHANGE UP (ref 0.2–1.2)
BILIRUB SERPL-MCNC: 1.1 MG/DL — SIGNIFICANT CHANGE UP (ref 0.2–1.2)
BILIRUB UR-MCNC: NEGATIVE — SIGNIFICANT CHANGE UP
BILIRUB UR-MCNC: NEGATIVE — SIGNIFICANT CHANGE UP
BLD GP AB SCN SERPL QL: SIGNIFICANT CHANGE UP
BUN SERPL-MCNC: 10 MG/DL — SIGNIFICANT CHANGE UP (ref 10–20)
BUN SERPL-MCNC: 11 MG/DL — SIGNIFICANT CHANGE UP (ref 10–20)
BUN SERPL-MCNC: 11 MG/DL — SIGNIFICANT CHANGE UP (ref 10–20)
BUN SERPL-MCNC: 16 MG/DL — SIGNIFICANT CHANGE UP (ref 10–20)
BUN SERPL-MCNC: 19 MG/DL — SIGNIFICANT CHANGE UP (ref 10–20)
BUN SERPL-MCNC: 20 MG/DL — SIGNIFICANT CHANGE UP (ref 10–20)
BUN SERPL-MCNC: 20 MG/DL — SIGNIFICANT CHANGE UP (ref 10–20)
BUN SERPL-MCNC: 21 MG/DL — HIGH (ref 10–20)
BUN SERPL-MCNC: 22 MG/DL — HIGH (ref 10–20)
BUN SERPL-MCNC: 23 MG/DL — HIGH (ref 10–20)
BUN SERPL-MCNC: 23 MG/DL — HIGH (ref 10–20)
BUN SERPL-MCNC: 26 MG/DL — HIGH (ref 10–20)
BUN SERPL-MCNC: 28 MG/DL — HIGH (ref 10–20)
BUN SERPL-MCNC: 28 MG/DL — HIGH (ref 10–20)
BUN SERPL-MCNC: 31 MG/DL — HIGH (ref 10–20)
BUN SERPL-MCNC: 33 MG/DL — HIGH (ref 10–20)
BUN SERPL-MCNC: 34 MG/DL — HIGH (ref 10–20)
BUN SERPL-MCNC: 36 MG/DL — HIGH (ref 10–20)
BUN SERPL-MCNC: 41 MG/DL — HIGH (ref 10–20)
BUN SERPL-MCNC: 43 MG/DL — HIGH (ref 10–20)
BUN SERPL-MCNC: 47 MG/DL — HIGH (ref 10–20)
BUN SERPL-MCNC: 8 MG/DL — LOW (ref 10–20)
CA-I BLD-SCNC: 3.7 MG/DL — LOW (ref 4.5–5.6)
CALCIUM SERPL-MCNC: 6.2 MG/DL — LOW (ref 8.4–10.5)
CALCIUM SERPL-MCNC: 6.3 MG/DL — LOW (ref 8.4–10.5)
CALCIUM SERPL-MCNC: 6.4 MG/DL — LOW (ref 8.4–10.5)
CALCIUM SERPL-MCNC: 6.4 MG/DL — LOW (ref 8.4–10.5)
CALCIUM SERPL-MCNC: 6.5 MG/DL — LOW (ref 8.4–10.4)
CALCIUM SERPL-MCNC: 6.5 MG/DL — LOW (ref 8.4–10.5)
CALCIUM SERPL-MCNC: 6.6 MG/DL — LOW (ref 8.4–10.4)
CALCIUM SERPL-MCNC: 6.6 MG/DL — LOW (ref 8.4–10.5)
CALCIUM SERPL-MCNC: 6.6 MG/DL — LOW (ref 8.4–10.5)
CALCIUM SERPL-MCNC: 6.7 MG/DL — LOW (ref 8.4–10.5)
CALCIUM SERPL-MCNC: 6.8 MG/DL — LOW (ref 8.4–10.4)
CALCIUM SERPL-MCNC: 6.9 MG/DL — LOW (ref 8.4–10.5)
CALCIUM SERPL-MCNC: 7 MG/DL — LOW (ref 8.4–10.4)
CALCIUM SERPL-MCNC: 7.1 MG/DL — LOW (ref 8.4–10.4)
CALCIUM SERPL-MCNC: 7.1 MG/DL — LOW (ref 8.4–10.5)
CALCIUM SERPL-MCNC: 7.2 MG/DL — LOW (ref 8.4–10.5)
CALCIUM SERPL-MCNC: 7.2 MG/DL — LOW (ref 8.4–10.5)
CALCIUM SERPL-MCNC: 7.3 MG/DL — LOW (ref 8.4–10.5)
CALCIUM SERPL-MCNC: 7.4 MG/DL — LOW (ref 8.4–10.4)
CALCIUM SERPL-MCNC: 7.5 MG/DL — LOW (ref 8.4–10.4)
CALCIUM SERPL-MCNC: 7.5 MG/DL — LOW (ref 8.4–10.4)
CALCIUM SERPL-MCNC: 7.5 MG/DL — LOW (ref 8.4–10.5)
CALCIUM SERPL-MCNC: 7.9 MG/DL — LOW (ref 8.4–10.5)
CALCIUM SERPL-MCNC: 8.4 MG/DL — SIGNIFICANT CHANGE UP (ref 8.4–10.5)
CHLORIDE SERPL-SCNC: 104 MMOL/L — SIGNIFICANT CHANGE UP (ref 98–110)
CHLORIDE SERPL-SCNC: 105 MMOL/L — SIGNIFICANT CHANGE UP (ref 98–110)
CHLORIDE SERPL-SCNC: 106 MMOL/L — SIGNIFICANT CHANGE UP (ref 98–110)
CHLORIDE SERPL-SCNC: 107 MMOL/L — SIGNIFICANT CHANGE UP (ref 98–110)
CHLORIDE SERPL-SCNC: 107 MMOL/L — SIGNIFICANT CHANGE UP (ref 98–110)
CHLORIDE SERPL-SCNC: 108 MMOL/L — SIGNIFICANT CHANGE UP (ref 98–110)
CHLORIDE SERPL-SCNC: 109 MMOL/L — SIGNIFICANT CHANGE UP (ref 98–110)
CHLORIDE SERPL-SCNC: 110 MMOL/L — SIGNIFICANT CHANGE UP (ref 98–110)
CHLORIDE SERPL-SCNC: 111 MMOL/L — HIGH (ref 98–110)
CHLORIDE SERPL-SCNC: 113 MMOL/L — HIGH (ref 98–110)
CHLORIDE SERPL-SCNC: 114 MMOL/L — HIGH (ref 98–110)
CO2 SERPL-SCNC: 15 MMOL/L — LOW (ref 17–32)
CO2 SERPL-SCNC: 15 MMOL/L — LOW (ref 17–32)
CO2 SERPL-SCNC: 16 MMOL/L — LOW (ref 17–32)
CO2 SERPL-SCNC: 16 MMOL/L — LOW (ref 17–32)
CO2 SERPL-SCNC: 17 MMOL/L — SIGNIFICANT CHANGE UP (ref 17–32)
CO2 SERPL-SCNC: 18 MMOL/L — SIGNIFICANT CHANGE UP (ref 17–32)
CO2 SERPL-SCNC: 18 MMOL/L — SIGNIFICANT CHANGE UP (ref 17–32)
CO2 SERPL-SCNC: 19 MMOL/L — SIGNIFICANT CHANGE UP (ref 17–32)
CO2 SERPL-SCNC: 20 MMOL/L — SIGNIFICANT CHANGE UP (ref 17–32)
CO2 SERPL-SCNC: 20 MMOL/L — SIGNIFICANT CHANGE UP (ref 17–32)
CO2 SERPL-SCNC: 21 MMOL/L — SIGNIFICANT CHANGE UP (ref 17–32)
CO2 SERPL-SCNC: 22 MMOL/L — SIGNIFICANT CHANGE UP (ref 17–32)
CO2 SERPL-SCNC: 23 MMOL/L — SIGNIFICANT CHANGE UP (ref 17–32)
CO2 SERPL-SCNC: 24 MMOL/L — SIGNIFICANT CHANGE UP (ref 17–32)
CO2 SERPL-SCNC: 25 MMOL/L — SIGNIFICANT CHANGE UP (ref 17–32)
CO2 SERPL-SCNC: 26 MMOL/L — SIGNIFICANT CHANGE UP (ref 17–32)
CO2 SERPL-SCNC: 27 MMOL/L — SIGNIFICANT CHANGE UP (ref 17–32)
CO2 SERPL-SCNC: 27 MMOL/L — SIGNIFICANT CHANGE UP (ref 17–32)
COLOR SPEC: YELLOW — SIGNIFICANT CHANGE UP
COLOR SPEC: YELLOW — SIGNIFICANT CHANGE UP
CREAT SERPL-MCNC: 0.9 MG/DL — SIGNIFICANT CHANGE UP (ref 0.7–1.5)
CREAT SERPL-MCNC: 0.9 MG/DL — SIGNIFICANT CHANGE UP (ref 0.7–1.5)
CREAT SERPL-MCNC: 1 MG/DL — SIGNIFICANT CHANGE UP (ref 0.7–1.5)
CREAT SERPL-MCNC: 1.1 MG/DL — SIGNIFICANT CHANGE UP (ref 0.7–1.5)
CREAT SERPL-MCNC: 1.2 MG/DL — SIGNIFICANT CHANGE UP (ref 0.7–1.5)
CREAT SERPL-MCNC: 1.3 MG/DL — SIGNIFICANT CHANGE UP (ref 0.7–1.5)
CREAT SERPL-MCNC: 1.3 MG/DL — SIGNIFICANT CHANGE UP (ref 0.7–1.5)
CREAT SERPL-MCNC: 1.4 MG/DL — SIGNIFICANT CHANGE UP (ref 0.7–1.5)
CREAT SERPL-MCNC: 1.4 MG/DL — SIGNIFICANT CHANGE UP (ref 0.7–1.5)
CREAT SERPL-MCNC: 1.5 MG/DL — SIGNIFICANT CHANGE UP (ref 0.7–1.5)
CREAT SERPL-MCNC: 1.5 MG/DL — SIGNIFICANT CHANGE UP (ref 0.7–1.5)
CREAT SERPL-MCNC: 1.6 MG/DL — HIGH (ref 0.7–1.5)
CREAT SERPL-MCNC: 1.7 MG/DL — HIGH (ref 0.7–1.5)
CREAT SERPL-MCNC: 1.7 MG/DL — HIGH (ref 0.7–1.5)
CREAT SERPL-MCNC: 1.8 MG/DL — HIGH (ref 0.7–1.5)
CREAT SERPL-MCNC: 2.1 MG/DL — HIGH (ref 0.7–1.5)
CREAT SERPL-MCNC: 2.4 MG/DL — HIGH (ref 0.7–1.5)
CULTURE RESULTS: SIGNIFICANT CHANGE UP
DIFF PNL FLD: ABNORMAL
DIFF PNL FLD: ABNORMAL
EGFR: 18 ML/MIN/1.73M2 — LOW
EGFR: 22 ML/MIN/1.73M2 — LOW
EGFR: 26 ML/MIN/1.73M2 — LOW
EGFR: 28 ML/MIN/1.73M2 — LOW
EGFR: 28 ML/MIN/1.73M2 — LOW
EGFR: 30 ML/MIN/1.73M2 — LOW
EGFR: 32 ML/MIN/1.73M2 — LOW
EGFR: 32 ML/MIN/1.73M2 — LOW
EGFR: 35 ML/MIN/1.73M2 — LOW
EGFR: 35 ML/MIN/1.73M2 — LOW
EGFR: 38 ML/MIN/1.73M2 — LOW
EGFR: 38 ML/MIN/1.73M2 — LOW
EGFR: 42 ML/MIN/1.73M2 — LOW
EGFR: 47 ML/MIN/1.73M2 — LOW
EGFR: 53 ML/MIN/1.73M2 — LOW
EGFR: 60 ML/MIN/1.73M2 — SIGNIFICANT CHANGE UP
EGFR: 60 ML/MIN/1.73M2 — SIGNIFICANT CHANGE UP
EOSINOPHIL # BLD AUTO: 0 K/UL — SIGNIFICANT CHANGE UP (ref 0–0.7)
EOSINOPHIL # BLD AUTO: 0.01 K/UL — SIGNIFICANT CHANGE UP (ref 0–0.7)
EOSINOPHIL # BLD AUTO: 0.02 K/UL — SIGNIFICANT CHANGE UP (ref 0–0.7)
EOSINOPHIL # BLD AUTO: 0.04 K/UL — SIGNIFICANT CHANGE UP (ref 0–0.7)
EOSINOPHIL # BLD AUTO: 0.06 K/UL — SIGNIFICANT CHANGE UP (ref 0–0.7)
EOSINOPHIL # BLD AUTO: 0.08 K/UL — SIGNIFICANT CHANGE UP (ref 0–0.7)
EOSINOPHIL # BLD AUTO: 0.08 K/UL — SIGNIFICANT CHANGE UP (ref 0–0.7)
EOSINOPHIL # BLD AUTO: 0.1 K/UL — SIGNIFICANT CHANGE UP (ref 0–0.7)
EOSINOPHIL # BLD AUTO: 0.12 K/UL — SIGNIFICANT CHANGE UP (ref 0–0.7)
EOSINOPHIL # BLD AUTO: 0.13 K/UL — SIGNIFICANT CHANGE UP (ref 0–0.7)
EOSINOPHIL # BLD AUTO: 0.14 K/UL — SIGNIFICANT CHANGE UP (ref 0–0.7)
EOSINOPHIL # BLD AUTO: 0.2 K/UL — SIGNIFICANT CHANGE UP (ref 0–0.7)
EOSINOPHIL # BLD AUTO: 0.21 K/UL — SIGNIFICANT CHANGE UP (ref 0–0.7)
EOSINOPHIL # BLD AUTO: 1.87 K/UL — HIGH (ref 0–0.7)
EOSINOPHIL NFR BLD AUTO: 0 % — SIGNIFICANT CHANGE UP (ref 0–8)
EOSINOPHIL NFR BLD AUTO: 0.1 % — SIGNIFICANT CHANGE UP (ref 0–8)
EOSINOPHIL NFR BLD AUTO: 0.2 % — SIGNIFICANT CHANGE UP (ref 0–8)
EOSINOPHIL NFR BLD AUTO: 0.3 % — SIGNIFICANT CHANGE UP (ref 0–8)
EOSINOPHIL NFR BLD AUTO: 0.5 % — SIGNIFICANT CHANGE UP (ref 0–8)
EOSINOPHIL NFR BLD AUTO: 0.6 % — SIGNIFICANT CHANGE UP (ref 0–8)
EOSINOPHIL NFR BLD AUTO: 0.7 % — SIGNIFICANT CHANGE UP (ref 0–8)
EOSINOPHIL NFR BLD AUTO: 0.7 % — SIGNIFICANT CHANGE UP (ref 0–8)
EOSINOPHIL NFR BLD AUTO: 0.9 % — SIGNIFICANT CHANGE UP (ref 0–8)
EOSINOPHIL NFR BLD AUTO: 1.3 % — SIGNIFICANT CHANGE UP (ref 0–8)
EOSINOPHIL NFR BLD AUTO: 1.3 % — SIGNIFICANT CHANGE UP (ref 0–8)
EOSINOPHIL NFR BLD AUTO: 6.4 % — SIGNIFICANT CHANGE UP (ref 0–8)
EPI CELLS # UR: 6 /HPF — HIGH (ref 0–5)
EPI CELLS # UR: ABNORMAL /HPF
ESTIMATED AVERAGE GLUCOSE: 114 MG/DL — SIGNIFICANT CHANGE UP (ref 68–114)
FOLATE SERPL-MCNC: 15.6 NG/ML — SIGNIFICANT CHANGE UP
GAS PNL BLDA: SIGNIFICANT CHANGE UP
GLUCOSE BLDC GLUCOMTR-MCNC: 140 MG/DL — HIGH (ref 70–99)
GLUCOSE BLDC GLUCOMTR-MCNC: 142 MG/DL — HIGH (ref 70–99)
GLUCOSE BLDC GLUCOMTR-MCNC: 143 MG/DL — HIGH (ref 70–99)
GLUCOSE BLDC GLUCOMTR-MCNC: 143 MG/DL — HIGH (ref 70–99)
GLUCOSE BLDC GLUCOMTR-MCNC: 144 MG/DL — HIGH (ref 70–99)
GLUCOSE BLDC GLUCOMTR-MCNC: 145 MG/DL — HIGH (ref 70–99)
GLUCOSE BLDC GLUCOMTR-MCNC: 150 MG/DL — HIGH (ref 70–99)
GLUCOSE BLDC GLUCOMTR-MCNC: 155 MG/DL — HIGH (ref 70–99)
GLUCOSE BLDC GLUCOMTR-MCNC: 157 MG/DL — HIGH (ref 70–99)
GLUCOSE BLDC GLUCOMTR-MCNC: 157 MG/DL — HIGH (ref 70–99)
GLUCOSE BLDC GLUCOMTR-MCNC: 168 MG/DL — HIGH (ref 70–99)
GLUCOSE BLDC GLUCOMTR-MCNC: 174 MG/DL — HIGH (ref 70–99)
GLUCOSE BLDC GLUCOMTR-MCNC: 183 MG/DL — HIGH (ref 70–99)
GLUCOSE BLDC GLUCOMTR-MCNC: 184 MG/DL — HIGH (ref 70–99)
GLUCOSE BLDC GLUCOMTR-MCNC: 187 MG/DL — HIGH (ref 70–99)
GLUCOSE BLDC GLUCOMTR-MCNC: 196 MG/DL — HIGH (ref 70–99)
GLUCOSE BLDC GLUCOMTR-MCNC: 197 MG/DL — HIGH (ref 70–99)
GLUCOSE BLDC GLUCOMTR-MCNC: 197 MG/DL — HIGH (ref 70–99)
GLUCOSE BLDC GLUCOMTR-MCNC: 219 MG/DL — HIGH (ref 70–99)
GLUCOSE BLDC GLUCOMTR-MCNC: 225 MG/DL — HIGH (ref 70–99)
GLUCOSE BLDC GLUCOMTR-MCNC: 228 MG/DL — HIGH (ref 70–99)
GLUCOSE BLDC GLUCOMTR-MCNC: 233 MG/DL — HIGH (ref 70–99)
GLUCOSE BLDC GLUCOMTR-MCNC: 285 MG/DL — HIGH (ref 70–99)
GLUCOSE BLDC GLUCOMTR-MCNC: 295 MG/DL — HIGH (ref 70–99)
GLUCOSE BLDC GLUCOMTR-MCNC: 318 MG/DL — HIGH (ref 70–99)
GLUCOSE BLDC GLUCOMTR-MCNC: 349 MG/DL — HIGH (ref 70–99)
GLUCOSE BLDC GLUCOMTR-MCNC: 87 MG/DL — SIGNIFICANT CHANGE UP (ref 70–99)
GLUCOSE SERPL-MCNC: 103 MG/DL — HIGH (ref 70–99)
GLUCOSE SERPL-MCNC: 111 MG/DL — HIGH (ref 70–99)
GLUCOSE SERPL-MCNC: 119 MG/DL — HIGH (ref 70–99)
GLUCOSE SERPL-MCNC: 123 MG/DL — HIGH (ref 70–99)
GLUCOSE SERPL-MCNC: 128 MG/DL — HIGH (ref 70–99)
GLUCOSE SERPL-MCNC: 135 MG/DL — HIGH (ref 70–99)
GLUCOSE SERPL-MCNC: 139 MG/DL — HIGH (ref 70–99)
GLUCOSE SERPL-MCNC: 140 MG/DL — HIGH (ref 70–99)
GLUCOSE SERPL-MCNC: 143 MG/DL — HIGH (ref 70–99)
GLUCOSE SERPL-MCNC: 143 MG/DL — HIGH (ref 70–99)
GLUCOSE SERPL-MCNC: 144 MG/DL — HIGH (ref 70–99)
GLUCOSE SERPL-MCNC: 144 MG/DL — HIGH (ref 70–99)
GLUCOSE SERPL-MCNC: 147 MG/DL — HIGH (ref 70–99)
GLUCOSE SERPL-MCNC: 150 MG/DL — HIGH (ref 70–99)
GLUCOSE SERPL-MCNC: 151 MG/DL — HIGH (ref 70–99)
GLUCOSE SERPL-MCNC: 159 MG/DL — HIGH (ref 70–99)
GLUCOSE SERPL-MCNC: 164 MG/DL — HIGH (ref 70–99)
GLUCOSE SERPL-MCNC: 166 MG/DL — HIGH (ref 70–99)
GLUCOSE SERPL-MCNC: 166 MG/DL — HIGH (ref 70–99)
GLUCOSE SERPL-MCNC: 178 MG/DL — HIGH (ref 70–99)
GLUCOSE SERPL-MCNC: 194 MG/DL — HIGH (ref 70–99)
GLUCOSE SERPL-MCNC: 228 MG/DL — HIGH (ref 70–99)
GLUCOSE SERPL-MCNC: 232 MG/DL — HIGH (ref 70–99)
GLUCOSE SERPL-MCNC: 247 MG/DL — HIGH (ref 70–99)
GLUCOSE SERPL-MCNC: 271 MG/DL — HIGH (ref 70–99)
GLUCOSE SERPL-MCNC: 277 MG/DL — HIGH (ref 70–99)
GLUCOSE SERPL-MCNC: 307 MG/DL — HIGH (ref 70–99)
GLUCOSE SERPL-MCNC: 78 MG/DL — SIGNIFICANT CHANGE UP (ref 70–99)
GLUCOSE UR QL: NEGATIVE MG/DL — SIGNIFICANT CHANGE UP
GLUCOSE UR QL: NEGATIVE — SIGNIFICANT CHANGE UP
GRAM STN FLD: SIGNIFICANT CHANGE UP
HCO3 BLDA-SCNC: 12 MMOL/L — LOW (ref 21–28)
HCO3 BLDA-SCNC: 17 MMOL/L — LOW (ref 21–28)
HCO3 BLDA-SCNC: 18 MMOL/L — LOW (ref 21–28)
HCO3 BLDA-SCNC: 18 MMOL/L — LOW (ref 21–28)
HCO3 BLDA-SCNC: 19 MMOL/L — LOW (ref 21–28)
HCO3 BLDA-SCNC: 20 MMOL/L — LOW (ref 21–28)
HCO3 BLDA-SCNC: 21 MMOL/L — SIGNIFICANT CHANGE UP (ref 21–28)
HCO3 BLDA-SCNC: 21 MMOL/L — SIGNIFICANT CHANGE UP (ref 21–28)
HCO3 BLDA-SCNC: 22 MMOL/L — SIGNIFICANT CHANGE UP (ref 21–28)
HCO3 BLDA-SCNC: 23 MMOL/L — SIGNIFICANT CHANGE UP (ref 21–28)
HCO3 BLDA-SCNC: 25 MMOL/L — SIGNIFICANT CHANGE UP (ref 21–28)
HCO3 BLDA-SCNC: 25 MMOL/L — SIGNIFICANT CHANGE UP (ref 21–28)
HCT VFR BLD CALC: 20 % — LOW (ref 37–47)
HCT VFR BLD CALC: 21.4 % — LOW (ref 37–47)
HCT VFR BLD CALC: 21.7 % — LOW (ref 37–47)
HCT VFR BLD CALC: 22.4 % — LOW (ref 37–47)
HCT VFR BLD CALC: 22.5 % — LOW (ref 37–47)
HCT VFR BLD CALC: 22.6 % — LOW (ref 37–47)
HCT VFR BLD CALC: 22.8 % — LOW (ref 37–47)
HCT VFR BLD CALC: 22.8 % — LOW (ref 37–47)
HCT VFR BLD CALC: 23.6 % — LOW (ref 37–47)
HCT VFR BLD CALC: 24.5 % — LOW (ref 37–47)
HCT VFR BLD CALC: 24.6 % — LOW (ref 37–47)
HCT VFR BLD CALC: 24.9 % — LOW (ref 37–47)
HCT VFR BLD CALC: 25 % — LOW (ref 37–47)
HCT VFR BLD CALC: 25.2 % — LOW (ref 37–47)
HCT VFR BLD CALC: 25.4 % — LOW (ref 37–47)
HCT VFR BLD CALC: 25.4 % — LOW (ref 37–47)
HCT VFR BLD CALC: 25.8 % — LOW (ref 37–47)
HCT VFR BLD CALC: 25.9 % — LOW (ref 37–47)
HCT VFR BLD CALC: 26 % — LOW (ref 37–47)
HCT VFR BLD CALC: 26.2 % — LOW (ref 37–47)
HCT VFR BLD CALC: 26.4 % — LOW (ref 37–47)
HCT VFR BLD CALC: 26.5 % — LOW (ref 37–47)
HCT VFR BLD CALC: 27 % — LOW (ref 37–47)
HCT VFR BLD CALC: 27.2 % — LOW (ref 37–47)
HCT VFR BLD CALC: 27.4 % — LOW (ref 37–47)
HCT VFR BLD CALC: 27.5 % — LOW (ref 37–47)
HCT VFR BLD CALC: 27.9 % — LOW (ref 37–47)
HCT VFR BLD CALC: 29.6 % — LOW (ref 37–47)
HCT VFR BLD CALC: 30.5 % — LOW (ref 37–47)
HCT VFR BLD CALC: 30.6 % — LOW (ref 37–47)
HCT VFR BLD CALC: 32 % — LOW (ref 37–47)
HCT VFR BLD CALC: 32.1 % — LOW (ref 37–47)
HCT VFR BLD CALC: 32.3 % — LOW (ref 37–47)
HCT VFR BLD CALC: 35.7 % — LOW (ref 37–47)
HGB BLD-MCNC: 10.2 G/DL — LOW (ref 12–16)
HGB BLD-MCNC: 10.6 G/DL — LOW (ref 12–16)
HGB BLD-MCNC: 10.6 G/DL — LOW (ref 12–16)
HGB BLD-MCNC: 11.3 G/DL — LOW (ref 12–16)
HGB BLD-MCNC: 7.2 G/DL — LOW (ref 12–16)
HGB BLD-MCNC: 7.3 G/DL — LOW (ref 12–16)
HGB BLD-MCNC: 7.3 G/DL — LOW (ref 12–16)
HGB BLD-MCNC: 7.4 G/DL — LOW (ref 12–16)
HGB BLD-MCNC: 7.5 G/DL — LOW (ref 12–16)
HGB BLD-MCNC: 7.6 G/DL — LOW (ref 12–16)
HGB BLD-MCNC: 7.6 G/DL — LOW (ref 12–16)
HGB BLD-MCNC: 7.8 G/DL — LOW (ref 12–16)
HGB BLD-MCNC: 7.8 G/DL — LOW (ref 12–16)
HGB BLD-MCNC: 7.9 G/DL — LOW (ref 12–16)
HGB BLD-MCNC: 7.9 G/DL — LOW (ref 12–16)
HGB BLD-MCNC: 8 G/DL — LOW (ref 12–16)
HGB BLD-MCNC: 8 G/DL — LOW (ref 12–16)
HGB BLD-MCNC: 8.1 G/DL — LOW (ref 12–16)
HGB BLD-MCNC: 8.2 G/DL — LOW (ref 12–16)
HGB BLD-MCNC: 8.3 G/DL — LOW (ref 12–16)
HGB BLD-MCNC: 8.4 G/DL — LOW (ref 12–16)
HGB BLD-MCNC: 8.6 G/DL — LOW (ref 12–16)
HGB BLD-MCNC: 8.6 G/DL — LOW (ref 12–16)
HGB BLD-MCNC: 8.9 G/DL — LOW (ref 12–16)
HGB BLD-MCNC: 9 G/DL — LOW (ref 12–16)
HGB BLD-MCNC: 9.1 G/DL — LOW (ref 12–16)
HGB BLD-MCNC: 9.2 G/DL — LOW (ref 12–16)
HGB BLD-MCNC: 9.3 G/DL — LOW (ref 12–16)
HGB BLD-MCNC: 9.3 G/DL — LOW (ref 12–16)
HGB BLD-MCNC: 9.4 G/DL — LOW (ref 12–16)
HGB BLD-MCNC: 9.6 G/DL — LOW (ref 12–16)
HOROWITZ INDEX BLDA+IHG-RTO: 40 — SIGNIFICANT CHANGE UP
HOROWITZ INDEX BLDA+IHG-RTO: 50 — SIGNIFICANT CHANGE UP
HYALINE CASTS # UR AUTO: 28 /LPF — HIGH (ref 0–7)
HYPOCHROMIA BLD QL: SLIGHT — SIGNIFICANT CHANGE UP
IMM GRANULOCYTES NFR BLD AUTO: 0.9 % — HIGH (ref 0.1–0.3)
IMM GRANULOCYTES NFR BLD AUTO: 1.1 % — HIGH (ref 0.1–0.3)
IMM GRANULOCYTES NFR BLD AUTO: 1.1 % — HIGH (ref 0.1–0.3)
IMM GRANULOCYTES NFR BLD AUTO: 1.2 % — HIGH (ref 0.1–0.3)
IMM GRANULOCYTES NFR BLD AUTO: 1.2 % — HIGH (ref 0.1–0.3)
IMM GRANULOCYTES NFR BLD AUTO: 1.3 % — HIGH (ref 0.1–0.3)
IMM GRANULOCYTES NFR BLD AUTO: 1.3 % — HIGH (ref 0.1–0.3)
IMM GRANULOCYTES NFR BLD AUTO: 1.4 % — HIGH (ref 0.1–0.3)
IMM GRANULOCYTES NFR BLD AUTO: 1.5 % — HIGH (ref 0.1–0.3)
IMM GRANULOCYTES NFR BLD AUTO: 1.5 % — HIGH (ref 0.1–0.3)
IMM GRANULOCYTES NFR BLD AUTO: 1.6 % — HIGH (ref 0.1–0.3)
IMM GRANULOCYTES NFR BLD AUTO: 1.7 % — HIGH (ref 0.1–0.3)
IMM GRANULOCYTES NFR BLD AUTO: 1.7 % — HIGH (ref 0.1–0.3)
IMM GRANULOCYTES NFR BLD AUTO: 1.9 % — HIGH (ref 0.1–0.3)
IMM GRANULOCYTES NFR BLD AUTO: 1.9 % — HIGH (ref 0.1–0.3)
IMM GRANULOCYTES NFR BLD AUTO: 2.1 % — HIGH (ref 0.1–0.3)
IMM GRANULOCYTES NFR BLD AUTO: 2.1 % — HIGH (ref 0.1–0.3)
IMM GRANULOCYTES NFR BLD AUTO: 2.2 % — HIGH (ref 0.1–0.3)
IMM GRANULOCYTES NFR BLD AUTO: 2.4 % — HIGH (ref 0.1–0.3)
IMM GRANULOCYTES NFR BLD AUTO: 2.4 % — HIGH (ref 0.1–0.3)
IMM GRANULOCYTES NFR BLD AUTO: 7.9 % — HIGH (ref 0.1–0.3)
INR BLD: 1.06 RATIO — SIGNIFICANT CHANGE UP (ref 0.65–1.3)
INR BLD: 1.1 RATIO — SIGNIFICANT CHANGE UP (ref 0.65–1.3)
INR BLD: 1.1 RATIO — SIGNIFICANT CHANGE UP (ref 0.65–1.3)
INR BLD: 1.14 RATIO — SIGNIFICANT CHANGE UP (ref 0.65–1.3)
INR BLD: 1.14 RATIO — SIGNIFICANT CHANGE UP (ref 0.65–1.3)
INR BLD: 1.27 RATIO — SIGNIFICANT CHANGE UP (ref 0.65–1.3)
INR BLD: 1.28 RATIO — SIGNIFICANT CHANGE UP (ref 0.65–1.3)
INR BLD: 1.84 RATIO — HIGH (ref 0.65–1.3)
IRON SATN MFR SERPL: 24 % — SIGNIFICANT CHANGE UP (ref 15–50)
IRON SATN MFR SERPL: 65 UG/DL — SIGNIFICANT CHANGE UP (ref 35–150)
KETONES UR-MCNC: NEGATIVE — SIGNIFICANT CHANGE UP
KETONES UR-MCNC: NEGATIVE — SIGNIFICANT CHANGE UP
LACTATE SERPL-SCNC: 1.4 MMOL/L — SIGNIFICANT CHANGE UP (ref 0.7–2)
LACTATE SERPL-SCNC: 2.8 MMOL/L — HIGH (ref 0.7–2)
LACTATE SERPL-SCNC: 3.9 MMOL/L — HIGH (ref 0.7–2)
LACTATE SERPL-SCNC: 8.7 MMOL/L — CRITICAL HIGH (ref 0.7–2)
LEUKOCYTE ESTERASE UR-ACNC: ABNORMAL
LEUKOCYTE ESTERASE UR-ACNC: NEGATIVE — SIGNIFICANT CHANGE UP
LYMPHOCYTES # BLD AUTO: 0.44 K/UL — LOW (ref 1.2–3.4)
LYMPHOCYTES # BLD AUTO: 0.5 K/UL — LOW (ref 1.2–3.4)
LYMPHOCYTES # BLD AUTO: 0.52 K/UL — LOW (ref 1.2–3.4)
LYMPHOCYTES # BLD AUTO: 0.52 K/UL — LOW (ref 1.2–3.4)
LYMPHOCYTES # BLD AUTO: 0.57 K/UL — LOW (ref 1.2–3.4)
LYMPHOCYTES # BLD AUTO: 0.64 K/UL — LOW (ref 1.2–3.4)
LYMPHOCYTES # BLD AUTO: 0.66 K/UL — LOW (ref 1.2–3.4)
LYMPHOCYTES # BLD AUTO: 0.69 K/UL — LOW (ref 1.2–3.4)
LYMPHOCYTES # BLD AUTO: 0.77 K/UL — LOW (ref 1.2–3.4)
LYMPHOCYTES # BLD AUTO: 0.87 K/UL — LOW (ref 1.2–3.4)
LYMPHOCYTES # BLD AUTO: 0.88 K/UL — LOW (ref 1.2–3.4)
LYMPHOCYTES # BLD AUTO: 0.89 K/UL — LOW (ref 1.2–3.4)
LYMPHOCYTES # BLD AUTO: 0.93 K/UL — LOW (ref 1.2–3.4)
LYMPHOCYTES # BLD AUTO: 0.98 K/UL — LOW (ref 1.2–3.4)
LYMPHOCYTES # BLD AUTO: 1.1 K/UL — LOW (ref 1.2–3.4)
LYMPHOCYTES # BLD AUTO: 1.2 K/UL — SIGNIFICANT CHANGE UP (ref 1.2–3.4)
LYMPHOCYTES # BLD AUTO: 1.22 K/UL — SIGNIFICANT CHANGE UP (ref 1.2–3.4)
LYMPHOCYTES # BLD AUTO: 1.32 K/UL — SIGNIFICANT CHANGE UP (ref 1.2–3.4)
LYMPHOCYTES # BLD AUTO: 1.41 K/UL — SIGNIFICANT CHANGE UP (ref 1.2–3.4)
LYMPHOCYTES # BLD AUTO: 1.49 K/UL — SIGNIFICANT CHANGE UP (ref 1.2–3.4)
LYMPHOCYTES # BLD AUTO: 1.5 % — LOW (ref 20.5–51.1)
LYMPHOCYTES # BLD AUTO: 1.6 % — LOW (ref 20.5–51.1)
LYMPHOCYTES # BLD AUTO: 1.7 % — LOW (ref 20.5–51.1)
LYMPHOCYTES # BLD AUTO: 10.6 % — LOW (ref 20.5–51.1)
LYMPHOCYTES # BLD AUTO: 17 % — LOW (ref 20.5–51.1)
LYMPHOCYTES # BLD AUTO: 2.04 K/UL — SIGNIFICANT CHANGE UP (ref 1.2–3.4)
LYMPHOCYTES # BLD AUTO: 2.07 K/UL — SIGNIFICANT CHANGE UP (ref 1.2–3.4)
LYMPHOCYTES # BLD AUTO: 2.1 % — LOW (ref 20.5–51.1)
LYMPHOCYTES # BLD AUTO: 2.1 % — LOW (ref 20.5–51.1)
LYMPHOCYTES # BLD AUTO: 2.3 % — LOW (ref 20.5–51.1)
LYMPHOCYTES # BLD AUTO: 2.81 K/UL — SIGNIFICANT CHANGE UP (ref 1.2–3.4)
LYMPHOCYTES # BLD AUTO: 2.9 % — LOW (ref 20.5–51.1)
LYMPHOCYTES # BLD AUTO: 3 % — LOW (ref 20.5–51.1)
LYMPHOCYTES # BLD AUTO: 4 % — LOW (ref 20.5–51.1)
LYMPHOCYTES # BLD AUTO: 4.4 % — LOW (ref 20.5–51.1)
LYMPHOCYTES # BLD AUTO: 4.6 % — LOW (ref 20.5–51.1)
LYMPHOCYTES # BLD AUTO: 4.6 % — LOW (ref 20.5–51.1)
LYMPHOCYTES # BLD AUTO: 5.4 % — LOW (ref 20.5–51.1)
LYMPHOCYTES # BLD AUTO: 5.4 % — LOW (ref 20.5–51.1)
LYMPHOCYTES # BLD AUTO: 5.5 % — LOW (ref 20.5–51.1)
LYMPHOCYTES # BLD AUTO: 5.7 % — LOW (ref 20.5–51.1)
LYMPHOCYTES # BLD AUTO: 5.8 % — LOW (ref 20.5–51.1)
LYMPHOCYTES # BLD AUTO: 5.8 % — LOW (ref 20.5–51.1)
LYMPHOCYTES # BLD AUTO: 6.4 % — LOW (ref 20.5–51.1)
LYMPHOCYTES # BLD AUTO: 6.5 % — LOW (ref 20.5–51.1)
LYMPHOCYTES # BLD AUTO: 7.3 % — LOW (ref 20.5–51.1)
MAGNESIUM SERPL-MCNC: 1.3 MG/DL — LOW (ref 1.8–2.4)
MAGNESIUM SERPL-MCNC: 1.6 MG/DL — LOW (ref 1.8–2.4)
MAGNESIUM SERPL-MCNC: 1.6 MG/DL — LOW (ref 1.8–2.4)
MAGNESIUM SERPL-MCNC: 1.7 MG/DL — LOW (ref 1.8–2.4)
MAGNESIUM SERPL-MCNC: 1.8 MG/DL — SIGNIFICANT CHANGE UP (ref 1.8–2.4)
MAGNESIUM SERPL-MCNC: 1.8 MG/DL — SIGNIFICANT CHANGE UP (ref 1.8–2.4)
MAGNESIUM SERPL-MCNC: 2 MG/DL — SIGNIFICANT CHANGE UP (ref 1.8–2.4)
MAGNESIUM SERPL-MCNC: 2.1 MG/DL — SIGNIFICANT CHANGE UP (ref 1.8–2.4)
MAGNESIUM SERPL-MCNC: 2.2 MG/DL — SIGNIFICANT CHANGE UP (ref 1.8–2.4)
MAGNESIUM SERPL-MCNC: 2.2 MG/DL — SIGNIFICANT CHANGE UP (ref 1.8–2.4)
MAGNESIUM SERPL-MCNC: 2.4 MG/DL — SIGNIFICANT CHANGE UP (ref 1.8–2.4)
MAGNESIUM SERPL-MCNC: 2.8 MG/DL — HIGH (ref 1.8–2.4)
MAGNESIUM SERPL-MCNC: 3 MG/DL — HIGH (ref 1.8–2.4)
MANUAL SMEAR VERIFICATION: SIGNIFICANT CHANGE UP
MCHC RBC-ENTMCNC: 27.6 PG — SIGNIFICANT CHANGE UP (ref 27–31)
MCHC RBC-ENTMCNC: 27.9 PG — SIGNIFICANT CHANGE UP (ref 27–31)
MCHC RBC-ENTMCNC: 28 PG — SIGNIFICANT CHANGE UP (ref 27–31)
MCHC RBC-ENTMCNC: 28.1 PG — SIGNIFICANT CHANGE UP (ref 27–31)
MCHC RBC-ENTMCNC: 28.2 PG — SIGNIFICANT CHANGE UP (ref 27–31)
MCHC RBC-ENTMCNC: 28.2 PG — SIGNIFICANT CHANGE UP (ref 27–31)
MCHC RBC-ENTMCNC: 28.3 PG — SIGNIFICANT CHANGE UP (ref 27–31)
MCHC RBC-ENTMCNC: 28.3 PG — SIGNIFICANT CHANGE UP (ref 27–31)
MCHC RBC-ENTMCNC: 28.4 PG — SIGNIFICANT CHANGE UP (ref 27–31)
MCHC RBC-ENTMCNC: 28.4 PG — SIGNIFICANT CHANGE UP (ref 27–31)
MCHC RBC-ENTMCNC: 28.5 PG — SIGNIFICANT CHANGE UP (ref 27–31)
MCHC RBC-ENTMCNC: 28.5 PG — SIGNIFICANT CHANGE UP (ref 27–31)
MCHC RBC-ENTMCNC: 28.6 PG — SIGNIFICANT CHANGE UP (ref 27–31)
MCHC RBC-ENTMCNC: 28.7 PG — SIGNIFICANT CHANGE UP (ref 27–31)
MCHC RBC-ENTMCNC: 28.7 PG — SIGNIFICANT CHANGE UP (ref 27–31)
MCHC RBC-ENTMCNC: 28.8 PG — SIGNIFICANT CHANGE UP (ref 27–31)
MCHC RBC-ENTMCNC: 28.8 PG — SIGNIFICANT CHANGE UP (ref 27–31)
MCHC RBC-ENTMCNC: 28.9 PG — SIGNIFICANT CHANGE UP (ref 27–31)
MCHC RBC-ENTMCNC: 29.1 PG — SIGNIFICANT CHANGE UP (ref 27–31)
MCHC RBC-ENTMCNC: 29.1 PG — SIGNIFICANT CHANGE UP (ref 27–31)
MCHC RBC-ENTMCNC: 29.3 PG — SIGNIFICANT CHANGE UP (ref 27–31)
MCHC RBC-ENTMCNC: 29.4 PG — SIGNIFICANT CHANGE UP (ref 27–31)
MCHC RBC-ENTMCNC: 29.5 PG — SIGNIFICANT CHANGE UP (ref 27–31)
MCHC RBC-ENTMCNC: 29.6 PG — SIGNIFICANT CHANGE UP (ref 27–31)
MCHC RBC-ENTMCNC: 29.7 PG — SIGNIFICANT CHANGE UP (ref 27–31)
MCHC RBC-ENTMCNC: 29.8 PG — SIGNIFICANT CHANGE UP (ref 27–31)
MCHC RBC-ENTMCNC: 30.1 G/DL — LOW (ref 32–37)
MCHC RBC-ENTMCNC: 30.1 PG — SIGNIFICANT CHANGE UP (ref 27–31)
MCHC RBC-ENTMCNC: 31.4 G/DL — LOW (ref 32–37)
MCHC RBC-ENTMCNC: 31.4 G/DL — LOW (ref 32–37)
MCHC RBC-ENTMCNC: 31.5 G/DL — LOW (ref 32–37)
MCHC RBC-ENTMCNC: 31.5 G/DL — LOW (ref 32–37)
MCHC RBC-ENTMCNC: 31.7 G/DL — LOW (ref 32–37)
MCHC RBC-ENTMCNC: 31.7 G/DL — LOW (ref 32–37)
MCHC RBC-ENTMCNC: 31.8 G/DL — LOW (ref 32–37)
MCHC RBC-ENTMCNC: 31.8 G/DL — LOW (ref 32–37)
MCHC RBC-ENTMCNC: 31.9 G/DL — LOW (ref 32–37)
MCHC RBC-ENTMCNC: 32 G/DL — SIGNIFICANT CHANGE UP (ref 32–37)
MCHC RBC-ENTMCNC: 32.1 G/DL — SIGNIFICANT CHANGE UP (ref 32–37)
MCHC RBC-ENTMCNC: 32.3 G/DL — SIGNIFICANT CHANGE UP (ref 32–37)
MCHC RBC-ENTMCNC: 32.3 G/DL — SIGNIFICANT CHANGE UP (ref 32–37)
MCHC RBC-ENTMCNC: 32.7 G/DL — SIGNIFICANT CHANGE UP (ref 32–37)
MCHC RBC-ENTMCNC: 32.8 G/DL — SIGNIFICANT CHANGE UP (ref 32–37)
MCHC RBC-ENTMCNC: 32.8 G/DL — SIGNIFICANT CHANGE UP (ref 32–37)
MCHC RBC-ENTMCNC: 32.9 G/DL — SIGNIFICANT CHANGE UP (ref 32–37)
MCHC RBC-ENTMCNC: 33 G/DL — SIGNIFICANT CHANGE UP (ref 32–37)
MCHC RBC-ENTMCNC: 33 G/DL — SIGNIFICANT CHANGE UP (ref 32–37)
MCHC RBC-ENTMCNC: 33.1 G/DL — SIGNIFICANT CHANGE UP (ref 32–37)
MCHC RBC-ENTMCNC: 33.1 G/DL — SIGNIFICANT CHANGE UP (ref 32–37)
MCHC RBC-ENTMCNC: 33.3 G/DL — SIGNIFICANT CHANGE UP (ref 32–37)
MCHC RBC-ENTMCNC: 33.3 G/DL — SIGNIFICANT CHANGE UP (ref 32–37)
MCHC RBC-ENTMCNC: 33.5 G/DL — SIGNIFICANT CHANGE UP (ref 32–37)
MCHC RBC-ENTMCNC: 33.6 G/DL — SIGNIFICANT CHANGE UP (ref 32–37)
MCHC RBC-ENTMCNC: 33.6 G/DL — SIGNIFICANT CHANGE UP (ref 32–37)
MCHC RBC-ENTMCNC: 33.7 G/DL — SIGNIFICANT CHANGE UP (ref 32–37)
MCHC RBC-ENTMCNC: 33.8 G/DL — SIGNIFICANT CHANGE UP (ref 32–37)
MCHC RBC-ENTMCNC: 34.1 G/DL — SIGNIFICANT CHANGE UP (ref 32–37)
MCHC RBC-ENTMCNC: 34.1 G/DL — SIGNIFICANT CHANGE UP (ref 32–37)
MCHC RBC-ENTMCNC: 34.2 G/DL — SIGNIFICANT CHANGE UP (ref 32–37)
MCHC RBC-ENTMCNC: 34.6 G/DL — SIGNIFICANT CHANGE UP (ref 32–37)
MCHC RBC-ENTMCNC: 34.7 G/DL — SIGNIFICANT CHANGE UP (ref 32–37)
MCHC RBC-ENTMCNC: 34.8 G/DL — SIGNIFICANT CHANGE UP (ref 32–37)
MCHC RBC-ENTMCNC: 35.1 G/DL — SIGNIFICANT CHANGE UP (ref 32–37)
MCHC RBC-ENTMCNC: 35.3 G/DL — SIGNIFICANT CHANGE UP (ref 32–37)
MCHC RBC-ENTMCNC: 36 G/DL — SIGNIFICANT CHANGE UP (ref 32–37)
MCV RBC AUTO: 82.3 FL — SIGNIFICANT CHANGE UP (ref 81–99)
MCV RBC AUTO: 83 FL — SIGNIFICANT CHANGE UP (ref 81–99)
MCV RBC AUTO: 84.1 FL — SIGNIFICANT CHANGE UP (ref 81–99)
MCV RBC AUTO: 84.8 FL — SIGNIFICANT CHANGE UP (ref 81–99)
MCV RBC AUTO: 85.2 FL — SIGNIFICANT CHANGE UP (ref 81–99)
MCV RBC AUTO: 85.9 FL — SIGNIFICANT CHANGE UP (ref 81–99)
MCV RBC AUTO: 85.9 FL — SIGNIFICANT CHANGE UP (ref 81–99)
MCV RBC AUTO: 86 FL — SIGNIFICANT CHANGE UP (ref 81–99)
MCV RBC AUTO: 86.5 FL — SIGNIFICANT CHANGE UP (ref 81–99)
MCV RBC AUTO: 86.6 FL — SIGNIFICANT CHANGE UP (ref 81–99)
MCV RBC AUTO: 86.6 FL — SIGNIFICANT CHANGE UP (ref 81–99)
MCV RBC AUTO: 86.7 FL — SIGNIFICANT CHANGE UP (ref 81–99)
MCV RBC AUTO: 86.8 FL — SIGNIFICANT CHANGE UP (ref 81–99)
MCV RBC AUTO: 86.9 FL — SIGNIFICANT CHANGE UP (ref 81–99)
MCV RBC AUTO: 87.1 FL — SIGNIFICANT CHANGE UP (ref 81–99)
MCV RBC AUTO: 87.4 FL — SIGNIFICANT CHANGE UP (ref 81–99)
MCV RBC AUTO: 87.5 FL — SIGNIFICANT CHANGE UP (ref 81–99)
MCV RBC AUTO: 87.7 FL — SIGNIFICANT CHANGE UP (ref 81–99)
MCV RBC AUTO: 87.7 FL — SIGNIFICANT CHANGE UP (ref 81–99)
MCV RBC AUTO: 88 FL — SIGNIFICANT CHANGE UP (ref 81–99)
MCV RBC AUTO: 88.2 FL — SIGNIFICANT CHANGE UP (ref 81–99)
MCV RBC AUTO: 88.4 FL — SIGNIFICANT CHANGE UP (ref 81–99)
MCV RBC AUTO: 88.6 FL — SIGNIFICANT CHANGE UP (ref 81–99)
MCV RBC AUTO: 88.7 FL — SIGNIFICANT CHANGE UP (ref 81–99)
MCV RBC AUTO: 89.3 FL — SIGNIFICANT CHANGE UP (ref 81–99)
MCV RBC AUTO: 89.3 FL — SIGNIFICANT CHANGE UP (ref 81–99)
MCV RBC AUTO: 89.4 FL — SIGNIFICANT CHANGE UP (ref 81–99)
MCV RBC AUTO: 89.4 FL — SIGNIFICANT CHANGE UP (ref 81–99)
MCV RBC AUTO: 89.7 FL — SIGNIFICANT CHANGE UP (ref 81–99)
MCV RBC AUTO: 89.8 FL — SIGNIFICANT CHANGE UP (ref 81–99)
MCV RBC AUTO: 90 FL — SIGNIFICANT CHANGE UP (ref 81–99)
MCV RBC AUTO: 90.1 FL — SIGNIFICANT CHANGE UP (ref 81–99)
MCV RBC AUTO: 90.2 FL — SIGNIFICANT CHANGE UP (ref 81–99)
MCV RBC AUTO: 91.3 FL — SIGNIFICANT CHANGE UP (ref 81–99)
MCV RBC AUTO: 91.9 FL — SIGNIFICANT CHANGE UP (ref 81–99)
MCV RBC AUTO: 91.9 FL — SIGNIFICANT CHANGE UP (ref 81–99)
METHOD TYPE: SIGNIFICANT CHANGE UP
METHOD TYPE: SIGNIFICANT CHANGE UP
MONOCYTES # BLD AUTO: 0.99 K/UL — HIGH (ref 0.1–0.6)
MONOCYTES # BLD AUTO: 1 K/UL — HIGH (ref 0.1–0.6)
MONOCYTES # BLD AUTO: 1.01 K/UL — HIGH (ref 0.1–0.6)
MONOCYTES # BLD AUTO: 1.1 K/UL — HIGH (ref 0.1–0.6)
MONOCYTES # BLD AUTO: 1.14 K/UL — HIGH (ref 0.1–0.6)
MONOCYTES # BLD AUTO: 1.21 K/UL — HIGH (ref 0.1–0.6)
MONOCYTES # BLD AUTO: 1.28 K/UL — HIGH (ref 0.1–0.6)
MONOCYTES # BLD AUTO: 1.32 K/UL — HIGH (ref 0.1–0.6)
MONOCYTES # BLD AUTO: 1.35 K/UL — HIGH (ref 0.1–0.6)
MONOCYTES # BLD AUTO: 1.4 K/UL — HIGH (ref 0.1–0.6)
MONOCYTES # BLD AUTO: 1.5 K/UL — HIGH (ref 0.1–0.6)
MONOCYTES # BLD AUTO: 1.5 K/UL — HIGH (ref 0.1–0.6)
MONOCYTES # BLD AUTO: 1.55 K/UL — HIGH (ref 0.1–0.6)
MONOCYTES # BLD AUTO: 1.55 K/UL — HIGH (ref 0.1–0.6)
MONOCYTES # BLD AUTO: 1.63 K/UL — HIGH (ref 0.1–0.6)
MONOCYTES # BLD AUTO: 1.68 K/UL — HIGH (ref 0.1–0.6)
MONOCYTES # BLD AUTO: 1.74 K/UL — HIGH (ref 0.1–0.6)
MONOCYTES # BLD AUTO: 1.75 K/UL — HIGH (ref 0.1–0.6)
MONOCYTES # BLD AUTO: 2 K/UL — HIGH (ref 0.1–0.6)
MONOCYTES # BLD AUTO: 2.27 K/UL — HIGH (ref 0.1–0.6)
MONOCYTES # BLD AUTO: 2.45 K/UL — HIGH (ref 0.1–0.6)
MONOCYTES NFR BLD AUTO: 4.2 % — SIGNIFICANT CHANGE UP (ref 1.7–9.3)
MONOCYTES NFR BLD AUTO: 4.4 % — SIGNIFICANT CHANGE UP (ref 1.7–9.3)
MONOCYTES NFR BLD AUTO: 4.8 % — SIGNIFICANT CHANGE UP (ref 1.7–9.3)
MONOCYTES NFR BLD AUTO: 5.1 % — SIGNIFICANT CHANGE UP (ref 1.7–9.3)
MONOCYTES NFR BLD AUTO: 5.2 % — SIGNIFICANT CHANGE UP (ref 1.7–9.3)
MONOCYTES NFR BLD AUTO: 5.7 % — SIGNIFICANT CHANGE UP (ref 1.7–9.3)
MONOCYTES NFR BLD AUTO: 6.2 % — SIGNIFICANT CHANGE UP (ref 1.7–9.3)
MONOCYTES NFR BLD AUTO: 6.3 % — SIGNIFICANT CHANGE UP (ref 1.7–9.3)
MONOCYTES NFR BLD AUTO: 6.4 % — SIGNIFICANT CHANGE UP (ref 1.7–9.3)
MONOCYTES NFR BLD AUTO: 6.5 % — SIGNIFICANT CHANGE UP (ref 1.7–9.3)
MONOCYTES NFR BLD AUTO: 6.6 % — SIGNIFICANT CHANGE UP (ref 1.7–9.3)
MONOCYTES NFR BLD AUTO: 6.9 % — SIGNIFICANT CHANGE UP (ref 1.7–9.3)
MONOCYTES NFR BLD AUTO: 7.2 % — SIGNIFICANT CHANGE UP (ref 1.7–9.3)
MONOCYTES NFR BLD AUTO: 7.2 % — SIGNIFICANT CHANGE UP (ref 1.7–9.3)
MONOCYTES NFR BLD AUTO: 7.5 % — SIGNIFICANT CHANGE UP (ref 1.7–9.3)
MONOCYTES NFR BLD AUTO: 7.7 % — SIGNIFICANT CHANGE UP (ref 1.7–9.3)
MONOCYTES NFR BLD AUTO: 7.8 % — SIGNIFICANT CHANGE UP (ref 1.7–9.3)
MONOCYTES NFR BLD AUTO: 7.8 % — SIGNIFICANT CHANGE UP (ref 1.7–9.3)
MONOCYTES NFR BLD AUTO: 8.7 % — SIGNIFICANT CHANGE UP (ref 1.7–9.3)
MONOCYTES NFR BLD AUTO: 8.8 % — SIGNIFICANT CHANGE UP (ref 1.7–9.3)
MONOCYTES NFR BLD AUTO: 9.2 % — SIGNIFICANT CHANGE UP (ref 1.7–9.3)
MRSA PCR RESULT.: NEGATIVE — SIGNIFICANT CHANGE UP
NEUTROPHILS # BLD AUTO: 11.91 K/UL — HIGH (ref 1.4–6.5)
NEUTROPHILS # BLD AUTO: 12.88 K/UL — HIGH (ref 1.4–6.5)
NEUTROPHILS # BLD AUTO: 13.58 K/UL — HIGH (ref 1.4–6.5)
NEUTROPHILS # BLD AUTO: 13.75 K/UL — HIGH (ref 1.4–6.5)
NEUTROPHILS # BLD AUTO: 13.95 K/UL — HIGH (ref 1.4–6.5)
NEUTROPHILS # BLD AUTO: 14.57 K/UL — HIGH (ref 1.4–6.5)
NEUTROPHILS # BLD AUTO: 15.64 K/UL — HIGH (ref 1.4–6.5)
NEUTROPHILS # BLD AUTO: 16.02 K/UL — HIGH (ref 1.4–6.5)
NEUTROPHILS # BLD AUTO: 16.04 K/UL — HIGH (ref 1.4–6.5)
NEUTROPHILS # BLD AUTO: 16.24 K/UL — HIGH (ref 1.4–6.5)
NEUTROPHILS # BLD AUTO: 16.8 K/UL — HIGH (ref 1.4–6.5)
NEUTROPHILS # BLD AUTO: 17.03 K/UL — HIGH (ref 1.4–6.5)
NEUTROPHILS # BLD AUTO: 21.49 K/UL — HIGH (ref 1.4–6.5)
NEUTROPHILS # BLD AUTO: 22.86 K/UL — HIGH (ref 1.4–6.5)
NEUTROPHILS # BLD AUTO: 23.7 K/UL — HIGH (ref 1.4–6.5)
NEUTROPHILS # BLD AUTO: 24.18 K/UL — HIGH (ref 1.4–6.5)
NEUTROPHILS # BLD AUTO: 24.69 K/UL — HIGH (ref 1.4–6.5)
NEUTROPHILS # BLD AUTO: 25.28 K/UL — HIGH (ref 1.4–6.5)
NEUTROPHILS # BLD AUTO: 26.57 K/UL — HIGH (ref 1.4–6.5)
NEUTROPHILS # BLD AUTO: 27.53 K/UL — HIGH (ref 1.4–6.5)
NEUTROPHILS # BLD AUTO: 27.89 K/UL — HIGH (ref 1.4–6.5)
NEUTROPHILS # BLD AUTO: 28.03 K/UL — HIGH (ref 1.4–6.5)
NEUTROPHILS # BLD AUTO: 28.63 K/UL — HIGH (ref 1.4–6.5)
NEUTROPHILS NFR BLD AUTO: 72.1 % — SIGNIFICANT CHANGE UP (ref 42.2–75.2)
NEUTROPHILS NFR BLD AUTO: 77.4 % — HIGH (ref 42.2–75.2)
NEUTROPHILS NFR BLD AUTO: 81.2 % — HIGH (ref 42.2–75.2)
NEUTROPHILS NFR BLD AUTO: 82 % — HIGH (ref 42.2–75.2)
NEUTROPHILS NFR BLD AUTO: 82.8 % — HIGH (ref 42.2–75.2)
NEUTROPHILS NFR BLD AUTO: 83.1 % — HIGH (ref 42.2–75.2)
NEUTROPHILS NFR BLD AUTO: 83.4 % — HIGH (ref 42.2–75.2)
NEUTROPHILS NFR BLD AUTO: 84.4 % — HIGH (ref 42.2–75.2)
NEUTROPHILS NFR BLD AUTO: 84.5 % — HIGH (ref 42.2–75.2)
NEUTROPHILS NFR BLD AUTO: 85.1 % — HIGH (ref 42.2–75.2)
NEUTROPHILS NFR BLD AUTO: 85.3 % — HIGH (ref 42.2–75.2)
NEUTROPHILS NFR BLD AUTO: 85.9 % — HIGH (ref 42.2–75.2)
NEUTROPHILS NFR BLD AUTO: 87.2 % — HIGH (ref 42.2–75.2)
NEUTROPHILS NFR BLD AUTO: 88 % — HIGH (ref 42.2–75.2)
NEUTROPHILS NFR BLD AUTO: 88.1 % — HIGH (ref 42.2–75.2)
NEUTROPHILS NFR BLD AUTO: 88.2 % — HIGH (ref 42.2–75.2)
NEUTROPHILS NFR BLD AUTO: 88.2 % — HIGH (ref 42.2–75.2)
NEUTROPHILS NFR BLD AUTO: 88.5 % — HIGH (ref 42.2–75.2)
NEUTROPHILS NFR BLD AUTO: 89.1 % — HIGH (ref 42.2–75.2)
NEUTROPHILS NFR BLD AUTO: 90.5 % — HIGH (ref 42.2–75.2)
NEUTROPHILS NFR BLD AUTO: 91.1 % — HIGH (ref 42.2–75.2)
NEUTROPHILS NFR BLD AUTO: 91.6 % — HIGH (ref 42.2–75.2)
NEUTROPHILS NFR BLD AUTO: 91.7 % — HIGH (ref 42.2–75.2)
NITRITE UR-MCNC: NEGATIVE — SIGNIFICANT CHANGE UP
NITRITE UR-MCNC: NEGATIVE — SIGNIFICANT CHANGE UP
NRBC # BLD: 0 /100 WBCS — SIGNIFICANT CHANGE UP (ref 0–0)
NRBC # BLD: 0 /100 — SIGNIFICANT CHANGE UP (ref 0–0)
ORGANISM # SPEC MICROSCOPIC CNT: SIGNIFICANT CHANGE UP
PCO2 BLDA: 25 MMHG — SIGNIFICANT CHANGE UP (ref 25–48)
PCO2 BLDA: 29 MMHG — SIGNIFICANT CHANGE UP (ref 25–48)
PCO2 BLDA: 31 MMHG — SIGNIFICANT CHANGE UP (ref 25–48)
PCO2 BLDA: 31 MMHG — SIGNIFICANT CHANGE UP (ref 25–48)
PCO2 BLDA: 34 MMHG — SIGNIFICANT CHANGE UP (ref 25–48)
PCO2 BLDA: 35 MMHG — SIGNIFICANT CHANGE UP (ref 25–48)
PCO2 BLDA: 35 MMHG — SIGNIFICANT CHANGE UP (ref 25–48)
PCO2 BLDA: 36 MMHG — SIGNIFICANT CHANGE UP (ref 25–48)
PCO2 BLDA: 38 MMHG — SIGNIFICANT CHANGE UP (ref 25–48)
PCO2 BLDA: 40 MMHG — SIGNIFICANT CHANGE UP (ref 25–48)
PCO2 BLDA: 45 MMHG — SIGNIFICANT CHANGE UP (ref 25–48)
PCO2 BLDA: 45 MMHG — SIGNIFICANT CHANGE UP (ref 25–48)
PH BLDA: 7.19 — CRITICAL LOW (ref 7.35–7.45)
PH BLDA: 7.2 — CRITICAL LOW (ref 7.35–7.45)
PH BLDA: 7.22 — LOW (ref 7.35–7.45)
PH BLDA: 7.31 — LOW (ref 7.35–7.45)
PH BLDA: 7.33 — LOW (ref 7.35–7.45)
PH BLDA: 7.39 — SIGNIFICANT CHANGE UP (ref 7.35–7.45)
PH BLDA: 7.4 — SIGNIFICANT CHANGE UP (ref 7.35–7.45)
PH BLDA: 7.42 — SIGNIFICANT CHANGE UP (ref 7.35–7.45)
PH BLDA: 7.42 — SIGNIFICANT CHANGE UP (ref 7.35–7.45)
PH BLDA: 7.43 — SIGNIFICANT CHANGE UP (ref 7.35–7.45)
PH BLDA: 7.43 — SIGNIFICANT CHANGE UP (ref 7.35–7.45)
PH BLDA: 7.61 — CRITICAL HIGH (ref 7.35–7.45)
PH UR: 5.5 — SIGNIFICANT CHANGE UP (ref 5–8)
PH UR: 6 — SIGNIFICANT CHANGE UP (ref 5–8)
PHOSPHATE SERPL-MCNC: 1.8 MG/DL — LOW (ref 2.1–4.9)
PHOSPHATE SERPL-MCNC: 2.3 MG/DL — SIGNIFICANT CHANGE UP (ref 2.1–4.9)
PHOSPHATE SERPL-MCNC: 2.3 MG/DL — SIGNIFICANT CHANGE UP (ref 2.1–4.9)
PHOSPHATE SERPL-MCNC: 2.6 MG/DL — SIGNIFICANT CHANGE UP (ref 2.1–4.9)
PHOSPHATE SERPL-MCNC: 2.8 MG/DL — SIGNIFICANT CHANGE UP (ref 2.1–4.9)
PHOSPHATE SERPL-MCNC: 2.9 MG/DL — SIGNIFICANT CHANGE UP (ref 2.1–4.9)
PHOSPHATE SERPL-MCNC: 2.9 MG/DL — SIGNIFICANT CHANGE UP (ref 2.1–4.9)
PHOSPHATE SERPL-MCNC: 3.4 MG/DL — SIGNIFICANT CHANGE UP (ref 2.1–4.9)
PHOSPHATE SERPL-MCNC: 4.1 MG/DL — SIGNIFICANT CHANGE UP (ref 2.1–4.9)
PHOSPHATE SERPL-MCNC: 6.1 MG/DL — HIGH (ref 2.1–4.9)
PLAT MORPH BLD: NORMAL — SIGNIFICANT CHANGE UP
PLATELET # BLD AUTO: 109 K/UL — LOW (ref 130–400)
PLATELET # BLD AUTO: 111 K/UL — LOW (ref 130–400)
PLATELET # BLD AUTO: 114 K/UL — LOW (ref 130–400)
PLATELET # BLD AUTO: 114 K/UL — LOW (ref 130–400)
PLATELET # BLD AUTO: 120 K/UL — LOW (ref 130–400)
PLATELET # BLD AUTO: 121 K/UL — LOW (ref 130–400)
PLATELET # BLD AUTO: 128 K/UL — LOW (ref 130–400)
PLATELET # BLD AUTO: 129 K/UL — LOW (ref 130–400)
PLATELET # BLD AUTO: 131 K/UL — SIGNIFICANT CHANGE UP (ref 130–400)
PLATELET # BLD AUTO: 132 K/UL — SIGNIFICANT CHANGE UP (ref 130–400)
PLATELET # BLD AUTO: 133 K/UL — SIGNIFICANT CHANGE UP (ref 130–400)
PLATELET # BLD AUTO: 133 K/UL — SIGNIFICANT CHANGE UP (ref 130–400)
PLATELET # BLD AUTO: 134 K/UL — SIGNIFICANT CHANGE UP (ref 130–400)
PLATELET # BLD AUTO: 134 K/UL — SIGNIFICANT CHANGE UP (ref 130–400)
PLATELET # BLD AUTO: 136 K/UL — SIGNIFICANT CHANGE UP (ref 130–400)
PLATELET # BLD AUTO: 138 K/UL — SIGNIFICANT CHANGE UP (ref 130–400)
PLATELET # BLD AUTO: 139 K/UL — SIGNIFICANT CHANGE UP (ref 130–400)
PLATELET # BLD AUTO: 140 K/UL — SIGNIFICANT CHANGE UP (ref 130–400)
PLATELET # BLD AUTO: 145 K/UL — SIGNIFICANT CHANGE UP (ref 130–400)
PLATELET # BLD AUTO: 147 K/UL — SIGNIFICANT CHANGE UP (ref 130–400)
PLATELET # BLD AUTO: 153 K/UL — SIGNIFICANT CHANGE UP (ref 130–400)
PLATELET # BLD AUTO: 168 K/UL — SIGNIFICANT CHANGE UP (ref 130–400)
PLATELET # BLD AUTO: 170 K/UL — SIGNIFICANT CHANGE UP (ref 130–400)
PLATELET # BLD AUTO: 172 K/UL — SIGNIFICANT CHANGE UP (ref 130–400)
PLATELET # BLD AUTO: 205 K/UL — SIGNIFICANT CHANGE UP (ref 130–400)
PLATELET # BLD AUTO: 208 K/UL — SIGNIFICANT CHANGE UP (ref 130–400)
PLATELET # BLD AUTO: 211 K/UL — SIGNIFICANT CHANGE UP (ref 130–400)
PLATELET # BLD AUTO: 269 K/UL — SIGNIFICANT CHANGE UP (ref 130–400)
PLATELET # BLD AUTO: 62 K/UL — LOW (ref 130–400)
PLATELET # BLD AUTO: 66 K/UL — LOW (ref 130–400)
PLATELET # BLD AUTO: 68 K/UL — LOW (ref 130–400)
PLATELET # BLD AUTO: 70 K/UL — LOW (ref 130–400)
PLATELET # BLD AUTO: 81 K/UL — LOW (ref 130–400)
PLATELET # BLD AUTO: 91 K/UL — LOW (ref 130–400)
PLATELET # BLD AUTO: 96 K/UL — LOW (ref 130–400)
PLATELET # BLD AUTO: 97 K/UL — LOW (ref 130–400)
PLATELET COUNT - ESTIMATE: NORMAL — SIGNIFICANT CHANGE UP
PO2 BLDA: 108 MMHG — SIGNIFICANT CHANGE UP (ref 83–108)
PO2 BLDA: 117 MMHG — HIGH (ref 83–108)
PO2 BLDA: 119 MMHG — HIGH (ref 83–108)
PO2 BLDA: 125 MMHG — HIGH (ref 83–108)
PO2 BLDA: 156 MMHG — HIGH (ref 83–108)
PO2 BLDA: 162 MMHG — HIGH (ref 83–108)
PO2 BLDA: 391 MMHG — HIGH (ref 83–108)
PO2 BLDA: 72 MMHG — LOW (ref 83–108)
PO2 BLDA: 83 MMHG — SIGNIFICANT CHANGE UP (ref 83–108)
PO2 BLDA: 86 MMHG — SIGNIFICANT CHANGE UP (ref 83–108)
PO2 BLDA: 89 MMHG — SIGNIFICANT CHANGE UP (ref 83–108)
PO2 BLDA: 90 MMHG — SIGNIFICANT CHANGE UP (ref 83–108)
POTASSIUM SERPL-MCNC: 3.1 MMOL/L — LOW (ref 3.5–5)
POTASSIUM SERPL-MCNC: 3.2 MMOL/L — LOW (ref 3.5–5)
POTASSIUM SERPL-MCNC: 3.4 MMOL/L — LOW (ref 3.5–5)
POTASSIUM SERPL-MCNC: 3.6 MMOL/L — SIGNIFICANT CHANGE UP (ref 3.5–5)
POTASSIUM SERPL-MCNC: 3.7 MMOL/L — SIGNIFICANT CHANGE UP (ref 3.5–5)
POTASSIUM SERPL-MCNC: 3.9 MMOL/L — SIGNIFICANT CHANGE UP (ref 3.5–5)
POTASSIUM SERPL-MCNC: 4 MMOL/L — SIGNIFICANT CHANGE UP (ref 3.5–5)
POTASSIUM SERPL-MCNC: 4.2 MMOL/L — SIGNIFICANT CHANGE UP (ref 3.5–5)
POTASSIUM SERPL-MCNC: 4.7 MMOL/L — SIGNIFICANT CHANGE UP (ref 3.5–5)
POTASSIUM SERPL-MCNC: 4.8 MMOL/L — SIGNIFICANT CHANGE UP (ref 3.5–5)
POTASSIUM SERPL-MCNC: 4.8 MMOL/L — SIGNIFICANT CHANGE UP (ref 3.5–5)
POTASSIUM SERPL-MCNC: 4.9 MMOL/L — SIGNIFICANT CHANGE UP (ref 3.5–5)
POTASSIUM SERPL-SCNC: 3.1 MMOL/L — LOW (ref 3.5–5)
POTASSIUM SERPL-SCNC: 3.2 MMOL/L — LOW (ref 3.5–5)
POTASSIUM SERPL-SCNC: 3.4 MMOL/L — LOW (ref 3.5–5)
POTASSIUM SERPL-SCNC: 3.6 MMOL/L — SIGNIFICANT CHANGE UP (ref 3.5–5)
POTASSIUM SERPL-SCNC: 3.7 MMOL/L — SIGNIFICANT CHANGE UP (ref 3.5–5)
POTASSIUM SERPL-SCNC: 3.9 MMOL/L — SIGNIFICANT CHANGE UP (ref 3.5–5)
POTASSIUM SERPL-SCNC: 4 MMOL/L — SIGNIFICANT CHANGE UP (ref 3.5–5)
POTASSIUM SERPL-SCNC: 4.2 MMOL/L — SIGNIFICANT CHANGE UP (ref 3.5–5)
POTASSIUM SERPL-SCNC: 4.7 MMOL/L — SIGNIFICANT CHANGE UP (ref 3.5–5)
POTASSIUM SERPL-SCNC: 4.8 MMOL/L — SIGNIFICANT CHANGE UP (ref 3.5–5)
POTASSIUM SERPL-SCNC: 4.8 MMOL/L — SIGNIFICANT CHANGE UP (ref 3.5–5)
POTASSIUM SERPL-SCNC: 4.9 MMOL/L — SIGNIFICANT CHANGE UP (ref 3.5–5)
PROCALCITONIN SERPL-MCNC: 0.25 NG/ML — HIGH (ref 0.02–0.1)
PROCALCITONIN SERPL-MCNC: 2.19 NG/ML — HIGH (ref 0.02–0.1)
PROCALCITONIN SERPL-MCNC: 2.67 NG/ML — HIGH (ref 0.02–0.1)
PROT SERPL-MCNC: 3 G/DL — LOW (ref 6–8)
PROT SERPL-MCNC: 3.3 G/DL — LOW (ref 6–8)
PROT SERPL-MCNC: 3.5 G/DL — LOW (ref 6–8)
PROT SERPL-MCNC: 3.6 G/DL — LOW (ref 6–8)
PROT SERPL-MCNC: 3.6 G/DL — LOW (ref 6–8)
PROT SERPL-MCNC: 3.7 G/DL — LOW (ref 6–8)
PROT SERPL-MCNC: 3.7 G/DL — LOW (ref 6–8)
PROT SERPL-MCNC: 3.8 G/DL — LOW (ref 6–8)
PROT SERPL-MCNC: 3.9 G/DL — LOW (ref 6–8)
PROT SERPL-MCNC: 4 G/DL — LOW (ref 6–8)
PROT SERPL-MCNC: 4 G/DL — LOW (ref 6–8)
PROT SERPL-MCNC: 4.1 G/DL — LOW (ref 6–8)
PROT SERPL-MCNC: 4.2 G/DL — LOW (ref 6–8)
PROT SERPL-MCNC: 4.3 G/DL — LOW (ref 6–8)
PROT SERPL-MCNC: 4.4 G/DL — LOW (ref 6–8)
PROT SERPL-MCNC: 4.5 G/DL — LOW (ref 6–8)
PROT SERPL-MCNC: 4.6 G/DL — LOW (ref 6–8)
PROT SERPL-MCNC: 4.8 G/DL — LOW (ref 6–8)
PROT SERPL-MCNC: 5.1 G/DL — LOW (ref 6–8)
PROT SERPL-MCNC: 5.5 G/DL — LOW (ref 6–8)
PROT UR-MCNC: ABNORMAL
PROT UR-MCNC: NEGATIVE MG/DL — SIGNIFICANT CHANGE UP
PROTHROM AB SERPL-ACNC: 12.1 SEC — SIGNIFICANT CHANGE UP (ref 9.95–12.87)
PROTHROM AB SERPL-ACNC: 12.6 SEC — SIGNIFICANT CHANGE UP (ref 9.95–12.87)
PROTHROM AB SERPL-ACNC: 12.6 SEC — SIGNIFICANT CHANGE UP (ref 9.95–12.87)
PROTHROM AB SERPL-ACNC: 13 SEC — HIGH (ref 9.95–12.87)
PROTHROM AB SERPL-ACNC: 13 SEC — HIGH (ref 9.95–12.87)
PROTHROM AB SERPL-ACNC: 14.6 SEC — HIGH (ref 9.95–12.87)
PROTHROM AB SERPL-ACNC: 14.7 SEC — HIGH (ref 9.95–12.87)
PROTHROM AB SERPL-ACNC: 21.4 SEC — HIGH (ref 9.95–12.87)
RBC # BLD: 2.43 M/UL — LOW (ref 4.2–5.4)
RBC # BLD: 2.45 M/UL — LOW (ref 4.2–5.4)
RBC # BLD: 2.49 M/UL — LOW (ref 4.2–5.4)
RBC # BLD: 2.51 M/UL — LOW (ref 4.2–5.4)
RBC # BLD: 2.53 M/UL — LOW (ref 4.2–5.4)
RBC # BLD: 2.57 M/UL — LOW (ref 4.2–5.4)
RBC # BLD: 2.58 M/UL — LOW (ref 4.2–5.4)
RBC # BLD: 2.66 M/UL — LOW (ref 4.2–5.4)
RBC # BLD: 2.7 M/UL — LOW (ref 4.2–5.4)
RBC # BLD: 2.74 M/UL — LOW (ref 4.2–5.4)
RBC # BLD: 2.76 M/UL — LOW (ref 4.2–5.4)
RBC # BLD: 2.83 M/UL — LOW (ref 4.2–5.4)
RBC # BLD: 2.84 M/UL — LOW (ref 4.2–5.4)
RBC # BLD: 2.86 M/UL — LOW (ref 4.2–5.4)
RBC # BLD: 2.86 M/UL — LOW (ref 4.2–5.4)
RBC # BLD: 2.88 M/UL — LOW (ref 4.2–5.4)
RBC # BLD: 2.89 M/UL — LOW (ref 4.2–5.4)
RBC # BLD: 2.9 M/UL — LOW (ref 4.2–5.4)
RBC # BLD: 2.9 M/UL — LOW (ref 4.2–5.4)
RBC # BLD: 2.91 M/UL — LOW (ref 4.2–5.4)
RBC # BLD: 3.02 M/UL — LOW (ref 4.2–5.4)
RBC # BLD: 3.07 M/UL — LOW (ref 4.2–5.4)
RBC # BLD: 3.08 M/UL — LOW (ref 4.2–5.4)
RBC # BLD: 3.09 M/UL — LOW (ref 4.2–5.4)
RBC # BLD: 3.13 M/UL — LOW (ref 4.2–5.4)
RBC # BLD: 3.16 M/UL — LOW (ref 4.2–5.4)
RBC # BLD: 3.17 M/UL — LOW (ref 4.2–5.4)
RBC # BLD: 3.2 M/UL — LOW (ref 4.2–5.4)
RBC # BLD: 3.22 M/UL — LOW (ref 4.2–5.4)
RBC # BLD: 3.22 M/UL — LOW (ref 4.2–5.4)
RBC # BLD: 3.28 M/UL — LOW (ref 4.2–5.4)
RBC # BLD: 3.3 M/UL — LOW (ref 4.2–5.4)
RBC # BLD: 3.32 M/UL — LOW (ref 4.2–5.4)
RBC # BLD: 3.33 M/UL — LOW (ref 4.2–5.4)
RBC # BLD: 3.59 M/UL — LOW (ref 4.2–5.4)
RBC # BLD: 3.67 M/UL — LOW (ref 4.2–5.4)
RBC # BLD: 3.7 M/UL — LOW (ref 4.2–5.4)
RBC # BLD: 3.91 M/UL — LOW (ref 4.2–5.4)
RBC # FLD: 13.9 % — SIGNIFICANT CHANGE UP (ref 11.5–14.5)
RBC # FLD: 14.3 % — SIGNIFICANT CHANGE UP (ref 11.5–14.5)
RBC # FLD: 14.3 % — SIGNIFICANT CHANGE UP (ref 11.5–14.5)
RBC # FLD: 14.4 % — SIGNIFICANT CHANGE UP (ref 11.5–14.5)
RBC # FLD: 14.5 % — SIGNIFICANT CHANGE UP (ref 11.5–14.5)
RBC # FLD: 14.6 % — HIGH (ref 11.5–14.5)
RBC # FLD: 14.6 % — HIGH (ref 11.5–14.5)
RBC # FLD: 14.8 % — HIGH (ref 11.5–14.5)
RBC # FLD: 14.9 % — HIGH (ref 11.5–14.5)
RBC # FLD: 14.9 % — HIGH (ref 11.5–14.5)
RBC # FLD: 15 % — HIGH (ref 11.5–14.5)
RBC # FLD: 15.1 % — HIGH (ref 11.5–14.5)
RBC # FLD: 15.2 % — HIGH (ref 11.5–14.5)
RBC # FLD: 15.6 % — HIGH (ref 11.5–14.5)
RBC # FLD: 15.6 % — HIGH (ref 11.5–14.5)
RBC # FLD: 15.9 % — HIGH (ref 11.5–14.5)
RBC # FLD: 16 % — HIGH (ref 11.5–14.5)
RBC # FLD: 16.1 % — HIGH (ref 11.5–14.5)
RBC # FLD: 16.1 % — HIGH (ref 11.5–14.5)
RBC # FLD: 16.2 % — HIGH (ref 11.5–14.5)
RBC # FLD: 16.2 % — HIGH (ref 11.5–14.5)
RBC # FLD: 16.3 % — HIGH (ref 11.5–14.5)
RBC # FLD: 16.4 % — HIGH (ref 11.5–14.5)
RBC # FLD: 16.5 % — HIGH (ref 11.5–14.5)
RBC # FLD: 16.6 % — HIGH (ref 11.5–14.5)
RBC BLD AUTO: NORMAL — SIGNIFICANT CHANGE UP
RBC CASTS # UR COMP ASSIST: 1 /HPF — SIGNIFICANT CHANGE UP (ref 0–4)
RBC CASTS # UR COMP ASSIST: ABNORMAL /HPF
SAO2 % BLDA: 100 % — HIGH (ref 94–98)
SAO2 % BLDA: 96.9 % — SIGNIFICANT CHANGE UP (ref 94–98)
SAO2 % BLDA: 97.4 % — SIGNIFICANT CHANGE UP (ref 94–98)
SAO2 % BLDA: 98.4 % — HIGH (ref 94–98)
SAO2 % BLDA: 98.4 % — HIGH (ref 94–98)
SAO2 % BLDA: 98.7 % — HIGH (ref 94–98)
SAO2 % BLDA: 98.9 % — HIGH (ref 94–98)
SAO2 % BLDA: 99.2 % — HIGH (ref 94–98)
SAO2 % BLDA: 99.2 % — HIGH (ref 94–98)
SAO2 % BLDA: 99.5 % — HIGH (ref 94–98)
SAO2 % BLDA: 99.5 % — HIGH (ref 94–98)
SAO2 % BLDA: 99.6 % — HIGH (ref 94–98)
SARS-COV-2 RNA SPEC QL NAA+PROBE: SIGNIFICANT CHANGE UP
SODIUM SERPL-SCNC: 135 MMOL/L — SIGNIFICANT CHANGE UP (ref 135–146)
SODIUM SERPL-SCNC: 135 MMOL/L — SIGNIFICANT CHANGE UP (ref 135–146)
SODIUM SERPL-SCNC: 136 MMOL/L — SIGNIFICANT CHANGE UP (ref 135–146)
SODIUM SERPL-SCNC: 137 MMOL/L — SIGNIFICANT CHANGE UP (ref 135–146)
SODIUM SERPL-SCNC: 138 MMOL/L — SIGNIFICANT CHANGE UP (ref 135–146)
SODIUM SERPL-SCNC: 139 MMOL/L — SIGNIFICANT CHANGE UP (ref 135–146)
SODIUM SERPL-SCNC: 140 MMOL/L — SIGNIFICANT CHANGE UP (ref 135–146)
SODIUM SERPL-SCNC: 140 MMOL/L — SIGNIFICANT CHANGE UP (ref 135–146)
SODIUM SERPL-SCNC: 141 MMOL/L — SIGNIFICANT CHANGE UP (ref 135–146)
SODIUM SERPL-SCNC: 142 MMOL/L — SIGNIFICANT CHANGE UP (ref 135–146)
SODIUM SERPL-SCNC: 143 MMOL/L — SIGNIFICANT CHANGE UP (ref 135–146)
SODIUM SERPL-SCNC: 143 MMOL/L — SIGNIFICANT CHANGE UP (ref 135–146)
SODIUM SERPL-SCNC: 144 MMOL/L — SIGNIFICANT CHANGE UP (ref 135–146)
SODIUM SERPL-SCNC: 147 MMOL/L — HIGH (ref 135–146)
SP GR SPEC: 1.01 — SIGNIFICANT CHANGE UP (ref 1.01–1.03)
SP GR SPEC: >1.05 (ref 1.01–1.03)
SPECIMEN SOURCE: SIGNIFICANT CHANGE UP
TIBC SERPL-MCNC: 275 UG/DL — SIGNIFICANT CHANGE UP (ref 220–430)
TRANSFERRIN SERPL-MCNC: 224 MG/DL — SIGNIFICANT CHANGE UP (ref 200–360)
TROPONIN T SERPL-MCNC: <0.01 NG/ML — SIGNIFICANT CHANGE UP
UIBC SERPL-MCNC: 210 UG/DL — SIGNIFICANT CHANGE UP (ref 110–370)
URATE SERPL-MCNC: 5.3 MG/DL — SIGNIFICANT CHANGE UP (ref 2.5–7)
UROBILINOGEN FLD QL: 0.2 MG/DL — SIGNIFICANT CHANGE UP
UROBILINOGEN FLD QL: SIGNIFICANT CHANGE UP
VANCOMYCIN TROUGH SERPL-MCNC: 15.3 UG/ML — HIGH (ref 5–10)
VANCOMYCIN TROUGH SERPL-MCNC: 23.5 UG/ML — HIGH (ref 5–10)
VIT B12 SERPL-MCNC: 289 PG/ML — SIGNIFICANT CHANGE UP (ref 232–1245)
WBC # BLD: 14.34 K/UL — HIGH (ref 4.8–10.8)
WBC # BLD: 15.27 K/UL — HIGH (ref 4.8–10.8)
WBC # BLD: 15.57 K/UL — HIGH (ref 4.8–10.8)
WBC # BLD: 15.82 K/UL — HIGH (ref 4.8–10.8)
WBC # BLD: 16.24 K/UL — HIGH (ref 4.8–10.8)
WBC # BLD: 16.5 K/UL — HIGH (ref 4.8–10.8)
WBC # BLD: 16.55 K/UL — HIGH (ref 4.8–10.8)
WBC # BLD: 16.73 K/UL — HIGH (ref 4.8–10.8)
WBC # BLD: 16.79 K/UL — HIGH (ref 4.8–10.8)
WBC # BLD: 17.11 K/UL — HIGH (ref 4.8–10.8)
WBC # BLD: 17.5 K/UL — HIGH (ref 4.8–10.8)
WBC # BLD: 18.26 K/UL — HIGH (ref 4.8–10.8)
WBC # BLD: 19.21 K/UL — HIGH (ref 4.8–10.8)
WBC # BLD: 19.24 K/UL — HIGH (ref 4.8–10.8)
WBC # BLD: 19.25 K/UL — HIGH (ref 4.8–10.8)
WBC # BLD: 19.27 K/UL — HIGH (ref 4.8–10.8)
WBC # BLD: 19.27 K/UL — HIGH (ref 4.8–10.8)
WBC # BLD: 19.45 K/UL — HIGH (ref 4.8–10.8)
WBC # BLD: 19.7 K/UL — HIGH (ref 4.8–10.8)
WBC # BLD: 20.01 K/UL — HIGH (ref 4.8–10.8)
WBC # BLD: 24.38 K/UL — HIGH (ref 4.8–10.8)
WBC # BLD: 24.77 K/UL — HIGH (ref 4.8–10.8)
WBC # BLD: 25.97 K/UL — HIGH (ref 4.8–10.8)
WBC # BLD: 26.32 K/UL — HIGH (ref 4.8–10.8)
WBC # BLD: 26.48 K/UL — HIGH (ref 4.8–10.8)
WBC # BLD: 26.9 K/UL — HIGH (ref 4.8–10.8)
WBC # BLD: 28.71 K/UL — HIGH (ref 4.8–10.8)
WBC # BLD: 29.17 K/UL — HIGH (ref 4.8–10.8)
WBC # BLD: 29.38 K/UL — HIGH (ref 4.8–10.8)
WBC # BLD: 29.44 K/UL — HIGH (ref 4.8–10.8)
WBC # BLD: 30 K/UL — HIGH (ref 4.8–10.8)
WBC # BLD: 30.48 K/UL — HIGH (ref 4.8–10.8)
WBC # BLD: 30.6 K/UL — HIGH (ref 4.8–10.8)
WBC # BLD: 31.25 K/UL — HIGH (ref 4.8–10.8)
WBC # BLD: 31.47 K/UL — HIGH (ref 4.8–10.8)
WBC # BLD: 31.64 K/UL — HIGH (ref 4.8–10.8)
WBC # BLD: 31.89 K/UL — HIGH (ref 4.8–10.8)
WBC # BLD: 33.25 K/UL — HIGH (ref 4.8–10.8)
WBC # FLD AUTO: 14.34 K/UL — HIGH (ref 4.8–10.8)
WBC # FLD AUTO: 15.27 K/UL — HIGH (ref 4.8–10.8)
WBC # FLD AUTO: 15.57 K/UL — HIGH (ref 4.8–10.8)
WBC # FLD AUTO: 15.82 K/UL — HIGH (ref 4.8–10.8)
WBC # FLD AUTO: 16.24 K/UL — HIGH (ref 4.8–10.8)
WBC # FLD AUTO: 16.5 K/UL — HIGH (ref 4.8–10.8)
WBC # FLD AUTO: 16.55 K/UL — HIGH (ref 4.8–10.8)
WBC # FLD AUTO: 16.73 K/UL — HIGH (ref 4.8–10.8)
WBC # FLD AUTO: 16.79 K/UL — HIGH (ref 4.8–10.8)
WBC # FLD AUTO: 17.11 K/UL — HIGH (ref 4.8–10.8)
WBC # FLD AUTO: 17.5 K/UL — HIGH (ref 4.8–10.8)
WBC # FLD AUTO: 18.26 K/UL — HIGH (ref 4.8–10.8)
WBC # FLD AUTO: 19.21 K/UL — HIGH (ref 4.8–10.8)
WBC # FLD AUTO: 19.24 K/UL — HIGH (ref 4.8–10.8)
WBC # FLD AUTO: 19.25 K/UL — HIGH (ref 4.8–10.8)
WBC # FLD AUTO: 19.27 K/UL — HIGH (ref 4.8–10.8)
WBC # FLD AUTO: 19.27 K/UL — HIGH (ref 4.8–10.8)
WBC # FLD AUTO: 19.45 K/UL — HIGH (ref 4.8–10.8)
WBC # FLD AUTO: 19.7 K/UL — HIGH (ref 4.8–10.8)
WBC # FLD AUTO: 20.01 K/UL — HIGH (ref 4.8–10.8)
WBC # FLD AUTO: 24.38 K/UL — HIGH (ref 4.8–10.8)
WBC # FLD AUTO: 24.77 K/UL — HIGH (ref 4.8–10.8)
WBC # FLD AUTO: 25.97 K/UL — HIGH (ref 4.8–10.8)
WBC # FLD AUTO: 26.32 K/UL — HIGH (ref 4.8–10.8)
WBC # FLD AUTO: 26.48 K/UL — HIGH (ref 4.8–10.8)
WBC # FLD AUTO: 26.9 K/UL — HIGH (ref 4.8–10.8)
WBC # FLD AUTO: 28.71 K/UL — HIGH (ref 4.8–10.8)
WBC # FLD AUTO: 29.17 K/UL — HIGH (ref 4.8–10.8)
WBC # FLD AUTO: 29.38 K/UL — HIGH (ref 4.8–10.8)
WBC # FLD AUTO: 29.44 K/UL — HIGH (ref 4.8–10.8)
WBC # FLD AUTO: 30 K/UL — HIGH (ref 4.8–10.8)
WBC # FLD AUTO: 30.48 K/UL — HIGH (ref 4.8–10.8)
WBC # FLD AUTO: 30.6 K/UL — HIGH (ref 4.8–10.8)
WBC # FLD AUTO: 31.25 K/UL — HIGH (ref 4.8–10.8)
WBC # FLD AUTO: 31.47 K/UL — HIGH (ref 4.8–10.8)
WBC # FLD AUTO: 31.64 K/UL — HIGH (ref 4.8–10.8)
WBC # FLD AUTO: 31.89 K/UL — HIGH (ref 4.8–10.8)
WBC # FLD AUTO: 33.25 K/UL — HIGH (ref 4.8–10.8)
WBC UR QL: 5 /HPF — SIGNIFICANT CHANGE UP (ref 0–5)
WBC UR QL: ABNORMAL /HPF

## 2022-01-01 PROCEDURE — 93294 REM INTERROG EVL PM/LDLS PM: CPT

## 2022-01-01 PROCEDURE — 99214 OFFICE O/P EST MOD 30 MIN: CPT

## 2022-01-01 PROCEDURE — 71045 X-RAY EXAM CHEST 1 VIEW: CPT | Mod: 26

## 2022-01-01 PROCEDURE — 93296 REM INTERROG EVL PM/IDS: CPT

## 2022-01-01 PROCEDURE — 99233 SBSQ HOSP IP/OBS HIGH 50: CPT

## 2022-01-01 PROCEDURE — 99223 1ST HOSP IP/OBS HIGH 75: CPT

## 2022-01-01 PROCEDURE — 99291 CRITICAL CARE FIRST HOUR: CPT

## 2022-01-01 PROCEDURE — 99448 NTRPROF PH1/NTRNET/EHR 21-30: CPT

## 2022-01-01 PROCEDURE — 70450 CT HEAD/BRAIN W/O DYE: CPT | Mod: 26,MA

## 2022-01-01 PROCEDURE — 71045 X-RAY EXAM CHEST 1 VIEW: CPT | Mod: 26,77

## 2022-01-01 PROCEDURE — 74018 RADEX ABDOMEN 1 VIEW: CPT | Mod: 26

## 2022-01-01 PROCEDURE — 93970 EXTREMITY STUDY: CPT | Mod: 26

## 2022-01-01 PROCEDURE — 99285 EMERGENCY DEPT VISIT HI MDM: CPT | Mod: FS

## 2022-01-01 PROCEDURE — 45378 DIAGNOSTIC COLONOSCOPY: CPT | Mod: XU,53

## 2022-01-01 PROCEDURE — 37244 VASC EMBOLIZE/OCCLUDE BLEED: CPT

## 2022-01-01 PROCEDURE — 93010 ELECTROCARDIOGRAM REPORT: CPT

## 2022-01-01 PROCEDURE — 76937 US GUIDE VASCULAR ACCESS: CPT | Mod: 26

## 2022-01-01 PROCEDURE — 93306 TTE W/DOPPLER COMPLETE: CPT | Mod: 26

## 2022-01-01 PROCEDURE — 36247 INS CATH ABD/L-EXT ART 3RD: CPT

## 2022-01-01 PROCEDURE — 36245 INS CATH ABD/L-EXT ART 1ST: CPT | Mod: 59

## 2022-01-01 PROCEDURE — 99233 SBSQ HOSP IP/OBS HIGH 50: CPT | Mod: GC

## 2022-01-01 PROCEDURE — 99222 1ST HOSP IP/OBS MODERATE 55: CPT

## 2022-01-01 PROCEDURE — 74174 CTA ABD&PLVS W/CONTRAST: CPT | Mod: 26,MA

## 2022-01-01 PROCEDURE — 76705 ECHO EXAM OF ABDOMEN: CPT | Mod: 26

## 2022-01-01 PROCEDURE — 74174 CTA ABD&PLVS W/CONTRAST: CPT | Mod: 26

## 2022-01-01 PROCEDURE — 43255 EGD CONTROL BLEEDING ANY: CPT

## 2022-01-01 RX ORDER — DEXTROSE 50 % IN WATER 50 %
12.5 SYRINGE (ML) INTRAVENOUS ONCE
Refills: 0 | Status: DISCONTINUED | OUTPATIENT
Start: 2022-01-01 | End: 2022-01-01

## 2022-01-01 RX ORDER — FENTANYL CITRATE 50 UG/ML
0.5 INJECTION INTRAVENOUS
Qty: 2500 | Refills: 0 | Status: DISCONTINUED | OUTPATIENT
Start: 2022-01-01 | End: 2022-01-01

## 2022-01-01 RX ORDER — POTASSIUM CHLORIDE 20 MEQ
20 PACKET (EA) ORAL ONCE
Refills: 0 | Status: COMPLETED | OUTPATIENT
Start: 2022-01-01 | End: 2022-01-01

## 2022-01-01 RX ORDER — SODIUM BICARBONATE 1 MEQ/ML
1 SYRINGE (ML) INTRAVENOUS
Qty: 50 | Refills: 0 | Status: DISCONTINUED | OUTPATIENT
Start: 2022-01-01 | End: 2022-01-01

## 2022-01-01 RX ORDER — DEXMEDETOMIDINE HYDROCHLORIDE IN 0.9% SODIUM CHLORIDE 4 UG/ML
0.2 INJECTION INTRAVENOUS
Qty: 400 | Refills: 0 | Status: DISCONTINUED | OUTPATIENT
Start: 2022-01-01 | End: 2022-01-01

## 2022-01-01 RX ORDER — CEFEPIME 1 G/1
INJECTION, POWDER, FOR SOLUTION INTRAMUSCULAR; INTRAVENOUS
Refills: 0 | Status: DISCONTINUED | OUTPATIENT
Start: 2022-01-01 | End: 2022-01-01

## 2022-01-01 RX ORDER — SOD SULF/SODIUM/NAHCO3/KCL/PEG
4000 SOLUTION, RECONSTITUTED, ORAL ORAL ONCE
Refills: 0 | Status: COMPLETED | OUTPATIENT
Start: 2022-01-01 | End: 2022-01-01

## 2022-01-01 RX ORDER — NOREPINEPHRINE BITARTRATE/D5W 8 MG/250ML
0.05 PLASTIC BAG, INJECTION (ML) INTRAVENOUS
Qty: 8 | Refills: 0 | Status: DISCONTINUED | OUTPATIENT
Start: 2022-01-01 | End: 2022-01-01

## 2022-01-01 RX ORDER — MAGNESIUM SULFATE 500 MG/ML
2 VIAL (ML) INJECTION ONCE
Refills: 0 | Status: COMPLETED | OUTPATIENT
Start: 2022-01-01 | End: 2022-01-01

## 2022-01-01 RX ORDER — POTASSIUM CHLORIDE 20 MEQ
20 PACKET (EA) ORAL
Refills: 0 | Status: DISCONTINUED | OUTPATIENT
Start: 2022-01-01 | End: 2022-01-01

## 2022-01-01 RX ORDER — MAGNESIUM SULFATE 500 MG/ML
2 VIAL (ML) INJECTION
Refills: 0 | Status: COMPLETED | OUTPATIENT
Start: 2022-01-01 | End: 2022-01-01

## 2022-01-01 RX ORDER — DEXTROSE 50 % IN WATER 50 %
15 SYRINGE (ML) INTRAVENOUS ONCE
Refills: 0 | Status: DISCONTINUED | OUTPATIENT
Start: 2022-01-01 | End: 2022-01-01

## 2022-01-01 RX ORDER — SODIUM CHLORIDE 9 MG/ML
1000 INJECTION INTRAMUSCULAR; INTRAVENOUS; SUBCUTANEOUS ONCE
Refills: 0 | Status: COMPLETED | OUTPATIENT
Start: 2022-01-01 | End: 2022-01-01

## 2022-01-01 RX ORDER — LANOLIN ALCOHOL/MO/W.PET/CERES
5 CREAM (GRAM) TOPICAL ONCE
Refills: 0 | Status: COMPLETED | OUTPATIENT
Start: 2022-01-01 | End: 2022-01-01

## 2022-01-01 RX ORDER — SODIUM CHLORIDE 9 MG/ML
1000 INJECTION, SOLUTION INTRAVENOUS
Refills: 0 | Status: DISCONTINUED | OUTPATIENT
Start: 2022-01-01 | End: 2022-01-01

## 2022-01-01 RX ORDER — CEFEPIME 1 G/1
2000 INJECTION, POWDER, FOR SOLUTION INTRAMUSCULAR; INTRAVENOUS EVERY 24 HOURS
Refills: 0 | Status: DISCONTINUED | OUTPATIENT
Start: 2022-01-01 | End: 2022-01-01

## 2022-01-01 RX ORDER — FUROSEMIDE 40 MG
40 TABLET ORAL ONCE
Refills: 0 | Status: COMPLETED | OUTPATIENT
Start: 2022-01-01 | End: 2022-01-01

## 2022-01-01 RX ORDER — SODIUM CHLORIDE 9 MG/ML
1000 INJECTION, SOLUTION INTRAVENOUS ONCE
Refills: 0 | Status: COMPLETED | OUTPATIENT
Start: 2022-01-01 | End: 2022-01-01

## 2022-01-01 RX ORDER — VANCOMYCIN HCL 1 G
750 VIAL (EA) INTRAVENOUS EVERY 24 HOURS
Refills: 0 | Status: DISCONTINUED | OUTPATIENT
Start: 2022-01-01 | End: 2022-01-01

## 2022-01-01 RX ORDER — CEFEPIME 1 G/1
2000 INJECTION, POWDER, FOR SOLUTION INTRAMUSCULAR; INTRAVENOUS ONCE
Refills: 0 | Status: COMPLETED | OUTPATIENT
Start: 2022-01-01 | End: 2022-01-01

## 2022-01-01 RX ORDER — INSULIN LISPRO 100/ML
VIAL (ML) SUBCUTANEOUS EVERY 6 HOURS
Refills: 0 | Status: DISCONTINUED | OUTPATIENT
Start: 2022-01-01 | End: 2022-01-01

## 2022-01-01 RX ORDER — FUROSEMIDE 40 MG
60 TABLET ORAL ONCE
Refills: 0 | Status: COMPLETED | OUTPATIENT
Start: 2022-01-01 | End: 2022-01-01

## 2022-01-01 RX ORDER — VERAPAMIL HYDROCHLORIDE 240 MG/1
240 TABLET ORAL
Qty: 90 | Refills: 0 | Status: DISCONTINUED | COMMUNITY
Start: 2018-02-02 | End: 2022-01-01

## 2022-01-01 RX ORDER — POTASSIUM CHLORIDE 20 MEQ
40 PACKET (EA) ORAL EVERY 4 HOURS
Refills: 0 | Status: COMPLETED | OUTPATIENT
Start: 2022-01-01 | End: 2022-01-01

## 2022-01-01 RX ORDER — MORPHINE SULFATE 50 MG/1
1 CAPSULE, EXTENDED RELEASE ORAL
Qty: 100 | Refills: 0 | Status: DISCONTINUED | OUTPATIENT
Start: 2022-01-01 | End: 2022-01-01

## 2022-01-01 RX ORDER — CEPHALEXIN 500 MG/1
500 CAPSULE ORAL
Qty: 10 | Refills: 0 | Status: DISCONTINUED | COMMUNITY
Start: 2018-03-23 | End: 2022-01-01

## 2022-01-01 RX ORDER — SODIUM BICARBONATE 1 MEQ/ML
50 SYRINGE (ML) INTRAVENOUS ONCE
Refills: 0 | Status: COMPLETED | OUTPATIENT
Start: 2022-01-01 | End: 2022-01-01

## 2022-01-01 RX ORDER — FUROSEMIDE 40 MG
20 TABLET ORAL
Refills: 0 | Status: COMPLETED | OUTPATIENT
Start: 2022-01-01 | End: 2022-01-01

## 2022-01-01 RX ORDER — TRANEXAMIC ACID 100 MG/ML
1000 INJECTION, SOLUTION INTRAVENOUS ONCE
Refills: 0 | Status: COMPLETED | OUTPATIENT
Start: 2022-01-01 | End: 2022-01-01

## 2022-01-01 RX ORDER — POTASSIUM CHLORIDE 20 MEQ
20 PACKET (EA) ORAL
Refills: 0 | Status: COMPLETED | OUTPATIENT
Start: 2022-01-01 | End: 2022-01-01

## 2022-01-01 RX ORDER — DEXMEDETOMIDINE HYDROCHLORIDE IN 0.9% SODIUM CHLORIDE 4 UG/ML
0.3 INJECTION INTRAVENOUS
Qty: 400 | Refills: 0 | Status: DISCONTINUED | OUTPATIENT
Start: 2022-01-01 | End: 2022-01-01

## 2022-01-01 RX ORDER — DEXMEDETOMIDINE HYDROCHLORIDE IN 0.9% SODIUM CHLORIDE 4 UG/ML
48 INJECTION INTRAVENOUS ONCE
Refills: 0 | Status: DISCONTINUED | OUTPATIENT
Start: 2022-01-01 | End: 2022-01-01

## 2022-01-01 RX ORDER — DEXMEDETOMIDINE HYDROCHLORIDE IN 0.9% SODIUM CHLORIDE 4 UG/ML
0.2 INJECTION INTRAVENOUS
Qty: 200 | Refills: 0 | Status: DISCONTINUED | OUTPATIENT
Start: 2022-01-01 | End: 2022-01-01

## 2022-01-01 RX ORDER — QUETIAPINE FUMARATE 200 MG/1
25 TABLET, FILM COATED ORAL AT BEDTIME
Refills: 0 | Status: DISCONTINUED | OUTPATIENT
Start: 2022-01-01 | End: 2022-01-01

## 2022-01-01 RX ORDER — PANTOPRAZOLE SODIUM 20 MG/1
40 TABLET, DELAYED RELEASE ORAL
Refills: 0 | Status: DISCONTINUED | OUTPATIENT
Start: 2022-01-01 | End: 2022-01-01

## 2022-01-01 RX ORDER — SOD SULF/SODIUM/NAHCO3/KCL/PEG
4000 SOLUTION, RECONSTITUTED, ORAL ORAL ONCE
Refills: 0 | Status: DISCONTINUED | OUTPATIENT
Start: 2022-01-01 | End: 2022-01-01

## 2022-01-01 RX ORDER — LEVOFLOXACIN 500 MG/1
500 TABLET, FILM COATED ORAL
Qty: 10 | Refills: 0 | Status: DISCONTINUED | COMMUNITY
Start: 2018-01-19 | End: 2022-01-01

## 2022-01-01 RX ORDER — ACETAMINOPHEN 500 MG
650 TABLET ORAL EVERY 6 HOURS
Refills: 0 | Status: DISCONTINUED | OUTPATIENT
Start: 2022-01-01 | End: 2022-01-01

## 2022-01-01 RX ORDER — SERTRALINE HYDROCHLORIDE 50 MG/1
50 TABLET, FILM COATED ORAL
Qty: 135 | Refills: 0 | Status: DISCONTINUED | COMMUNITY
Start: 2018-05-02 | End: 2022-01-01

## 2022-01-01 RX ORDER — SODIUM CHLORIDE 9 MG/ML
1000 INJECTION INTRAMUSCULAR; INTRAVENOUS; SUBCUTANEOUS
Refills: 0 | Status: DISCONTINUED | OUTPATIENT
Start: 2022-01-01 | End: 2022-01-01

## 2022-01-01 RX ORDER — DONEPEZIL HYDROCHLORIDE 10 MG/1
10 TABLET, FILM COATED ORAL AT BEDTIME
Refills: 0 | Status: DISCONTINUED | OUTPATIENT
Start: 2022-01-01 | End: 2022-01-01

## 2022-01-01 RX ORDER — SODIUM BICARBONATE 1 MEQ/ML
0.23 SYRINGE (ML) INTRAVENOUS
Qty: 150 | Refills: 0 | Status: DISCONTINUED | OUTPATIENT
Start: 2022-01-01 | End: 2022-01-01

## 2022-01-01 RX ORDER — PANTOPRAZOLE SODIUM 20 MG/1
8 TABLET, DELAYED RELEASE ORAL
Qty: 80 | Refills: 0 | Status: DISCONTINUED | OUTPATIENT
Start: 2022-01-01 | End: 2022-01-01

## 2022-01-01 RX ORDER — MORPHINE SULFATE 50 MG/1
2 CAPSULE, EXTENDED RELEASE ORAL ONCE
Refills: 0 | Status: DISCONTINUED | OUTPATIENT
Start: 2022-01-01 | End: 2022-01-01

## 2022-01-01 RX ORDER — SODIUM CHLORIDE 9 MG/ML
250 INJECTION INTRAMUSCULAR; INTRAVENOUS; SUBCUTANEOUS ONCE
Refills: 0 | Status: COMPLETED | OUTPATIENT
Start: 2022-01-01 | End: 2022-01-01

## 2022-01-01 RX ORDER — MORPHINE SULFATE 50 MG/1
4 CAPSULE, EXTENDED RELEASE ORAL ONCE
Refills: 0 | Status: DISCONTINUED | OUTPATIENT
Start: 2022-01-01 | End: 2022-01-01

## 2022-01-01 RX ORDER — GLUCAGON INJECTION, SOLUTION 0.5 MG/.1ML
1 INJECTION, SOLUTION SUBCUTANEOUS ONCE
Refills: 0 | Status: DISCONTINUED | OUTPATIENT
Start: 2022-01-01 | End: 2022-01-01

## 2022-01-01 RX ORDER — DEXTROSE 50 % IN WATER 50 %
25 SYRINGE (ML) INTRAVENOUS ONCE
Refills: 0 | Status: DISCONTINUED | OUTPATIENT
Start: 2022-01-01 | End: 2022-01-01

## 2022-01-01 RX ORDER — VANCOMYCIN HCL 1 G
750 VIAL (EA) INTRAVENOUS ONCE
Refills: 0 | Status: COMPLETED | OUTPATIENT
Start: 2022-01-01 | End: 2022-01-01

## 2022-01-01 RX ORDER — FUROSEMIDE 40 MG
120 TABLET ORAL ONCE
Refills: 0 | Status: DISCONTINUED | OUTPATIENT
Start: 2022-01-01 | End: 2022-01-01

## 2022-01-01 RX ORDER — PANTOPRAZOLE SODIUM 20 MG/1
40 TABLET, DELAYED RELEASE ORAL ONCE
Refills: 0 | Status: COMPLETED | OUTPATIENT
Start: 2022-01-01 | End: 2022-01-01

## 2022-01-01 RX ORDER — VANCOMYCIN HCL 1 G
VIAL (EA) INTRAVENOUS
Refills: 0 | Status: DISCONTINUED | OUTPATIENT
Start: 2022-01-01 | End: 2022-01-01

## 2022-01-01 RX ORDER — FUROSEMIDE 40 MG
20 TABLET ORAL ONCE
Refills: 0 | Status: DISCONTINUED | OUTPATIENT
Start: 2022-01-01 | End: 2022-01-01

## 2022-01-01 RX ORDER — CHLORHEXIDINE GLUCONATE 213 G/1000ML
1 SOLUTION TOPICAL
Refills: 0 | Status: DISCONTINUED | OUTPATIENT
Start: 2022-01-01 | End: 2022-01-01

## 2022-01-01 RX ORDER — FUROSEMIDE 40 MG
120 TABLET ORAL ONCE
Refills: 0 | Status: COMPLETED | OUTPATIENT
Start: 2022-01-01 | End: 2022-01-01

## 2022-01-01 RX ORDER — CHLORHEXIDINE GLUCONATE 213 G/1000ML
15 SOLUTION TOPICAL EVERY 12 HOURS
Refills: 0 | Status: DISCONTINUED | OUTPATIENT
Start: 2022-01-01 | End: 2022-01-01

## 2022-01-01 RX ORDER — SODIUM BICARBONATE 1 MEQ/ML
0.63 SYRINGE (ML) INTRAVENOUS
Qty: 150 | Refills: 0 | Status: DISCONTINUED | OUTPATIENT
Start: 2022-01-01 | End: 2022-01-01

## 2022-01-01 RX ORDER — AMLODIPINE BESYLATE 2.5 MG/1
5 TABLET ORAL DAILY
Refills: 0 | Status: DISCONTINUED | OUTPATIENT
Start: 2022-01-01 | End: 2022-01-01

## 2022-01-01 RX ORDER — ACETAMINOPHEN 500 MG
1000 TABLET ORAL ONCE
Refills: 0 | Status: COMPLETED | OUTPATIENT
Start: 2022-01-01 | End: 2022-01-01

## 2022-01-01 RX ORDER — LOSARTAN POTASSIUM 100 MG/1
100 TABLET, FILM COATED ORAL DAILY
Refills: 0 | Status: DISCONTINUED | OUTPATIENT
Start: 2022-01-01 | End: 2022-01-01

## 2022-01-01 RX ORDER — PANTOPRAZOLE SODIUM 20 MG/1
40 TABLET, DELAYED RELEASE ORAL EVERY 12 HOURS
Refills: 0 | Status: DISCONTINUED | OUTPATIENT
Start: 2022-01-01 | End: 2022-01-01

## 2022-01-01 RX ADMIN — CHLORHEXIDINE GLUCONATE 15 MILLILITER(S): 213 SOLUTION TOPICAL at 18:49

## 2022-01-01 RX ADMIN — PANTOPRAZOLE SODIUM 10 MG/HR: 20 TABLET, DELAYED RELEASE ORAL at 18:49

## 2022-01-01 RX ADMIN — CHLORHEXIDINE GLUCONATE 15 MILLILITER(S): 213 SOLUTION TOPICAL at 19:01

## 2022-01-01 RX ADMIN — CHLORHEXIDINE GLUCONATE 1 APPLICATION(S): 213 SOLUTION TOPICAL at 05:12

## 2022-01-01 RX ADMIN — Medication 20 MILLIGRAM(S): at 14:17

## 2022-01-01 RX ADMIN — CHLORHEXIDINE GLUCONATE 15 MILLILITER(S): 213 SOLUTION TOPICAL at 05:13

## 2022-01-01 RX ADMIN — CHLORHEXIDINE GLUCONATE 15 MILLILITER(S): 213 SOLUTION TOPICAL at 17:35

## 2022-01-01 RX ADMIN — Medication 40 MILLIGRAM(S): at 06:58

## 2022-01-01 RX ADMIN — Medication 1000 MILLIGRAM(S): at 11:31

## 2022-01-01 RX ADMIN — Medication 100 MILLIEQUIVALENT(S): at 21:08

## 2022-01-01 RX ADMIN — DEXMEDETOMIDINE HYDROCHLORIDE IN 0.9% SODIUM CHLORIDE 3.59 MICROGRAM(S)/KG/HR: 4 INJECTION INTRAVENOUS at 19:52

## 2022-01-01 RX ADMIN — Medication 1: at 12:30

## 2022-01-01 RX ADMIN — CHLORHEXIDINE GLUCONATE 1 APPLICATION(S): 213 SOLUTION TOPICAL at 05:16

## 2022-01-01 RX ADMIN — CEFEPIME 100 MILLIGRAM(S): 1 INJECTION, POWDER, FOR SOLUTION INTRAMUSCULAR; INTRAVENOUS at 07:00

## 2022-01-01 RX ADMIN — Medication 10 MILLIGRAM(S): at 17:16

## 2022-01-01 RX ADMIN — CHLORHEXIDINE GLUCONATE 15 MILLILITER(S): 213 SOLUTION TOPICAL at 05:19

## 2022-01-01 RX ADMIN — SODIUM CHLORIDE 50 MILLILITER(S): 9 INJECTION INTRAMUSCULAR; INTRAVENOUS; SUBCUTANEOUS at 22:21

## 2022-01-01 RX ADMIN — Medication 40 MILLIEQUIVALENT(S): at 08:19

## 2022-01-01 RX ADMIN — PANTOPRAZOLE SODIUM 10 MG/HR: 20 TABLET, DELAYED RELEASE ORAL at 19:52

## 2022-01-01 RX ADMIN — Medication 2: at 18:56

## 2022-01-01 RX ADMIN — Medication 50 MILLIEQUIVALENT(S): at 06:35

## 2022-01-01 RX ADMIN — MORPHINE SULFATE 2 MILLIGRAM(S): 50 CAPSULE, EXTENDED RELEASE ORAL at 10:45

## 2022-01-01 RX ADMIN — Medication 25 GRAM(S): at 07:44

## 2022-01-01 RX ADMIN — Medication 50 MILLIEQUIVALENT(S): at 14:18

## 2022-01-01 RX ADMIN — Medication 400 MILLIGRAM(S): at 11:00

## 2022-01-01 RX ADMIN — Medication 50 MILLIEQUIVALENT(S): at 05:02

## 2022-01-01 RX ADMIN — Medication 20 MILLIGRAM(S): at 09:56

## 2022-01-01 RX ADMIN — Medication 250 MILLIGRAM(S): at 18:49

## 2022-01-01 RX ADMIN — CHLORHEXIDINE GLUCONATE 15 MILLILITER(S): 213 SOLUTION TOPICAL at 17:03

## 2022-01-01 RX ADMIN — SODIUM CHLORIDE 70 MILLILITER(S): 9 INJECTION, SOLUTION INTRAVENOUS at 18:50

## 2022-01-01 RX ADMIN — CHLORHEXIDINE GLUCONATE 1 APPLICATION(S): 213 SOLUTION TOPICAL at 05:00

## 2022-01-01 RX ADMIN — CHLORHEXIDINE GLUCONATE 1 APPLICATION(S): 213 SOLUTION TOPICAL at 06:07

## 2022-01-01 RX ADMIN — Medication 4.49 MICROGRAM(S)/KG/MIN: at 01:44

## 2022-01-01 RX ADMIN — CHLORHEXIDINE GLUCONATE 1 APPLICATION(S): 213 SOLUTION TOPICAL at 05:03

## 2022-01-01 RX ADMIN — CHLORHEXIDINE GLUCONATE 1 APPLICATION(S): 213 SOLUTION TOPICAL at 06:48

## 2022-01-01 RX ADMIN — DEXMEDETOMIDINE HYDROCHLORIDE IN 0.9% SODIUM CHLORIDE 3.59 MICROGRAM(S)/KG/HR: 4 INJECTION INTRAVENOUS at 08:42

## 2022-01-01 RX ADMIN — CHLORHEXIDINE GLUCONATE 1 APPLICATION(S): 213 SOLUTION TOPICAL at 05:14

## 2022-01-01 RX ADMIN — SODIUM CHLORIDE 60 MILLILITER(S): 9 INJECTION, SOLUTION INTRAVENOUS at 10:17

## 2022-01-01 RX ADMIN — SODIUM CHLORIDE 70 MILLILITER(S): 9 INJECTION, SOLUTION INTRAVENOUS at 09:28

## 2022-01-01 RX ADMIN — SODIUM CHLORIDE 1000 MILLILITER(S): 9 INJECTION, SOLUTION INTRAVENOUS at 03:17

## 2022-01-01 RX ADMIN — CHLORHEXIDINE GLUCONATE 15 MILLILITER(S): 213 SOLUTION TOPICAL at 06:48

## 2022-01-01 RX ADMIN — PANTOPRAZOLE SODIUM 40 MILLIGRAM(S): 20 TABLET, DELAYED RELEASE ORAL at 13:04

## 2022-01-01 RX ADMIN — PANTOPRAZOLE SODIUM 10 MG/HR: 20 TABLET, DELAYED RELEASE ORAL at 09:28

## 2022-01-01 RX ADMIN — CEFEPIME 100 MILLIGRAM(S): 1 INJECTION, POWDER, FOR SOLUTION INTRAMUSCULAR; INTRAVENOUS at 05:35

## 2022-01-01 RX ADMIN — MORPHINE SULFATE 4 MILLIGRAM(S): 50 CAPSULE, EXTENDED RELEASE ORAL at 10:45

## 2022-01-01 RX ADMIN — CHLORHEXIDINE GLUCONATE 15 MILLILITER(S): 213 SOLUTION TOPICAL at 05:33

## 2022-01-01 RX ADMIN — Medication 40 MILLIEQUIVALENT(S): at 10:17

## 2022-01-01 RX ADMIN — CHLORHEXIDINE GLUCONATE 15 MILLILITER(S): 213 SOLUTION TOPICAL at 17:16

## 2022-01-01 RX ADMIN — CEFEPIME 100 MILLIGRAM(S): 1 INJECTION, POWDER, FOR SOLUTION INTRAMUSCULAR; INTRAVENOUS at 05:03

## 2022-01-01 RX ADMIN — Medication 50 MILLIEQUIVALENT(S): at 08:36

## 2022-01-01 RX ADMIN — Medication 4.49 MICROGRAM(S)/KG/MIN: at 06:52

## 2022-01-01 RX ADMIN — PANTOPRAZOLE SODIUM 10 MG/HR: 20 TABLET, DELAYED RELEASE ORAL at 00:07

## 2022-01-01 RX ADMIN — TRANEXAMIC ACID 13.75 MILLIGRAM(S): 100 INJECTION, SOLUTION INTRAVENOUS at 05:29

## 2022-01-01 RX ADMIN — Medication 50 MILLIEQUIVALENT(S): at 12:29

## 2022-01-01 RX ADMIN — Medication 200 MEQ/KG/HR: at 05:09

## 2022-01-01 RX ADMIN — Medication 25 GRAM(S): at 10:29

## 2022-01-01 RX ADMIN — PANTOPRAZOLE SODIUM 10 MG/HR: 20 TABLET, DELAYED RELEASE ORAL at 21:34

## 2022-01-01 RX ADMIN — DONEPEZIL HYDROCHLORIDE 10 MILLIGRAM(S): 10 TABLET, FILM COATED ORAL at 22:04

## 2022-01-01 RX ADMIN — DEXMEDETOMIDINE HYDROCHLORIDE IN 0.9% SODIUM CHLORIDE 3.59 MICROGRAM(S)/KG/HR: 4 INJECTION INTRAVENOUS at 21:17

## 2022-01-01 RX ADMIN — PANTOPRAZOLE SODIUM 10 MG/HR: 20 TABLET, DELAYED RELEASE ORAL at 06:52

## 2022-01-01 RX ADMIN — DEXMEDETOMIDINE HYDROCHLORIDE IN 0.9% SODIUM CHLORIDE 3.59 MICROGRAM(S)/KG/HR: 4 INJECTION INTRAVENOUS at 09:28

## 2022-01-01 RX ADMIN — Medication 1: at 05:08

## 2022-01-01 RX ADMIN — SODIUM CHLORIDE 1500 MILLILITER(S): 9 INJECTION INTRAMUSCULAR; INTRAVENOUS; SUBCUTANEOUS at 00:00

## 2022-01-01 RX ADMIN — SODIUM CHLORIDE 1000 MILLILITER(S): 9 INJECTION INTRAMUSCULAR; INTRAVENOUS; SUBCUTANEOUS at 10:48

## 2022-01-01 RX ADMIN — CEFEPIME 100 MILLIGRAM(S): 1 INJECTION, POWDER, FOR SOLUTION INTRAMUSCULAR; INTRAVENOUS at 09:12

## 2022-01-01 RX ADMIN — Medication 1: at 14:33

## 2022-01-01 RX ADMIN — PANTOPRAZOLE SODIUM 40 MILLIGRAM(S): 20 TABLET, DELAYED RELEASE ORAL at 06:14

## 2022-01-01 RX ADMIN — Medication 1: at 18:32

## 2022-01-01 RX ADMIN — CHLORHEXIDINE GLUCONATE 1 APPLICATION(S): 213 SOLUTION TOPICAL at 06:00

## 2022-01-01 RX ADMIN — CHLORHEXIDINE GLUCONATE 15 MILLILITER(S): 213 SOLUTION TOPICAL at 18:45

## 2022-01-01 RX ADMIN — PANTOPRAZOLE SODIUM 40 MILLIGRAM(S): 20 TABLET, DELAYED RELEASE ORAL at 06:09

## 2022-01-01 RX ADMIN — PANTOPRAZOLE SODIUM 40 MILLIGRAM(S): 20 TABLET, DELAYED RELEASE ORAL at 05:10

## 2022-01-01 RX ADMIN — PANTOPRAZOLE SODIUM 10 MG/HR: 20 TABLET, DELAYED RELEASE ORAL at 10:11

## 2022-01-01 RX ADMIN — Medication 4.49 MICROGRAM(S)/KG/MIN: at 06:19

## 2022-01-01 RX ADMIN — SODIUM CHLORIDE 70 MILLILITER(S): 9 INJECTION, SOLUTION INTRAVENOUS at 19:52

## 2022-01-01 RX ADMIN — SODIUM CHLORIDE 70 MILLILITER(S): 9 INJECTION, SOLUTION INTRAVENOUS at 09:59

## 2022-01-01 RX ADMIN — CEFEPIME 2000 MILLIGRAM(S): 1 INJECTION, POWDER, FOR SOLUTION INTRAMUSCULAR; INTRAVENOUS at 08:06

## 2022-01-01 RX ADMIN — QUETIAPINE FUMARATE 25 MILLIGRAM(S): 200 TABLET, FILM COATED ORAL at 22:04

## 2022-01-01 RX ADMIN — CHLORHEXIDINE GLUCONATE 1 APPLICATION(S): 213 SOLUTION TOPICAL at 06:14

## 2022-01-01 RX ADMIN — PANTOPRAZOLE SODIUM 40 MILLIGRAM(S): 20 TABLET, DELAYED RELEASE ORAL at 05:16

## 2022-01-01 RX ADMIN — CHLORHEXIDINE GLUCONATE 15 MILLILITER(S): 213 SOLUTION TOPICAL at 18:38

## 2022-01-01 RX ADMIN — CEFEPIME 100 MILLIGRAM(S): 1 INJECTION, POWDER, FOR SOLUTION INTRAMUSCULAR; INTRAVENOUS at 06:08

## 2022-01-01 RX ADMIN — PANTOPRAZOLE SODIUM 10 MG/HR: 20 TABLET, DELAYED RELEASE ORAL at 04:18

## 2022-01-01 RX ADMIN — PANTOPRAZOLE SODIUM 40 MILLIGRAM(S): 20 TABLET, DELAYED RELEASE ORAL at 18:43

## 2022-01-01 RX ADMIN — Medication 250 MILLIGRAM(S): at 17:58

## 2022-01-01 RX ADMIN — Medication 250 MILLIGRAM(S): at 18:00

## 2022-01-01 RX ADMIN — DEXMEDETOMIDINE HYDROCHLORIDE IN 0.9% SODIUM CHLORIDE 3.59 MICROGRAM(S)/KG/HR: 4 INJECTION INTRAVENOUS at 18:51

## 2022-01-01 RX ADMIN — Medication 50 MILLIEQUIVALENT(S): at 05:10

## 2022-01-01 RX ADMIN — Medication 40 MILLIGRAM(S): at 06:52

## 2022-01-01 RX ADMIN — Medication 50 MILLIEQUIVALENT(S): at 08:00

## 2022-01-01 RX ADMIN — Medication 1: at 19:01

## 2022-01-01 RX ADMIN — Medication 25 GRAM(S): at 09:56

## 2022-01-01 RX ADMIN — Medication 25 GRAM(S): at 12:47

## 2022-01-01 RX ADMIN — MORPHINE SULFATE 4 MILLIGRAM(S): 50 CAPSULE, EXTENDED RELEASE ORAL at 11:00

## 2022-01-01 RX ADMIN — PANTOPRAZOLE SODIUM 40 MILLIGRAM(S): 20 TABLET, DELAYED RELEASE ORAL at 17:38

## 2022-01-01 RX ADMIN — TRANEXAMIC ACID 660 MILLIGRAM(S): 100 INJECTION, SOLUTION INTRAVENOUS at 02:04

## 2022-01-01 RX ADMIN — CHLORHEXIDINE GLUCONATE 15 MILLILITER(S): 213 SOLUTION TOPICAL at 05:14

## 2022-01-01 RX ADMIN — Medication 50 MILLIEQUIVALENT(S): at 08:41

## 2022-01-01 RX ADMIN — Medication 250 MILLIGRAM(S): at 08:06

## 2022-01-01 RX ADMIN — Medication 50 MILLIEQUIVALENT(S): at 11:25

## 2022-01-01 RX ADMIN — MORPHINE SULFATE 2 MILLIGRAM(S): 50 CAPSULE, EXTENDED RELEASE ORAL at 10:40

## 2022-01-01 RX ADMIN — Medication 75 MEQ/KG/HR: at 06:52

## 2022-01-01 RX ADMIN — Medication 40 MILLIGRAM(S): at 13:02

## 2022-01-01 RX ADMIN — FENTANYL CITRATE 2.4 MICROGRAM(S)/KG/HR: 50 INJECTION INTRAVENOUS at 06:19

## 2022-01-01 RX ADMIN — CHLORHEXIDINE GLUCONATE 1 APPLICATION(S): 213 SOLUTION TOPICAL at 05:19

## 2022-01-01 RX ADMIN — Medication 124 MILLIGRAM(S): at 14:32

## 2022-01-01 RX ADMIN — Medication 250 MILLIGRAM(S): at 05:36

## 2022-01-01 RX ADMIN — CEFEPIME 100 MILLIGRAM(S): 1 INJECTION, POWDER, FOR SOLUTION INTRAMUSCULAR; INTRAVENOUS at 15:29

## 2022-01-01 RX ADMIN — CHLORHEXIDINE GLUCONATE 1 APPLICATION(S): 213 SOLUTION TOPICAL at 05:13

## 2022-01-01 RX ADMIN — SODIUM CHLORIDE 3000 MILLILITER(S): 9 INJECTION INTRAMUSCULAR; INTRAVENOUS; SUBCUTANEOUS at 13:00

## 2022-01-01 RX ADMIN — DEXMEDETOMIDINE HYDROCHLORIDE IN 0.9% SODIUM CHLORIDE 2.4 MICROGRAM(S)/KG/HR: 4 INJECTION INTRAVENOUS at 02:31

## 2022-01-01 RX ADMIN — CHLORHEXIDINE GLUCONATE 15 MILLILITER(S): 213 SOLUTION TOPICAL at 05:03

## 2022-01-01 RX ADMIN — PANTOPRAZOLE SODIUM 10 MG/HR: 20 TABLET, DELAYED RELEASE ORAL at 21:32

## 2022-01-01 RX ADMIN — CHLORHEXIDINE GLUCONATE 1 APPLICATION(S): 213 SOLUTION TOPICAL at 05:10

## 2022-01-01 RX ADMIN — Medication 50 MILLIEQUIVALENT(S): at 07:03

## 2022-01-01 RX ADMIN — CHLORHEXIDINE GLUCONATE 1 APPLICATION(S): 213 SOLUTION TOPICAL at 05:33

## 2022-01-01 RX ADMIN — Medication 25 GRAM(S): at 10:36

## 2022-01-01 RX ADMIN — CEFEPIME 2000 MILLIGRAM(S): 1 INJECTION, POWDER, FOR SOLUTION INTRAMUSCULAR; INTRAVENOUS at 09:26

## 2022-01-01 RX ADMIN — Medication 60 MILLIGRAM(S): at 01:54

## 2022-01-01 RX ADMIN — CHLORHEXIDINE GLUCONATE 1 APPLICATION(S): 213 SOLUTION TOPICAL at 05:35

## 2022-01-01 RX ADMIN — CHLORHEXIDINE GLUCONATE 15 MILLILITER(S): 213 SOLUTION TOPICAL at 05:11

## 2022-01-01 RX ADMIN — Medication 250 MILLIGRAM(S): at 18:37

## 2022-01-01 RX ADMIN — FENTANYL CITRATE 2.4 MICROGRAM(S)/KG/HR: 50 INJECTION INTRAVENOUS at 14:10

## 2022-01-01 RX ADMIN — PANTOPRAZOLE SODIUM 10 MG/HR: 20 TABLET, DELAYED RELEASE ORAL at 05:10

## 2022-01-01 RX ADMIN — SODIUM CHLORIDE 70 MILLILITER(S): 9 INJECTION, SOLUTION INTRAVENOUS at 22:04

## 2022-01-01 RX ADMIN — Medication 40 MILLIGRAM(S): at 09:10

## 2022-01-01 RX ADMIN — CEFEPIME 100 MILLIGRAM(S): 1 INJECTION, POWDER, FOR SOLUTION INTRAMUSCULAR; INTRAVENOUS at 08:00

## 2022-01-01 RX ADMIN — CHLORHEXIDINE GLUCONATE 15 MILLILITER(S): 213 SOLUTION TOPICAL at 05:36

## 2022-01-01 RX ADMIN — CHLORHEXIDINE GLUCONATE 1 APPLICATION(S): 213 SOLUTION TOPICAL at 06:02

## 2022-01-01 RX ADMIN — Medication 2: at 21:49

## 2022-01-01 RX ADMIN — Medication 4000 MILLILITER(S): at 17:15

## 2022-02-16 NOTE — PHYSICAL THERAPY INITIAL EVALUATION ADULT - HEALTH SCREEN CRITERIA
Message left on VM that EMG ws normal and results will be sent to her as a MyChart attachment as soon as Dr Zachery Braswell is able to transfer EMG results to patient's chart. yes

## 2022-05-17 NOTE — PROGRESS NOTE ADULT - ASSESSMENT
17-May-2022 88 year old female w/ HTN now presents with third degree heart block    # Third degree heart block  - BP stable, stop verapamil c/w losartan   - check TSH  - Going to CT surgery for Pacemaker today   - Echo done   - NPO after midnight  - pacing pads placed, keep atropine at the bedside      # Right arm pain r/o fracture  - sling placed in ED  - Fracture through the right humeral surgical nexk   - ? non displaced fracture of the right greater tuberosity     # HTN  - c/w losartan, lasix    # DVT ppx    # FULL CODE 88 year old female w/ HTN now presents with third degree heart block    # Third degree heart block  - BP stable, stop verapamil c/w losartan   - check TSH  - Going to CT surgery for Pacemaker today   - Echo done   - NPO after midnight  - pacing pads placed, keep atropine at the bedside      # Right arm pain r/o fracture  - sling placed in ED  - Fracture through the right humeral surgical nexk   - ? non displaced fracture of the right greater tuberosity     # HTN  - c/w losartan, lasix  - start norvasc 5 mg     # DVT ppx    # FULL CODE

## 2022-06-22 NOTE — ED ADULT TRIAGE NOTE - NS ED NURSE DIRECT TO ROOM YN
June 22, 2022     Patient: Efrain Hyman   YOB: 2011   Date of Visit: 6/22/2022       To Whom it May Concern:    Efrain Hyman was seen in my clinic on 6/22/2022 at 6:30 pm.     Please excuse Efrain for his absence from school on the date listed above to be able to make his appointment.    Can return to school.     Sincerely,         Jammie Arnold MD    Medical information is confidential and cannot be disclosed without the written consent of the patient or his representative.      
No

## 2022-09-28 PROBLEM — F03.90 DEMENTIA: Status: ACTIVE | Noted: 2019-09-05

## 2022-09-28 NOTE — DISCUSSION/SUMMARY
[FreeTextEntry1] : Ms. Hartley is a 89yo woman with mild-moderate dementia now with some behavioral changes.  She needs assistance with safe ambulation, medication administration, meals and eating, bathing, dressing and hygeine\par 1. Would increase time with aide\par 2. Continue current medications\par 3. f/u in 6 months

## 2022-09-28 NOTE — PHYSICAL EXAM
[FreeTextEntry1] : MOCA 12/30\par Registration was 1/3 in 30 seconds and 0/3 in 5 minutes\par CN 2-12 normal \par NO drift power 5/5\par Temp, Vib, LT symmetric\par Gait slow needs assistance with walker

## 2022-09-28 NOTE — HISTORY OF PRESENT ILLNESS
[FreeTextEntry1] : Ms. Hartley is being seen for follow up of her dementia.  She was last seen in 8/4/2020 and at that time was stable.  However recently she has been having more changes to her behavior and worsening of her memory.  She has increased need for assistance throughout the day and has an aide for a total of 3 hours a day.\par She does not remember meals, medications, hygeine and needs direction from family and aide for these activities.\par Her short term memory has gotten to the point where she has no recollection of activities during the day and can call the family >40 times per day asking the same quesiotns\par \par She needs help with most ADLs\par She has an aide 8 hours per day and no assistance at night\par She has had no falls overnight to date\par \par

## 2022-11-17 NOTE — CONSULT NOTE ADULT - SUBJECTIVE AND OBJECTIVE BOX
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 Additional history obtained from her son Ernesto. Patient is fairly active for her age ambulatory with walker able to climb stairs, walk to the parking lot with no issue. Denies chest pain, SOB. dizziness, leg swelling          PAST MEDICAL & SURGICAL HISTORY  HTN (hypertension)    Bradycardia    Pacemaker        FAMILY HISTORY:  FAMILY HISTORY:      SOCIAL HISTORY:  []smoker  []Alcohol  []Drug    ALLERGIES:  No Known Allergies      MEDICATIONS:  MEDICATIONS  (STANDING):  bisacodyl 10 milliGRAM(s) Oral once  cefepime   IVPB 2000 milliGRAM(s) IV Intermittent once  polyethylene glycol/electrolyte Solution. 4000 milliLiter(s) Oral once    MEDICATIONS  (PRN):      HOME MEDICATIONS:  Home Medications:  mesalamine 800 mg oral delayed release tablet: 2 tab(s) orally 3 times a day (03 Jul 2018 00:42)  polyethylene glycol 3350 oral powder for reconstitution: 17 gram(s) orally  (20 Jul 2018 15:21)      VITALS:   T(F): 96.9 (11-17 @ 13:00), Max: 97 (11-17 @ 12:45)  HR: 78 (11-17 @ 13:50) (74 - 89)  BP: 144/81 (11-17 @ 13:50) (129/56 - 170/114)  BP(mean): --  RR: 20 (11-17 @ 13:50) (20 - 20)  SpO2: 96% (11-17 @ 13:50) (96% - 97%)    I&O's Summary      REVIEW OF SYSTEMS:  CONSTITUTIONAL: No weakness, fevers or chills  EYES: No visual changes  ENT: No vertigo or throat pain   NECK: No pain or stiffness  RESPIRATORY: No cough, wheezing, hemoptysis; No shortness of breath  CARDIOVASCULAR: No chest pain or palpitations  GASTROINTESTINAL: No abdominal or epigastric pain. No nausea, vomiting, or hematemesis; No diarrhea or constipation. No melena or hematochezia.  GENITOURINARY: No dysuria, frequency or hematuria  NEUROLOGICAL: No numbness or weakness  SKIN: No itching, no rashes  MSK: no    PHYSICAL EXAM:  NEURO: patient is awake , alert and oriented  GEN: Not in acute distress  NECK: no thyroid enlargement, no JVD  LUNGS: Clear to auscultation bilaterally   CARDIOVASCULAR: S1/S2 present, RRR , no murmurs or rubs, no carotid bruits,  + PP bilaterally  ABD: Soft, non-tender, non-distended, +BS  EXT: No BISMARK  SKIN: Intact    LABS:                        8.0    29.38 )-----------( 269      ( 17 Nov 2022 10:30 )             24.9     11-17    138  |  106  |  47<H>  ----------------------------<  232<H>  4.8   |  17  |  1.7<H>    Ca    8.4      17 Nov 2022 10:30    TPro  5.5<L>  /  Alb  3.5  /  TBili  0.2  /  DBili  x   /  AST  13  /  ALT  8   /  AlkPhos  74  11-17    PT/INR - ( 17 Nov 2022 10:30 )   PT: 12.60 sec;   INR: 1.10 ratio         PTT - ( 17 Nov 2022 10:30 )  PTT:23.4 sec  Lactate, Blood: 3.9 mmol/L *H* (11-17-22 @ 13:40)  Troponin T, Serum: <0.01 ng/mL (11-17-22 @ 10:30)    CARDIAC MARKERS ( 17 Nov 2022 10:30 )  x     / <0.01 ng/mL / x     / x     / x            Troponin trend:            RADIOLOGY:  -CXR:  -TTE:  -CCTA:  -STRESS TEST:  -CATHETERIZATION:    ECG:    TELEMETRY EVENTS:   HPI-Cardiology   89 y/o female with hx of dementia, CHF, HTN , 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 Additional history obtained from her son Ernesto. Patient is fairly active for her age ambulatory with walker able to climb stairs, walk to the parking lot with no issue. Denies chest pain, SOB. dizziness, leg swelling. Last TTE 03/22 EF 60-65% G1DD          PAST MEDICAL & SURGICAL HISTORY  HTN (hypertension)    Bradycardia    Pacemaker        FAMILY HISTORY:  FAMILY HISTORY:      SOCIAL HISTORY:  []smoker  []Alcohol  []Drug    ALLERGIES:  No Known Allergies      MEDICATIONS:  MEDICATIONS  (STANDING):  bisacodyl 10 milliGRAM(s) Oral once  cefepime   IVPB 2000 milliGRAM(s) IV Intermittent once  polyethylene glycol/electrolyte Solution. 4000 milliLiter(s) Oral once    MEDICATIONS  (PRN):      HOME MEDICATIONS:  Home Medications:  mesalamine 800 mg oral delayed release tablet: 2 tab(s) orally 3 times a day (03 Jul 2018 00:42)  polyethylene glycol 3350 oral powder for reconstitution: 17 gram(s) orally  (20 Jul 2018 15:21)      VITALS:   T(F): 96.9 (11-17 @ 13:00), Max: 97 (11-17 @ 12:45)  HR: 78 (11-17 @ 13:50) (74 - 89)  BP: 144/81 (11-17 @ 13:50) (129/56 - 170/114)  BP(mean): --  RR: 20 (11-17 @ 13:50) (20 - 20)  SpO2: 96% (11-17 @ 13:50) (96% - 97%)    I&O's Summary      REVIEW OF SYSTEMS:  CONSTITUTIONAL: No weakness, fevers or chills  EYES: No visual changes  ENT: No vertigo or throat pain   NECK: No pain or stiffness  RESPIRATORY: No cough, wheezing, hemoptysis; No shortness of breath  CARDIOVASCULAR: No chest pain or palpitations  GASTROINTESTINAL: No abdominal or epigastric pain. No nausea, vomiting, or hematemesis; No diarrhea or constipation. No melena or hematochezia.  GENITOURINARY: No dysuria, frequency or hematuria  NEUROLOGICAL: No numbness or weakness  SKIN: No itching, no rashes  MSK: no    PHYSICAL EXAM:  NEURO: patient is awake , alert and oriented  GEN: Not in acute distress  NECK: no thyroid enlargement, no JVD  LUNGS: Clear to auscultation bilaterally   CARDIOVASCULAR: S1/S2 present, RRR , no murmurs or rubs, no carotid bruits,  + PP bilaterally  ABD: Soft, non-tender, non-distended, +BS  EXT: No BISMARK  SKIN: Intact    LABS:                        8.0    29.38 )-----------( 269      ( 17 Nov 2022 10:30 )             24.9     11-17    138  |  106  |  47<H>  ----------------------------<  232<H>  4.8   |  17  |  1.7<H>    Ca    8.4      17 Nov 2022 10:30    TPro  5.5<L>  /  Alb  3.5  /  TBili  0.2  /  DBili  x   /  AST  13  /  ALT  8   /  AlkPhos  74  11-17    PT/INR - ( 17 Nov 2022 10:30 )   PT: 12.60 sec;   INR: 1.10 ratio         PTT - ( 17 Nov 2022 10:30 )  PTT:23.4 sec  Lactate, Blood: 3.9 mmol/L *H* (11-17-22 @ 13:40)  Troponin T, Serum: <0.01 ng/mL (11-17-22 @ 10:30)    CARDIAC MARKERS ( 17 Nov 2022 10:30 )  x     / <0.01 ng/mL / x     / x     / x            Troponin trend:            RADIOLOGY:  < from: Transthoracic Echocardiogram (03.22.18 @ 07:03) >  Summary:   1.Left ventricular ejection fraction, by visual estimation, is 60 to   65%.   2. Mildly increased LV wall thickness.   3. Spectral Doppler shows impaired relaxation pattern of left   ventricular myocardial filling (Grade I diastolic dysfunction).   4. Mild aortic regurgitation.   5. Aortic sclerosis.   6. Estimated pulmonary artery systolic pressure is 46.0 mmHg assuming a   right atrial pressure of 10 mmHg, which is consistent with mild pulmonary   hypertension.    PHYSICIAN INTERPRETATION:  Left Ventricle: The left ventricular internal cavity size is normal. Left   ventricular wall thickness is mildly increased. Left ventricular ejection   fraction, by visual estimation, is 60 to 65%. Spectral Doppler shows   impaired relaxation pattern of left ventricular myocardial filling (Grade   I diastolic dysfunction).  Right Ventricle: Normal right ventricular size and function.  Left Atrium: Mildly enlarged left atrium.  Right Atrium: Mildly enlarged right atrium.  Pericardium: There is no evidence of pericardial effusion.  Mitral Valve: Structurally normal mitral valve, with normal leaflet   excursion.  Tricuspid Valve: Structurally normal tricuspid valve, with normal leaflet   excursion. The tricuspid valve is normal in structure. Mild tricuspid   regurgitation is visualized. Estimated pulmonary artery systolic pressure   is 46.0 mmHg assuming a right atrial pressure of 10 mmHg, which is   consistent with mild pulmonary hypertension.  Aortic Valve: The aortic valve is trileaflet. No evidence of aortic   stenosis. Mild aortic valve regurgitation is seen. Aortic sclerosis.  Pulmonic Valve: Structurally normal pulmonic valve, with normal leaflet   excursion.  Aorta: The aortic root and ascending aorta are structurally normal, with   noevidence of dilitation.  Pulmonary Artery: The main pulmonary artery is normal in size.       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 Additional history obtained from her son Ernesto. Patient is fairly active for her age ambulatory with walker able to climb stairs, walk to the parking lot with no issue. Denies chest pain, SOB. dizziness, leg swelling. Last TTE 03/22 EF 60-65% G1DD          PAST MEDICAL & SURGICAL HISTORY  HTN (hypertension)    Bradycardia    Pacemaker        FAMILY HISTORY:  FAMILY HISTORY:      SOCIAL HISTORY:  []smoker  []Alcohol  []Drug    ALLERGIES:  No Known Allergies      MEDICATIONS:  MEDICATIONS  (STANDING):  bisacodyl 10 milliGRAM(s) Oral once  cefepime   IVPB 2000 milliGRAM(s) IV Intermittent once  polyethylene glycol/electrolyte Solution. 4000 milliLiter(s) Oral once    MEDICATIONS  (PRN):      HOME MEDICATIONS:  Home Medications:  mesalamine 800 mg oral delayed release tablet: 2 tab(s) orally 3 times a day (03 Jul 2018 00:42)  polyethylene glycol 3350 oral powder for reconstitution: 17 gram(s) orally  (20 Jul 2018 15:21)      VITALS:   T(F): 96.9 (11-17 @ 13:00), Max: 97 (11-17 @ 12:45)  HR: 78 (11-17 @ 13:50) (74 - 89)  BP: 144/81 (11-17 @ 13:50) (129/56 - 170/114)  BP(mean): --  RR: 20 (11-17 @ 13:50) (20 - 20)  SpO2: 96% (11-17 @ 13:50) (96% - 97%)    I&O's Summary      REVIEW OF SYSTEMS:  CONSTITUTIONAL: No weakness, fevers or chills  EYES: No visual changes  ENT: No vertigo or throat pain   NECK: No pain or stiffness  RESPIRATORY: No cough, wheezing, hemoptysis; No shortness of breath  CARDIOVASCULAR: No chest pain or palpitations  GASTROINTESTINAL: No abdominal or epigastric pain. No nausea, vomiting, or hematemesis; No diarrhea or constipation. No melena or hematochezia.  GENITOURINARY: No dysuria, frequency or hematuria  NEUROLOGICAL: No numbness or weakness  SKIN: No itching, no rashes  MSK: no    PHYSICAL EXAM:  NEURO: patient is awake , alert and oriented  GEN: Not in acute distress  NECK: no thyroid enlargement, no JVD  LUNGS: Clear to auscultation bilaterally   CARDIOVASCULAR: S1/S2 present, RRR , no murmurs or rubs, no carotid bruits,  + PP bilaterally  ABD: Soft, non-tender, non-distended, +BS  EXT: No BISMARK  SKIN: Intact    LABS:                        8.0    29.38 )-----------( 269      ( 17 Nov 2022 10:30 )             24.9     11-17    138  |  106  |  47<H>  ----------------------------<  232<H>  4.8   |  17  |  1.7<H>    Ca    8.4      17 Nov 2022 10:30    TPro  5.5<L>  /  Alb  3.5  /  TBili  0.2  /  DBili  x   /  AST  13  /  ALT  8   /  AlkPhos  74  11-17    PT/INR - ( 17 Nov 2022 10:30 )   PT: 12.60 sec;   INR: 1.10 ratio         PTT - ( 17 Nov 2022 10:30 )  PTT:23.4 sec  Lactate, Blood: 3.9 mmol/L *H* (11-17-22 @ 13:40)  Troponin T, Serum: <0.01 ng/mL (11-17-22 @ 10:30)    CARDIAC MARKERS ( 17 Nov 2022 10:30 )  x     / <0.01 ng/mL / x     / x     / x            Troponin trend:            RADIOLOGY:  < from: Transthoracic Echocardiogram (03.22.18 @ 07:03) >  Summary:   1.Left ventricular ejection fraction, by visual estimation, is 60 to   65%.   2. Mildly increased LV wall thickness.   3. Spectral Doppler shows impaired relaxation pattern of left   ventricular myocardial filling (Grade I diastolic dysfunction).   4. Mild aortic regurgitation.   5. Aortic sclerosis.   6. Estimated pulmonary artery systolic pressure is 46.0 mmHg assuming a   right atrial pressure of 10 mmHg, which is consistent with mild pulmonary   hypertension.    PHYSICIAN INTERPRETATION:  Left Ventricle: The left ventricular internal cavity size is normal. Left   ventricular wall thickness is mildly increased. Left ventricular ejection   fraction, by visual estimation, is 60 to 65%. Spectral Doppler shows   impaired relaxation pattern of left ventricular myocardial filling (Grade   I diastolic dysfunction).  Right Ventricle: Normal right ventricular size and function.  Left Atrium: Mildly enlarged left atrium.  Right Atrium: Mildly enlarged right atrium.  Pericardium: There is no evidence of pericardial effusion.  Mitral Valve: Structurally normal mitral valve, with normal leaflet   excursion.  Tricuspid Valve: Structurally normal tricuspid valve, with normal leaflet   excursion. The tricuspid valve is normal in structure. Mild tricuspid   regurgitation is visualized. Estimated pulmonary artery systolic pressure   is 46.0 mmHg assuming a right atrial pressure of 10 mmHg, which is   consistent with mild pulmonary hypertension.  Aortic Valve: The aortic valve is trileaflet. No evidence of aortic   stenosis. Mild aortic valve regurgitation is seen. Aortic sclerosis.  Pulmonic Valve: Structurally normal pulmonic valve, with normal leaflet   excursion.  Aorta: The aortic root and ascending aorta are structurally normal, with   noevidence of dilitation.  Pulmonary Artery: The main pulmonary artery is normal in size.       < end of copied text >      ECG:  < from: 12 Lead ECG (11.17.22 @ 10:47) >  Ventricular Rate 71 BPM    Atrial Rate 71 BPM    P-R Interval 342 ms    QRS Duration 144 ms    Q-T Interval 476 ms    QTC Calculation(Bazett) 517 ms    P Axis 66 degrees    R Axis 268 degrees    T Axis 82 degrees    Diagnosis Line Atrial-sensed ventricular-paced rhythm with prolonged AV conduction  Abnormal ECG    Confirmed by MARTIN KELLY MD ([id number]) on 11/17/2022 11:01:47 AM    < end of copied text >    < from: CT Angio Abdomen and Pelvis w/ IV Cont (11.17.22 @ 12:05) >  IMPRESSION:    1. Since May 27, 2018, no definite evidence of active gastrointestinal   bleed.    2. Diffuse colonic diverticulosis, without evidence of acute   diverticulitis.    3. New 8 mm calculus within the proximal pancreatic duct in the region of   the pancreatic head with new diffuse diffuse pancreatic parenchymal   atrophy and dilatation of the pancreatic duct to 1.1 cm; if clinically   warranted, GI consultation may be of use.    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 Cardiac risk stratification requested for EGD/Colonoscopy    Pt evaluated at bedside, poor historian. Additional history obtained from her son Ernesto. Patient is fairly active for her age ambulatory with walker able to climb stairs, walk slowly to the parking lot with no issue. Denies chest pain, SOB. dizziness, leg swelling. Last TTE 03/22 EF 60-65% G1DD          PAST MEDICAL & SURGICAL HISTORY  HTN (hypertension)    Bradycardia    Pacemaker        FAMILY HISTORY:  FAMILY HISTORY:      SOCIAL HISTORY:  []smoker  []Alcohol  []Drug    ALLERGIES:  No Known Allergies      MEDICATIONS:  MEDICATIONS  (STANDING):  bisacodyl 10 milliGRAM(s) Oral once  cefepime   IVPB 2000 milliGRAM(s) IV Intermittent once  polyethylene glycol/electrolyte Solution. 4000 milliLiter(s) Oral once    MEDICATIONS  (PRN):      HOME MEDICATIONS:  Home Medications:  mesalamine 800 mg oral delayed release tablet: 2 tab(s) orally 3 times a day (03 Jul 2018 00:42)  polyethylene glycol 3350 oral powder for reconstitution: 17 gram(s) orally  (20 Jul 2018 15:21)      VITALS:   T(F): 96.9 (11-17 @ 13:00), Max: 97 (11-17 @ 12:45)  HR: 78 (11-17 @ 13:50) (74 - 89)  BP: 144/81 (11-17 @ 13:50) (129/56 - 170/114)  BP(mean): --  RR: 20 (11-17 @ 13:50) (20 - 20)  SpO2: 96% (11-17 @ 13:50) (96% - 97%)    I&O's Summary      REVIEW OF SYSTEMS:  CONSTITUTIONAL: No weakness, fevers or chills  EYES: No visual changes  ENT: No vertigo or throat pain   NECK: No pain or stiffness  RESPIRATORY: No cough, wheezing, hemoptysis; No shortness of breath  CARDIOVASCULAR: No chest pain or palpitations  GASTROINTESTINAL: No abdominal or epigastric pain. No nausea, vomiting, or hematemesis; No diarrhea or constipation. No melena or hematochezia.  GENITOURINARY: No dysuria, frequency or hematuria  NEUROLOGICAL: No numbness or weakness  SKIN: No itching, no rashes  MSK: no    PHYSICAL EXAM:  NEURO: patient is awake , alert and oriented  GEN: Not in acute distress  NECK: no thyroid enlargement, no JVD  LUNGS: Clear to auscultation bilaterally   CARDIOVASCULAR: S1/S2 present, RRR , no murmurs or rubs, no carotid bruits,  + PP bilaterally  ABD: Soft, non-tender, non-distended, +BS  EXT: No BISMARK  SKIN: Intact    LABS:                        8.0    29.38 )-----------( 269      ( 17 Nov 2022 10:30 )             24.9     11-17    138  |  106  |  47<H>  ----------------------------<  232<H>  4.8   |  17  |  1.7<H>    Ca    8.4      17 Nov 2022 10:30    TPro  5.5<L>  /  Alb  3.5  /  TBili  0.2  /  DBili  x   /  AST  13  /  ALT  8   /  AlkPhos  74  11-17    PT/INR - ( 17 Nov 2022 10:30 )   PT: 12.60 sec;   INR: 1.10 ratio         PTT - ( 17 Nov 2022 10:30 )  PTT:23.4 sec  Lactate, Blood: 3.9 mmol/L *H* (11-17-22 @ 13:40)  Troponin T, Serum: <0.01 ng/mL (11-17-22 @ 10:30)    CARDIAC MARKERS ( 17 Nov 2022 10:30 )  x     / <0.01 ng/mL / x     / x     / x            Troponin trend:            RADIOLOGY:  < from: Transthoracic Echocardiogram (03.22.18 @ 07:03) >  Summary:   1.Left ventricular ejection fraction, by visual estimation, is 60 to   65%.   2. Mildly increased LV wall thickness.   3. Spectral Doppler shows impaired relaxation pattern of left   ventricular myocardial filling (Grade I diastolic dysfunction).   4. Mild aortic regurgitation.   5. Aortic sclerosis.   6. Estimated pulmonary artery systolic pressure is 46.0 mmHg assuming a   right atrial pressure of 10 mmHg, which is consistent with mild pulmonary   hypertension.    PHYSICIAN INTERPRETATION:  Left Ventricle: The left ventricular internal cavity size is normal. Left   ventricular wall thickness is mildly increased. Left ventricular ejection   fraction, by visual estimation, is 60 to 65%. Spectral Doppler shows   impaired relaxation pattern of left ventricular myocardial filling (Grade   I diastolic dysfunction).  Right Ventricle: Normal right ventricular size and function.  Left Atrium: Mildly enlarged left atrium.  Right Atrium: Mildly enlarged right atrium.  Pericardium: There is no evidence of pericardial effusion.  Mitral Valve: Structurally normal mitral valve, with normal leaflet   excursion.  Tricuspid Valve: Structurally normal tricuspid valve, with normal leaflet   excursion. The tricuspid valve is normal in structure. Mild tricuspid   regurgitation is visualized. Estimated pulmonary artery systolic pressure   is 46.0 mmHg assuming a right atrial pressure of 10 mmHg, which is   consistent with mild pulmonary hypertension.  Aortic Valve: The aortic valve is trileaflet. No evidence of aortic   stenosis. Mild aortic valve regurgitation is seen. Aortic sclerosis.  Pulmonic Valve: Structurally normal pulmonic valve, with normal leaflet   excursion.  Aorta: The aortic root and ascending aorta are structurally normal, with   noevidence of dilitation.  Pulmonary Artery: The main pulmonary artery is normal in size.       < end of copied text >      ECG:  < from: 12 Lead ECG (11.17.22 @ 10:47) >  Ventricular Rate 71 BPM    Atrial Rate 71 BPM    P-R Interval 342 ms    QRS Duration 144 ms    Q-T Interval 476 ms    QTC Calculation(Bazett) 517 ms    P Axis 66 degrees    R Axis 268 degrees    T Axis 82 degrees    Diagnosis Line Atrial-sensed ventricular-paced rhythm with prolonged AV conduction  Abnormal ECG    Confirmed by MARTIN KELLY MD ([id number]) on 11/17/2022 11:01:47 AM    < end of copied text >    < from: CT Angio Abdomen and Pelvis w/ IV Cont (11.17.22 @ 12:05) >  IMPRESSION:    1. Since May 27, 2018, no definite evidence of active gastrointestinal   bleed.    2. Diffuse colonic diverticulosis, without evidence of acute   diverticulitis.    3. New 8 mm calculus within the proximal pancreatic duct in the region of   the pancreatic head with new diffuse diffuse pancreatic parenchymal   atrophy and dilatation of the pancreatic duct to 1.1 cm; if clinically   warranted, GI consultation may be of use.    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 Cardiac risk stratification requested for EGD/Colonoscopy    Pt evaluated at bedside, poor historian. Additional history obtained from her son Ernesto. Patient is fairly active for her age ambulatory with walker able to climb stairs, walk slowly to the parking lot with no issue. Denies chest pain, SOB. dizziness, leg swelling. Last TTE 03/22 EF 60-65% G1DD          PAST MEDICAL & SURGICAL HISTORY  HTN (hypertension)    Bradycardia    Pacemaker        FAMILY HISTORY:  FAMILY HISTORY:      SOCIAL HISTORY:  []smoker  []Alcohol  []Drug    ALLERGIES:  No Known Allergies      MEDICATIONS:  MEDICATIONS  (STANDING):  bisacodyl 10 milliGRAM(s) Oral once  cefepime   IVPB 2000 milliGRAM(s) IV Intermittent once  polyethylene glycol/electrolyte Solution. 4000 milliLiter(s) Oral once    MEDICATIONS  (PRN):      HOME MEDICATIONS:  Home Medications:  mesalamine 800 mg oral delayed release tablet: 2 tab(s) orally 3 times a day (03 Jul 2018 00:42)  polyethylene glycol 3350 oral powder for reconstitution: 17 gram(s) orally  (20 Jul 2018 15:21)      VITALS:   T(F): 96.9 (11-17 @ 13:00), Max: 97 (11-17 @ 12:45)  HR: 78 (11-17 @ 13:50) (74 - 89)  BP: 144/81 (11-17 @ 13:50) (129/56 - 170/114)  BP(mean): --  RR: 20 (11-17 @ 13:50) (20 - 20)  SpO2: 96% (11-17 @ 13:50) (96% - 97%)    I&O's Summary      REVIEW OF SYSTEMS:  CONSTITUTIONAL: No weakness, fevers or chills  EYES: No visual changes  ENT: No vertigo or throat pain   NECK: No pain or stiffness  RESPIRATORY: No cough, wheezing, hemoptysis; No shortness of breath  CARDIOVASCULAR: No chest pain or palpitations  GASTROINTESTINAL: No abdominal or epigastric pain. No nausea, vomiting, or hematemesis; No diarrhea or constipation. No melena or hematochezia.  GENITOURINARY: No dysuria, frequency or hematuria  NEUROLOGICAL: No numbness or weakness  SKIN: No itching, no rashes  MSK: no    PHYSICAL EXAM:  NEURO: patient is awake , alert and oriented  GEN: Not in acute distress  NECK: no thyroid enlargement, no JVD  LUNGS: Clear to auscultation bilaterally   CARDIOVASCULAR: S1/S2 present, RRR , no murmurs or rubs, no carotid bruits,  + PP bilaterally  ABD: Soft, non-tender, non-distended, +BS  EXT: No BISMARK  SKIN: Intact    LABS:                        8.0    29.38 )-----------( 269      ( 17 Nov 2022 10:30 )             24.9     11-17    138  |  106  |  47<H>  ----------------------------<  232<H>  4.8   |  17  |  1.7<H>    Ca    8.4      17 Nov 2022 10:30    TPro  5.5<L>  /  Alb  3.5  /  TBili  0.2  /  DBili  x   /  AST  13  /  ALT  8   /  AlkPhos  74  11-17    PT/INR - ( 17 Nov 2022 10:30 )   PT: 12.60 sec;   INR: 1.10 ratio         PTT - ( 17 Nov 2022 10:30 )  PTT:23.4 sec  Lactate, Blood: 3.9 mmol/L *H* (11-17-22 @ 13:40)  Troponin T, Serum: <0.01 ng/mL (11-17-22 @ 10:30)    CARDIAC MARKERS ( 17 Nov 2022 10:30 )  x     / <0.01 ng/mL / x     / x     / x            Troponin trend:            RADIOLOGY:  < from: Transthoracic Echocardiogram (03.22.18 @ 07:03) >  Summary:   1.Left ventricular ejection fraction, by visual estimation, is 60 to   65%.   2. Mildly increased LV wall thickness.   3. Spectral Doppler shows impaired relaxation pattern of left   ventricular myocardial filling (Grade I diastolic dysfunction).   4. Mild aortic regurgitation.   5. Aortic sclerosis.   6. Estimated pulmonary artery systolic pressure is 46.0 mmHg assuming a   right atrial pressure of 10 mmHg, which is consistent with mild pulmonary   hypertension.    PHYSICIAN INTERPRETATION:  Left Ventricle: The left ventricular internal cavity size is normal. Left   ventricular wall thickness is mildly increased. Left ventricular ejection   fraction, by visual estimation, is 60 to 65%. Spectral Doppler shows   impaired relaxation pattern of left ventricular myocardial filling (Grade   I diastolic dysfunction).  Right Ventricle: Normal right ventricular size and function.  Left Atrium: Mildly enlarged left atrium.  Right Atrium: Mildly enlarged right atrium.  Pericardium: There is no evidence of pericardial effusion.  Mitral Valve: Structurally normal mitral valve, with normal leaflet   excursion.  Tricuspid Valve: Structurally normal tricuspid valve, with normal leaflet   excursion. The tricuspid valve is normal in structure. Mild tricuspid   regurgitation is visualized. Estimated pulmonary artery systolic pressure   is 46.0 mmHg assuming a right atrial pressure of 10 mmHg, which is   consistent with mild pulmonary hypertension.  Aortic Valve: The aortic valve is trileaflet. No evidence of aortic   stenosis. Mild aortic valve regurgitation is seen. Aortic sclerosis.  Pulmonic Valve: Structurally normal pulmonic valve, with normal leaflet   excursion.  Aorta: The aortic root and ascending aorta are structurally normal, with   noevidence of dilitation.  Pulmonary Artery: The main pulmonary artery is normal in size.       < end of copied text >      ECG:  < from: 12 Lead ECG (11.17.22 @ 10:47) >  Ventricular Rate 71 BPM    Atrial Rate 71 BPM    P-R Interval 342 ms    QRS Duration 144 ms    Q-T Interval 476 ms    QTC Calculation(Bazett) 517 ms    P Axis 66 degrees    R Axis 268 degrees    T Axis 82 degrees    Diagnosis Line Atrial-sensed ventricular-paced rhythm with prolonged AV conduction  Abnormal ECG    Confirmed by MARTIN KELLY MD ([id number]) on 11/17/2022 11:01:47 AM    < end of copied text >    < from: CT Angio Abdomen and Pelvis w/ IV Cont (11.17.22 @ 12:05) >  IMPRESSION:    1. Since May 27, 2018, no definite evidence of active gastrointestinal   bleed.    2. Diffuse colonic diverticulosis, without evidence of acute   diverticulitis.    3. New 8 mm calculus within the proximal pancreatic duct in the region of   the pancreatic head with new diffuse diffuse pancreatic parenchymal   atrophy and dilatation of the pancreatic duct to 1.1 cm; if clinically   warranted, GI consultation may be of use.    --- End of Report ---    < end of copied text >        TELEMETRY EVENTS:   HPI-Cardiology   89 y/o female with hx of dementia, CHF, HTN , PPM secondary to heart block presents to the ED for maroon colored stool, found to have low hgb 8, CTA +diverticulosis, neg for active GI bleed. Cardiac risk stratification requested for EGD/Colonoscopy    Pt evaluated at bedside, poor historian. Additional history obtained from her son Ernesto. Patient is fairly active for her age ambulatory with walker able to climb stairs, walk to the parking lot with no issue. Denies chest pain, SOB. dizziness, leg swelling. Last TTE 03/22 EF 60-65% G1DD          PAST MEDICAL & SURGICAL HISTORY  HTN (hypertension)    Bradycardia    Pacemaker        FAMILY HISTORY:  FAMILY HISTORY:      SOCIAL HISTORY:  []smoker  []Alcohol  []Drug    ALLERGIES:  No Known Allergies      MEDICATIONS:  MEDICATIONS  (STANDING):  bisacodyl 10 milliGRAM(s) Oral once  cefepime   IVPB 2000 milliGRAM(s) IV Intermittent once  polyethylene glycol/electrolyte Solution. 4000 milliLiter(s) Oral once    MEDICATIONS  (PRN):      HOME MEDICATIONS:  Home Medications:  mesalamine 800 mg oral delayed release tablet: 2 tab(s) orally 3 times a day (03 Jul 2018 00:42)  polyethylene glycol 3350 oral powder for reconstitution: 17 gram(s) orally  (20 Jul 2018 15:21)      VITALS:   T(F): 96.9 (11-17 @ 13:00), Max: 97 (11-17 @ 12:45)  HR: 78 (11-17 @ 13:50) (74 - 89)  BP: 144/81 (11-17 @ 13:50) (129/56 - 170/114)  BP(mean): --  RR: 20 (11-17 @ 13:50) (20 - 20)  SpO2: 96% (11-17 @ 13:50) (96% - 97%)    I&O's Summary      REVIEW OF SYSTEMS:  CONSTITUTIONAL: No weakness, fevers or chills  EYES: No visual changes  ENT: No vertigo or throat pain   NECK: No pain or stiffness  RESPIRATORY: No cough, wheezing, hemoptysis; No shortness of breath  CARDIOVASCULAR: No chest pain or palpitations  GASTROINTESTINAL: No abdominal or epigastric pain. No nausea, vomiting, or hematemesis; No diarrhea or constipation. No melena or hematochezia.  GENITOURINARY: No dysuria, frequency or hematuria  NEUROLOGICAL: No numbness or weakness  SKIN: No itching, no rashes  MSK: no    PHYSICAL EXAM:  NEURO: patient is awake , alert and oriented  GEN: Not in acute distress  NECK: no thyroid enlargement, no JVD  LUNGS: Clear to auscultation bilaterally   CARDIOVASCULAR: S1/S2 present, RRR , no murmurs or rubs, no carotid bruits,  + PP bilaterally  ABD: Soft, non-tender, non-distended, +BS  EXT: No BISMARK  SKIN: Intact    LABS:                        8.0    29.38 )-----------( 269      ( 17 Nov 2022 10:30 )             24.9     11-17    138  |  106  |  47<H>  ----------------------------<  232<H>  4.8   |  17  |  1.7<H>    Ca    8.4      17 Nov 2022 10:30    TPro  5.5<L>  /  Alb  3.5  /  TBili  0.2  /  DBili  x   /  AST  13  /  ALT  8   /  AlkPhos  74  11-17    PT/INR - ( 17 Nov 2022 10:30 )   PT: 12.60 sec;   INR: 1.10 ratio         PTT - ( 17 Nov 2022 10:30 )  PTT:23.4 sec  Lactate, Blood: 3.9 mmol/L *H* (11-17-22 @ 13:40)  Troponin T, Serum: <0.01 ng/mL (11-17-22 @ 10:30)    CARDIAC MARKERS ( 17 Nov 2022 10:30 )  x     / <0.01 ng/mL / x     / x     / x            Troponin trend:            RADIOLOGY:  < from: Transthoracic Echocardiogram (03.22.18 @ 07:03) >  Summary:   1.Left ventricular ejection fraction, by visual estimation, is 60 to   65%.   2. Mildly increased LV wall thickness.   3. Spectral Doppler shows impaired relaxation pattern of left   ventricular myocardial filling (Grade I diastolic dysfunction).   4. Mild aortic regurgitation.   5. Aortic sclerosis.   6. Estimated pulmonary artery systolic pressure is 46.0 mmHg assuming a   right atrial pressure of 10 mmHg, which is consistent with mild pulmonary   hypertension.    PHYSICIAN INTERPRETATION:  Left Ventricle: The left ventricular internal cavity size is normal. Left   ventricular wall thickness is mildly increased. Left ventricular ejection   fraction, by visual estimation, is 60 to 65%. Spectral Doppler shows   impaired relaxation pattern of left ventricular myocardial filling (Grade   I diastolic dysfunction).  Right Ventricle: Normal right ventricular size and function.  Left Atrium: Mildly enlarged left atrium.  Right Atrium: Mildly enlarged right atrium.  Pericardium: There is no evidence of pericardial effusion.  Mitral Valve: Structurally normal mitral valve, with normal leaflet   excursion.  Tricuspid Valve: Structurally normal tricuspid valve, with normal leaflet   excursion. The tricuspid valve is normal in structure. Mild tricuspid   regurgitation is visualized. Estimated pulmonary artery systolic pressure   is 46.0 mmHg assuming a right atrial pressure of 10 mmHg, which is   consistent with mild pulmonary hypertension.  Aortic Valve: The aortic valve is trileaflet. No evidence of aortic   stenosis. Mild aortic valve regurgitation is seen. Aortic sclerosis.  Pulmonic Valve: Structurally normal pulmonic valve, with normal leaflet   excursion.  Aorta: The aortic root and ascending aorta are structurally normal, with   noevidence of dilitation.  Pulmonary Artery: The main pulmonary artery is normal in size.       < end of copied text >      ECG:  < from: 12 Lead ECG (11.17.22 @ 10:47) >  Ventricular Rate 71 BPM    Atrial Rate 71 BPM    P-R Interval 342 ms    QRS Duration 144 ms    Q-T Interval 476 ms    QTC Calculation(Bazett) 517 ms    P Axis 66 degrees    R Axis 268 degrees    T Axis 82 degrees    Diagnosis Line Atrial-sensed ventricular-paced rhythm with prolonged AV conduction  Abnormal ECG    Confirmed by MARTIN KELLY MD ([id number]) on 11/17/2022 11:01:47 AM    < end of copied text >    < from: CT Angio Abdomen and Pelvis w/ IV Cont (11.17.22 @ 12:05) >  IMPRESSION:    1. Since May 27, 2018, no definite evidence of active gastrointestinal   bleed.    2. Diffuse colonic diverticulosis, without evidence of acute   diverticulitis.    3. New 8 mm calculus within the proximal pancreatic duct in the region of   the pancreatic head with new diffuse diffuse pancreatic parenchymal   atrophy and dilatation of the pancreatic duct to 1.1 cm; if clinically   warranted, GI consultation may be of use.    --- End of Report ---    < end of copied text >        TELEMETRY EVENTS:

## 2022-11-17 NOTE — ED PROVIDER NOTE - CLINICAL SUMMARY MEDICAL DECISION MAKING FREE TEXT BOX
Patient here on the floor with maroon-colored stools.  Patient not hypotensive digit rectal exam demonstrates colored stools and labs demonstrate hemoglobin 8.0 and a white count of 29,000.  Previous hemoglobin was 12.  Patient was seen by GI and transfused unit of blood.  CT angio shows no acute active sterilization in the abdomen.  Patient to be admitted to stepdown unit with plan for colonoscopy as an inpatient.  Of note patient received the transfusion but after were unable to get in touch with family and 2 providers signed the consent on behalf of the patient to the urgent need of the transfusion.

## 2022-11-17 NOTE — CONSULT NOTE ADULT - ASSESSMENT
89yFemale pmh HTN, bradycardia, pacemaker presents with maroon colored stools. Patient's home health aide not able to give much information, reports maroon colored stools one time at 9A.M. and nothing on her shift yesterday.  Bed 7    Problem 1-Maroon colored stools   ddx PUD, diverticulosis, AVMs, r/o malignancy  hgb 8 baseline 12 in July  Rec  -Please obtain CTA  -attempted to reach out to patient's son Ernesto over the phone but he did not   -IF patient's family agreeable will aim for EGD/Colonoscopy tomorrow if CTA negative  -Cardiology risk stratification for GI workup  -Maintain Hemodynamic Stability   -Monitor CBC  -Negative COVID-19 Test within 3 days for intervention   -CMP,Optimize Electrolytes  -PT,PTT,INR  -EKG, Chest-Xray   -Transfuse prn to hgb >8  -Two large bore IV lines  - PPI BID  -ICU evaluation  -Monitor Vital Signs  -Keep patient NPO  -Monitor Stool For blood, frequency, consistency, melena  -Active Type and Screen  -Iron Studies, Folate, Vitamin B12 levels  89yFemale pmh HTN, bradycardia, pacemaker presents with maroon colored stools. Patient's home health aide not able to give much information, reports maroon colored stools one time at 9A.M. and nothing on her shift yesterday.  Bed 7    Problem 1-Maroon colored stools   ddx PUD, diverticulosis, AVMs, r/o malignancy  hgb 8 baseline 12 in July  Rec  -CTA negative for active GI bleed  -If family agreeable will plan for Colonoscopy tomorrow with Go-lytely prep  -attempted to reach out to patient's son Ernesto over the phone but he did not   -IF patient's family agreeable will aim for Colonoscopy tomorrow if CTA negative, tried to reach family contact again without success   -Cardiology risk stratification for GI workup  -Maintain Hemodynamic Stability   -Monitor CBC  -Negative COVID-19 Test within 3 days for intervention   -CMP,Optimize Electrolytes  -PT,PTT,INR  -EKG, Chest-Xray   -Transfuse prn to hgb >8  -Two large bore IV lines  - PPI BID  -ICU evaluation  -Monitor Vital Signs  -Keep patient NPO  -Monitor Stool For blood, frequency, consistency, melena  -Active Type and Screen  -Iron Studies, Folate, Vitamin B12 levels     Problem 2-New 8 mm calculus within the proximal pancreatic duct in the region of   the pancreatic head with new diffuse diffuse pancreatic parenchymal   atrophy and dilatation of the pancreatic duct to 1.1 cm; if clinically   warranted, GI consultation may be of use  normal LFTs  Rec  - Follow up with our [facility] located at [address] [place] [address]. Phone Number: [phone] Tuesday session 89yFemale pmh HTN, bradycardia, pacemaker presents with maroon colored stools. Patient's home health aide not able to give much information, reports maroon colored stools one time at 9A.M. and nothing on her shift yesterday.  Bed 7  Last Colonoscopy as per son a long long time ago    Problem 1-Maroon colored stools   ddx PUD, diverticulosis, AVMs, r/o malignancy  hgb 8 baseline 12 in July  Rec  -CTA negative for active GI bleed  - will plan for Colonoscopy tomorrow with Jered-jose prep 11A.M.  -Finally reached Ernesto on Cell phone [phone] he wants to proceed with Colonoscopy   -IF patient's family agreeable will aim for Colonoscopy tomorrow if CTA negative, tried to reach family contact again without success   -Cardiology risk stratification for GI workup  -Maintain Hemodynamic Stability   -Monitor CBC  -Negative COVID-19 Test within 3 days for intervention   -CMP,Optimize Electrolytes  -PT,PTT,INR  -EKG, Chest-Xray   -Transfuse prn to hgb >8  -Two large bore IV lines  - PPI BID  -ICU evaluation  -Monitor Vital Signs  -Keep patient NPO  -Monitor Stool For blood, frequency, consistency, melena  -Active Type and Screen  -Iron Studies, Folate, Vitamin B12 levels     Problem 2-New 8 mm calculus within the proximal pancreatic duct in the region of   the pancreatic head with new diffuse diffuse pancreatic parenchymal   atrophy and dilatation of the pancreatic duct to 1.1 cm; if clinically   warranted, GI consultation may be of use, family reports intermittent epigastric pain  normal LFTs  Rec  - Follow up with our [facility] located at [address] [place] [address]. Phone Number: [phone] Tuesday session 89yFemale pmh HTN, bradycardia, pacemaker presents with maroon colored stools. Patient's home health aide not able to give much information, reports maroon colored stools one time at 9A.M. and nothing on her shift yesterday.  Bed 7  Last Colonoscopy as per son a long long time ago    Problem 1-Maroon colored stools   ddx PUD, diverticulosis, AVMs, r/o malignancy  hgb 8 baseline 12 in July  Rec  -CTA negative for active GI bleed  - will plan for Colonoscopy tomorrow with Go-wildatewilda prep 11A.M.  -Finally reached Ernesto on Cell phone [phone] he wants to proceed with Colonoscopy   -IF patient's family agreeable will aim for Colonoscopy tomorrow if CTA negative, tried to reach family contact again without success   -Cardiology risk stratification for GI workup  -Maintain Hemodynamic Stability   -Monitor CBC  -Negative COVID-19 Test within 3 days for intervention   -CMP,Optimize Electrolytes  -PT,PTT,INR  -EKG, Chest-Xray   -Transfuse prn to hgb >8  -Two large bore IV lines  - PPI BID  -ICU evaluation  -Monitor Vital Signs  -Keep patient NPO  -Monitor Stool For blood, frequency, consistency, melena  -Active Type and Screen  -Iron Studies, Folate, Vitamin B12 levels     Problem 2-New 8 mm calculus within the proximal pancreatic duct in the region of   the pancreatic head with new diffuse diffuse pancreatic parenchymal   atrophy and dilatation of the pancreatic duct to 1.1 cm; if clinically   warranted, GI consultation may be of use, family reports intermittent epigastric pain  normal LFTs  Rec  - Further work up by MRI to rule out malignancy, unclear whether she has a hx of chronic pancreatitis.  - Follow up with our [facility] located at [address] [place] [address]. Phone Number: [phone] Tuesday session

## 2022-11-17 NOTE — CONSULT NOTE ADULT - SUBJECTIVE AND OBJECTIVE BOX
Chief complaint/Reason for consult: maroon colored stools    HPI: 89yFemale pmh HTN, bradycardia, pacemaker presents with maroon colored stools. Patient's home health aide not able to give much information, reports maroon colored stools one time at 9A.M. and nothing on her shift yesterday. Home health aide this is patient's first instance since she has been taking care of patient.      PAST MEDICAL & SURGICAL HISTORY:  HTN (hypertension)      Bradycardia      Pacemaker            Family history:  FAMILY HISTORY:    No GI cancers in first or second degree relatives    Social History: No smoking. No alcohol. No illegal drug use.    Allergies:   No Known Allergies            MEDICATIONS: Home Medications:  mesalamine 800 mg oral delayed release tablet: 2 tab(s) orally 3 times a day (03 Jul 2018 00:42)  polyethylene glycol 3350 oral powder for reconstitution: 17 gram(s) orally  (20 Jul 2018 15:21)    REVIEW OF SYSTEMS  unobtainable       VITALS:   T(F): 96.3 (11-17-22 @ 10:22), Max: 96.3 (11-17-22 @ 10:22)  HR: 89 (11-17-22 @ 10:22) (89 - 89)  BP: 129/56 (11-17-22 @ 10:22) (129/56 - 129/56)  RR: 20 (11-17-22 @ 10:22) (20 - 20)  SpO2: 97% (11-17-22 @ 10:22) (97% - 97%)    PHYSICAL EXAM:  GENERAL: AAOx1 to person not place or time, no acute distress.  HEAD:  Atraumatic, Normocephalic  EYES: conjunctiva and sclera clear  NECK: Supple, No thyromegaly   CHEST/LUNG: Clear to auscultation bilaterally; No wheeze, rhonchi, or rales  HEART: Regular rate and rhythm; normal S1, S2, No murmurs.  ABDOMEN: Soft, nontender, nondistended; Bowel sounds present  NEUROLOGY: No asterixis or tremor  SKIN: Intact, no jaundice  Rectal exam-brown stool with maroon tinge        LABS:                              8.0    29.38 )-----------( 269      ( 17 Nov 2022 10:30 )             24.9           IMAGING:         Chief complaint/Reason for consult: maroon colored stools    HPI: 89yFemale pmh HTN, bradycardia, pacemaker presents with maroon colored stools. Patient's home health aide not able to give much information, reports maroon colored stools one time at 9A.M. and nothing on her shift yesterday. Home health aide this is patient's first instance since she has been taking care of patient.      PAST MEDICAL & SURGICAL HISTORY:  HTN (hypertension)      Bradycardia      Pacemaker            Family history:  FAMILY HISTORY:    No GI cancers in first or second degree relatives    Social History: No smoking. No alcohol. No illegal drug use.    Allergies:   No Known Allergies            MEDICATIONS: Home Medications:  mesalamine 800 mg oral delayed release tablet: 2 tab(s) orally 3 times a day (03 Jul 2018 00:42)  polyethylene glycol 3350 oral powder for reconstitution: 17 gram(s) orally  (20 Jul 2018 15:21)    REVIEW OF SYSTEMS  unobtainable       VITALS:   T(F): 96.3 (11-17-22 @ 10:22), Max: 96.3 (11-17-22 @ 10:22)  HR: 89 (11-17-22 @ 10:22) (89 - 89)  BP: 129/56 (11-17-22 @ 10:22) (129/56 - 129/56)  RR: 20 (11-17-22 @ 10:22) (20 - 20)  SpO2: 97% (11-17-22 @ 10:22) (97% - 97%)    PHYSICAL EXAM:  GENERAL: AAOx1 to person not place or time, no acute distress.  HEAD:  Atraumatic, Normocephalic  EYES: conjunctiva and sclera clear  NECK: Supple, No thyromegaly   CHEST/LUNG: Clear to auscultation bilaterally; No wheeze, rhonchi, or rales  HEART: Regular rate and rhythm; normal S1, S2, No murmurs.  ABDOMEN: Soft, nontender, nondistended; Bowel sounds present  NEUROLOGY: No asterixis or tremor  SKIN: Intact, no jaundice  Rectal exam-brown stool with maroon tinge        LABS:                              8.0    29.38 )-----------( 269      ( 17 Nov 2022 10:30 )             24.9           IMAGING:    < from: CT Angio Abdomen and Pelvis w/ IV Cont (11.17.22 @ 12:05) >    ACC: [id number] EXAM:  CT ANGIO ABD PELV (W)AW IC                          PROCEDURE DATE:  11/17/2022          INTERPRETATION:  CLINICAL STATEMENT: GI bleed.    TECHNIQUE: Contiguous axial CT images were obtained from the thoracic   inlet to the pubic symphysis both before and after administration of 100   cc of Optiray 320 intravenous contrast, using a GI bleeding protocol.    Oral contrast was not administered.  Reformatted images in the coronal   and sagittal planes were acquired.    COMPARISON CT: May 27, 2018.    FINDINGS:    LOWER CHEST: Mild bibasilar subsegmental atelectasis.    HEPATOBILIARY: Subcentimeter left hepatic hypodensity is too small to   further characterize.    SPLEEN: Unremarkable.    PANCREAS: A new 8 mm calculus is noted within the proximal pancreatic   duct in the region of the pancreatic head. Diffuse pancreatic atrophy,   with dilatation of the pancreatic duct to 1.1 cm.    ADRENAL GLANDS: Unremarkable.    KIDNEYS: Multiple bilateral renal cysts are noted, as are additional   subcentimeter renal hypodensities, which are too small to further   characterize. No hydronephrosis.    ABDOMINOPELVIC NODES: No enlarged abdominal or pelvic lymph nodes.    PELVIC ORGANS: Urinary bladder is decompressed, limiting further   evaluation.    PERITONEUM/MESENTERY/BOWEL: Rectum is distended with stool. Diffuse   colonic diverticulosis is noted, without evidence of acute   diverticulitis. No evidence of active arterial extravasation to suggest   active gastrointestinal bleed. No evidence of bowel obstruction. No   ascites or free intraperitoneal air.    BONES/SOFT TISSUES: Status post right femoral fixation. Degenerative   changes of the spine with grade 1-2 anterolisthesis of L4 on L5. No acute   osseous abnormality.    OTHER: Atherosclerotic vascular calcification.      IMPRESSION:    1. Since May 27, 2018, no definite evidence of active gastrointestinal   bleed.    2. Diffuse colonic diverticulosis, without evidence of acute   diverticulitis.    3. New 8 mm calculus within the proximal pancreatic duct in the region of   the pancreatic head with new diffuse diffuse pancreatic parenchymal   atrophy and dilatation of the pancreatic duct to 1.1 cm; if clinically   warranted, GI consultation may be of use.    --- End of Report ---            EVELIO WINTER MD; Attending Radiologist  This document has been electronically signed. Nov 17 2022  1:26PM    < end of copied text >

## 2022-11-17 NOTE — H&P ADULT - NSHPPHYSICALEXAM_GEN_ALL_CORE
CONSTITUTIONAL: no acute distress, able to vigorously move around in the bed for examination  SKIN: warm, dry  HEAD: Normocephalic no bruising on head  EYES: no conjunctival erythema eomi peerla 2mm b/l  ENT: no nasal discharge, airway clear  NECK: full ROM, non-tender  CARD: regular rate and rhythm  RESP: normal respiratory effort, no wheezes, rales or rhonchi  ABD: soft, non-distended, non-tender  EXT: moving all extremities spontaneously  NEURO: alert and oriented x2 but forgets conversation about 5 minutes later, able to move all extremities

## 2022-11-17 NOTE — CONSULT NOTE ADULT - NS ATTEND AMEND GEN_ALL_CORE FT
Agree with the above. Hematochezia awaiting colonoscopy. warrants op work up of her pancreas to rule out malignancy. Obtain US RUQ to evaluate the CBD (not dilated on CT)

## 2022-11-17 NOTE — ED PROVIDER NOTE - NS ED ATTENDING STATEMENT MOD
This was a shared visit with the BROOKS. I reviewed and verified the documentation and independently performed the documented:

## 2022-11-17 NOTE — CONSULT NOTE ADULT - ASSESSMENT
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 Additional history obtained from her son Ernesto. Patient is fairly active for her age ambulatory with walker able to climb stairs, walk to the parking lot with no issue. Denies chest pain, SOB. dizziness, leg swelling. 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 Cardio risk stratification requested for EGD/Colonoscopy    Trop <0.01  ecg 11/17 A-sensed V-paced prolonged AV conduction  Last TTE 03/22 EF 60-65% G1DD  hgb 8     Impression  # risk stratification for EGD/Colonoscopy      Plan  - No clinical evidence of ACS or ADHF  - RCRI score: 1 class II; 30-Day risk of JULIETTE 6%  - Functional capacity 3-4METS  - Pt is at intermediate risk for pio-op MACE in Low risk procedure  - Monitor h&h, Transfuse prn  - All other workup per primary team

## 2022-11-17 NOTE — ED PROVIDER NOTE - OBJECTIVE STATEMENT
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 patient poor historian. no vomiting in the ED. patient denies any headaches, sob, dizziness, or chest pain

## 2022-11-17 NOTE — PATIENT PROFILE ADULT - FALL HARM RISK - HARM RISK INTERVENTIONS

## 2022-11-17 NOTE — PATIENT PROFILE ADULT - FUNCTIONAL ASSESSMENT - BASIC MOBILITY 6.
2-calculated by average/Not able to assess (calculate score using [facility] averaging method)

## 2022-11-17 NOTE — ED ADULT NURSE NOTE - NSIMPLEMENTINTERV_GEN_ALL_ED
Implemented All Fall with Harm Risk Interventions:  [place] to call system. Call bell, personal items and telephone within reach. Instruct patient to call for assistance. Room bathroom lighting operational. Non-slip footwear when patient is off stretcher. Physically safe environment: no spills, clutter or unnecessary equipment. Stretcher in lowest position, wheels locked, appropriate side rails in place. Provide visual cue, wrist band, yellow gown, etc. Monitor gait and stability. Monitor for mental status changes and reorient to person, place, and time. Review medications for side effects contributing to fall risk. Reinforce activity limits and safety measures with patient and family. Provide visual clues: red socks.

## 2022-11-17 NOTE — H&P ADULT - ATTENDING COMMENTS
pt was seen and examined at bedside, agree with resident plan    Pt has BRBPR from NSAID  - serial cbc   - transfuse keep hgb above 8   - GI fu   - NPO   - hold beta blockers  - prognosis guarded

## 2022-11-17 NOTE — H&P ADULT - HISTORY OF PRESENT ILLNESS
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 Patient has advanced dementia and is a poor historian but is able to articulate pain and symptoms accurately per the son. unknown HT, unknown LOC, and she does not take anticoagulation. History was obtained from the patient, her son at the bedside, and medication reconcilation was done by calling [address] [phone], where she fills her medications per the son. Patient does not remember falling, as she has poor memory. She denies any current lightheadedness, fatigue, chest pain, SOB, nausea, vomiting, headache, back pain, abdominal pain, urinary sx. Of note, she does take celecoxib 100mg BID per pharmacist.     In the ED, she was found to have HGB 8, prior HGB was 12. Patient remained HD stable throughout ED stay. GI was consulted, and they plan to perform colonoscopy tomorrow. Cardiology cleared patient for colonoscopy procedure. She also has wbc 30 but has been afebrile. CTA abdomen/pelvis was negative for active bleeding or infectious etiology. She received 1 dose of 2g cefepime.

## 2022-11-17 NOTE — ED PROVIDER NOTE - CARE PLAN
1 Principal Discharge DX:	GI bleed  Secondary Diagnosis:	Anemia due to acute blood loss  Secondary Diagnosis:	Sepsis

## 2022-11-17 NOTE — H&P ADULT - ASSESSMENT
Assessment & Plan    89y Female  s/p     NEURO:  #Alzheimer's dementia  -cont home donepezil 10mg daily at bedtime  -cont home seroquel 25mg daily at bedtime    #chronic pain  -hold all NSAIDs, in setting of GI bleed  -tylenol as needed for pain    RESP:   -no active issues    CARDS:   #history of hypertension  -cont home amlodipine 10mg daily, keep BP above 130/80, hold as needed  -cont home losartan 100mg daily, keep BP above 130/80, hold as needed    GI/NUTR:   #GI bleed likely 2/2 to NSAIDs use  -hgb 8, GI following, colonoscopy planned for tomorrow, npo after midnight, transfuse if hgb less than 8  -CTA a/p negative for active bleeding or infectious etiology  -patient takes celecoxib 100mg bid daily and this is likely the cause of her GI bleed, hold all NSAIDs  -send Iron Studies, Folate, Vitamin B12 levels, per GI recs      /RENAL:   #acute kidney injury  -BUN/Cr 47/1.7, unknown baseline  -cont to monitor    HEME/ONC:   #anemia  -likely secondary to her GI bleed  -hgb 8, transfuse if below 8 per GI recs  -keep active T&S    #dvt ppx with SCDs, hold medication ppx for now in setting of GI bleed    ID:  -wbc 30, afebrile, s/p 1x dose 2g cefepime  -will send pan culture and f/u culture, hold abx for now, give abx if develops fever    ENDO:  no active issues    LINES/DRAINS:  PIV    Advanced Directives: Full Code    DISPO:   SDU    Plan discussed with and agreed by ICU attending physician, Dr. Avalos.  Assessment & Plan    89y Female  s/p     NEURO:  #Alzheimer's dementia  -cont home donepezil 10mg daily at bedtime  -cont home seroquel 25mg daily at bedtime    #chronic pain  -hold all NSAIDs, in setting of GI bleed  -tylenol as needed for pain    RESP:   -no active issues    CARDS:   #history of hypertension  -cont home amlodipine 10mg daily, keep BP above 130/80, hold as needed  -cont home losartan 100mg daily, keep BP above 130/80, hold as needed    #ED cardiac workup  -Trop <0.01  -ecg 11/17 A-sensed V-paced prolonged AV conduction  -Last TTE 03/22 EF 60-65% G1DD  -No clinical evidence of ACS or ADHF, patient cleared by cardiology for EGD/colonoscopy    GI/NUTR:   #GI bleed likely 2/2 to NSAIDs use  -hgb 8, GI following, colonoscopy planned for tomorrow, npo after midnight, transfuse if hgb less than 8  -CTA a/p negative for active bleeding or infectious etiology  -patient takes celecoxib 100mg bid daily and this is likely the cause of her GI bleed, hold all NSAIDs  -send Iron Studies, Folate, Vitamin B12 levels, per GI recs      /RENAL:   #acute kidney injury  -BUN/Cr 47/1.7, unknown baseline  -cont to monitor    HEME/ONC:   #anemia  -likely secondary to her GI bleed  -hgb 8, transfuse if below 8 per GI recs  -keep active T&S    #dvt ppx with SCDs, hold medication ppx for now in setting of GI bleed    ID:  -wbc 30, afebrile, s/p 1x dose 2g cefepime  -will send pan culture and f/u culture, hold abx for now, give abx if develops fever    ENDO:  no active issues    LINES/DRAINS:  PIV    Advanced Directives: Full Code    DISPO:   SDU    Plan discussed with and agreed by ICU attending physician, Dr. Avalos.  Assessment & Plan    89y Female  s/p     NEURO:  #Alzheimer's dementia  -cont home donepezil 10mg daily at bedtime  -cont home seroquel 25mg daily at bedtime    #chronic pain  -hold all NSAIDs, in setting of GI bleed  -tylenol as needed for pain    #falls  -PT/OT consult  -fall precautions    RESP:   -no active issues    CARDS:   #history of hypertension  -cont home amlodipine 10mg daily, keep BP above 130/80, hold as needed  -cont home losartan 100mg daily, keep BP above 130/80, hold as needed    #ED cardiac workup  -Trop <0.01  -ecg 11/17 A-sensed V-paced prolonged AV conduction  -Last TTE 03/22 EF 60-65% G1DD  -No clinical evidence of ACS or ADHF, patient cleared by cardiology for EGD/colonoscopy    GI/NUTR:   #GI bleed likely 2/2 to NSAIDs use  -hgb 8, GI following, colonoscopy planned for tomorrow, npo after midnight, transfuse if hgb less than 8  -CTA a/p negative for active bleeding or infectious etiology  -patient takes celecoxib 100mg bid daily and this is likely the cause of her GI bleed, hold all NSAIDs  -send Iron Studies, Folate, Vitamin B12 levels, per GI recs      /RENAL:   #acute kidney injury  -BUN/Cr 47/1.7, unknown baseline  -cont to monitor    HEME/ONC:   #anemia  -likely secondary to her GI bleed  -hgb 8, transfuse if below 8 per GI recs  -keep active T&S    #dvt ppx with SCDs, hold medication ppx for now in setting of GI bleed    ID:  -wbc 30, afebrile, s/p 1x dose 2g cefepime  -will send pan culture and f/u culture, hold abx for now, give abx if develops fever    ENDO:  no active issues    LINES/DRAINS:  PIV    Advanced Directives: Full Code    DISPO:   SDU    Plan discussed with and agreed by ICU attending physician, Dr. Avalos.  Assessment & Plan    89F w/ pmhx of Alzheimer's dementia, CHF, HTN, chronic pain, who was biba for fall and GI bleeding, admitted to SDU for hemodynamic monitoring.     NEURO:  #Alzheimer's dementia  -cont home donepezil 10mg daily at bedtime  -cont home seroquel 25mg daily at bedtime    #chronic pain  -hold all NSAIDs, in setting of GI bleed  -tylenol as needed for pain    #falls  -PT/OT consult  -fall precautions    RESP:   -no active issues    CARDS:   #history of hypertension  -cont home amlodipine 10mg daily, keep BP above 130/80, hold as needed  -cont home losartan 100mg daily, keep BP above 130/80, hold as needed    #ED cardiac workup  -Trop <0.01  -ecg 11/17 A-sensed V-paced prolonged AV conduction  -Last TTE 03/22 EF 60-65% G1DD  -No clinical evidence of ACS or ADHF, patient cleared by cardiology for EGD/colonoscopy    GI/NUTR:   #GI bleed likely 2/2 to NSAIDs use  -hgb 8, GI following, colonoscopy planned for tomorrow, npo after midnight, transfuse if hgb less than 8  -CTA a/p negative for active bleeding or infectious etiology  -patient takes celecoxib 100mg bid daily and this is likely the cause of her GI bleed, hold all NSAIDs  -send Iron Studies, Folate, Vitamin B12 levels, per GI recs      /RENAL:   #acute kidney injury  -BUN/Cr 47/1.7, unknown baseline  -cont to monitor    HEME/ONC:   #anemia  -likely secondary to her GI bleed  -hgb 8, transfuse if below 8 per GI recs  -keep active T&S    #dvt ppx with SCDs, hold medication ppx for now in setting of GI bleed    ID:  -wbc 30, afebrile, s/p 1x dose 2g cefepime  -will send pan culture and f/u culture, hold abx for now, give abx if develops fever    ENDO:  no active issues    LINES/DRAINS:  PIV    Advanced Directives: Full Code    DISPO:   SDU    Plan discussed with and agreed by ICU attending physician, Dr. Avalos.

## 2022-11-17 NOTE — CONSULT NOTE ADULT - NS ATTEND AMEND GEN_ALL_CORE FT
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  She denies chest pain or sob. But poor historian. She is not sure why in hospital . Pacemaker functioning appropriately . 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  She denies chest pain or sob. But poor historian. She is not sure why in hospital . Pacemaker functioning appropriately .Transfuse.  Cardiac risk intermediate

## 2022-11-17 NOTE — ED PROVIDER NOTE - PHYSICAL EXAMINATION
Vital Signs: I have reviewed the initial vital signs.  Constitutional: elderly , frail , confused, anxious  Eyes: PERRLA, EOMI, pallor conjunctiva  ENT: MMM, TM b/l clear , no nasal congestion  Cardiovascular: regular rate, regular rhythm, no murmur appreciated  Respiratory: unlabored respiratory effort, clear to auscultation bilaterally  Gastrointestinal: soft, non-tender, non-distended  abdomen, +maroon colored stools   Musculoskeletal: supple neck, no lower extremity edema, no bony tenderness  Integumentary: warm, dry, no rash  Neurologic: awake, alert, confused, cranial nerves II-XII grossly intact, extremities’ motor and sensory functions grossly intact, no focal deficits

## 2022-11-17 NOTE — H&P ADULT - NSHPREVIEWOFSYSTEMS_GEN_ALL_CORE
Constitutional: No fever   Eyes:  No visual changes  Ears:  No hearing changes  Neck: No neck pain  Cardiac:  No chest pain  Respiratory:  No SOB   GI:  No abdominal pain, nausea, or vomiting +bloody stool  :  No dysuria  MS:  No back pain  Neuro:  No headache or weakness.  No LOC  Skin:  No skin rash

## 2022-11-18 NOTE — CONSULT NOTE ADULT - SUBJECTIVE AND OBJECTIVE BOX
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 Patient has advanced dementia and is a poor historian but is able to articulate pain and symptoms accurately per the son. unknown HT, unknown LOC, and she does not take anticoagulation. History was obtained from the patient, her son at the bedside, and medication reconcilation was done by calling [address] [phone], where she fills her medications per the son. Patient does not remember falling, as she has poor memory. She denies any current lightheadedness, fatigue, chest pain, SOB, nausea, vomiting, headache, back pain, abdominal pain, urinary sx. Of note, she does take celecoxib 100mg BID per pharmacist.     In the ED, she was found to have HGB 8, prior HGB was 12. Patient remained HD stable throughout ED stay. GI was consulted, and they plan to perform colonoscopy tomorrow. Cardiology cleared patient for colonoscopy procedure. She also has wbc 30 but has been afebrile. CTA abdomen/pelvis was negative for active bleeding or infectious etiology. She received 1 dose of 2g cefepime.  (17 Nov 2022 17:03)      PAST MEDICAL & SURGICAL HISTORY:  HTN (hypertension)      Bradycardia      Pacemaker          MEDICATIONS  (STANDING):    MEDICATIONS  (PRN):      Allergies    No Known Allergies    Intolerances        Physical Exam:   Vital Signs Last 24 Hrs  T(C): 36 (18 Nov 2022 14:52), Max: 36.6 (17 Nov 2022 18:09)  T(F): 96.8 (18 Nov 2022 14:52), Max: 97.9 (17 Nov 2022 18:09)  HR: 78 (18 Nov 2022 14:52) (60 - 96)  BP: 151/65 (18 Nov 2022 14:52) (115/65 - 151/65)  BP(mean): 94 (18 Nov 2022 14:52) (84 - 99)  RR: 16 (18 Nov 2022 14:52) (16 - 73)  SpO2: 99% (18 Nov 2022 14:52) (94% - 99%)    Parameters below as of 18 Nov 2022 14:52  Patient On (Oxygen Delivery Method): room air        Labs:                         8.2    19.21 )-----------( 208      ( 18 Nov 2022 05:17 )             24.6     11-18    140  |  108  |  41<H>  ----------------------------<  103<H>  3.7   |  19  |  1.4    Ca    7.9<L>      18 Nov 2022 05:17  Mg     2.0     11-18    TPro  5.1<L>  /  Alb  3.1<L>  /  TBili  0.4  /  DBili  x   /  AST  14  /  ALT  7   /  AlkPhos  69  11-18    PT/INR - ( 18 Nov 2022 05:17 )   PT: 12.60 sec;   INR: 1.10 ratio         PTT - ( 18 Nov 2022 05:17 )  PTT:22.2 sec    Pertinent labs:                      8.2    19.21 )-----------( 208      ( 18 Nov 2022 05:17 )             24.6       11-18    140  |  108  |  41<H>  ----------------------------<  103<H>  3.7   |  19  |  1.4    Ca    7.9<L>      18 Nov 2022 05:17  Mg     2.0     11-18    TPro  5.1<L>  /  Alb  3.1<L>  /  TBili  0.4  /  DBili  x   /  AST  14  /  ALT  7   /  AlkPhos  69  11-18      PT/INR - ( 18 Nov 2022 05:17 )   PT: 12.60 sec;   INR: 1.10 ratio         PTT - ( 18 Nov 2022 05:17 )  PTT:22.2 sec    Radiology imaging reviewed.     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 EGD findings were notable for a 2 cm duodenal bulb ulcer. CTA was negative for active extravasation. IR was consulted to follow in case of need for emergent intervention for additional GI bleeding.  - Chart and imaging reviewed, negative CTA for active GI bleed  - Trend H/H, transfuse prn  - IR will continue to follow    Thank you for the courtesy of this consult, please call [phone] between 8am and 5pm on weekdays, and [phone] at any other time with any further questions.

## 2022-11-18 NOTE — OCCUPATIONAL THERAPY INITIAL EVALUATION ADULT - SPECIFY REASON(S)
Attempted to see patient for OT IE. As discussed MAURICE Tapia, Pt in the OR at this time for procedure and is unavailable for treatment. OT to f/u when appropriate.

## 2022-11-18 NOTE — CONSULT NOTE ADULT - SUBJECTIVE AND OBJECTIVE BOX
Patient is a 89y old  Female who presents with a chief complaint of rectal bleeding (2022 14:51)      HPI:  89F with phhx of Alzheimer's dementia, CHF, HTN , 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 Patient has advanced dementia and is a poor historian but is able to articulate pain and symptoms accurately per the son. unknown HT, unknown LOC, and she does not take anticoagulation. History was obtained from the patient, her son at the bedside, and medication reconcilation was done by calling [address] [phone], where she fills her medications per the son. Patient does not remember falling, as she has poor memory. She denies any current lightheadedness, fatigue, chest pain, SOB, nausea, vomiting, headache, back pain, abdominal pain, urinary sx. Of note, she does take celecoxib 100mg BID per pharmacist.     In the ED, she was found to have HGB 8, prior HGB was 12. Patient remained HD stable throughout ED stay. GI was consulted, and they plan to perform colonoscopy tomorrow. Cardiology cleared patient for colonoscopy procedure. She also has wbc 30 but has been afebrile. CTA abdomen/pelvis was negative for active bleeding or infectious etiology. She received 1 dose of 2g cefepime.  (2022 17:03)      PAST MEDICAL & SURGICAL HISTORY:  HTN (hypertension)      Bradycardia      Pacemaker        Allergies    No Known Allergies    Intolerances      Family history : no cardiovscular family history   Home Medications:  mesalamine 800 mg oral delayed release tablet: 2 tab(s) orally 3 times a day (2018 00:42)  polyethylene glycol 3350 oral powder for reconstitution: 17 gram(s) orally  (2018 15:21)    Occupation:  Alochol: Denied  Smoking: Denied  Drug Use: Denied  Marital Status:         ROS: as in HPI; All other systems reviewed are negative    ICU Vital Signs Last 24 Hrs  T(C): 35.8 (2022 23:20), Max: 36.6 (2022 18:09)  T(F): 96.4 (2022 23:20), Max: 97.9 (2022 18:09)  HR: 60 (2022 06:05) (60 - 93)  BP: 138/68 (2022 06:05) (115/65 - 170/114)  BP(mean): 94 (2022 06:05) (84 - 96)  ABP: --  ABP(mean): --  RR: 20 (2022 06:05) (18 - 73)  SpO2: 98% (2022 06:05) (95% - 98%)    O2 Parameters below as of 2022 13:50  Patient On (Oxygen Delivery Method): room air              Physical Examination:    General:  awake     HEENT: Pupils equal, reactive to light.  Symmetric.    PULM: Clear to auscultation bilaterally, no significant sputum production    CVS: Regular rate and rhythm, no murmurs, rubs, or gallops    ABD: Soft, nondistended, nontender, normoactive bowel sounds, no masses    EXT: No edema, nontender, no clubbing     SKIN: Warm and well perfused, no rashes noted.    Neurology : no motor or sensory deficit     Musculoskeletal : move all extremity     Lymphatic system: no Palpable node               I&O's Detail    2022 07:01  -  2022 07:00  --------------------------------------------------------  IN:  Total IN: 0 mL    OUT:    Indwelling Catheter - Urethral (mL): 800 mL  Total OUT: 800 mL    Total NET: -800 mL            LABS:                        8.2    19.21 )-----------( 208      ( 2022 05:17 )             24.6     2022 05:17    140    |  108    |  41     ----------------------------<  103    3.7     |  19     |  1.4      Ca    7.9        2022 05:17  Mg     2.0       2022 05:17    TPro  5.1    /  Alb  3.1    /  TBili  0.4    /  DBili  x      /  AST  14     /  ALT  7      /  AlkPhos  69     2022 05:17  Amylase x     lipase x          CARDIAC MARKERS ( 2022 10:30 )  x     / <0.01 ng/mL / x     / x     / x          CAPILLARY BLOOD GLUCOSE        PT/INR - ( 2022 05:17 )   PT: 12.60 sec;   INR: 1.10 ratio         PTT - ( 2022 05:17 )  PTT:22.2 sec  Urinalysis Basic - ( 2022 14:25 )    Color: Yellow / Appearance: Clear / S.010 / pH: x  Gluc: x / Ketone: Negative  / Bili: Negative / Urobili: 0.2 mg/dL   Blood: x / Protein: Negative mg/dL / Nitrite: Negative   Leuk Esterase: Small / RBC: 11-25 /HPF / WBC 26-50 /HPF   Sq Epi: x / Non Sq Epi: Few /HPF / Bacteria: Moderate      Culture    Lactate, Blood: 3.9 mmol/L (22 @ 13:40)      MEDICATIONS  (STANDING):  amLODIPine   Tablet 5 milliGRAM(s) Oral daily  donepezil 10 milliGRAM(s) Oral at bedtime  losartan 100 milliGRAM(s) Oral daily  pantoprazole  Injectable 40 milliGRAM(s) IV Push two times a day  polyethylene glycol/electrolyte Solution. 4000 milliLiter(s) Oral once  potassium chloride    Tablet ER 40 milliEquivalent(s) Oral every 4 hours  QUEtiapine 25 milliGRAM(s) Oral at bedtime    MEDICATIONS  (PRN):        RADIOLOGY: ***   ct< from: CT Angio Abdomen and Pelvis w/ IV Cont (22 @ 12:05) >  Since May 27, 2018, no definite evidence of active gastrointestinal   bleed.    2. Diffuse colonic diverticulosis, without evidence of acute   diverticulitis.    3. New 8 mm calculus within the proximal pancreatic duct in the region of   the pancreatic head with new diffuse diffuse pancreatic parenchymal   atrophy and dilatation of the pancreatic duct to 1.1 cm; if clinically   warranted, GI consultation may be of use.      < end of copied text >    CXR:  TLC:  OG:  ET tube:        [facility] ICU:  ECHO:

## 2022-11-18 NOTE — PROGRESS NOTE ADULT - SUBJECTIVE AND OBJECTIVE BOX
SUBJECTIVE:    Patient is a 89y old Female who presents with a chief complaint of rectal bleeding (2022 14:51)    Currently admitted to medicine with the primary diagnosis of GI bleed       Today is hospital day 1d. This morning she is resting comfortably in bed     ROS:   CONSTITUTIONAL: No weakness, fevers or chills   EYES/ENT: No visual changes; No vertigo or throat pain   NECK: No pain or stiffness   RESPIRATORY: No cough, wheezing, hemoptysis; No shortness of breath   CARDIOVASCULAR: No chest pain or palpitations   GASTROINTESTINAL: No abdominal or epigastric pain. No nausea, vomiting, or hematemesis; No diarrhea or constipation. No melena or hematochezia.  GENITOURINARY: No dysuria, frequency or hematuria  NEUROLOGICAL: No numbness or weakness        PAST MEDICAL & SURGICAL HISTORY  HTN (hypertension)    Bradycardia    Pacemaker      SOCIAL HISTORY:    ALLERGIES:  No Known Allergies    MEDICATIONS:  STANDING MEDICATIONS  amLODIPine   Tablet 5 milliGRAM(s) Oral daily  dextrose 5% + sodium chloride 0.45%. 1000 milliLiter(s) IV Continuous <Continuous>  donepezil 10 milliGRAM(s) Oral at bedtime  losartan 100 milliGRAM(s) Oral daily  pantoprazole  Injectable 40 milliGRAM(s) IV Push two times a day  polyethylene glycol/electrolyte Solution. 4000 milliLiter(s) Oral once  QUEtiapine 25 milliGRAM(s) Oral at bedtime    PRN MEDICATIONS    VITALS:   T(F): 97  HR: 89  BP: 138/66  RR: 26  SpO2: 96%    LABS:  Negative for smoking/alcohol/drug use.                         8.2    19.21 )-----------( 208      ( 2022 05:17 )             24.6     11-18    140  |  108  |  41<H>  ----------------------------<  103<H>  3.7   |  19  |  1.4    Ca    7.9<L>      2022 05:17  Mg     2.0     11-18    TPro  5.1<L>  /  Alb  3.1<L>  /  TBili  0.4  /  DBili  x   /  AST  14  /  ALT  7   /  AlkPhos  69  11-18    PT/INR - ( 2022 05:17 )   PT: 12.60 sec;   INR: 1.10 ratio         PTT - ( 2022 05:17 )  PTT:22.2 sec  Urinalysis Basic - ( 2022 14:25 )    Color: Yellow / Appearance: Clear / S.010 / pH: x  Gluc: x / Ketone: Negative  / Bili: Negative / Urobili: 0.2 mg/dL   Blood: x / Protein: Negative mg/dL / Nitrite: Negative   Leuk Esterase: Small / RBC: 11-25 /HPF / WBC 26-50 /HPF   Sq Epi: x / Non Sq Epi: Few /HPF / Bacteria: Moderate        Lactate, Blood: 3.9 mmol/L *H* (22 @ 13:40)      CARDIAC MARKERS ( 2022 10:30 )  x     / <0.01 ng/mL / x     / x     / x          RADIOLOGY:    PHYSICAL EXAM:  GEN: No acute distress  HEENT: normocephalic, atraumatic, aniceteric  LUNGS: Clear to auscultation bilaterally, no rales/wheezing/ rhonchi  HEART: S1/S2 present. RRR, no murmurs  ABD: Soft, non-tender, non-distended. Bowel sounds present  EXT: NC/NC/NE/2+PP/PRIETO  NEURO: no focal deficits, forgetful       ASSESSMENT AND PLAN:    89F with phhx of Alzheimer's dementia, CHF, HTN , PPM secondary to heart block brought to the ED after being found down by her health aide after an unwitnessed fall, covered with maroon colored stool since the morning on day of admission. Patient takes NSAID's at home for R knee pain     # BRBPR possibly secondary to LGIB in setting of NSAID use  - hemodynamically stable  - Hb baseline ~ 8-9   - Active type and screen, 2 large bore iv's  - sp 1 unit prbc on admission  - monitor CBC  - Planned for colonoscopy today   - NPO , IVF  - PPI    # Leukocytosis, improving  possibly reactive in setting of GIB  - patient asymptomatic and afebrile  - send cultures and procalcitonin    # H/O Alzheimer's dementia , c/w donepezil, seroquel   # H/O HTN, cw home medications   # H/O HFpEF ? previously HFrEF?? sp PPM, not on diuretics at home, appears euvolemic , monitor fluid status     # DVTPPX: SCD  # GI PPX: PPI  # Diet: NPO   # Dispo: acute  # Handoff: pending colonoscopy today and monitor cbc  # Family discussion: attempted to contact Ernesto (son, number in chart) for an update and for GOC today at 12 pm , number goes to voice mail. will re-attempt at a later time      SUBJECTIVE:    Patient is a 89y old Female who presents with a chief complaint of rectal bleeding (2022 14:51)    Currently admitted to medicine with the primary diagnosis of GI bleed       Today is hospital day 1d. This morning she is resting comfortably in bed     ROS:   CONSTITUTIONAL: No weakness, fevers or chills   EYES/ENT: No visual changes; No vertigo or throat pain   NECK: No pain or stiffness   RESPIRATORY: No cough, wheezing, hemoptysis; No shortness of breath   CARDIOVASCULAR: No chest pain or palpitations   GASTROINTESTINAL: No abdominal or epigastric pain. No nausea, vomiting, or hematemesis; No diarrhea or constipation. No melena or hematochezia.  GENITOURINARY: No dysuria, frequency or hematuria  NEUROLOGICAL: No numbness or weakness        PAST MEDICAL & SURGICAL HISTORY  HTN (hypertension)    Bradycardia    Pacemaker      SOCIAL HISTORY:    ALLERGIES:  No Known Allergies    MEDICATIONS:  STANDING MEDICATIONS  amLODIPine   Tablet 5 milliGRAM(s) Oral daily  dextrose 5% + sodium chloride 0.45%. 1000 milliLiter(s) IV Continuous <Continuous>  donepezil 10 milliGRAM(s) Oral at bedtime  losartan 100 milliGRAM(s) Oral daily  pantoprazole  Injectable 40 milliGRAM(s) IV Push two times a day  polyethylene glycol/electrolyte Solution. 4000 milliLiter(s) Oral once  QUEtiapine 25 milliGRAM(s) Oral at bedtime    PRN MEDICATIONS    VITALS:   T(F): 97  HR: 89  BP: 138/66  RR: 26  SpO2: 96%    LABS:  Negative for smoking/alcohol/drug use.                         8.2    19.21 )-----------( 208      ( 2022 05:17 )             24.6     11-18    140  |  108  |  41<H>  ----------------------------<  103<H>  3.7   |  19  |  1.4    Ca    7.9<L>      2022 05:17  Mg     2.0     11-18    TPro  5.1<L>  /  Alb  3.1<L>  /  TBili  0.4  /  DBili  x   /  AST  14  /  ALT  7   /  AlkPhos  69  11-18    PT/INR - ( 2022 05:17 )   PT: 12.60 sec;   INR: 1.10 ratio         PTT - ( 2022 05:17 )  PTT:22.2 sec  Urinalysis Basic - ( 2022 14:25 )    Color: Yellow / Appearance: Clear / S.010 / pH: x  Gluc: x / Ketone: Negative  / Bili: Negative / Urobili: 0.2 mg/dL   Blood: x / Protein: Negative mg/dL / Nitrite: Negative   Leuk Esterase: Small / RBC: 11-25 /HPF / WBC 26-50 /HPF   Sq Epi: x / Non Sq Epi: Few /HPF / Bacteria: Moderate        Lactate, Blood: 3.9 mmol/L *H* (22 @ 13:40)      CARDIAC MARKERS ( 2022 10:30 )  x     / <0.01 ng/mL / x     / x     / x          RADIOLOGY:    PHYSICAL EXAM:  GEN: No acute distress  HEENT: normocephalic, atraumatic, aniceteric  LUNGS: Clear to auscultation bilaterally, no rales/wheezing/ rhonchi  HEART: S1/S2 present. RRR, no murmurs  ABD: Soft, non-tender, non-distended. Bowel sounds present  EXT: NC/NC/NE/2+PP/PRIETO  NEURO: no focal deficits, forgetful       ASSESSMENT AND PLAN:    89F with phhx of Alzheimer's dementia, CHF, HTN , PPM secondary to heart block brought to the ED after being found down by her health aide after an unwitnessed fall, covered with maroon colored stool since the morning on day of admission. Patient takes NSAID's at home for R knee pain     # BRBPR possibly secondary to LGIB in setting of NSAID use  - hemodynamically stable  - Hb baseline ~ 8-9   - Active type and screen, 2 large bore iv's  - sp 1 unit prbc on admission  - monitor CBC  - Planned for colonoscopy today   - NPO , IVF  - PPI    # Leukocytosis, improving  possibly reactive in setting of GIB  - patient asymptomatic and afebrile  - send cultures and procalcitonin    # Right knee pain  - mild swelling, no erythema, mild tenderness  - possibly 2/2 ostearthritis   - obtain R Knee XRAY when patient stable   - send uric acid level  - meloxicam topical , tylenol     # H/O Alzheimer's dementia , c/w donepezil, seroquel   # H/O HTN, cw home medications   # H/O HFpEF ? previously HFrEF?? sp PPM, not on diuretics at home, appears euvolemic , monitor fluid status     # DVTPPX: SCD  # GI PPX: PPI  # Diet: NPO   # Dispo: acute  # Handoff: pending colonoscopy today and monitor cbc  # Family discussion: attempted to contact Ernesto (son, number in chart) for an update and for GOC today at 12 pm , number goes to voice mail. will re-attempt at a later time      SUBJECTIVE:    Patient is a 89y old Female who presents with a chief complaint of rectal bleeding (2022 14:51)    Currently admitted to medicine with the primary diagnosis of GI bleed       Today is hospital day 1d. This morning she is resting comfortably in bed     ROS:   CONSTITUTIONAL: No weakness, fevers or chills   EYES/ENT: No visual changes; No vertigo or throat pain   NECK: No pain or stiffness   RESPIRATORY: No cough, wheezing, hemoptysis; No shortness of breath   CARDIOVASCULAR: No chest pain or palpitations   GASTROINTESTINAL: No abdominal or epigastric pain. No nausea, vomiting, or hematemesis; No diarrhea or constipation. No melena or hematochezia.  GENITOURINARY: No dysuria, frequency or hematuria  NEUROLOGICAL: No numbness or weakness        PAST MEDICAL & SURGICAL HISTORY  HTN (hypertension)    Bradycardia    Pacemaker      SOCIAL HISTORY:    ALLERGIES:  No Known Allergies    MEDICATIONS:  STANDING MEDICATIONS  amLODIPine   Tablet 5 milliGRAM(s) Oral daily  dextrose 5% + sodium chloride 0.45%. 1000 milliLiter(s) IV Continuous <Continuous>  donepezil 10 milliGRAM(s) Oral at bedtime  losartan 100 milliGRAM(s) Oral daily  pantoprazole  Injectable 40 milliGRAM(s) IV Push two times a day  polyethylene glycol/electrolyte Solution. 4000 milliLiter(s) Oral once  QUEtiapine 25 milliGRAM(s) Oral at bedtime    PRN MEDICATIONS    VITALS:   T(F): 97  HR: 89  BP: 138/66  RR: 26  SpO2: 96%    LABS:  Negative for smoking/alcohol/drug use.                         8.2    19.21 )-----------( 208      ( 2022 05:17 )             24.6     11-18    140  |  108  |  41<H>  ----------------------------<  103<H>  3.7   |  19  |  1.4    Ca    7.9<L>      2022 05:17  Mg     2.0     11-18    TPro  5.1<L>  /  Alb  3.1<L>  /  TBili  0.4  /  DBili  x   /  AST  14  /  ALT  7   /  AlkPhos  69  11-18    PT/INR - ( 2022 05:17 )   PT: 12.60 sec;   INR: 1.10 ratio         PTT - ( 2022 05:17 )  PTT:22.2 sec  Urinalysis Basic - ( 2022 14:25 )    Color: Yellow / Appearance: Clear / S.010 / pH: x  Gluc: x / Ketone: Negative  / Bili: Negative / Urobili: 0.2 mg/dL   Blood: x / Protein: Negative mg/dL / Nitrite: Negative   Leuk Esterase: Small / RBC: 11-25 /HPF / WBC 26-50 /HPF   Sq Epi: x / Non Sq Epi: Few /HPF / Bacteria: Moderate        Lactate, Blood: 3.9 mmol/L *H* (22 @ 13:40)      CARDIAC MARKERS ( 2022 10:30 )  x     / <0.01 ng/mL / x     / x     / x          RADIOLOGY:    PHYSICAL EXAM:  GEN: No acute distress  HEENT: normocephalic, atraumatic, aniceteric  LUNGS: Clear to auscultation bilaterally, no rales/wheezing/ rhonchi  HEART: S1/S2 present. RRR, no murmurs  ABD: Soft, non-tender, non-distended. Bowel sounds present  EXT: NC/NC/NE/2+PP/PRIETO  NEURO: no focal deficits, forgetful       ASSESSMENT AND PLAN:    89F with phhx of Alzheimer's dementia, CHF, HTN , PPM secondary to heart block brought to the ED after being found down by her health aide after an unwitnessed fall, covered with maroon colored stool since the morning on day of admission. Patient takes NSAID's at home for R knee pain     # BRBPR possibly secondary to LGIB in setting of NSAID use  - hemodynamically stable  - Hb baseline ~ 8-9   - Active type and screen, 2 large bore iv's  - sp 1 unit prbc on admission  - monitor CBC  - Planned for colonoscopy today   - NPO , IVF  - PPI    # Leukocytosis, improving  possibly reactive in setting of GIB  - patient asymptomatic and afebrile  - send cultures and procalcitonin  - monitor off abx    # Right knee pain  - mild swelling, no erythema, mild tenderness  - possibly 2/2 ostearthritis   - obtain R Knee XRAY when patient stable   - send uric acid level  - meloxicam topical , tylenol     # H/O Alzheimer's dementia , c/w donepezil, seroquel   # H/O HTN, cw home medications   # H/O HFpEF ? previously HFrEF?? sp PPM, not on diuretics at home, appears euvolemic , monitor fluid status     # DVTPPX: SCD  # GI PPX: PPI  # Diet: NPO   # Dispo: acute  # Handoff: pending colonoscopy today and monitor cbc  # Family discussion: attempted to contact Ernesto (son, number in chart) for an update and for GOC today at 12 pm , number goes to voice mail. will re-attempt at a later time

## 2022-11-18 NOTE — PHYSICAL THERAPY INITIAL EVALUATION ADULT - SPECIFY REASON(S)
As per RN, pt is tired at this time and had blood her in stool. Will hold PT for now and follow-up as appropriate.
PT attempted to see pt for IE at bed side, pt is not appropriate 2/2 blood transfusion, will follow when appropriate

## 2022-11-18 NOTE — CONSULT NOTE ADULT - ASSESSMENT
IMPRESSION:  GI bleed ?? diverticular   blood loss anemia       PLAN:    CNS: no sedation     HEENT: oral care     PULMONARY: keep pox >92 %     CARDIOVASCULAR:iv hydration [id number]/hr or D51/2 ns   continue home BP meds   keep map around 70  GI: GI prophylaxis.  npo follow GI pending scope     RENAL: follow is and os lytes     INFECTIOUS DISEASE: porcal   bld cx     HEMATOLOGICAL:  DVT prophylaxis. serial cbc   transfuse 1 unit prbc   follow INR     ENDOCRINE:  Follow up FS.  Insulin protocol if needed.    MUSCULOSKELETAL:        CRITICAL CARE TIME SPENT: ***     IMPRESSION:  GI bleed ?? diverticular   blood loss anemia       PLAN:    CNS: no sedation     HEENT: oral care     PULMONARY: keep pox >92 %     CARDIOVASCULAR:iv hydration [id number]/hr or D51/2 ns   continue home BP meds   keep map around 70  GI: GI prophylaxis.  npo follow GI pending scope   PPI  RENAL: follow is and os lytes     INFECTIOUS DISEASE: porcal   bld cx     HEMATOLOGICAL:  DVT prophylaxis. serial cbc   transfuse 1 unit prbc   follow INR     ENDOCRINE:  Follow up FS.  Insulin protocol if needed.    MUSCULOSKELETAL:        CRITICAL CARE TIME SPENT: ***

## 2022-11-18 NOTE — CHART NOTE - NSCHARTNOTEFT_GEN_A_CORE
Please refer to sunrise results section for EGD/Colonoscopy findings and recommendations   -Spoke with and updated Ernesto Hartley  -Spoke with Dr. Crowell transfer to [facility] approved due to concern for rebleed and need for repeat GI workup with IR backup  -IR Consult placed to be aware of case Please refer to sunrise results section for EGD/Colonoscopy findings and recommendations   -Spoke with and updated Ernesto Hartley  -if patient rebleeds recommend CTA and possible repeat GI workup  -Spoke with Dr. Crowell transfer to [facility] approved due to concern for rebleed and need for repeat GI workup with IR backup  -IR Consult placed to be aware of case Please refer to sunrise results section for EGD/Colonoscopy findings and recommendations   -Spoke with and updated Ernesto Hartley  -if patient rebleeds recommend CTA and possible repeat GI workup  -Spoke with Dr. Crowell transfer to [facility] approved due to concern for rebleed and need for repeat GI workup with IR backup  -IR aware of case

## 2022-11-18 NOTE — CHART NOTE - NSCHARTNOTEFT_GEN_A_CORE
MICU Transfer Note    Transfer from: MICU  Transfer to:  CCU at [facility]     Accepting physician: Mervat    89F with phhx of Alzheimer's dementia, CHF, HTN , 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 Patient has advanced dementia and is a poor historian but is able to articulate pain and symptoms accurately per the son. unknown HT, unknown LOC, and she does not take anticoagulation. History was obtained from the patient, her son at the bedside, and medication reconcilation was done by calling [address] [phone], where she fills her medications per the son. Patient does not remember falling, as she has poor memory. She denies any current lightheadedness, fatigue, chest pain, SOB, nausea, vomiting, headache, back pain, abdominal pain, urinary sx. Of note, she does take celecoxib 100mg BID per pharmacist.     In the ED, she was found to have HGB 8, prior HGB was 12. Patient remained HD stable throughout ED stay. GI was consulted, and they plan to perform colonoscopy tomorrow. Cardiology cleared patient for colonoscopy procedure. She also has wbc 30 but has been afebrile. CTA abdomen/pelvis was negative for active bleeding or infectious etiology. She received 1 dose of 2g cefepime.     In MICU; patient hgb continued to trend down; 1prbc transfused this AM. pt HD stable on monitor; pt to OR for colonoscopy, found severe diverticilosis in OR; plan to transfer pt to [place] for advanced gi and suspected IR intervention made.       ASSESSMENT & PLAN:   **SEE MOST RECENT NOTE**      For Follow-Up:  q4 cbc   daily lytes  t&s active  coags MICU Transfer Note    Transfer from: MICU  Transfer to:  CCU at [facility]     Accepting physician: Mervat    89F with phhx of Alzheimer's dementia, CHF, HTN , 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 Patient has advanced dementia and is a poor historian but is able to articulate pain and symptoms accurately per the son. unknown HT, unknown LOC, and she does not take anticoagulation. History was obtained from the patient, her son at the bedside, and medication reconcilation was done by calling [address] [phone], where she fills her medications per the son. Patient does not remember falling, as she has poor memory. She denies any current lightheadedness, fatigue, chest pain, SOB, nausea, vomiting, headache, back pain, abdominal pain, urinary sx. Of note, she does take celecoxib 100mg BID per pharmacist.     In the ED, she was found to have HGB 8, prior HGB was 12. Patient remained HD stable throughout ED stay. GI was consulted, and they plan to perform colonoscopy tomorrow. Cardiology cleared patient for colonoscopy procedure. She also has wbc 30 but has been afebrile. CTA abdomen/pelvis was negative for active bleeding or infectious etiology. She received 1 dose of 2g cefepime.     In MICU; patient hgb continued to trend down; 1prbc transfused this AM. pt HD stable on monitor; pt to OR for colonoscopy, found severe diverticilosis in OR; plan to transfer pt to [place] for advanced gi and suspected IR intervention made.       ASSESSMENT & PLAN:   **SEE MOST RECENT NOTE**      For Follow-Up:  q4 cbc   daily lytes  t&s active  coags    pt signed out to [id number]

## 2022-11-19 NOTE — PROGRESS NOTE ADULT - ASSESSMENT
IMPRESSION:  GI bleed ?? diverticular . duodenal ulcer   blood loss anemia       PLAN:    CNS: no sedation     HEENT: oral care     PULMONARY: keep pox >92 %     CARDIOVASCULAR:iv hydration [id number]    continue home BP meds   keep map around 70  GI: GI prophylaxis.  npo follow GI  on PPI drip   IR stand by   RENAL: follow is and os lytes     INFECTIOUS DISEAS:   bld cx   procal     HEMATOLOGICAL:  DVT prophylaxis. serial cbc   s/p transfusion    follow INR     ENDOCRINE:  Follow up FS.  Insulin protocol if needed.    MUSCULOSKELETAL:        CRITICAL CARE TIME SPENT: ***

## 2022-11-19 NOTE — PROGRESS NOTE ADULT - SUBJECTIVE AND OBJECTIVE BOX
Patient is a 89y old  Female who presents with a chief complaint of rectal bleeding (2022 14:51)    HPI:  89F with phhx of Alzheimer's dementia, CHF, HTN , 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 Patient has advanced dementia and is a poor historian but is able to articulate pain and symptoms accurately per the son. unknown HT, unknown LOC, and she does not take anticoagulation. History was obtained from the patient, her son at the bedside, and medication reconcilation was done by calling [address] [phone], where she fills her medications per the son. Patient does not remember falling, as she has poor memory. She denies any current lightheadedness, fatigue, chest pain, SOB, nausea, vomiting, headache, back pain, abdominal pain, urinary sx. Of note, she does take celecoxib 100mg BID per pharmacist.     In the ED, she was found to have HGB 8, prior HGB was 12. Patient remained HD stable throughout ED stay. GI was consulted, and they plan to perform colonoscopy tomorrow. Cardiology cleared patient for colonoscopy procedure. She also has wbc 30 but has been afebrile. CTA abdomen/pelvis was negative for active bleeding or infectious etiology. She received 1 dose of 2g cefepime.  (2022 17:03)       INTERVAL HPI/OVERNIGHT EVENTS:   No overnight events   Afebrile, hemodynamically stable     Subjective:    ICU Vital Signs Last 24 Hrs  T(C): 36.3 (2022 00:00), Max: 36.3 (2022 14:17)  T(F): 97.3 (2022 00:00), Max: 97.4 (2022 14:17)  HR: 70 (2022 03:00) (60 - 96)  BP: 126/61 (2022 03:00) (98/56 - 151/65)  BP(mean): 88 (2022 03:00) (72 - 99)  ABP: --  ABP(mean): --  RR: 20 (2022 03:00) (15 - 26)  SpO2: 99% (2022 03:00) (94% - 100%)    O2 Parameters below as of 2022 04:00  Patient On (Oxygen Delivery Method): room air          I&O's Summary    2022 07:01  -  2022 07:00  --------------------------------------------------------  IN: 0 mL / OUT: 800 mL / NET: -800 mL    2022 07:01  -  2022 03:16  --------------------------------------------------------  IN: 1200 mL / OUT: 170 mL / NET: 1030 mL          Daily Height in cm: 157.48 (2022 18:39)    Daily     MEDICATIONS  (STANDING):  chlorhexidine 2% Cloths 1 Application(s) Topical <User Schedule>  pantoprazole Infusion 8 mG/Hr (10 mL/Hr) IV Continuous <Continuous>  sodium chloride 0.9%. 1000 milliLiter(s) (50 mL/Hr) IV Continuous <Continuous>      PHYSICAL EXAM:  GENERAL:   HEAD:  Atraumatic, Normocephalic  EYES: EOMI, PERRLA, conjunctiva and sclera clear  NECK: Supple, No JVD, Normal thyroid, no enlarged nodes  NERVOUS SYSTEM:  Alert & Awake.   CHEST/LUNG: B/L good air entry; No rales, rhonchi, or wheezing  HEART: S1S2 normal, no S3, Regular rate and rhythm; No murmurs  ABDOMEN: Soft, Nontender, Nondistended; Bowel sounds present  EXTREMITIES:  2+ Peripheral Pulses, No clubbing, cyanosis, or edema  LYMPH: No lymphadenopathy noted  SKIN: No rashes or lesions    LABS:                        9.1    20.01 )-----------( 140      ( 2022 00:30 )             26.2     11-18    137  |  109  |  34<H>  ----------------------------<  140<H>  4.2   |  17  |  1.3    Ca    7.5<L>      2022 19:55  Phos  2.9     11-18  Mg     1.7     11-18    TPro  4.3<L>  /  Alb  2.7<L>  /  TBili  0.4  /  DBili  x   /  AST  15  /  ALT  7   /  AlkPhos  54  11-18    LIVER FUNCTIONS - ( 2022 19:55 )  Alb: 2.7 g/dL / Pro: 4.3 g/dL / ALK PHOS: 54 U/L / ALT: 7 U/L / AST: 15 U/L / GGT: x           PT/INR - ( 2022 19:55 )   PT: 13.00 sec;   INR: 1.14 ratio         PTT - ( 2022 19:55 )  PTT:19.1 sec  CAPILLARY BLOOD GLUCOSE            CARDIAC MARKERS ( 2022 10:30 )  x     / <0.01 ng/mL / x     / x     / x          Urinalysis Basic - ( 2022 14:25 )    Color: Yellow / Appearance: Clear / S.010 / pH: x  Gluc: x / Ketone: Negative  / Bili: Negative / Urobili: 0.2 mg/dL   Blood: x / Protein: Negative mg/dL / Nitrite: Negative   Leuk Esterase: Small / RBC: 11-25 /HPF / WBC 26-50 /HPF   Sq Epi: x / Non Sq Epi: Few /HPF / Bacteria: Moderate      Care Discussed with Consultants/Other Providers [ x] YES  [ ] NO

## 2022-11-19 NOTE — PROGRESS NOTE ADULT - SUBJECTIVE AND OBJECTIVE BOX
Gastroenterology progress note:     Patient is a 89y old  Female who presents with a chief complaint of rectal bleeding (17 Nov 2022 14:51)    Admitted on: 11-17-22    We are following the patient for duodenal ulcer      No acute events overnight.   few dark BMS since yesterday, remains HD stable       PAST MEDICAL & SURGICAL HISTORY:  HTN (hypertension)      Bradycardia      Pacemaker          MEDICATIONS  (STANDING):  chlorhexidine 2% Cloths 1 Application(s) Topical <User Schedule>  dextrose 5% + lactated ringers. 1000 milliLiter(s) (70 mL/Hr) IV Continuous <Continuous>  pantoprazole Infusion 8 mG/Hr (10 mL/Hr) IV Continuous <Continuous>    MEDICATIONS  (PRN):      Allergies  No Known Allergies      Review of Systems:   Cardiovascular:  No Chest Pain, No Palpitations  Respiratory:  No Cough, No Dyspnea  Gastrointestinal:  As described in HPI  Skin:  No Skin Lesions, No Jaundice  Neuro:  No Syncope, No Dizziness    Physical Examination:  T(C): 36.7 (11-19-22 @ 08:00), Max: 36.7 (11-19-22 @ 08:00)  HR: 81 (11-19-22 @ 13:00) (67 - 86)  BP: 120/68 (11-19-22 @ 13:00) (98/56 - 148/63)  RR: 19 (11-19-22 @ 13:00) (15 - 36)  SpO2: 98% (11-19-22 @ 13:00) (95% - 100%)  Weight (kg): 47.9 (11-18-22 @ 18:39)    11-18-22 @ 07:01  -  11-19-22 @ 07:00  --------------------------------------------------------  IN: 1380 mL / OUT: 260 mL / NET: 1120 mL    11-19-22 @ 07:01  -  11-19-22 @ 15:52  --------------------------------------------------------  IN: 510 mL / OUT: 60 mL / NET: 450 mL        GENERAL: AAOx2, no acute distress.  HEAD:  Atraumatic, Normocephalic  EYES: conjunctiva and sclera clear  NECK: Supple, no JVD or thyromegaly  CHEST/LUNG: Clear to auscultation bilaterally; No wheeze, rhonchi, or rales  HEART: Regular rate and rhythm; normal S1, S2, No murmurs.  ABDOMEN: Soft, nontender, nondistended; Bowel sounds present  NEUROLOGY: No asterixis or tremor.   SKIN: Intact, no jaundice     Data:                        8.6    19.25 )-----------( 134      ( 19 Nov 2022 04:50 )             25.2     Hgb trend:  8.6  11-19-22 @ 04:50  9.1  11-19-22 @ 00:30  7.4  11-18-22 @ 19:55  8.2  11-18-22 @ 05:17  8.9  11-17-22 @ 21:48  9.2  11-17-22 @ 19:33  8.0  11-17-22 @ 10:30      11-18-22 @ 07:01  -  11-19-22 @ 07:00  --------------------------------------------------------  IN: 720 mL      11-19    137  |  111<H>  |  33<H>  ----------------------------<  123<H>  4.0   |  15<L>  |  1.2    Ca    7.2<L>      19 Nov 2022 04:50  Phos  3.4     11-19  Mg     1.7     11-19    TPro  4.3<L>  /  Alb  2.6<L>  /  TBili  0.6  /  DBili  x   /  AST  13  /  ALT  7   /  AlkPhos  53  11-19    Liver panel trend:  TBili 0.6   /   AST 13   /   ALT 7   /   AlkP 53   /   Tptn 4.3   /   Alb 2.6    /   DBili --      11-19  TBili 0.4   /   AST 15   /   ALT 7   /   AlkP 54   /   Tptn 4.3   /   Alb 2.7    /   DBili --      11-18  TBili 0.4   /   AST 14   /   ALT 7   /   AlkP 69   /   Tptn 5.1   /   Alb 3.1    /   DBili --      11-18  TBili 0.2   /   AST 13   /   ALT 8   /   AlkP 74   /   Tptn 5.5   /   Alb 3.5    /   DBili --      11-17      PT/INR - ( 18 Nov 2022 19:55 )   PT: 13.00 sec;   INR: 1.14 ratio         PTT - ( 18 Nov 2022 19:55 )  PTT:19.1 sec    Culture - Urine (collected 17 Nov 2022 14:25)  Source: Clean Catch Clean Catch (Midstream)  Preliminary Report (19 Nov 2022 09:39):    50,000 - 99,000 CFU/mL Escherichia coli         Radiology:    US Abdomen Upper Quadrant Right:   ACC: [id number] EXAM:  US ABDOMEN RT UPR QUADRANT                          PROCEDURE DATE:  11/17/2022          INTERPRETATION:  CLINICAL INFORMATION: Pancreatic ductal dilatation    Correlation is made with CT abdomen and pelvis performed concurrently.    TECHNIQUE: Sonography of the right upper quadrant.    FINDINGS:  Liver: Within normal limits.  Bile ducts: Normal caliber. Common bile duct measures 0.4 cm.  Gallbladder: Within normal limits.  Pancreas: Poorly visualized.  Right kidney: 7.7 cm. No hydronephrosis. Likely extrarenal pelvis.  Ascites: None.  IVC: Visualized portions are within normal limits.    IMPRESSION:    No biliary ductal dilatation.    Poorly visualized pancreas.    --- End of Report ---            ELLIOT LANDAU MD; Attending Radiologist  This document has been electronically signed. Nov 18 2022  8:59AM (11-17-22 @ 20:51)    < from: EGD-Colonoscopy (11.18.22 @ 11:00) >  Normal mucosa in the whole esophagus.    Erythema in the stomach compatible with non-erosive gastritis.    2 cm duodenal bulb ulcer along the anterior wall, ulcer is deep, cratered,  with friable base and pigmentation, no active GI bleeding noted. 3 cc of  epinephrine injected, 2 endoclips were deployed to secure hemostasis.    2 clean based ulcer seen in the second part of duodenum, 5 mm each.    < end of copied text >  < from: EGD-Colonoscopy (11.18.22 @ 11:00) >  Severe diverticulosis of the the left side of the colon.    Dark red blood noted along the examined part of the colon up to 65 cm from  anal verge, procedurewas aborted secondary to poor prep and severe  diverticulosis. No active GI bleeding noted.    < end of copied text >   Gastroenterology progress note:     Patient is a 89y old  Female who presents with a chief complaint of rectal bleeding (17 Nov 2022 14:51)    Admitted on: 11-17-22    We are following the patient for duodenal ulcer      No acute events overnight.   few dark BMS since yesterday improving through the day, remains HD stable , received one unit of PRBC and corrected appropriately       PAST MEDICAL & SURGICAL HISTORY:  HTN (hypertension)      Bradycardia      Pacemaker          MEDICATIONS  (STANDING):  chlorhexidine 2% Cloths 1 Application(s) Topical <User Schedule>  dextrose 5% + lactated ringers. 1000 milliLiter(s) (70 mL/Hr) IV Continuous <Continuous>  pantoprazole Infusion 8 mG/Hr (10 mL/Hr) IV Continuous <Continuous>    MEDICATIONS  (PRN):      Allergies  No Known Allergies      Review of Systems:   Cardiovascular:  No Chest Pain, No Palpitations  Respiratory:  No Cough, No Dyspnea  Gastrointestinal:  As described in HPI  Skin:  No Skin Lesions, No Jaundice  Neuro:  No Syncope, No Dizziness    Physical Examination:  T(C): 36.7 (11-19-22 @ 08:00), Max: 36.7 (11-19-22 @ 08:00)  HR: 81 (11-19-22 @ 13:00) (67 - 86)  BP: 120/68 (11-19-22 @ 13:00) (98/56 - 148/63)  RR: 19 (11-19-22 @ 13:00) (15 - 36)  SpO2: 98% (11-19-22 @ 13:00) (95% - 100%)  Weight (kg): 47.9 (11-18-22 @ 18:39)    11-18-22 @ 07:01  -  11-19-22 @ 07:00  --------------------------------------------------------  IN: 1380 mL / OUT: 260 mL / NET: 1120 mL    11-19-22 @ 07:01  -  11-19-22 @ 15:52  --------------------------------------------------------  IN: 510 mL / OUT: 60 mL / NET: 450 mL        GENERAL: AAOx2, no acute distress.  HEAD:  Atraumatic, Normocephalic  EYES: conjunctiva and sclera clear  NECK: Supple, no JVD or thyromegaly  CHEST/LUNG: Clear to auscultation bilaterally; No wheeze, rhonchi, or rales  HEART: Regular rate and rhythm; normal S1, S2, No murmurs.  ABDOMEN: Soft, nontender, nondistended; Bowel sounds present  NEUROLOGY: No asterixis or tremor.   SKIN: Intact, no jaundice     Data:                        8.6    19.25 )-----------( 134      ( 19 Nov 2022 04:50 )             25.2     Hgb trend:  8.6  11-19-22 @ 04:50  9.1  11-19-22 @ 00:30  7.4  11-18-22 @ 19:55  8.2  11-18-22 @ 05:17  8.9  11-17-22 @ 21:48  9.2  11-17-22 @ 19:33  8.0  11-17-22 @ 10:30      11-18-22 @ 07:01  -  11-19-22 @ 07:00  --------------------------------------------------------  IN: 720 mL      11-19    137  |  111<H>  |  33<H>  ----------------------------<  123<H>  4.0   |  15<L>  |  1.2    Ca    7.2<L>      19 Nov 2022 04:50  Phos  3.4     11-19  Mg     1.7     11-19    TPro  4.3<L>  /  Alb  2.6<L>  /  TBili  0.6  /  DBili  x   /  AST  13  /  ALT  7   /  AlkPhos  53  11-19    Liver panel trend:  TBili 0.6   /   AST 13   /   ALT 7   /   AlkP 53   /   Tptn 4.3   /   Alb 2.6    /   DBili --      11-19  TBili 0.4   /   AST 15   /   ALT 7   /   AlkP 54   /   Tptn 4.3   /   Alb 2.7    /   DBili --      11-18  TBili 0.4   /   AST 14   /   ALT 7   /   AlkP 69   /   Tptn 5.1   /   Alb 3.1    /   DBili --      11-18  TBili 0.2   /   AST 13   /   ALT 8   /   AlkP 74   /   Tptn 5.5   /   Alb 3.5    /   DBili --      11-17      PT/INR - ( 18 Nov 2022 19:55 )   PT: 13.00 sec;   INR: 1.14 ratio         PTT - ( 18 Nov 2022 19:55 )  PTT:19.1 sec    Culture - Urine (collected 17 Nov 2022 14:25)  Source: Clean Catch Clean Catch (Midstream)  Preliminary Report (19 Nov 2022 09:39):    50,000 - 99,000 CFU/mL Escherichia coli         Radiology:    US Abdomen Upper Quadrant Right:   ACC: [id number] EXAM:  US ABDOMEN RT UPR QUADRANT                          PROCEDURE DATE:  11/17/2022          INTERPRETATION:  CLINICAL INFORMATION: Pancreatic ductal dilatation    Correlation is made with CT abdomen and pelvis performed concurrently.    TECHNIQUE: Sonography of the right upper quadrant.    FINDINGS:  Liver: Within normal limits.  Bile ducts: Normal caliber. Common bile duct measures 0.4 cm.  Gallbladder: Within normal limits.  Pancreas: Poorly visualized.  Right kidney: 7.7 cm. No hydronephrosis. Likely extrarenal pelvis.  Ascites: None.  IVC: Visualized portions are within normal limits.    IMPRESSION:    No biliary ductal dilatation.    Poorly visualized pancreas.    --- End of Report ---            ELLIOT LANDAU MD; Attending Radiologist  This document has been electronically signed. Nov 18 2022  8:59AM (11-17-22 @ 20:51)    < from: EGD-Colonoscopy (11.18.22 @ 11:00) >  Normal mucosa in the whole esophagus.    Erythema in the stomach compatible with non-erosive gastritis.    2 cm duodenal bulb ulcer along the anterior wall, ulcer is deep, cratered,  with friable base and pigmentation, no active GI bleeding noted. 3 cc of  epinephrine injected, 2 endoclips were deployed to secure hemostasis.    2 clean based ulcer seen in the second part of duodenum, 5 mm each.    < end of copied text >  < from: EGD-Colonoscopy (11.18.22 @ 11:00) >  Severe diverticulosis of the the left side of the colon.    Dark red blood noted along the examined part of the colon up to 65 cm from  anal verge, procedurewas aborted secondary to poor prep and severe  diverticulosis. No active GI bleeding noted.    < end of copied text >

## 2022-11-19 NOTE — PROGRESS NOTE ADULT - SUBJECTIVE AND OBJECTIVE BOX
Patient is a 89y old  Female who presents with a chief complaint of rectal bleeding (2022 14:51)    s/p EGD and colonoscopy   clipping duodenal   significant diverticulosis   transfer to ccu for possible IR             ROS: as in HPI; All other systems reviewed are negative    ICU Vital Signs Last 24 Hrs  T(C): 36.7 (2022 08:00), Max: 36.7 (2022 08:00)  T(F): 98.1 (2022 08:00), Max: 98.1 (2022 08:00)  HR: 81 (2022 08:00) (67 - 96)  BP: 106/58 (2022 08:00) (98/56 - 151/65)  BP(mean): 79 (2022 08:00) (72 - 99)  ABP: --  ABP(mean): --  RR: 36 (2022 08:00) (15 - 36)  SpO2: 97% (2022 08:00) (94% - 100%)    O2 Parameters below as of 2022 08:00  Patient On (Oxygen Delivery Method): room air              Physical Examination:    General: No acute distress.  Alert, oriented, interactive, nonfocal    HEENT: Pupils equal, reactive to light.  Symmetric.    PULM: Clear to auscultation bilaterally, no significant sputum production    CVS: Regular rate and rhythm, no murmurs, rubs, or gallops    ABD: Soft, nondistended, nontender, normoactive bowel sounds, no masses    EXT: No edema, nontender, no clubbing     SKIN: Warm and well perfused, no rashes noted.    Neurology : no motor or sensory deficit     Musculoskeletal : move all extremity     Lymphatic system: no Palpable node               I&O's Detail    2022 07:01  -  2022 07:00  --------------------------------------------------------  IN:    Lactated Ringers: 100 mL    Pantoprazole: 110 mL    PRBCs (Packed Red Blood Cells): 720 mL    sodium chloride 0.9%: 450 mL  Total IN: 1380 mL    OUT:    Indwelling Catheter - Urethral (mL): 260 mL  Total OUT: 260 mL    Total NET: 1120 mL            LABS:                        8.6    19.25 )-----------( 134      ( 2022 04:50 )             25.2     2022 04:50    137    |  111    |  33     ----------------------------<  123    4.0     |  15     |  1.2      Ca    7.2        2022 04:50  Phos  3.4       2022 04:50  Mg     1.7       2022 04:50    TPro  4.3    /  Alb  2.6    /  TBili  0.6    /  DBili  x      /  AST  13     /  ALT  7      /  AlkPhos  53     2022 04:50  Amylase x     lipase x          CARDIAC MARKERS ( 2022 10:30 )  x     / <0.01 ng/mL / x     / x     / x          CAPILLARY BLOOD GLUCOSE        PT/INR - ( 2022 19:55 )   PT: 13.00 sec;   INR: 1.14 ratio         PTT - ( 2022 19:55 )  PTT:19.1 sec  Urinalysis Basic - ( 2022 14:25 )    Color: Yellow / Appearance: Clear / S.010 / pH: x  Gluc: x / Ketone: Negative  / Bili: Negative / Urobili: 0.2 mg/dL   Blood: x / Protein: Negative mg/dL / Nitrite: Negative   Leuk Esterase: Small / RBC: 11-25 /HPF / WBC 26-50 /HPF   Sq Epi: x / Non Sq Epi: Few /HPF / Bacteria: Moderate      Culture    Lactate, Blood: 1.4 mmol/L (22 @ 19:55)  Lactate, Blood: 3.9 mmol/L (22 @ 13:40)      MEDICATIONS  (STANDING):  chlorhexidine 2% Cloths 1 Application(s) Topical <User Schedule>  pantoprazole Infusion 8 mG/Hr (10 mL/Hr) IV Continuous <Continuous>  sodium chloride 0.9%. 1000 milliLiter(s) (50 mL/Hr) IV Continuous <Continuous>    MEDICATIONS  (PRN):        RADIOLOGY: ***     CXR:  TLC:  OG:  ET tube:        CAM ICU:  ECHO:

## 2022-11-19 NOTE — PROGRESS NOTE ADULT - ASSESSMENT
89yFemale pmh HTN, bradycardia, pacemaker presents with maroon colored stools. Patient's home health aide not able to give much information, reports maroon colored stools    # Upper GI bleeding 2/2 duodenal ulcers   s/p EGD 2 cm duodenal bulb ulcer along the anterior wall, ulcer is deep, cratered, with friable base and pigmentation, no active GI bleeding noted. 3 cc of epinephrine injected, 2 Endoclip were deployed to secure hemostasis.  2 clean based ulcer seen in the second part of duodenum, 5 mm each.    PLAN: c/w MICU monitoring, trend CBC, notify GI with any signs of overt bleeding, PPI infusion, NPO, IR following. Transfuse to keep hemoglobin > 8, 2 Large IVs     # New 8 mm calculus within the proximal pancreatic duct in the region of   the pancreatic head with new diffuse diffuse pancreatic parenchymal   atrophy and dilatation of the pancreatic duct to 1.1 cm; if clinically   warranted, GI consultation may be of use, family reports intermittent epigastric pain  normal LFTs  Rec  - Further work up by MRI to rule out malignancy, unclear whether she has a hx of chronic pancreatitis.  - Follow up with our [facility] located at [address] [place] [address]. Phone Number: [phone] Tuesday session 89yFemale pmh HTN, bradycardia, pacemaker presents with maroon colored stools. Patient's home health aide not able to give much information, reports maroon colored stools    # GI bleed   HD stable   received one unit of PRBC overnight, corrected appropriately  s/p EGD 2 cm duodenal bulb ulcer along the anterior wall, ulcer is deep, cratered, with friable base and pigmentation, no active GI bleeding noted. 3 cc of epinephrine injected, 2 Endoclip were deployed to secure hemostasis.  2 clean based ulcer seen in the second part of duodenum, 5 mm each.  s/p colonoscopy with severe diverticulosis , procedure aborted secondary to poor prep( old blood through the examined part of colon) and diverticulosis     PLAN:  c/w MICU monitoring  trend CBC, notify GI with any signs of overt bleeding  PPI infusion   Transfuse to keep hemoglobin > 8   2 Large IVs   Keep NPO  IR input appreciated   plan of care discussed extensively with son mayelin and ICU team       # New 8 mm calculus within the proximal pancreatic duct in the region of the pancreatic head with new diffuse pancreatic parenchymal   atrophy and dilatation of the pancreatic duct to 1.1 cm   normal LFTs  Rec  - Further work up by MRI to rule out malignancy as outpatient if compatible with goals of care   - Follow up with our [facility] located at [address] [place] [address]. Phone Number: [phone] Tuesday session

## 2022-11-19 NOTE — PROGRESS NOTE ADULT - ASSESSMENT
IMPRESSION:  GI bleed ?? diverticular   blood loss anemia       PLAN:    CNS: No sedation     HEENT: oral care     PULMONARY: O2% >92 %     CARDIOVASCULAR: iv hydration [id number]/hr or D51/2 ns   continue home BP meds   keep map goal of 70    GI:   Npo follow GI pending scope   PPI    RENAL:   Follow I's and O's   Follow lytes     INFECTIOUS DISEASE:   Procal   FU Blood cx     HEMATOLOGICAL:  DVT prophylaxis. serial cbc   s/p 1 unit prbc ->Hgb 9.4  follow INR     ENDOCRINE:  Follow up FS.  Insulin protocol if needed.

## 2022-11-20 NOTE — PHYSICAL THERAPY INITIAL EVALUATION ADULT - NSPTDMEREC_GEN_A_CORE
Quality 226: Preventive Care And Screening: Tobacco Use: Screening And Cessation Intervention: Patient screened for tobacco use and is an ex/non-smoker Detail Level: Detailed Quality 130: Documentation Of Current Medications In The Medical Record: Current Medications Documented rolling walker

## 2022-11-20 NOTE — PHYSICAL THERAPY INITIAL EVALUATION ADULT - GAIT DISTANCE, PT EVAL
Pt is having trouble breaking her Seroquel medication in half to make it an even 50 mg- she is requesting that she get the 50 mg called in instead so she can take a lower dosage  
4 small steps sideways

## 2022-11-20 NOTE — PROGRESS NOTE ADULT - ASSESSMENT
89F with phhx of Alzheimer's dementia, CHF, HTN , PPM secondary to heart block brought to the ED after being found down by her health aide after an unwitnessed fall and found to have GI bleed    GI bleed Diverticular -Duodenal ulcer   blood loss anemia     GI recommendations appreciated:    -trend CBC, Transfuse to keep hemoglobin > 8    -PPI 40 mg IV bid    -advance to clear liquids    - Ernesto (son) states he does not want pt to undergo any further EGD or colon unless pt is significantly bleeding , he understand the risks and benefits including missing GI malignancy and possible recurrent bleeding   IR reccs appreciated:   -Team will remain on standby in case of need for emergent intervention for additional GI bleeding.    Leukocytosis, Fever spike on 11.20.22  +UA for E. coli  -F/U Bacterial Cx (NGTD 11/17)     # New 8 mm calculus within the proximal pancreatic duct in the region of the pancreatic head with new diffuse pancreatic parenchymal   atrophy and dilatation of the pancreatic duct to 1.1 cm   normal LFTs  Rec  - Further work up by MRI to rule out malignancy as outpatient if compatible with goals of care   - Follow up with [facility]    DVT ppx: off AC d/t bleeding  GI PPX: Pantoprazole BID  Activity: IAT    Lines: D/C elie 11.20     89F with phhx of Alzheimer's dementia, CHF, HTN , PPM secondary to heart block brought to the ED after being found down by her health aide after an unwitnessed fall and found to have GI bleed    GI bleed Diverticular -Duodenal ulcer   blood loss anemia     GI recommendations appreciated:    -trend CBC, Transfuse to keep hemoglobin > 8    -PPI 40 mg IV bid    -advance to clear liquids    - Ernesto (son) states he does not want pt to undergo any further EGD or colon unless pt is significantly bleeding , he understand the risks and benefits including missing GI malignancy and possible recurrent bleeding   IR reccs appreciated:   -Team will remain on standby in case of need for emergent intervention for additional GI bleeding.  -s/p transfusion  x2  -follow INR     Leukocytosis, Fever spike on 11.20.22  +UA for E. coli  -F/U Bacterial Cx (NGTD 11/17)     # New 8 mm calculus within the proximal pancreatic duct in the region of the pancreatic head with new diffuse pancreatic parenchymal   atrophy and dilatation of the pancreatic duct to 1.1 cm   normal LFTs  Rec  - Further work up by MRI to rule out malignancy as outpatient if compatible with goals of care   - Follow up with [facility]    DVT ppx: off AC d/t bleeding  GI PPX: Pantoprazole BID  Activity: IAT    Lines: D/C elie 11.20

## 2022-11-20 NOTE — PHYSICAL THERAPY INITIAL EVALUATION ADULT - LEVEL OF INDEPENDENCE: SUPINE/SIT, REHAB EVAL
pt sat at EOB x 10 min, performed AAROM bilat hips/knees flex/ext AROM/moderate assist (50% patients effort)

## 2022-11-20 NOTE — PROGRESS NOTE ADULT - SUBJECTIVE AND OBJECTIVE BOX
Patient is a 89y old  Female who presents with a chief complaint of rectal bleeding (17 Nov 2022 14:51)      INTERVAL HPI/OVERNIGHT EVENTS:   EFRAIN O/N      ICU Vital Signs Last 24 Hrs  T(C): 36.2 (20 Nov 2022 19:00), Max: 37.1 (20 Nov 2022 00:00)  T(F): 97.2 (20 Nov 2022 19:00), Max: 98.8 (20 Nov 2022 00:00)  HR: 72 (20 Nov 2022 22:00) (66 - 93)  BP: 135/65 (20 Nov 2022 22:00) (112/60 - 161/85)  BP(mean): 92 (20 Nov 2022 22:00) (79 - 117)  ABP: --  ABP(mean): --  RR: 22 (20 Nov 2022 22:00) (18 - 31)  SpO2: 100% (20 Nov 2022 22:00) (96% - 100%)    O2 Parameters below as of 20 Nov 2022 22:00  Patient On (Oxygen Delivery Method): nasal cannula  O2 Flow (L/min): 2        I&O's Summary    19 Nov 2022 07:01  -  20 Nov 2022 07:00  --------------------------------------------------------  IN: 1900 mL / OUT: 470 mL / NET: 1430 mL    20 Nov 2022 07:01  -  20 Nov 2022 22:57  --------------------------------------------------------  IN: 1200 mL / OUT: 410 mL / NET: 790 mL          LABS:                        7.5    16.73 )-----------( 133      ( 20 Nov 2022 16:17 )             22.8     11-20    139  |  113<H>  |  22<H>  ----------------------------<  139<H>  3.6   |  20  |  1.1    Ca    7.3<L>      20 Nov 2022 12:10  Phos  3.4     11-19  Mg     2.4     11-20    TPro  3.8<L>  /  Alb  2.6<L>  /  TBili  0.4  /  DBili  x   /  AST  11  /  ALT  5   /  AlkPhos  50  11-20        CAPILLARY BLOOD GLUCOSE            RADIOLOGY & ADDITIONAL TESTS:    Consultant(s) Notes Reviewed:  [x ] YES  [ ] NO    MEDICATIONS  (STANDING):  chlorhexidine 2% Cloths 1 Application(s) Topical <User Schedule>  dextrose 5% + lactated ringers. 1000 milliLiter(s) (70 mL/Hr) IV Continuous <Continuous>  pantoprazole Infusion 8 mG/Hr (10 mL/Hr) IV Continuous <Continuous>    MEDICATIONS  (PRN):      PHYSICAL EXAM:  GENERAL: In bed, NAD  HEAD:  Atraumatic, Normocephalic  EYES: EOMI, PERRLA, conjunctiva and sclera clear  NECK: Supple, No JVD, Normal thyroid, no enlarged nodes  CHEST/LUNG: CTA B/L   HEART: S1S2 normal, no S3, Regular rate and rhythm; No murmurs  ABDOMEN: Soft, Nontender, Nondistended; Bowel sounds present  EXTREMITIES:  2+ Peripheral Pulses, No clubbing, cyanosis, or edema      Care Discussed with Consultants/Other Providers [ x] YES  [ ] NO

## 2022-11-20 NOTE — PROGRESS NOTE ADULT - ASSESSMENT
89yFemale pmh HTN, bradycardia, pacemaker presents with maroon colored stools. Patient's home health aide not able to give much information, reports maroon colored stools    # GI bleed   HD stable   received one unit of PRBC overnight, corrected appropriately  s/p EGD 2 cm duodenal bulb ulcer along the anterior wall, ulcer is deep, cratered, with friable base and pigmentation, no active GI bleeding noted. 3 cc of epinephrine injected, 2 Endoclip were deployed to secure hemostasis.  2 clean based ulcer seen in the second part of duodenum, 5 mm each.  s/p colonoscopy with severe diverticulosis , procedure aborted secondary to poor prep( old blood through the examined part of colon) and diverticulosis     PLAN:  c/w MICU monitoring  trend CBC, notify GI with any signs of overt bleeding  PPI infusion   Transfuse to keep hemoglobin > 8   2 Large IVs   advance to clear liquids   IR input appreciated   plan of care discussed extensively with son mayelin and ICU team       # New 8 mm calculus within the proximal pancreatic duct in the region of the pancreatic head with new diffuse pancreatic parenchymal   atrophy and dilatation of the pancreatic duct to 1.1 cm   normal LFTs  Rec  - Further work up by MRI to rule out malignancy as outpatient if compatible with goals of care   - Follow up with our [facility] located at [address] [place] [address]. Phone Number: [phone] Tuesday session 89yFemale pmh HTN, bradycardia, pacemaker presents with maroon colored stools. Patient's home health aide not able to give much information, reports maroon colored stools    # GI bleed   HD stable   HGB with no significant drop  No further bloody bowel movements reported   s/p EGD 2 cm duodenal bulb ulcer along the anterior wall, ulcer is deep, cratered, with friable base and pigmentation, no active GI bleeding noted. 3 cc of epinephrine injected, 2 Endoclip were deployed to secure hemostasis.  2 clean based ulcer seen in the second part of duodenum, 5 mm each.  s/p colonoscopy with severe diverticulosis , procedure aborted secondary to poor prep( old blood through the examined part of colon) and diverticulosis     PLAN:  c/w MICU monitoring  trend CBC, notify GI with any signs of overt bleeding  PPI 40 mg IV bid   Transfuse to keep hemoglobin > 8   2 Large IVs   advance to clear liquids   IR input appreciated   plan of care discussed extensively with son ernesto and ICU team   Discussed with Ernesto that the colonoscopy the need to repeat colonoscopy, as the previous one was aborted secondary to poor prep, and the need to repeat EGD in 8 weeks for follow up on gastric ulcers, given patient age and comorbidities he does not want her to undergo any further EGD or colon unless she is significantly bleeding , he understand the risks and benefits including missing GI malignancy and possible recurrent bleeding       # New 8 mm calculus within the proximal pancreatic duct in the region of the pancreatic head with new diffuse pancreatic parenchymal   atrophy and dilatation of the pancreatic duct to 1.1 cm   normal LFTs  Rec  - Further work up by MRI to rule out malignancy as outpatient if compatible with goals of care   - Follow up with our [facility] located at [address] [place] [address]. Phone Number: [phone] Tuesday session

## 2022-11-20 NOTE — PROGRESS NOTE ADULT - ASSESSMENT
IMPRESSION:  GI bleed ?? diverticular . duodenal ulcer   blood loss anemia       PLAN:    CNS: no sedation     HEENT: oral care     PULMONARY: keep pox >92 %     CARDIOVASCULAR:iv hydration [id number]/hr  while NPO   continue home BP meds   keep map around 70  GI: GI prophylaxis.  npo follow GI  if can start feed on PPI drip follow if ok to switch to Q12 hrs   IR stand by   RENAL: follow is and os lytes     INFECTIOUS DISEAS:   bld cx   procal     HEMATOLOGICAL:  DVT prophylaxis. serial cbc   s/p transfusion    follow INR     ENDOCRINE:  Follow up FS.  Insulin protocol if needed.    MUSCULOSKELETAL:  d/c fo;ey       CRITICAL CARE TIME SPENT: ***

## 2022-11-20 NOTE — PROGRESS NOTE ADULT - SUBJECTIVE AND OBJECTIVE BOX
Patient is a 89y old  Female who presents with a chief complaint of rectal bleeding (17 Nov 2022 14:51)      Over Night Events:  Patient seen and examined.   feel good no active bleed in last 24 hrs     ROS:  See HPI    PHYSICAL EXAM    ICU Vital Signs Last 24 Hrs  T(C): 37.1 (20 Nov 2022 06:00), Max: 37.1 (19 Nov 2022 20:00)  T(F): 98.7 (20 Nov 2022 06:00), Max: 98.8 (20 Nov 2022 00:00)  HR: 69 (20 Nov 2022 08:00) (66 - 83)  BP: 133/95 (20 Nov 2022 08:00) (114/63 - 143/69)  BP(mean): 109 (20 Nov 2022 08:00) (84 - 109)  ABP: --  ABP(mean): --  RR: 21 (20 Nov 2022 08:00) (17 - 29)  SpO2: 99% (20 Nov 2022 08:00) (95% - 100%)    O2 Parameters below as of 20 Nov 2022 08:00  Patient On (Oxygen Delivery Method): room air            General: AOx3  HEENT:  rahel               Lymph Nodes: NO cervical LN   Lungs: Bilateral BS  Cardiovascular: Regular   Abdomen: Soft, Positive BS  Extremities: No clubbing   Skin: warm   Neurological: no focal deficit   Musculoskeletal: move all ext     I&O's Detail    19 Nov 2022 07:01  -  20 Nov 2022 07:00  --------------------------------------------------------  IN:    dextrose 5% + lactated ringers: 1470 mL    IV PiggyBack: 100 mL    Pantoprazole: 230 mL    sodium chloride 0.9%: 100 mL  Total IN: 1900 mL    OUT:    Indwelling Catheter - Urethral (mL): 470 mL  Total OUT: 470 mL    Total NET: 1430 mL      20 Nov 2022 07:01  -  20 Nov 2022 08:56  --------------------------------------------------------  IN:    dextrose 5% + lactated ringers: 140 mL    Pantoprazole: 20 mL  Total IN: 160 mL    OUT:  Total OUT: 0 mL    Total NET: 160 mL          LABS:                          7.6    16.55 )-----------( 138      ( 20 Nov 2022 00:37 )             22.5         20 Nov 2022 00:37    139    |  113    |  26     ----------------------------<  111    3.6     |  17     |  1.2      Ca    7.2        20 Nov 2022 00:37  Phos  3.4       19 Nov 2022 04:50  Mg     2.8       20 Nov 2022 00:37                                               PT/INR - ( 18 Nov 2022 19:55 )   PT: 13.00 sec;   INR: 1.14 ratio         PTT - ( 18 Nov 2022 19:55 )  PTT:19.1 sec                                           Lactate, Blood: 1.4 mmol/L (11-18-22 @ 19:55)  Lactate, Blood: 3.9 mmol/L (11-17-22 @ 13:40)                                                          Culture - Blood (collected 18 Nov 2022 19:56)  Source: .Blood Blood-Peripheral  Preliminary Report (20 Nov 2022 03:01):    No growth to date.    Culture - Urine (collected 17 Nov 2022 14:25)  Source: Clean Catch Clean Catch (Midstream)  Preliminary Report (19 Nov 2022 09:39):    50,000 - 99,000 CFU/mL Escherichia coli                                                                                           MEDICATIONS  (STANDING):  chlorhexidine 2% Cloths 1 Application(s) Topical <User Schedule>  dextrose 5% + lactated ringers. 1000 milliLiter(s) (70 mL/Hr) IV Continuous <Continuous>  pantoprazole Infusion 8 mG/Hr (10 mL/Hr) IV Continuous <Continuous>    MEDICATIONS  (PRN):          Xrays:  TLC:  OG:  ET tube:                                                                                       ECHO:  CAM ICU:

## 2022-11-20 NOTE — PROGRESS NOTE ADULT - SUBJECTIVE AND OBJECTIVE BOX
Gastroenterology progress note:     Patient is a 89y old  Female who presents with a chief complaint of rectal bleeding (17 Nov 2022 14:51)       Admitted on: 11-17-22    We are following the patient for Duodenal ulcers and UGIB         No acute events overnight.   not on pressors   no BMs overnight     PAST MEDICAL & SURGICAL HISTORY:  HTN (hypertension)      Bradycardia      Pacemaker          MEDICATIONS  (STANDING):  chlorhexidine 2% Cloths 1 Application(s) Topical <User Schedule>  dextrose 5% + lactated ringers. 1000 milliLiter(s) (70 mL/Hr) IV Continuous <Continuous>  pantoprazole Infusion 8 mG/Hr (10 mL/Hr) IV Continuous <Continuous>    MEDICATIONS  (PRN):      Allergies  No Known Allergies      Review of Systems:   Cardiovascular:  No Chest Pain, No Palpitations  Respiratory:  No Cough, No Dyspnea  Gastrointestinal:  As described in HPI  Skin:  No Skin Lesions, No Jaundice  Neuro:  No Syncope, No Dizziness    Physical Examination:  T(C): 36.4 (11-20-22 @ 12:00), Max: 37.1 (11-19-22 @ 20:00)  HR: 72 (11-20-22 @ 12:00) (66 - 82)  BP: 134/79 (11-20-22 @ 12:00) (112/60 - 143/69)  RR: 27 (11-20-22 @ 12:00) (17 - 29)  SpO2: 99% (11-20-22 @ 12:00) (95% - 100%)      11-19-22 @ 07:01  -  11-20-22 @ 07:00  --------------------------------------------------------  IN: 1900 mL / OUT: 470 mL / NET: 1430 mL    11-20-22 @ 07:01  -  11-20-22 @ 12:48  --------------------------------------------------------  IN: 480 mL / OUT: 260 mL / NET: 220 mL        GENERAL: AAOx2 no acute distress.  HEAD:  Atraumatic, Normocephalic  EYES: conjunctiva and sclera clear  NECK: Supple, no JVD or thyromegaly  CHEST/LUNG: Clear to auscultation bilaterally; No wheeze, rhonchi, or rales  HEART: Regular rate and rhythm; normal S1, S2, No murmurs.  ABDOMEN: Soft, nontender, nondistended; Bowel sounds present  NEUROLOGY: No asterixis or tremor.   SKIN: Intact, no jaundice     Data:                        7.3    15.82 )-----------( 132      ( 20 Nov 2022 12:10 )             22.6     Hgb trend:  7.3  11-20-22 @ 12:10  7.6  11-20-22 @ 00:37  7.8  11-19-22 @ 20:09  8.6  11-19-22 @ 04:50  9.1  11-19-22 @ 00:30  7.4  11-18-22 @ 19:55  8.2  11-18-22 @ 05:17  8.9  11-17-22 @ 21:48  9.2  11-17-22 @ 19:33      11-18-22 @ 07:01  -  11-19-22 @ 07:00  --------------------------------------------------------  IN: 720 mL      11-20    139  |  113<H>  |  22<H>  ----------------------------<  139<H>  3.6   |  20  |  1.1    Ca    7.3<L>      20 Nov 2022 12:10  Phos  3.4     11-19  Mg     2.4     11-20    TPro  3.8<L>  /  Alb  2.6<L>  /  TBili  0.4  /  DBili  x   /  AST  11  /  ALT  5   /  AlkPhos  50  11-20    Liver panel trend:  TBili 0.4   /   AST 11   /   ALT 5   /   AlkP 50   /   Tptn 3.8   /   Alb 2.6    /   DBili --      11-20  TBili 0.6   /   AST 13   /   ALT 7   /   AlkP 53   /   Tptn 4.3   /   Alb 2.6    /   DBili --      11-19  TBili 0.4   /   AST 15   /   ALT 7   /   AlkP 54   /   Tptn 4.3   /   Alb 2.7    /   DBili --      11-18  TBili 0.4   /   AST 14   /   ALT 7   /   AlkP 69   /   Tptn 5.1   /   Alb 3.1    /   DBili --      11-18  TBili 0.2   /   AST 13   /   ALT 8   /   AlkP 74   /   Tptn 5.5   /   Alb 3.5    /   DBili --      11-17      PT/INR - ( 18 Nov 2022 19:55 )   PT: 13.00 sec;   INR: 1.14 ratio         PTT - ( 18 Nov 2022 19:55 )  PTT:19.1 sec    Culture - Blood (collected 18 Nov 2022 19:56)  Source: .Blood Blood-Peripheral  Preliminary Report (20 Nov 2022 03:01):    No growth to date.    Culture - Urine (collected 17 Nov 2022 14:25)  Source: Clean Catch Clean Catch (Midstream)  Final Report (20 Nov 2022 10:35):    50,000 - 99,000 CFU/mL Escherichia coli  Organism: Escherichia coli (20 Nov 2022 10:35)  Organism: Escherichia coli (20 Nov 2022 10:35)         Radiology:       Gastroenterology progress note:     Patient is a 89y old  Female who presents with a chief complaint of rectal bleeding (17 Nov 2022 14:51)       Admitted on: 11-17-22    We are following the patient for Duodenal ulcers and GIB         No acute events overnight.   not on pressors   no BMs overnight     PAST MEDICAL & SURGICAL HISTORY:  HTN (hypertension)      Bradycardia      Pacemaker          MEDICATIONS  (STANDING):  chlorhexidine 2% Cloths 1 Application(s) Topical <User Schedule>  dextrose 5% + lactated ringers. 1000 milliLiter(s) (70 mL/Hr) IV Continuous <Continuous>  pantoprazole Infusion 8 mG/Hr (10 mL/Hr) IV Continuous <Continuous>    MEDICATIONS  (PRN):      Allergies  No Known Allergies      Review of Systems:   Cardiovascular:  No Chest Pain, No Palpitations  Respiratory:  No Cough, No Dyspnea  Gastrointestinal:  As described in HPI  Skin:  No Skin Lesions, No Jaundice  Neuro:  No Syncope, No Dizziness    Physical Examination:  T(C): 36.4 (11-20-22 @ 12:00), Max: 37.1 (11-19-22 @ 20:00)  HR: 72 (11-20-22 @ 12:00) (66 - 82)  BP: 134/79 (11-20-22 @ 12:00) (112/60 - 143/69)  RR: 27 (11-20-22 @ 12:00) (17 - 29)  SpO2: 99% (11-20-22 @ 12:00) (95% - 100%)      11-19-22 @ 07:01  -  11-20-22 @ 07:00  --------------------------------------------------------  IN: 1900 mL / OUT: 470 mL / NET: 1430 mL    11-20-22 @ 07:01  -  11-20-22 @ 12:48  --------------------------------------------------------  IN: 480 mL / OUT: 260 mL / NET: 220 mL        GENERAL: AAOx2 no acute distress.  HEAD:  Atraumatic, Normocephalic  EYES: conjunctiva and sclera clear  NECK: Supple, no JVD or thyromegaly  CHEST/LUNG: Clear to auscultation bilaterally; No wheeze, rhonchi, or rales  HEART: Regular rate and rhythm; normal S1, S2, No murmurs.  ABDOMEN: Soft, nontender, nondistended; Bowel sounds present  NEUROLOGY: No asterixis or tremor.   SKIN: Intact, no jaundice     Data:                        7.3    15.82 )-----------( 132      ( 20 Nov 2022 12:10 )             22.6     Hgb trend:  7.3  11-20-22 @ 12:10  7.6  11-20-22 @ 00:37  7.8  11-19-22 @ 20:09  8.6  11-19-22 @ 04:50  9.1  11-19-22 @ 00:30  7.4  11-18-22 @ 19:55  8.2  11-18-22 @ 05:17  8.9  11-17-22 @ 21:48  9.2  11-17-22 @ 19:33      11-18-22 @ 07:01  -  11-19-22 @ 07:00  --------------------------------------------------------  IN: 720 mL      11-20    139  |  113<H>  |  22<H>  ----------------------------<  139<H>  3.6   |  20  |  1.1    Ca    7.3<L>      20 Nov 2022 12:10  Phos  3.4     11-19  Mg     2.4     11-20    TPro  3.8<L>  /  Alb  2.6<L>  /  TBili  0.4  /  DBili  x   /  AST  11  /  ALT  5   /  AlkPhos  50  11-20    Liver panel trend:  TBili 0.4   /   AST 11   /   ALT 5   /   AlkP 50   /   Tptn 3.8   /   Alb 2.6    /   DBili --      11-20  TBili 0.6   /   AST 13   /   ALT 7   /   AlkP 53   /   Tptn 4.3   /   Alb 2.6    /   DBili --      11-19  TBili 0.4   /   AST 15   /   ALT 7   /   AlkP 54   /   Tptn 4.3   /   Alb 2.7    /   DBili --      11-18  TBili 0.4   /   AST 14   /   ALT 7   /   AlkP 69   /   Tptn 5.1   /   Alb 3.1    /   DBili --      11-18  TBili 0.2   /   AST 13   /   ALT 8   /   AlkP 74   /   Tptn 5.5   /   Alb 3.5    /   DBili --      11-17      PT/INR - ( 18 Nov 2022 19:55 )   PT: 13.00 sec;   INR: 1.14 ratio         PTT - ( 18 Nov 2022 19:55 )  PTT:19.1 sec    Culture - Blood (collected 18 Nov 2022 19:56)  Source: .Blood Blood-Peripheral  Preliminary Report (20 Nov 2022 03:01):    No growth to date.    Culture - Urine (collected 17 Nov 2022 14:25)  Source: Clean Catch Clean Catch (Midstream)  Final Report (20 Nov 2022 10:35):    50,000 - 99,000 CFU/mL Escherichia coli  Organism: Escherichia coli (20 Nov 2022 10:35)  Organism: Escherichia coli (20 Nov 2022 10:35)         Radiology:

## 2022-11-21 NOTE — CHART NOTE - NSCHARTNOTEFT_GEN_A_CORE
89F with phhx of Alzheimer's dementia, CHF, HTN , PPM secondary to heart block brought to the ED after being found by her health aide s/p unwitnessed fall and found to have GI bleed.    GI bleed: s/p EGD: 2 cm duodenal bulb ulcer along the anterior wall, ulcer is deep, cratered, with friable base and pigmentation, no active GI bleeding noted. 2 clean based ulcer seen in the second part of duodenum, 5 mm each.  s/p colonoscopy with severe diverticulosis , procedure aborted secondary to poor prep( old blood through the examined part of colon) and diverticulosis     GI recommendations appreciated:    -trend CBC, Transfuse to keep hemoglobin > 8    -PPI 40 mg IV bid    -advance to clear liquids   -New 8 mm calculus within the proximal pancreatic duct in the region of the pancreatic head with new diffuse pancreatic parenchymal   atrophy and dilatation of the pancreatic duct to 1.1 cm   normal LFTs: W/U by MRI to r/o malignancy as o/p if compatible with GOC   - Ernesto (son) states he does not want pt to undergo any further EGD or colon unless pt is significantly bleeding , he understand the risks and benefits including missing GI malignancy and possible recurrent bleeding   IR reccs appreciated:   -Team will remain on standby in case of need for emergent intervention for additional GI bleeding.  -s/p transfusion  x2  -follow INR     -Keep MAP around 70    Leukocytosis, Fever spike on 11.20.22  +UA for E. coli but asymptomatic, not on abx  -F/U Bacterial Cx (NGTD 11/17) and procal    # New 8 mm calculus within the proximal pancreatic duct in the region of the pancreatic head with new diffuse pancreatic parenchymal   atrophy and dilatation of the pancreatic duct to 1.1 cm   normal LFTs  Rec  - Further work up by MRI to rule out malignancy as outpatient if compatible with goals of care   - Follow up with [facility]    DVT ppx: off AC d/t bleeding  GI PPX: Pantoprazole BID  Activity: IAT    while on the floor the patient was noted to have dark stool and was transfused 1PRBC for Hgb lower threshold of 8. The patient was deemed stable to be started on CLD and stable for downgrade

## 2022-11-21 NOTE — DIETITIAN INITIAL EVALUATION ADULT - ADD RECOMMEND
1. Recommend to add Ensure Clear 3X/DAILY to optimize kcal/pro intake -- provides 540 kcal/day total, 24g pro/day total  2. Continue with current diet order until medically feasible to advance -- recommend DASH/TLC modular (dx CHF, HTN) and texture per SLP recs  3. Encourage PO intake and assist during meals prn    Pt assessed to be at high nutrition risk. Will f/u in 2-4 days.

## 2022-11-21 NOTE — DIETITIAN INITIAL EVALUATION ADULT - ORAL INTAKE PTA/DIET HISTORY
Hpi Title: Evaluation of Skin Lesions Pt unable to participate in nutrition assessment at this time; confused per flowsheet. Unable to reach emergency contact to obtain nutrition hx. Will attempt to call again at follow up. Additional History: \\nFBSC.

## 2022-11-21 NOTE — DIETITIAN INITIAL EVALUATION ADULT - PERTINENT LABORATORY DATA
11-21    143  |  113<H>  |  16  ----------------------------<  144<H>  3.6   |  22  |  1.0    Ca    7.3<L>      21 Nov 2022 05:19  Phos  2.3     11-21  Mg     2.1     11-21    TPro  4.3<L>  /  Alb  2.7<L>  /  TBili  0.4  /  DBili  x   /  AST  12  /  ALT  <5  /  AlkPhos  59  11-21

## 2022-11-21 NOTE — PROGRESS NOTE ADULT - SUBJECTIVE AND OBJECTIVE BOX
Patient is a 89y old  Female who presents with a chief complaint of GI bleed (20 Nov 2022 22:54)      Over Night Events:  Patient seen and examined.   started on clear fluid     ROS:  See HPI    PHYSICAL EXAM    ICU Vital Signs Last 24 Hrs  T(C): 36.7 (21 Nov 2022 07:00), Max: 36.8 (21 Nov 2022 04:00)  T(F): 98 (21 Nov 2022 07:00), Max: 98.2 (21 Nov 2022 04:00)  HR: 85 (21 Nov 2022 07:00) (69 - 103)  BP: 133/65 (21 Nov 2022 07:00) (112/60 - 183/97)  BP(mean): 94 (21 Nov 2022 07:00) (79 - 130)  ABP: --  ABP(mean): --  RR: 25 (21 Nov 2022 07:00) (17 - 31)  SpO2: 100% (21 Nov 2022 07:00) (96% - 100%)    O2 Parameters below as of 21 Nov 2022 07:00  Patient On (Oxygen Delivery Method): nasal cannula  O2 Flow (L/min): 2          General:awake   HEENT:  rahel              Lymph Nodes: NO cervical LN   Lungs: Bilateral BS  Cardiovascular: Regular   Abdomen: Soft, Positive BS  Extremities: No clubbing   Skin: warm   Neurological: no focal   Musculoskeletal: move all ext     I&O's Detail    20 Nov 2022 07:01  -  21 Nov 2022 07:00  --------------------------------------------------------  IN:    dextrose 5% + lactated ringers: 1610 mL    Pantoprazole: 220 mL  Total IN: 1830 mL    OUT:    Indwelling Catheter - Urethral (mL): 260 mL    Voided (mL): 550 mL  Total OUT: 810 mL    Total NET: 1020 mL      21 Nov 2022 07:01  -  21 Nov 2022 08:31  --------------------------------------------------------  IN:    dextrose 5% + lactated ringers: 70 mL  Total IN: 70 mL    OUT:    Pantoprazole: 0 mL  Total OUT: 0 mL    Total NET: 70 mL          LABS:                          7.3    15.27 )-----------( 133      ( 21 Nov 2022 05:19 )             21.7         21 Nov 2022 05:19    143    |  113    |  16     ----------------------------<  144    3.6     |  22     |  1.0      Ca    7.3        21 Nov 2022 05:19  Phos  2.3       21 Nov 2022 05:19  Mg     2.1       21 Nov 2022 05:19    TPro  4.3    /  Alb  2.7    /  TBili  0.4    /  DBili  x      /  AST  12     /  ALT  <5     /  AlkPhos  59     21 Nov 2022 05:19  Amylase x     lipase x                                                 PT/INR - ( 21 Nov 2022 05:19 )   PT: 12.10 sec;   INR: 1.06 ratio         PTT - ( 21 Nov 2022 05:19 )  PTT:23.1 sec                                           Lactate, Blood: 1.4 mmol/L (11-18-22 @ 19:55)                                                          Culture - Blood (collected 18 Nov 2022 19:56)  Source: .Blood Blood-Peripheral  Preliminary Report (20 Nov 2022 03:01):    No growth to date.                                                                                           MEDICATIONS  (STANDING):  chlorhexidine 2% Cloths 1 Application(s) Topical <User Schedule>  dextrose 5% + lactated ringers. 1000 milliLiter(s) (70 mL/Hr) IV Continuous <Continuous>  pantoprazole  Injectable 40 milliGRAM(s) IV Push every 12 hours  pantoprazole Infusion 8 mG/Hr (10 mL/Hr) IV Continuous <Continuous>    MEDICATIONS  (PRN):          Xrays:  TLC:  OG:  ET tube:                                                                                    no infiltrate    ECHO:  CAM ICU:

## 2022-11-21 NOTE — DIETITIAN INITIAL EVALUATION ADULT - NUTRITION CONSULT
CICU DAILY GOALS       A: Awake    RASS: Goal -    Actual - RASS (Bender Agitation-Sedation Scale): 0-->alert and calm   Restraint necessity:    B: Breath   BiPap: 15/8 @ 100%  C: Coordinate A & B, analgesics/sedatives   Pain: managed    SAT: Not intubated  D: Delirium   CAM-ICU: Overall CAM-ICU: Negative  E: Early(intubated/ Progressive (non-intubated) Mobility   MOVE Screen:    Activity: Activity Management: Patient unable to perform activities  FAS: Feeding/Nutrition   Diet order: Diet/Nutrition Received: ice chips,   Fluid restriction:    T: Thrombus   DVT prophylaxis: VTE Required Core Measure: Pharmacological prophylaxis initiated/maintained  H: HOB Elevation   Head of Bed (HOB) Positioning: HOB at 30-45 degrees  U: Ulcer Prophylaxis   GI: yes  G: Glucose control   managed    S: Skin   Bundle compliance: yes   Bathing/Skin Care: bath, complete, linen changed Date: 2/20/2022  B: Bowel Function   no issues   I: Indwelling Catheters   Zhang necessity:     CVC necessity: No   IPAD offered: Not appropriate  D: De-escalation Antibx   Yes  Plan for the day   Improve oxygenation and reduce anxiety.  Family/Goals of care/Code Status   Code Status: DNR     No acute events overnight, VS and assessment per flow sheet, patient progressing towards all goals, but ability to maintain adequate oxygenation and ventilation remains a concern. Plan of care reviewed with Janeth Boyce and daughter at bedside. All concerns addressed at this time.      no

## 2022-11-21 NOTE — PROGRESS NOTE ADULT - SUBJECTIVE AND OBJECTIVE BOX
Gastroenterology progress note:     Patient is a 89y old  Female who presents with a chief complaint of GI bleed (20 Nov 2022 22:54)    Admitted on: 11-17-22    We are following the patient for duodenal ulcer and GIB.     No acute events overnight.   No bloody BM   no pain       PAST MEDICAL & SURGICAL HISTORY:  HTN (hypertension)      Bradycardia      Pacemaker          MEDICATIONS  (STANDING):  chlorhexidine 2% Cloths 1 Application(s) Topical <User Schedule>  dextrose 5% + lactated ringers. 1000 milliLiter(s) (70 mL/Hr) IV Continuous <Continuous>  pantoprazole  Injectable 40 milliGRAM(s) IV Push every 12 hours  pantoprazole Infusion 8 mG/Hr (10 mL/Hr) IV Continuous <Continuous>    MEDICATIONS  (PRN):      Allergies  No Known Allergies      Review of Systems:   Cardiovascular:  No Chest Pain, No Palpitations  Respiratory:  No Cough, No Dyspnea  Gastrointestinal:  As described in HPI  Skin:  No Skin Lesions, No Jaundice  Neuro:  No Syncope, No Dizziness    Physical Examination:  T(C): 36.5 (11-21-22 @ 11:00), Max: 36.8 (11-21-22 @ 04:00)  HR: 76 (11-21-22 @ 11:00) (69 - 103)  BP: 141/93 (11-21-22 @ 11:00) (123/64 - 183/97)  RR: 18 (11-21-22 @ 11:00) (17 - 31)  SpO2: 100% (11-21-22 @ 11:00) (96% - 100%)      11-20-22 @ 07:01  -  11-21-22 @ 07:00  --------------------------------------------------------  IN: 1830 mL / OUT: 810 mL / NET: 1020 mL    11-21-22 @ 07:01  -  11-21-22 @ 12:55  --------------------------------------------------------  IN: 310 mL / OUT: 100 mL / NET: 210 mL        GENERAL: AAOx3, no acute distress.  HEAD:  Atraumatic, Normocephalic  EYES: conjunctiva and sclera clear  NECK: Supple, no JVD or thyromegaly  CHEST/LUNG: Clear to auscultation bilaterally; No wheeze, rhonchi, or rales  HEART: Regular rate and rhythm; normal S1, S2, No murmurs.  ABDOMEN: Soft, nontender, nondistended; Bowel sounds present  NEUROLOGY: No asterixis or tremor.   SKIN: Intact, no jaundice     Data:                        7.3    15.27 )-----------( 133      ( 21 Nov 2022 05:19 )             21.7     Hgb trend:  7.3  11-21-22 @ 05:19  7.5  11-20-22 @ 16:17  7.3  11-20-22 @ 12:10  7.6  11-20-22 @ 00:37  7.8  11-19-22 @ 20:09  8.6  11-19-22 @ 04:50  9.1  11-19-22 @ 00:30  7.4  11-18-22 @ 19:55      11-18-22 @ 07:01  -  11-19-22 @ 07:00  --------------------------------------------------------  IN: 720 mL      11-21    143  |  113<H>  |  16  ----------------------------<  144<H>  3.6   |  22  |  1.0    Ca    7.3<L>      21 Nov 2022 05:19  Phos  2.3     11-21  Mg     2.1     11-21    TPro  4.3<L>  /  Alb  2.7<L>  /  TBili  0.4  /  DBili  x   /  AST  12  /  ALT  <5  /  AlkPhos  59  11-21    Liver panel trend:  TBili 0.4   /   AST 12   /   ALT <5   /   AlkP 59   /   Tptn 4.3   /   Alb 2.7    /   DBili --      11-21  TBili 0.4   /   AST 11   /   ALT 5   /   AlkP 50   /   Tptn 3.8   /   Alb 2.6    /   DBili --      11-20  TBili 0.6   /   AST 13   /   ALT 7   /   AlkP 53   /   Tptn 4.3   /   Alb 2.6    /   DBili --      11-19  TBili 0.4   /   AST 15   /   ALT 7   /   AlkP 54   /   Tptn 4.3   /   Alb 2.7    /   DBili --      11-18  TBili 0.4   /   AST 14   /   ALT 7   /   AlkP 69   /   Tptn 5.1   /   Alb 3.1    /   DBili --      11-18  TBili 0.2   /   AST 13   /   ALT 8   /   AlkP 74   /   Tptn 5.5   /   Alb 3.5    /   DBili --      11-17      PT/INR - ( 21 Nov 2022 05:19 )   PT: 12.10 sec;   INR: 1.06 ratio         PTT - ( 21 Nov 2022 05:19 )  PTT:23.1 sec    Culture - Blood (collected 18 Nov 2022 19:56)  Source: .Blood Blood-Peripheral  Preliminary Report (20 Nov 2022 03:01):    No growth to date.         Radiology:

## 2022-11-21 NOTE — OCCUPATIONAL THERAPY INITIAL EVALUATION ADULT - SPECIFY REASON(S)
Pt transferred from [facility] to [facility], requires new OT consult if requires OT evaluation, contacted medical resident via [employer] Teams, will follow up when orders placed.

## 2022-11-21 NOTE — DIETITIAN INITIAL EVALUATION ADULT - OTHER INFO
Pertinent Medical Information:   89 y/o female w/ PMHx of Alzheimer's dementia, CHF, HTN, chronic pain, who was biba for fall and GI bleeding, admitted to SDU for hemodynamic monitoring.   # GI bleed Diverticular -Duodenal ulcer   - blood loss anemia   # Leukocytosis, Fever spike on 11.20.22    Pertinent Subjective Information:   Per RN, pt tolerating clear liquids well. Diet advanced from NPO to clear liquids on 11/20. No nausea or vomiting reported.    Weight hx: UBW unknown. Current dosing weight is 47.9 KG. Previous admission weight was       Pertinent Medical Information:   89 y/o female w/ PMHx of Alzheimer's dementia, CHF, HTN, chronic pain, who was biba for fall and GI bleeding, admitted to SDU for hemodynamic monitoring.   # GI bleed Diverticular -Duodenal ulcer   - blood loss anemia   # Leukocytosis, Fever spike on 11.20.22    Pertinent Subjective Information:   Per RN, pt tolerating clear liquids well. Diet advanced from NPO to clear liquids on 11/20. No nausea or vomiting reported.    Weight hx: UBW unknown. Current dosing weight is 47.9 KG. Previous admission weight was 46.3 KG; 3.3% unintentional weight gain in over 3 years compared to 47.9 KG. Pt does not meet weight criteria for malnutrition at this time.

## 2022-11-21 NOTE — DIETITIAN INITIAL EVALUATION ADULT - ETIOLOGY
Unable to meet estimated energy needs on current diet order [Time Spent: ___ minutes] : I have spent [unfilled] minutes of time on the encounter.

## 2022-11-21 NOTE — DIETITIAN INITIAL EVALUATION ADULT - OTHER CALCULATIONS
Using ABW: 47.9 KG: ENERGY: 844-929 kcal/day (MSJ 1-1.1 SF); PROTEIN: 57-72 g/day (1.2-1.5 g/kg); FLUID:1198 mL/day (25 mL/kg) -- with consideration for crit care, age

## 2022-11-21 NOTE — DIETITIAN INITIAL EVALUATION ADULT - PERTINENT MEDS FT
MEDICATIONS  (STANDING):  chlorhexidine 2% Cloths 1 Application(s) Topical <User Schedule>  dextrose 5% + lactated ringers. 1000 milliLiter(s) (70 mL/Hr) IV Continuous <Continuous>  pantoprazole  Injectable 40 milliGRAM(s) IV Push every 12 hours  pantoprazole Infusion 8 mG/Hr (10 mL/Hr) IV Continuous <Continuous>    MEDICATIONS  (PRN):

## 2022-11-21 NOTE — PROGRESS NOTE ADULT - ASSESSMENT
IMPRESSION:  GI bleed ?? diverticular . duodenal ulcer   blood loss anemia       PLAN:    CNS: no sedation     HEENT: oral care     PULMONARY: keep pox >92 %     CARDIOVASCULAR:iv hydration [id number]  s/c iv fluid if tolerate feed   continue home BP meds   keep map around 70  GI: GI prophylaxis.   start clear liquid follow GI   IR stand by   RENAL: follow is and os lytes     INFECTIOUS DISEAS:   bld cx   procal     HEMATOLOGICAL:  DVT prophylaxis. serial cbc   s/p transfusion    follow INR     ENDOCRINE:  Follow up FS.  Insulin protocol if needed.    MUSCULOSKELETAL:  d/c delgadillo     transfer to floor afternoon   CRITICAL CARE TIME SPENT: ***

## 2022-11-22 NOTE — CHART NOTE - NSCHARTNOTEFT_GEN_A_CORE
89F w/ PMHx of Alzheimer's dementia, CHF, HTN, PPM 2/2 heart block presenting after unwitnessed fall, found to have GIB. CT A/P w/o active bleed/infectious etiology. S/p EGD/colonoscopy w/ gastric ulcers, severe diverticulosis. Required 3u pRBC so far.    She had two episodes of significant hematemesis suddenly on her body. rapid response was called. Suctioning to clear airways was done yield 50cc. Vitals showed BP of 70/40. two lines placed, labs were sent. Code fusion was called and 1 unit of PRBC was transfused along with 1 L of LR. GI was called urgently, they recommended IR evaluation. IR was consulted. Vitals repeat was stable. CTA abdomen was done. Upon arrival to CCU another episode of active upper and lower GI bleed happened.    PLAN    # Acute blood loss anemia  # GIB - gastric ulcers   # Ulcers/diverticulosis  - Keep active T&S  - Trend hemoglobin  - F/u w/ GI  - follow up with IR - they recommended GI evaluation     # Leukocytosis - stable  # UTI vs contamination  UCx w/ E. Coli. UA w/ epithelial cells.  - Continue to monitor    # Incidentally found pancreatic duct calculus w/ new pancreatic parenchymal atrophy  - F/u outpatient    # HTN  - Stable    # Dementia       # Misc  - DVT Prophylaxis: Compression Device Sequential  - GI Prophylaxis: pantoprazole  Injectable 40 milliGRAM(s) IV Push every 12 hours  - Diet: Diet, Clear Liquid  - Activity: Activity - Increase As Tolerated  - Code Status: Full Principal Discharge DX:	Pneumothorax on left  Goal:	one time occurance  Assessment and plan of treatment:	f/u Dr. Mayen

## 2022-11-22 NOTE — OCCUPATIONAL THERAPY INITIAL EVALUATION ADULT - PERTINENT HX OF CURRENT PROBLEM, REHAB EVAL
89F with PMH of Alzheimer's dementia, CHF, HTN , 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 Patient has advanced dementia and is a poor historian but is able to articulate pain and symptoms accurately per the son. unknown HT, unknown LOC, and she does not take anticoagulation. History was obtained from the patient and her son at the bedside. Patient does not remember falling, as she has poor memory. She denies any current lightheadedness, fatigue, chest pain, SOB, nausea, vomiting, headache, back pain, abdominal pain, urinary sx.

## 2022-11-22 NOTE — GOALS OF CARE CONVERSATION - ADVANCED CARE PLANNING - CONVERSATION DETAILS
I had a discussion with the patient's son regarding their chronic illnesses and goals of care. Patient' son understand the extent of their condition. Patient's son would like to pursue full medical therapy as needed. Patient is a Full Code. I had a discussion with the patient's son regarding their chronic illnesses and goals of care. Patient' son understand the extent of their condition. Need to intubation in order to protect airway was discussed with Patient's son, who stated that he wants her to be intubated if needed for airway protection, Patient's son would like to pursue full medical therapy as needed. Patient is a Full Code.

## 2022-11-22 NOTE — PROGRESS NOTE ADULT - ASSESSMENT
89F with phhx of Alzheimer's dementia, CHF, HTN , PPM secondary to heart block brought to the ED after being found down by her health aide after an unwitnessed fall and found to have GI bleed    #Acute blood loss anemia secondary to gi hemorrhage secondary to ulcer and diverticulosis   hemoglobin monitor   follow up gi whether can start aspirin     #Leukocytosis, Fever spike on 11.20.22 secondary to uti resolved     # New 8 mm calculus within the proximal pancreatic duct in the region of the pancreatic head with new diffuse pancreatic parenchymal   outpatient follow up     #Hypernatremia no intervention     #HTN   BP: 142/79 (22 Nov 2022 09:17) (121/73 - 160/80)  controlled    #Dementia     PROGRESS NOTE HANDOFF    Pending: gi follow up for whether can restart aspirin , monitor cbc      Family discussion: family aware of plan of care     Disposition: Home

## 2022-11-22 NOTE — CHART NOTE - NSCHARTNOTEFT_GEN_A_CORE
IMPRESSION:  Hematemesis  GI bleed sp recent EGD duodenal ulcer sp clip, colonoscopy with diverticulosis  Acute blood loss anemia   HO HFpEF      PLAN:    CNS: avoid sedation     HEENT: oral care     PULMONARY: keep pox >92 %     CARDIOVASCULAR: LR bolus, then maintenance at 75 cc/hr  hold BP meds   keep map >65    GI: Start PPI drip  CTA, IF positive then call IR  GI follow up    RENAL: follow is and os lytes     INFECTIOUS DISEASE:   Monitor off abx    HEMATOLOGICAL:  DVT prophylaxis with sequentials. transfuse 2 units PRBC    ENDOCRINE:  Follow up FS.  Insulin protocol if needed.    MUSCULOSKELETAL:  bedrest    MICU

## 2022-11-22 NOTE — PROGRESS NOTE ADULT - SUBJECTIVE AND OBJECTIVE BOX
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 Patient has advanced dementia and is a poor historian but is able to articulate pain and symptoms accurately per the son. unknown HT, unknown LOC, and she does not take anticoagulation. History was obtained from the patient, her son at the bedside, and medication reconcilation was done by calling [address] [phone], where she fills her medications per the son. Patient does not remember falling, as she has poor memory. She denies any current lightheadedness, fatigue, chest pain, SOB, nausea, vomiting, headache, back pain, abdominal pain, urinary sx. Of note, she does take celecoxib 100mg BID per pharmacist.     In the ED, she was found to have HGB 8, prior HGB was 12. Patient remained HD stable throughout ED stay. GI was consulted, and they plan to perform colonoscopy tomorrow. Cardiology cleared patient for colonoscopy procedure. She also has wbc 30 but has been afebrile. CTA abdomen/pelvis was negative for active bleeding or infectious etiology. She received 1 dose of 2g cefepime.  (17 Nov 2022 17:03)    Currently admitted to medicine with the primary diagnosis of GI bleed       Today is hospital day 5d.     INTERVAL HPI / OVERNIGHT EVENTS:  Patient was examined and seen at bedside. This morning she is resting comfortably in bed and reports no new issues or overnight events.     ROS: Otherwise unremarkable     PAST MEDICAL & SURGICAL HISTORY  HTN (hypertension)    Bradycardia    Pacemaker      ALLERGIES  No Known Allergies    MEDICATIONS  STANDING MEDICATIONS  chlorhexidine 2% Cloths 1 Application(s) Topical <User Schedule>  dextrose 5% + lactated ringers. 1000 milliLiter(s) IV Continuous <Continuous>  pantoprazole  Injectable 40 milliGRAM(s) IV Push every 12 hours    PRN MEDICATIONS    VITALS:  T(F): 96.8  HR: 81  BP: 142/79  RR: 18  SpO2: 99%    PHYSICAN EXAM  GENERAL: NAD, resting comfortably in bed  HEAD:  Atraumatic, Normocephalic  EYES: EOMI, conjunctiva and sclera clear  ENT: Moist mucous membranes  CHEST/LUNG: Clear to auscultation bilaterally; No rales, rhonchi, wheezing, or rubs. Unlabored respirations  HEART: Regular rate and rhythm; No murmurs, rubs, or gallops  ABDOMEN: BSx4; Soft, nontender, nondistended  EXTREMITIES:  No clubbing, cyanosis, or edema  NERVOUS SYSTEM:  A&Ox1, follows commands, no sensory or motor deficits    LABS                        8.4    15.57 )-----------( 140      ( 22 Nov 2022 07:48 )             24.9     11-22    147<H>  |  114<H>  |  10  ----------------------------<  143<H>  4.0   |  26  |  0.9    Ca    7.5<L>      22 Nov 2022 07:48  Phos  2.3     11-21  Mg     2.0     11-22    TPro  4.5<L>  /  Alb  2.6<L>  /  TBili  0.6  /  DBili  x   /  AST  14  /  ALT  6   /  AlkPhos  62  11-22    PT/INR - ( 21 Nov 2022 05:19 )   PT: 12.10 sec;   INR: 1.06 ratio         PTT - ( 21 Nov 2022 05:19 )  PTT:23.1 sec

## 2022-11-22 NOTE — CHART NOTE - NSCHARTNOTEFT_GEN_A_CORE
IR called regarding episode of hematemesis and code fusion. Given finding of duodenal ulcer on recent endoscopy, first line standard of care would be a repeat endoscopy. Repeat CTA not necessary but can be acquired. IR will continue to follow.

## 2022-11-22 NOTE — PROGRESS NOTE ADULT - SUBJECTIVE AND OBJECTIVE BOX
[facility]ORTENSEN  89[id number]      Patient is a 89y old  Female who presents with a chief complaint of GI BLEED; ANEMIA DUE TO ACUTE BLOOD LOSS; SEPSIS     (21 Nov 2022 14:40)      INTERVAL HPI/OVERNIGHT EVENTS:  no acute events overnight       REVIEW OF SYSTEMS:  ROS neg   FAMILY HISTORY:    T(C): 36 (11-22-22 @ 09:17), Max: 36.4 (11-21-22 @ 13:00)  HR: 81 (11-22-22 @ 09:17) (65 - 82)  BP: 142/79 (11-22-22 @ 09:17) (121/73 - 160/80)  RR: 18 (11-22-22 @ 09:17) (18 - 21)  SpO2: 99% (11-22-22 @ 09:17) (95% - 99%)  Wt(kg): --Vital Signs Last 24 Hrs  T(C): 36 (22 Nov 2022 09:17), Max: 36.4 (21 Nov 2022 13:00)  T(F): 96.8 (22 Nov 2022 09:17), Max: 97.6 (21 Nov 2022 13:00)  HR: 81 (22 Nov 2022 09:17) (65 - 82)  BP: 142/79 (22 Nov 2022 09:17) (121/73 - 160/80)  BP(mean): 105 (21 Nov 2022 18:00) (67 - 107)  RR: 18 (22 Nov 2022 09:17) (18 - 21)  SpO2: 99% (22 Nov 2022 09:17) (95% - 99%)    Parameters below as of 22 Nov 2022 09:17  Patient On (Oxygen Delivery Method): nasal cannula        PHYSICAL EXAM:  GENERAL: NAD, well-groomed, well-developed  HEAD:  Atraumatic, Normocephalic  EYES: EOMI, PERRLA, conjunctiva and sclera clear  ENMT: No tonsillar erythema, exudates, or enlargement; Moist mucous membranes, Good dentition, No lesions  NECK: Supple, No JVD, Normal thyroid  NERVOUS SYSTEM:  Alert & Oriented X1  PULM: Clear to auscultation bilaterally  CARDIAC: Regular rate and rhythm; No murmurs, rubs, or gallops  GI: Soft, Nontender, Nondistended; Bowel sounds present  EXTREMITIES:  2+ Peripheral Pulses, No clubbing, cyanosis, or edema  LYMPH: No lymphadenopathy noted  SKIN: No rashes or lesions    Consultant(s) Notes Reviewed:  [x ] YES  [ ] NO  Care Discussed with Consultants/Other Providers [ x] YES  [ ] NO    LABS:                            8.4    15.57 )-----------( 140      ( 22 Nov 2022 07:48 )             24.9   11-22    147<H>  |  114<H>  |  10  ----------------------------<  143<H>  4.0   |  26  |  0.9    Ca    7.5<L>      22 Nov 2022 07:48  Phos  2.3     11-21  Mg     2.0     11-22    TPro  4.5<L>  /  Alb  2.6<L>  /  TBili  0.6  /  DBili  x   /  AST  14  /  ALT  6   /  AlkPhos  62  11-22            chlorhexidine 2% Cloths 1 Application(s) Topical <User Schedule>  dextrose 5% + lactated ringers. 1000 milliLiter(s) IV Continuous <Continuous>  pantoprazole  Injectable 40 milliGRAM(s) IV Push every 12 hours      HEALTH ISSUES - PROBLEM Dx:          Case Discussed with House Staff      Spectra [phone]

## 2022-11-22 NOTE — PROGRESS NOTE ADULT - ASSESSMENT
Patient is a 89F w/ PMHx of Alzheimer's dementia, CHF, HTN, PPM 2/2 heart block presenting after unwitnessed fall, found to have GIB. CT A/P w/o active bleed/infectious etiology. S/p EGD/colonoscopy w/ gastric ulcers, severe diverticulosis. Required 3u pRBC so far.    PLAN    # Acute blood loss anemia - stable  # GIB  # Ulcers/diverticulosis  - Keep active T&S  - Trend hemoglobin  - F/u w/ GI regarding aspirin    # Leukocytosis - stable  # UTI vs contamination  UCx w/ E. Coli. UA w/ epithelial cells.  - Continue to monitor    # Incidentally found pancreatic duct calculus w/ new pancreatic parenchymal atrophy  - F/u outpatient    # HTN  - Stable    # Dementia     # To follow up  - Keep active T&S  - Trend hemoglobin  - F/u w/ GI regarding aspirin    # Misc  - DVT Prophylaxis: Compression Device Sequential  - GI Prophylaxis: pantoprazole  Injectable 40 milliGRAM(s) IV Push every 12 hours  - Diet: Diet, Clear Liquid  - Activity: Activity - Increase As Tolerated  - Code Status: Full

## 2022-11-23 NOTE — PROGRESS NOTE ADULT - SUBJECTIVE AND OBJECTIVE BOX
Patient is a 89y old  Female who presents with a chief complaint of rectal bleeding (17 Nov 2022 14:51)      INTERVAL HPI/OVERNIGHT EVENTS:  Code fusion called over night, patient noted to have hematemesis and hematochezia,   recieved 2PRBC, 1Unit of platelets and 1U of FFP prior to arrival in CCU. Patient was intubated per son HCP, decision was made to place on bicarb drip, pantoprazole drip,norepi/precedex/fentanyl drip, The patient is s/p intubation and on vent. IR intervention on hold per son despite an extensive conversation with attending., pt was made DNR        ICU Vital Signs Last 24 Hrs  T(C): 36.2 (20 Nov 2022 19:00), Max: 37.1 (20 Nov 2022 00:00)  T(F): 97.2 (20 Nov 2022 19:00), Max: 98.8 (20 Nov 2022 00:00)  HR: 72 (20 Nov 2022 22:00) (66 - 93)  BP: 135/65 (20 Nov 2022 22:00) (112/60 - 161/85)  BP(mean): 92 (20 Nov 2022 22:00) (79 - 117)  ABP: --  ABP(mean): --  RR: 22 (20 Nov 2022 22:00) (18 - 31)  SpO2: 100% (20 Nov 2022 22:00) (96% - 100%)    O2 Parameters below as of 20 Nov 2022 22:00  Patient On (Oxygen Delivery Method): nasal cannula  O2 Flow (L/min): 2        I&O's Summary    19 Nov 2022 07:01  -  20 Nov 2022 07:00  --------------------------------------------------------  IN: 1900 mL / OUT: 470 mL / NET: 1430 mL    20 Nov 2022 07:01  -  20 Nov 2022 22:57  --------------------------------------------------------  IN: 1200 mL / OUT: 410 mL / NET: 790 mL          LABS:                        7.5    16.73 )-----------( 133      ( 20 Nov 2022 16:17 )             22.8     11-20    139  |  113<H>  |  22<H>  ----------------------------<  139<H>  3.6   |  20  |  1.1    Ca    7.3<L>      20 Nov 2022 12:10  Phos  3.4     11-19  Mg     2.4     11-20    TPro  3.8<L>  /  Alb  2.6<L>  /  TBili  0.4  /  DBili  x   /  AST  11  /  ALT  5   /  AlkPhos  50  11-20        CAPILLARY BLOOD GLUCOSE            RADIOLOGY & ADDITIONAL TESTS:    Consultant(s) Notes Reviewed:  [x ] YES  [ ] NO    MEDICATIONS  (STANDING):  chlorhexidine 2% Cloths 1 Application(s) Topical <User Schedule>  dextrose 5% + lactated ringers. 1000 milliLiter(s) (70 mL/Hr) IV Continuous <Continuous>  pantoprazole Infusion 8 mG/Hr (10 mL/Hr) IV Continuous <Continuous>    MEDICATIONS  (PRN):      PHYSICAL EXAM:  GENERAL: In bed, intubated-on vent/sedated, +LIJ central line +OG tube +pallor  HEAD:  Atraumatic, Normocephalic  EYES: EOMI, PERRLA, conjunctiva and sclera clear, opening right eye more than left  NECK: Supple, No JVD, Normal thyroid, no enlarged nodes  CHEST/LUNG: CTA B/L   HEART: S1S2 normal, no S3, Regular rate and rhythm; No murmurs  ABDOMEN: Soft, Nontender, Nondistended; Bowel sounds present  EXTREMITIES:  2+ Peripheral Pulses,       Care Discussed with Consultants/Other Providers [ x] YES  [ ] NO

## 2022-11-23 NOTE — PROGRESS NOTE ADULT - SUBJECTIVE AND OBJECTIVE BOX
Gastroenterology progress note:     Patient is a 89y old  Female who presents with a chief complaint of GI BLEED; ANEMIA DUE TO ACUTE BLOOD LOSS; SEPSIS    Admitted on: 11-17-22    We are following the patient for upper gi bleeding       patient developed multiple bloody bms overnight, developed hematemesis and intubated to secure the airways. currently sedated and intubated on pressors.       PAST MEDICAL & SURGICAL HISTORY:  HTN (hypertension)      Bradycardia      Pacemaker          MEDICATIONS  (STANDING):  cefepime   IVPB      chlorhexidine 0.12% Liquid 15 milliLiter(s) Oral Mucosa every 12 hours  chlorhexidine 2% Cloths 1 Application(s) Topical <User Schedule>  dexMEDEtomidine Infusion 0.2 MICROgram(s)/kG/Hr (2.4 mL/Hr) IV Continuous <Continuous>  fentaNYL   Infusion. 0.5 MICROgram(s)/kG/Hr (2.4 mL/Hr) IV Continuous <Continuous>  lactated ringers. 1000 milliLiter(s) (75 mL/Hr) IV Continuous <Continuous>  norepinephrine Infusion 0.05 MICROgram(s)/kG/Min (4.49 mL/Hr) IV Continuous <Continuous>  pantoprazole  Injectable 40 milliGRAM(s) IV Push every 12 hours  pantoprazole Infusion 8 mG/Hr (10 mL/Hr) IV Continuous <Continuous>    MEDICATIONS  (PRN):      Allergies  No Known Allergies      Review of Systems:   unable to obtain     Physical Examination:  T(C): 38.6 (11-23-22 @ 11:00), Max: 38.6 (11-23-22 @ 11:00)  HR: 81 (11-23-22 @ 11:00) (68 - 112)  BP: 97/53 (11-23-22 @ 11:00) (70/47 - 185/85)  RR: 22 (11-23-22 @ 11:00) (16 - 39)  SpO2: 100% (11-23-22 @ 11:00) (59% - 100%)      11-22-22 @ 07:01  -  11-23-22 @ 07:00  --------------------------------------------------------  IN: 952.7 mL / OUT: 100 mL / NET: 852.7 mL    11-23-22 @ 07:01  -  11-23-22 @ 12:42  --------------------------------------------------------  IN: 1289 mL / OUT: 350 mL / NET: 939 mL        GENERAL: sedated intubated   HEAD:  Atraumatic, Normocephalic  EYES: conjunctiva and sclera clear  NECK: Supple, no JVD or thyromegaly  CHEST/LUNG: Clear to auscultation bilaterally;  HEART: Regular rate and rhythm; normal S1, S2, No murmurs.  ABDOMEN: Soft, nontender, nondistended  NEUROLOGY: sedated   SKIN: Intact, no jaundice     Data:                        9.6    31.89 )-----------( 68       ( 23 Nov 2022 04:20 )             30.5     Hgb trend:  9.6  11-23-22 @ 04:20  11.3  11-23-22 @ 03:05  7.6  11-22-22 @ 21:14  8.2  11-22-22 @ 16:59  8.4  11-22-22 @ 07:48  9.2  11-21-22 @ 16:00  7.3  11-21-22 @ 05:19  7.5  11-20-22 @ 16:17      11-21-22 @ 07:01  -  11-22-22 @ 07:00  --------------------------------------------------------  IN: 300 mL    11-22-22 @ 07:01  -  11-23-22 @ 07:00  --------------------------------------------------------  IN: 568 mL    11-23-22 @ 07:01  -  11-23-22 @ 12:42  --------------------------------------------------------  IN: 257 mL      11-23    139  |  109  |  11  ----------------------------<  271<H>  4.9   |  16<L>  |  1.2    Ca    6.8<L>      23 Nov 2022 04:20  Phos  2.6     11-22  Mg     1.6     11-23    TPro  3.0<L>  /  Alb  1.9<L>  /  TBili  0.9  /  DBili  x   /  AST  17  /  ALT  8   /  AlkPhos  43  11-23    Liver panel trend:  TBili 0.9   /   AST 17   /   ALT 8   /   AlkP 43   /   Tptn 3.0   /   Alb 1.9    /   DBili --      11-23  TBili 1.0   /   AST 15   /   ALT 7   /   AlkP 48   /   Tptn 3.3   /   Alb 2.1    /   DBili --      11-23  TBili 0.5   /   AST 12   /   ALT 6   /   AlkP 53   /   Tptn 4.0   /   Alb 2.5    /   DBili --      11-22  TBili 0.6   /   AST 14   /   ALT 6   /   AlkP 62   /   Tptn 4.5   /   Alb 2.6    /   DBili --      11-22  TBili 0.4   /   AST 12   /   ALT <5   /   AlkP 59   /   Tptn 4.3   /   Alb 2.7    /   DBili --      11-21  TBili 0.4   /   AST 11   /   ALT 5   /   AlkP 50   /   Tptn 3.8   /   Alb 2.6    /   DBili --      11-20  TBili 0.6   /   AST 13   /   ALT 7   /   AlkP 53   /   Tptn 4.3   /   Alb 2.6    /   DBili --      11-19  TBili 0.4   /   AST 15   /   ALT 7   /   AlkP 54   /   Tptn 4.3   /   Alb 2.7    /   DBili --      11-18  TBili 0.4   /   AST 14   /   ALT 7   /   AlkP 69   /   Tptn 5.1   /   Alb 3.1    /   DBili --      11-18  TBili 0.2   /   AST 13   /   ALT 8   /   AlkP 74   /   Tptn 5.5   /   Alb 3.5    /   DBili --      11-17      PT/INR - ( 23 Nov 2022 04:20 )   PT: 21.40 sec;   INR: 1.84 ratio         PTT - ( 23 Nov 2022 04:20 )  PTT:26.8 sec    Culture - Blood (collected 21 Nov 2022 05:19)  Source: .Blood None  Preliminary Report (22 Nov 2022 15:02):    No growth to date.         Radiology:  CT Angio Abdomen and Pelvis w/ IV Cont:   ACC: [id number] EXAM:  CT ANGIO ABD PELV (W)AW IC                          PROCEDURE DATE:  11/22/2022          INTERPRETATION:  CLINICAL HISTORY/REASON FOR EXAM: Hematemesis. Recent   duodenal ulcer Endo Clip.    TECHNIQUE: CTA abdomen and pelvis with and without IV contrast   (gastrointestinal bleeding protocol). Contiguous axial CTA images of the   abdomen and pelvis were obtained before and after the administration of   100 cc Omnipaque 350 intravenous contrast. Arterial and venous phase   images obtained. 0 cc contrast discarded. Oral contrast was not   administered. Reformatted images in the coronal and sagittal planes were   acquired. 3-D/MIP images obtained.    COMPARISON: CTA abdomen and pelvis 11/17/2022.      FINDINGS:      LOWER CHEST: New small bilateral pleural effusions, left greater than   right, with adjacent compressive atelectasis.    HEPATOBILIARY: Unchanged.    SPLEEN: Unchanged.    PANCREAS: Unchanged.    ADRENAL GLANDS: Unchanged bilateral nodular thickening of the adrenal   glands.    KIDNEYS: Unchanged.    ABDOMINOPELVIC NODES: Unchanged.    PELVIC ORGANS: Unremarkable.    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 Redemonstrated colonic   diverticulosis without evidence of diverticulitis. Multiple fluid-filled   segments of small bowel, compatible with diarrheal illness. No bowel   obstruction. No ascites or pneumoperitoneum.    BONES/SOFT TISSUES: Unchanged.    OTHER: Unchanged.      IMPRESSION:    Within the second segment of the duodenum there is an arterial   outpouching measuring 7 x 10 mm, presumably a gastroduodenal artery   pseudoaneurysm with distal pooling on venous phase compatible with active   extravasation.    Multiple fluid-filled segments of small bowel, compatible with diarrheal   illness/enteritis    Findings were discussed with Dr. Daniel at 11:37 PM 11/22/2022    --- End of Report ---          ABIDA VILLA MD; Resident Radiologist  This document has been electronically signed.  LEENA QUINTEROS MD; Attending Radiologist  This document has been electronically signed. Nov 23 2022  1:31AM (11-22-22 @ 22:39)

## 2022-11-23 NOTE — CHART NOTE - NSCHARTNOTEFT_GEN_A_CORE
Case was discussed with IR and GI extensively.   Case was aslo discussed with On call ICU fellow. Decision was made to intubate the patient for airway protection due to multiple episodes of hematemesis. Patient is hemodynamically unstable requiring levophed. Patent received 2L LR Bolus, 3U PRBC, 1U FFP, 1U Platelet, 1g tranexamic acid over 10min and next 1g over 8hrs.   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 Decision was made to perform an angio/embo with IR.  Anesthesia was called for IR procedure, after discussing with anesthesia family decided to not go through with IR procedure due to the fact that they won't be able to give her any sedation during the procedure as per the anesthesiologist. Patient was made DNR per family request. Multiple family discussion took place with IR Dr. Lee. Family is requesting more time to think about the procedure. Case was discussed with IR and GI extensively.   Case was aslo discussed with On call ICU fellow. Decision was made to intubate the patient for airway protection due to multiple episodes of hematemesis. Patient is hemodynamically unstable requiring levophed. Patent received 2L LR Bolus, 3U PRBC, 1U FFP, 1U Platelet, 1g tranexamic acid over 10min and next 1g over 8hrs.   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 Decision was made to perform an angio/embo with IR.  Anesthesia was called for IR procedure, after discussing with anesthesia family decided to not go through with IR procedure due to the fact that they won't be able to give her any sedation during the procedure as per the anesthesiologist. Patient was made DNR per family request. Multiple family discussion took place with IR Dr. Lee. Family is requesting more time to think about the procedure.  - F/u Palliative team consult  - repeat labs

## 2022-11-23 NOTE — CONSULT NOTE ADULT - PROBLEM SELECTOR RECOMMENDATION 3
Ongoing ICU management.   Was requiring pressors and blood transfusions   Labs daily DNR   Palliative meet con adam Orozco Friday 11/25 at 12

## 2022-11-23 NOTE — PROGRESS NOTE ADULT - ASSESSMENT
IMPRESSION:  GI bleed ?? diverticular . duodenal ulcer   blood loss anemia   gastroduodenal bleed   metabolic acidosis   PLAN:    CNS:  keep donta neg 1     HEENT: oral care     PULMONARY: increase rate to 22     CARDIOVASCULAR:   continue IV fluid Bicarb drip follow BMP     GI: GI prophylaxis.   spoke to IR , GI agree for IR embolisation   i spoke with son mayelin extensively he want comfort care refusing any intervention understand the risk of bleed might not stop with out IR   follwo LA   RENAL: follow is and os lytes     INFECTIOUS DISEASe: cefepime       HEMATOLOGICAL:  DVT prophylaxis.  on bicarb drip follow BMP   replace lytes   s/p transfusion   follow serial cbc    follow INR     ENDOCRINE:  Follow up FS.  Insulin protocol if needed.    MUSCULOSKELETAL:    palliative care consult   CRITICAL CARE TIME SPENT: ***

## 2022-11-23 NOTE — CONSULT NOTE ADULT - CONVERSATION DETAILS
Introduced palliative care, reviewed role of palliative care team    He is coming to see mom now, says he understands medically what is currently happening. Offered to meet today to discuss palliative options or to meet friday and continue ongoing medical care in the hope that she could improve medically.    He voiced that he would like to meet Friday, he made her DNR already. He said he just needs a few days to process.

## 2022-11-23 NOTE — CONSULT NOTE ADULT - PROBLEM SELECTOR RECOMMENDATION 9
DNR   Palliative meet con adam Orozco Friday 11/25 at 12 Advanced illness, needs 24/7 care, lives w son.  supportive care

## 2022-11-23 NOTE — PROGRESS NOTE ADULT - ASSESSMENT
89yFemale pmh HTN, bradycardia, pacemaker presents with maroon colored stools. Patient's home health aide not able to give much information, reports maroon colored stools    # GI bleed, s/p EGD 2 cm duodenal bulb ulcer along the anterior wall, ulcer is deep, cratered, with friable base and pigmentation, no active GI bleeding noted. 3 cc of epinephrine injected, 2 Endoclip were deployed to secure hemostasis.  2 clean based ulcer seen in the second part of duodenum, 5 mm each.  s/p colonoscopy with severe diverticulosis , procedure aborted secondary to poor prep( old blood through the examined part of colon) and diverticulosis.  Pt remained stable for 4 days without any signs of active bleeding. However, overnight she developed multiple bloody BMS , hematemesis and required intubation. Currently on pressors in CCU.     PLAN:  c/w MICU monitoring  trend CBC  PPI infusion  Transfuse to keep hemoglobin > 8   2 Large IVs   plan of care discussed extensively with son ernesto and ICU team   Had previous discussion with the son Ernesto who deferred further endoscopic interventions. Given high risk lesion, positive extravasation in CT scan, and failed endoscopic intervention patient would benefit from embolization w IR. However, family decided to avoid any intervention and patient was made DNR.       # New 8 mm calculus within the proximal pancreatic duct in the region of the pancreatic head with new diffuse pancreatic parenchymal   atrophy and dilatation of the pancreatic duct to 1.1 cm   normal LFTs  Rec  - Further work up by MRI to rule out malignancy as outpatient if compatible with goals of care   - Follow up with our [facility] located at [address] [place] [address]. Phone Number: [phone] Tuesday session 89yFemale pmh HTN, bradycardia, pacemaker presents with maroon colored stools. Patient's home health aide not able to give much information, reports maroon colored stools    # GI bleed, s/p EGD 2 cm duodenal bulb ulcer along the anterior wall, ulcer is deep, cratered, with friable base and pigmentation, no active GI bleeding noted. 3 cc of epinephrine injected, 2 Endoclip were deployed to secure hemostasis.  2 clean based ulcer seen in the second part of duodenum, 5 mm each.  s/p colonoscopy with severe diverticulosis , procedure aborted secondary to poor prep( old blood through the examined part of colon) and diverticulosis.  Pt remained stable for 4 days without any signs of active bleeding. However, overnight she developed multiple bloody BMS , hematemesis and required intubation. Currently on pressors in CCU.     PLAN:  c/w MICU monitoring  trend CBC  PPI infusion  Transfuse to keep hemoglobin > 8   2 Large IVs   plan of care discussed extensively with son ernesto and ICU team   Had previous discussion with the son Ernesto who deferred further endoscopic interventions. Given high risk lesion, positive extravasation in CT scan, and failed endoscopic intervention patient would benefit from embolization w IR. However, family decided to avoid any intervention and patient was made DNR. Patients son doesnt want her to undergo any more invasive procedures.      # New 8 mm calculus within the proximal pancreatic duct in the region of the pancreatic head with new diffuse pancreatic parenchymal   atrophy and dilatation of the pancreatic duct to 1.1 cm   normal LFTs  Rec  - Further work up by MRI to rule out malignancy as outpatient if compatible with goals of care   - Follow up with our [facility] located at [address] [place] [address]. Phone Number: [phone] Tuesday session 89yFemale pmh HTN, bradycardia, pacemaker presents with maroon colored stools. Patient's home health aide not able to give much information, reports maroon colored stools    # GI bleed, s/p EGD at presentation showing 2 cm duodenal bulb ulcer along the anterior wall, ulcer is deep, cratered, with friable base and pigmentation, no active GI bleeding noted. 3 cc of epinephrine injected, 2 Endoclip were deployed to secure hemostasis.  2 clean based ulcer seen in the second part of duodenum, 5 mm each.  s/p colonoscopy with severe diverticulosis , procedure aborted secondary to poor prep( old blood through the examined part of colon) and diverticulosis.  Pt remained stable for 4 days without any signs of active bleeding.     However, overnight she developed multiple bloody BMS , hematemesis and required intubation. Currently on pressors in CCU.     PLAN:  c/w MICU monitoring  trend CBC  PPI infusion  Transfuse to keep hemoglobin > 8   2 Large IVs   plan of care discussed extensively with son ernesto and ICU team   Had  discussion with the son Ernesto who deferred further endoscopic interventions.   Given high risk lesion, positive extravasation in CT scan, and failed endoscopic intervention patient would benefit from embolization w IR. However, family decided to avoid any intervention and patient was made DNR. Patients son doesn't want her to undergo any more invasive procedures understanding that this might be result in death.       # New 8 mm calculus within the proximal pancreatic duct in the region of the pancreatic head with new diffuse pancreatic parenchymal   atrophy and dilatation of the pancreatic duct to 1.1 cm   normal LFTs  Rec  - No further workup given current  age, clinical status and goals of cares

## 2022-11-23 NOTE — CONSULT NOTE ADULT - ASSESSMENT
89yFemale being evaluated for goals of care and symptom management. Pt intubated for airway protection due to excessive hematemesis. Pt has baseline advanced ALz disease, is cared for at home by son. Now a DNR, very guarded prognosis.       MEDD (morphine equivalent daily dose):      See Recs below.    Please call [phone] with questions or concerns 24/7.   We will continue to follow.    89yFemale being evaluated for goals of care and symptom management. Pt intubated for airway protection due to excessive hematemesis. Pt has baseline advanced ALz disease, is cared for at home by son. Now a DNR, very guarded prognosis.     Pt without signs of pain or dyspnea - vented sedated    MEDD (morphine equivalent daily dose): Fentanyl Drip      See Recs below.    Please call [phone] with questions or concerns 24/7.   We will continue to follow.

## 2022-11-23 NOTE — CONSULT NOTE ADULT - PROBLEM SELECTOR RECOMMENDATION 2
Advanced illness, needs 24/7 care, lives w son. Ongoing ICU management.   Was requiring pressors and blood transfusions   Labs daily  c/w IV cefepime, high risk, monitor counts

## 2022-11-23 NOTE — CONSULT NOTE ADULT - NS ATTEND AMEND GEN_ALL_CORE FT
89F with PMH of dementia, CHF, HTn, PPM, here after fall, found to have GIB and hematemesis with hypotension, s/p code fusion, intubation, now awake off sedation. Palliative care consulted for GOC, son agreed to DNR, MOLST completed. Will follow, likely family meeting Friday  ______________  Piter Collazo MD  Palliative Medicine  [facility]  [phone]

## 2022-11-23 NOTE — CONSULT NOTE ADULT - SUBJECTIVE AND OBJECTIVE BOX
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 Patient has advanced dementia and is a poor historian but is able to articulate pain and symptoms accurately per the son. unknown HT, unknown LOC, and she does not take anticoagulation. History was obtained from the patient, her son at the bedside, and medication reconcilation was done by calling [address] [phone], where she fills her medications per the son. Patient does not remember falling, as she has poor memory. She denies any current lightheadedness, fatigue, chest pain, SOB, nausea, vomiting, headache, back pain, abdominal pain, urinary sx. Of note, she does take celecoxib 100mg BID per pharmacist.     In the ED, she was found to have HGB 8, prior HGB was 12. Patient remained HD stable throughout ED stay. GI was consulted, and they plan to perform colonoscopy tomorrow. Cardiology cleared patient for colonoscopy procedure. She also has wbc 30 but has been afebrile. CTA abdomen/pelvis was negative for active bleeding or infectious etiology. She received 1 dose of 2g cefepime.  (17 Nov 2022 17:03)    PERTINENT PM/SXH:   HTN (hypertension)    Bradycardia    Pacemaker      No significant past surgical history      FAMILY HISTORY:    ITEMS NOT CHECKED ARE NOT PRESENT    SOCIAL HISTORY:   Significant other/partner[ ]  Children[ ]  Pentecostalism/Spirituality:  Substance hx:  [ ]   Tobacco hx:  [ ]   Alcohol hx: [ ]   Living Situation: [x ]Home  [ ]Long term care  [ ]Rehab [ ]Other  Home Services: [ ] HHA [ ] Visting RN [ ] Hospice  Occupation:  Home Opioid hx:  [ ] Y [ ] N [x ] I-Stop Reference No:  : Nalini No | Reference #: [id number]    There are no results for the search terms that you entered.  ADVANCE DIRECTIVES:    [ ] Full Code [ ] DNR  MOLST  [ ]  Living Will  [ ]   DECISION MAKER(s):  [ ] Health Care Proxy(s)  [ ] Surrogate(s)  [ ] Guardian           Name(s): Phone Number(s):  Marv Hartley:  son [phone]    BASELINE (I)ADL(s) (prior to admission):  [place]: [ ]Total  [ ] Moderate [ ]Dependent  Palliative Performance Status Version 2:         %    [email or web address]/[email or web address]_performance_scale_[email or web address].pdf    Allergies    No Known Allergies    Intolerances    MEDICATIONS  (STANDING):  acetaminophen   IVPB .. 1000 milliGRAM(s) IV Intermittent once  cefepime   IVPB      cefepime   IVPB 2000 milliGRAM(s) IV Intermittent once  chlorhexidine 0.12% Liquid 15 milliLiter(s) Oral Mucosa every 12 hours  chlorhexidine 2% Cloths 1 Application(s) Topical <User Schedule>  dexMEDEtomidine Infusion 0.2 MICROgram(s)/kG/Hr (2.4 mL/Hr) IV Continuous <Continuous>  fentaNYL   Infusion. 0.5 MICROgram(s)/kG/Hr (2.4 mL/Hr) IV Continuous <Continuous>  norepinephrine Infusion 0.05 MICROgram(s)/kG/Min (4.49 mL/Hr) IV Continuous <Continuous>  pantoprazole  Injectable 40 milliGRAM(s) IV Push every 12 hours  pantoprazole Infusion 8 mG/Hr (10 mL/Hr) IV Continuous <Continuous>  sodium bicarbonate  Infusion 0.626 mEq/kG/Hr (200 mL/Hr) IV Continuous <Continuous>    MEDICATIONS  (PRN):    PRESENT SYMPTOMS: [ x]Unable to obtain due to poor mentation   Source if other than patient:  [ ]Family   [ ]Team     Pain: [ ]yes [ ]no  QOL impact -   Location -                    Aggravating factors -  Quality -  Radiation -  Timing-  Severity (0-10 scale):  Minimal acceptable level (0-10 scale):     CPOT:    https://www.[facility].org/getattachment/[email or web address]/Critical-Care-Pain-Observation-Tool-(CPOT)      PAIN AD Score:     http://geriatrictoolkit.[place].edu/cog/painad.pdf (press ctrl +  left click to view)      Dyspnea:                           [ ]Mild [ ]Moderate [ ]Severe [ ]None  Anxiety:                             [ ]Mild [ ]Moderate [ ]Severe [ ]None  Fatigue:                             [ ]Mild [ ]Moderate [ ]Severe [ ]None  Nausea:                             [ ]Mild [ ]Moderate [ ]Severe [ ]None  Loss of appetite:              [ ]Mild [ ]Moderate [ ]Severe [ ]None  Constipation:                    [ ]Mild [ ]Moderate [ ]Severe [ ]None    Other Symptoms:  [ ]All other review of systems negative     Palliative Performance Status Version 2:         %    http://npcrc.org/files/news/palliative_performance_scale_ppsv2.pdf  PHYSICAL EXAM:  Vital Signs Last 24 Hrs  T(C): 35.6 (23 Nov 2022 04:00), Max: 36.3 (22 Nov 2022 15:00)  T(F): 96 (23 Nov 2022 04:00), Max: 97.3 (22 Nov 2022 15:00)  HR: 105 (23 Nov 2022 07:28) (68 - 112)  BP: 144/92 (23 Nov 2022 07:00) (70/47 - 185/85)  BP(mean): 112 (23 Nov 2022 07:00) (55 - 122)  RR: 16 (23 Nov 2022 07:00) (16 - 39)  SpO2: 100% (23 Nov 2022 07:28) (59% - 100%)    Parameters below as of 23 Nov 2022 07:00  Patient On (Oxygen Delivery Method): ventilator  O2 Flow (L/min): 60   I&O's Summary    22 Nov 2022 07:01  -  23 Nov 2022 07:00  --------------------------------------------------------  IN: 952.7 mL / OUT: 100 mL / NET: 852.7 mL        GENERAL:  [ ] No acute distress [ ]Lethargic  [ ]Unarousable  [ ]Verbal  [ ]Non-Verbal [ ]Cachexia    BEHAVIORAL/PSYCH:  [ ]Alert and Oriented x  [ ] Anxiety [ ] Delirium [ ] Agitation [ ] Calm   EYES: [ ] No scleral icterus [ ] Scleral icterus [ ] Closed  ENMT:  [ ]Dry mouth  [ ]No external oral lesions [ ] No external ear or nose lesions  CARDIOVASCULAR:  [ ]Regular [ ]Irregular [ ]Tachy [ ]Not Tachy  [ ]Luis [ ] Edema [ ] No edema  PULMONARY:  [ ]Tachypnea  [ ]Audible excessive secretions [ ] No labored breathing [ ] labored breathing  GASTROINTESTINAL: [ ]Soft  [ ]Distended  [ ]Not distended [ ]Non tender [ ]Tender  MUSCULOSKELETAL: [ ]No clubbing [ ] clubbing  [ ] No cyanosis [ ] cyanosis  NEUROLOGIC: [ ]No focal deficits  [ ]Follows commands  [ ]Does not follow commands  [ ]Cognitive impairment  [ ]Dysphagia  [ ]Dysarthria  [ ]Paresis   SKIN: [ ] Jaundiced [ ] Non-jaundiced [ ]Rash [ ]No Rash [ ] Warm [ ] Dry  MISC/LINES: [ ] ET tube [ ] Trach [ ]NGT/OGT [ ]PEG [ ]Costa    CRITICAL CARE:  [ ] Shock Present  [ ]Septic [ ]Cardiogenic [ ]Neurologic [ ]Hypovolemic  [ ]  Vasopressors [ ]  Inotropes   [ ]Respiratory failure present [ ]Mechanical ventilation [ ]Non-invasive ventilatory support [ ]High flow  [ ]Acute  [ ]Chronic [ ]Hypoxic  [ ]Hypercarbic [ ]Other  [ ]Other organ failure     LABS: reviewed by me                        9.6    31.89 )-----------( 68       ( 23 Nov 2022 04:20 )             30.5   11-23    139  |  109  |  11  ----------------------------<  271<H>  4.9   |  16<L>  |  1.2    Ca    6.8<L>      23 Nov 2022 04:20  Phos  2.6     11-22  Mg     1.6     11-23    TPro  3.0<L>  /  Alb  1.9<L>  /  TBili  0.9  /  DBili  x   /  AST  17  /  ALT  8   /  AlkPhos  43  11-23  PT/INR - ( 23 Nov 2022 04:20 )   PT: 21.40 sec;   INR: 1.84 ratio         PTT - ( 23 Nov 2022 04:20 )  PTT:26.8 sec      RADIOLOGY & ADDITIONAL STUDIES: reviewed by me    EKG: reviewed by me      REFERRALS:   [ ]Chaplaincy  [ ]Hospice  [ ]Child Life  [x ]Social Work  [ x]Case management [ ]Holistic Therapy     Goals of Care Document:    HPI:  89F with phhx of Alzheimer's dementia, CHF, HTN , 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 Patient has advanced dementia and is a poor historian but is able to articulate pain and symptoms accurately per the son. unknown HT, unknown LOC, and she does not take anticoagulation. History was obtained from the patient, her son at the bedside, and medication reconcilation was done by calling [address] [phone], where she fills her medications per the son. Patient does not remember falling, as she has poor memory. She denies any current lightheadedness, fatigue, chest pain, SOB, nausea, vomiting, headache, back pain, abdominal pain, urinary sx. Of note, she does take celecoxib 100mg BID per pharmacist.     In the ED, she was found to have HGB 8, prior HGB was 12. Patient remained HD stable throughout ED stay. GI was consulted, and they plan to perform colonoscopy tomorrow. Cardiology cleared patient for colonoscopy procedure. She also has wbc 30 but has been afebrile. CTA abdomen/pelvis was negative for active bleeding or infectious etiology. She received 1 dose of 2g cefepime.  (17 Nov 2022 17:03)    Palliative care consulted to support son/caregiver and discuss options as needed.     PERTINENT PM/SXH:   HTN (hypertension)    Bradycardia    Pacemaker      No significant past surgical history      FAMILY HISTORY:    ITEMS NOT CHECKED ARE NOT PRESENT    SOCIAL HISTORY:   Significant other/partner[ ]  Children[ x]  Latter-day/Spirituality:  Substance hx:  [ ]   Tobacco hx:  [ ]   Alcohol hx: [ ]   Living Situation: [x ]Home  [ ]Long term care  [ ]Rehab [ ]Other  Home Services: [ ] HHA [ ] Visting RN [ ] Hospice  Occupation:  Home Opioid hx:  [ ] Y [ ] N [x ] I-Stop Reference No:  : Nalini No | Reference #: [id number]    There are no results for the search terms that you entered.  ADVANCE DIRECTIVES:    [ ] Full Code [ ] DNR  MOLST  [ ]  Living Will  [ ]   DECISION MAKER(s):  [ ] Health Care Proxy(s)  [ x] Surrogate(s)  [ ] Guardian           Name(s): Phone Number(s):  Marv Hartley:  son [phone]    BASELINE (I)ADL(s) (prior to admission):  [place]: [ ]Total  [ ] Moderate [x ]Dependent  Palliative Performance Status Version 2:         %    http://[email or web address]/files/news/palliative_performance_scale_[email or web address].pdf    Allergies    No Known Allergies    Intolerances    MEDICATIONS  (STANDING):  acetaminophen   IVPB .. 1000 milliGRAM(s) IV Intermittent once  cefepime   IVPB      cefepime   IVPB 2000 milliGRAM(s) IV Intermittent once  chlorhexidine 0.12% Liquid 15 milliLiter(s) Oral Mucosa every 12 hours  chlorhexidine 2% Cloths 1 Application(s) Topical <User Schedule>  dexMEDEtomidine Infusion 0.2 MICROgram(s)/kG/Hr (2.4 mL/Hr) IV Continuous <Continuous>  fentaNYL   Infusion. 0.5 MICROgram(s)/kG/Hr (2.4 mL/Hr) IV Continuous <Continuous>  norepinephrine Infusion 0.05 MICROgram(s)/kG/Min (4.49 mL/Hr) IV Continuous <Continuous>  pantoprazole  Injectable 40 milliGRAM(s) IV Push every 12 hours  pantoprazole Infusion 8 mG/Hr (10 mL/Hr) IV Continuous <Continuous>  sodium bicarbonate  Infusion 0.626 mEq/kG/Hr (200 mL/Hr) IV Continuous <Continuous>    MEDICATIONS  (PRN):    PRESENT SYMPTOMS: [ x]Unable to obtain due to poor mentation   Source if other than patient:  [ ]Family   [ ]Team     Pain: [ ]yes [ ]no  QOL impact -   Location -                    Aggravating factors -  Quality -  Radiation -  Timing-  Severity (0-10 scale):  Minimal acceptable level (0-10 scale):     CPOT:    https://www.[facility].org/getattachment/[email or web address]/Critical-Care-Pain-Observation-Tool-(CPOT)      PAIN AD Score:     http://geriatrictoolkit.[place].[facility]/cog/painad.pdf (press ctrl +  left click to view)      Dyspnea:                           [ ]Mild [ ]Moderate [ ]Severe [ ]None  Anxiety:                             [ ]Mild [ ]Moderate [ ]Severe [ ]None  Fatigue:                             [ ]Mild [ ]Moderate [ ]Severe [ ]None  Nausea:                             [ ]Mild [ ]Moderate [ ]Severe [ ]None  Loss of appetite:              [ ]Mild [ ]Moderate [ ]Severe [ ]None  Constipation:                    [ ]Mild [ ]Moderate [ ]Severe [ ]None    Other Symptoms:  [ ]All other review of systems negative     Palliative Performance Status Version 2:         %    http://[facility].org/files/news/palliative_performance_scale_ppsv2.pdf  PHYSICAL EXAM:  Vital Signs Last 24 Hrs  T(C): 35.6 (23 Nov 2022 04:00), Max: 36.3 (22 Nov 2022 15:00)  T(F): 96 (23 Nov 2022 04:00), Max: 97.3 (22 Nov 2022 15:00)  HR: 105 (23 Nov 2022 07:28) (68 - 112)  BP: 144/92 (23 Nov 2022 07:00) (70/47 - 185/85)  BP(mean): 112 (23 Nov 2022 07:00) (55 - 122)  RR: 16 (23 Nov 2022 07:00) (16 - 39)  SpO2: 100% (23 Nov 2022 07:28) (59% - 100%)    Parameters below as of 23 Nov 2022 07:00  Patient On (Oxygen Delivery Method): ventilator  O2 Flow (L/min): 60   I&O's Summary    22 Nov 2022 07:01  -  23 Nov 2022 07:00  --------------------------------------------------------  IN: 952.7 mL / OUT: 100 mL / NET: 852.7 mL        GENERAL:  [ x] No acute distress [ ]Lethargic  [ ]Unarousable  [ ]Verbal  [ ]Non-Verbal [ ]Cachexia    BEHAVIORAL/PSYCH:  [ ]Alert and Oriented x  [ ] Anxiety [ ] Delirium [ ] Agitation [ ] Calm   EYES: [ x] No scleral icterus [ ] Scleral icterus [ ] Closed  ENMT:  [ ]Dry mouth  [ ]No external oral lesions [ ] No external ear or nose lesions  CARDIOVASCULAR:  [ ]Regular [ ]Irregular [x ]Tachy [ ]Not Tachy  [ ]Luis [ ] Edema [ ] No edema  PULMONARY:  [ x]Tachypnea  [ ]Audible excessive secretions [ ] No labored breathing [ ] labored breathing  GASTROINTESTINAL: [x ]Soft  [ ]Distended  [ ]Not distended [ ]Non tender [ ]Tender  MUSCULOSKELETAL: [x ]No clubbing [ ] clubbing  [ ] No cyanosis [ ] cyanosis  NEUROLOGIC: [ ]No focal deficits  [ ]Follows commands  [ ]Does not follow commands  [x ]Cognitive impairment  [ ]Dysphagia  [ ]Dysarthria  [ ]Paresis   SKIN: [ ] Jaundiced [x ] Non-jaundiced [ ]Rash [ ]No Rash [ ] Warm [ ] Dry  MISC/LINES: [ x] ET tube [ ] Trach [x ]NGT/OGT [ ]PEG [ ]Costa    CRITICAL CARE:  [ x] Shock Present  [ ]Septic [ ]Cardiogenic [ ]Neurologic [x ]Hypovolemic  [x ]  Vasopressors [ ]  Inotropes   [ x]Respiratory failure present [x ]Mechanical ventilation [ ]Non-invasive ventilatory support [ ]High flow  [ ]Acute  [ ]Chronic [ ]Hypoxic  [ ]Hypercarbic [ ]Other  [ ]Other organ failure     LABS: reviewed by me                        9.6    31.89 )-----------( 68       ( 23 Nov 2022 04:20 )             30.5   11-23    139  |  109  |  11  ----------------------------<  271<H>  4.9   |  16<L>  |  1.2    Ca    6.8<L>      23 Nov 2022 04:20  Phos  2.6     11-22  Mg     1.6     11-23    TPro  3.0<L>  /  Alb  1.9<L>  /  TBili  0.9  /  DBili  x   /  AST  17  /  ALT  8   /  AlkPhos  43  11-23  PT/INR - ( 23 Nov 2022 04:20 )   PT: 21.40 sec;   INR: 1.84 ratio         PTT - ( 23 Nov 2022 04:20 )  PTT:26.8 sec      RADIOLOGY & ADDITIONAL STUDIES: reviewed by me    EKG: reviewed by me      REFERRALS:   [ ]Chaplaincy  [ ]Hospice  [ ]Child Life  [x ]Social Work  [ x]Case management [ ]Holistic Therapy     Goals of Care Document:    HPI:  89F with phhx of Alzheimer's dementia, CHF, HTN , 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 Patient has advanced dementia and is a poor historian but is able to articulate pain and symptoms accurately per the son. unknown HT, unknown LOC, and she does not take anticoagulation. History was obtained from the patient, her son at the bedside, and medication reconcilation was done by calling [address] [phone], where she fills her medications per the son. Patient does not remember falling, as she has poor memory. She denies any current lightheadedness, fatigue, chest pain, SOB, nausea, vomiting, headache, back pain, abdominal pain, urinary sx. Of note, she does take celecoxib 100mg BID per pharmacist.     In the ED, she was found to have HGB 8, prior HGB was 12. Patient remained HD stable throughout ED stay. GI was consulted, and they plan to perform colonoscopy tomorrow. Cardiology cleared patient for colonoscopy procedure. She also has wbc 30 but has been afebrile. CTA abdomen/pelvis was negative for active bleeding or infectious etiology. She received 1 dose of 2g cefepime.  (17 Nov 2022 17:03)    Palliative care consulted to support son/caregiver and discuss options as needed.     PERTINENT PM/SXH:   HTN (hypertension)    Bradycardia    Pacemaker      No significant past surgical history      FAMILY HISTORY: cannot obtain from patient    ITEMS NOT CHECKED ARE NOT PRESENT    SOCIAL HISTORY:   Significant other/partner[ ]  Children[ x]  Pentecostal/Spirituality:  Substance hx:  [ ]   Tobacco hx:  [ ]   Alcohol hx: [ ]   Living Situation: [x ]Home  [ ]Long term care  [ ]Rehab [ ]Other  Home Services: [ ] HHA [ ] Visting RN [ ] Hospice  Occupation:  Home Opioid hx:  [ ] Y [ ] N [x ] I-Stop Reference No:  : Nalini No | Reference #: [id number]    There are no results for the search terms that you entered.  ADVANCE DIRECTIVES:    [ ] Full Code [ ] DNR  MOLST  [ ]  Living Will  [ ]   DECISION MAKER(s):  [ ] Health Care Proxy(s)  [ x] Surrogate(s)  [ ] Guardian           Name(s): Phone Number(s):  Marv Hartley:  son [phone]    BASELINE (I)ADL(s) (prior to admission):  [place]: [ ]Total  [ ] Moderate [x ]Dependent  Palliative Performance Status Version 2:         %    http://[facility].[email or web address]/files/news/palliative_performance_scale_ppsv2.pdf    Allergies    No Known Allergies    Intolerances    MEDICATIONS  (STANDING):  acetaminophen   IVPB .. 1000 milliGRAM(s) IV Intermittent once  cefepime   IVPB      cefepime   IVPB 2000 milliGRAM(s) IV Intermittent once  chlorhexidine 0.12% Liquid 15 milliLiter(s) Oral Mucosa every 12 hours  chlorhexidine 2% Cloths 1 Application(s) Topical <User Schedule>  dexMEDEtomidine Infusion 0.2 MICROgram(s)/kG/Hr (2.4 mL/Hr) IV Continuous <Continuous>  fentaNYL   Infusion. 0.5 MICROgram(s)/kG/Hr (2.4 mL/Hr) IV Continuous <Continuous>  norepinephrine Infusion 0.05 MICROgram(s)/kG/Min (4.49 mL/Hr) IV Continuous <Continuous>  pantoprazole  Injectable 40 milliGRAM(s) IV Push every 12 hours  pantoprazole Infusion 8 mG/Hr (10 mL/Hr) IV Continuous <Continuous>  sodium bicarbonate  Infusion 0.626 mEq/kG/Hr (200 mL/Hr) IV Continuous <Continuous>    MEDICATIONS  (PRN):    PRESENT SYMPTOMS: [ x]Unable to obtain due to poor mentation   Source if other than patient:  [ ]Family   [ ]Team     Pain: [ ]yes [ ]no  QOL impact -   Location -                    Aggravating factors -  Quality -  Radiation -  Timing-  Severity (0-10 scale):  Minimal acceptable level (0-10 scale):     CPOT:    https://www.[facility].org/getattachment/pmg99f15-0e7r-1a7q-9r6t-4986o0730r1c/Critical-Care-Pain-Observation-Tool-(CPOT)      PAIN AD Score:     http://geriatrictoolkit.[place].[facility]/cog/painad.pdf (press ctrl +  left click to view)      Dyspnea:                           [ ]Mild [ ]Moderate [ ]Severe [ ]None  Anxiety:                             [ ]Mild [ ]Moderate [ ]Severe [ ]None  Fatigue:                             [ ]Mild [ ]Moderate [ ]Severe [ ]None  Nausea:                             [ ]Mild [ ]Moderate [ ]Severe [ ]None  Loss of appetite:              [ ]Mild [ ]Moderate [ ]Severe [ ]None  Constipation:                    [ ]Mild [ ]Moderate [ ]Severe [ ]None    Other Symptoms:  [ ]All other review of systems negative     Palliative Performance Status Version 2:         %    http://[facility].[email or web address]/files/news/palliative_performance_scale_ppsv2.pdf  PHYSICAL EXAM:  Vital Signs Last 24 Hrs  T(C): 35.6 (23 Nov 2022 04:00), Max: 36.3 (22 Nov 2022 15:00)  T(F): 96 (23 Nov 2022 04:00), Max: 97.3 (22 Nov 2022 15:00)  HR: 105 (23 Nov 2022 07:28) (68 - 112)  BP: 144/92 (23 Nov 2022 07:00) (70/47 - 185/85)  BP(mean): 112 (23 Nov 2022 07:00) (55 - 122)  RR: 16 (23 Nov 2022 07:00) (16 - 39)  SpO2: 100% (23 Nov 2022 07:28) (59% - 100%)    Parameters below as of 23 Nov 2022 07:00  Patient On (Oxygen Delivery Method): ventilator  O2 Flow (L/min): 60   I&O's Summary    22 Nov 2022 07:01  -  23 Nov 2022 07:00  --------------------------------------------------------  IN: 952.7 mL / OUT: 100 mL / NET: 852.7 mL        GENERAL:  [ x] No acute distress [ ]Lethargic  [ ]Unarousable  [ ]Verbal  [ ]Non-Verbal [ ]Cachexia    BEHAVIORAL/PSYCH:  [ ]Alert and Oriented x  [ ] Anxiety [ ] Delirium [ ] Agitation [ ] Calm   EYES: [ x] No scleral icterus [ ] Scleral icterus [ ] Closed  ENMT:  [ ]Dry mouth  [ ]No external oral lesions [ ] No external ear or nose lesions  CARDIOVASCULAR:  [ ]Regular [ ]Irregular [x ]Tachy [ ]Not Tachy  [ ]Luis [ ] Edema [ ] No edema  PULMONARY:  [ x]Tachypnea  [ ]Audible excessive secretions [ ] No labored breathing [ ] labored breathing  GASTROINTESTINAL: [x ]Soft  [ ]Distended  [ ]Not distended [ ]Non tender [ ]Tender  MUSCULOSKELETAL: [x ]No clubbing [ ] clubbing  [ ] No cyanosis [ ] cyanosis  NEUROLOGIC: [ ]No focal deficits  [ ]Follows commands  [ ]Does not follow commands  [x ]Cognitive impairment  [ ]Dysphagia  [ ]Dysarthria  [ ]Paresis   SKIN: [ ] Jaundiced [x ] Non-jaundiced [ ]Rash [ ]No Rash [ ] Warm [ ] Dry  MISC/LINES: [ x] ET tube [ ] Trach [x ]NGT/OGT [ ]PEG [ ]Costa    CRITICAL CARE:  [ x] Shock Present  [ ]Septic [ ]Cardiogenic [ ]Neurologic [x ]Hypovolemic  [x ]  Vasopressors [ ]  Inotropes   [ x]Respiratory failure present [x ]Mechanical ventilation [ ]Non-invasive ventilatory support [ ]High flow  [ ]Acute  [ ]Chronic [ ]Hypoxic  [ ]Hypercarbic [ ]Other  [ ]Other organ failure     LABS: reviewed by me                        9.6    31.89 )-----------( 68       ( 23 Nov 2022 04:20 )             30.5   11-23    139  |  109  |  11  ----------------------------<  271<H>  4.9   |  16<L>  |  1.2    Ca    6.8<L>      23 Nov 2022 04:20  Phos  2.6     11-22  Mg     1.6     11-23    TPro  3.0<L>  /  Alb  1.9<L>  /  TBili  0.9  /  DBili  x   /  AST  17  /  ALT  8   /  AlkPhos  43  11-23  PT/INR - ( 23 Nov 2022 04:20 )   PT: 21.40 sec;   INR: 1.84 ratio         PTT - ( 23 Nov 2022 04:20 )  PTT:26.8 sec      RADIOLOGY & ADDITIONAL STUDIES: reviewed by me    EKG: reviewed by me      REFERRALS:   [ ]Chaplaincy  [ ]Hospice  [ ]Child Life  [x ]Social Work  [ x]Case management [ ]Holistic Therapy     Goals of Care Document:

## 2022-11-23 NOTE — PROGRESS NOTE ADULT - SUBJECTIVE AND OBJECTIVE BOX
Patient is a 89y old  Female who presents with a chief complaint of GI BLEED; ANEMIA DUE TO ACUTE BLOOD LOSS; SEPSIS     (21 Nov 2022 14:40)      Over Night Events:  Patient seen and examined.   over night start UGI bleed get intubation for airway protection   ct Ct angio and bleed from gastroduodenal bleed   family refused risk of IR embolisation   s/p 3 unit prbc   on levop0.8    ROS:  See HPI    PHYSICAL EXAM    ICU Vital Signs Last 24 Hrs  T(C): 35.6 (23 Nov 2022 04:00), Max: 36.3 (22 Nov 2022 15:00)  T(F): 96 (23 Nov 2022 04:00), Max: 97.3 (22 Nov 2022 15:00)  HR: 105 (23 Nov 2022 07:28) (68 - 112)  BP: 144/92 (23 Nov 2022 07:00) (70/47 - 185/85)  BP(mean): 112 (23 Nov 2022 07:00) (55 - 122)  ABP: --  ABP(mean): --  RR: 16 (23 Nov 2022 07:00) (16 - 39)  SpO2: 100% (23 Nov 2022 07:28) (59% - 100%)    O2 Parameters below as of 23 Nov 2022 07:00  Patient On (Oxygen Delivery Method): ventilator  O2 Flow (L/min): 60          General: on fentanyl   HEENT: et tube                Lymph Nodes: NO cervical LN   Lungs: Bilateral BS  Cardiovascular: Regular   Abdomen: Soft, Positive BS  Extremities: No clubbing   Skin: warm   Neurological:   Musculoskeletal: move all ext     I&O's Detail    22 Nov 2022 07:01  -  23 Nov 2022 07:00  --------------------------------------------------------  IN:    Norepinephrine: 134.7 mL    Oral Fluid: 200 mL    Pantoprazole: 50 mL    PRBCs (Packed Red Blood Cells): 568 mL  Total IN: 952.7 mL    OUT:    Voided (mL): 100 mL  Total OUT: 100 mL    Total NET: 852.7 mL          LABS:                          9.6    31.89 )-----------( 68       ( 23 Nov 2022 04:20 )             30.5         23 Nov 2022 04:20    139    |  109    |  11     ----------------------------<  271    4.9     |  16     |  1.2      Ca    6.8        23 Nov 2022 04:20  Phos  2.6       22 Nov 2022 21:14  Mg     1.6       23 Nov 2022 04:20    TPro  3.0    /  Alb  1.9    /  TBili  0.9    /  DBili  x      /  AST  17     /  ALT  8      /  AlkPhos  43     23 Nov 2022 04:20  Amylase x     lipase x                                                 PT/INR - ( 23 Nov 2022 04:20 )   PT: 21.40 sec;   INR: 1.84 ratio         PTT - ( 23 Nov 2022 04:20 )  PTT:26.8 sec                                           Lactate, Blood: 8.7 mmol/L (11-23-22 @ 03:05)                                                          Culture - Blood (collected 21 Nov 2022 05:19)  Source: .Blood None  Preliminary Report (22 Nov 2022 15:02):    No growth to date.                                                   Mode: AC/ CMV (Assist Control/ Continuous Mandatory Ventilation)  RR (machine): 16  TV (machine): 400  FiO2: 60  PEEP: 8  ITime: 1  MAP: 12  PIP: 34                                      ABG - ( 23 Nov 2022 03:50 )  pH, Arterial: 7.04  pH, Blood: x     /  pCO2: 41    /  pO2: 67    / HCO3: 11    / Base Excess: -18.7 /  SaO2: 92.7                MEDICATIONS  (STANDING):  chlorhexidine 0.12% Liquid 15 milliLiter(s) Oral Mucosa every 12 hours  chlorhexidine 2% Cloths 1 Application(s) Topical <User Schedule>  dexMEDEtomidine Infusion 0.2 MICROgram(s)/kG/Hr (2.4 mL/Hr) IV Continuous <Continuous>  dextrose 5% + lactated ringers. 1000 milliLiter(s) (70 mL/Hr) IV Continuous <Continuous>  fentaNYL   Infusion. 0.5 MICROgram(s)/kG/Hr (2.4 mL/Hr) IV Continuous <Continuous>  norepinephrine Infusion 0.05 MICROgram(s)/kG/Min (4.49 mL/Hr) IV Continuous <Continuous>  pantoprazole  Injectable 40 milliGRAM(s) IV Push every 12 hours  pantoprazole Infusion 8 mG/Hr (10 mL/Hr) IV Continuous <Continuous>  sodium bicarbonate  Infusion 0.626 mEq/kG/Hr (200 mL/Hr) IV Continuous <Continuous>    MEDICATIONS  (PRN):          Xrays:  TLC:  OG:  ET tube:                                                                                    no infiltrate    ECHO:  CAM ICU:

## 2022-11-23 NOTE — AIRWAY PLACEMENT NOTE ADULT - AIRWAY COMMENTS:
called to CCU room 109. pt. seen at bedside, receiving PRBC, on levophed gtt. Medicine resident requesting pt. to be intubated for airway protection. Pt. son at bedside, plan of care discussed and agrees with plan. Pt. pre-ox 100% via ambu bag. Induced with meds stated above, pt. intubated with cricoid pressure, V/c visualized, ETT 7.0 placed. B/l Bs auscultated and + color change X 5 breaths noted. Ett secured and pt. placed on Mv by Respiratory. Care endorsed to RN and CCU team for further management. Chest xray pending.

## 2022-11-23 NOTE — CHART NOTE - NSCHARTNOTEFT_GEN_A_CORE
IR called for code fusion and acute upper GI bleeding.  CTA shows positive bleeding in the duodenum.  Patient unstable.  Discussed case with GI and agreed for IR to take patient for angio/embo.  Mobilized team to come in for case but when called family (son mayelin Hartley ) to obtain consent the family is not willing to consent at this time and do not want her to undergo any more invasive procedures.  Family says they will call us back if they change their mind.  Please update IR with any acute changes.

## 2022-11-23 NOTE — CONSULT NOTE ADULT - NS_MD_PANP_GEN_ALL_CORE
Attending and PA/NP shared services statement (NON-critical care):
no chest pain and no edema.
Attending and PA/NP shared services statement (NON-critical care):
Attending and PA/NP shared services statement (NON-critical care):

## 2022-11-23 NOTE — PROGRESS NOTE ADULT - ASSESSMENT
#Acute GI bleed S/P 3U PRBC, 1 U of platelets, 1U FFP, 1 gram of TXA + 1g over 8 hours.  #CA + [Within the second segment of the duodenum there is an arterial   outpouching measuring 7 x 10 mm, presumably a gastroduodenal artery   pseudoaneurysm with distal pooling on venous phase compatible with active   extravasation.]    -c/w MICU monitoring  -trend CBC  -Transfuse to keep hemoglobin > 8   - Cefipime  metabolic acidosis     GIPPX: PPI infusion  DVT PPX: SCD  Diet: DASH   Pending: Palliative meeting with mayelin

## 2022-11-24 NOTE — PROGRESS NOTE ADULT - SUBJECTIVE AND OBJECTIVE BOX
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 Patient has advanced dementia and is a poor historian but is able to articulate pain and symptoms accurately per the son. unknown HT, unknown LOC, and she does not take anticoagulation. History was obtained from the patient, her son at the bedside, and medication reconcilation was done by calling [address] [phone], where she fills her medications per the son. Patient does not remember falling, as she has poor memory. She denies any current lightheadedness, fatigue, chest pain, SOB, nausea, vomiting, headache, back pain, abdominal pain, urinary sx. Of note, she does take celecoxib 100mg BID per pharmacist.     In the ED, she was found to have HGB 8, prior HGB was 12. Patient remained HD stable throughout ED stay. GI was consulted, and they plan to perform colonoscopy tomorrow. Cardiology cleared patient for colonoscopy procedure. She also has wbc 30 but has been afebrile. CTA abdomen/pelvis was negative for active bleeding or infectious etiology. She received 1 dose of 2g cefepime.  (2022 17:03)       INTERVAL HPI/OVERNIGHT EVENTS:   No overnight events   Afebrile, hemodynamically stable     Subjective:    ICU Vital Signs Last 24 Hrs  T(C): 36.3 (2022 16:00), Max: 37.7 (2022 16:00)  T(F): 97.3 (2022 16:00), Max: 99.9 (2022 16:00)  HR: 79 (2022 15:00) (60 - 88)  BP: 119/72 (2022 15:00) (69/42 - 158/76)  BP(mean): 91 (2022 15:00) (50 - 109)  ABP: --  ABP(mean): --  RR: 20 (2022 15:00) (14 - 25)  SpO2: 98% (2022 15:00) (85% - 100%)    O2 Parameters below as of 2022 16:00  Patient On (Oxygen Delivery Method): ventilator    O2 Concentration (%): 40      I&O's Summary    2022 07:  -  2022 07:00  --------------------------------------------------------  IN: 4109.4 mL / OUT: 800 mL / NET: 3309.4 mL    2022 07:01  -  2022 15:30  --------------------------------------------------------  IN: 1450.4 mL / OUT: 120 mL / NET: 1330.4 mL      Mode: AC/ CMV (Assist Control/ Continuous Mandatory Ventilation)  RR (machine): 14  TV (machine): 350  FiO2: 40  PEEP: 8  ITime: 1  MAP: 10  PIP: 24      Daily     Daily Weight in k.2 (2022 06:00)    Adult Advanced Hemodynamics Last 24 Hrs  CVP(mm Hg): --  CVP(cm H2O): --  CO: --  CI: --  PA: --  PA(mean): --  PCWP: --  SVR: --  SVRI: --  PVR: --  PVRI: --    EKG/Telemetry Events:    MEDICATIONS  (STANDING):  cefepime   IVPB      cefepime   IVPB 2000 milliGRAM(s) IV Intermittent every 24 hours  chlorhexidine 0.12% Liquid 15 milliLiter(s) Oral Mucosa every 12 hours  chlorhexidine 2% Cloths 1 Application(s) Topical <User Schedule>  dexMEDEtomidine Infusion 0.3 MICROgram(s)/kG/Hr (3.59 mL/Hr) IV Continuous <Continuous>  fentaNYL   Infusion. 0.5 MICROgram(s)/kG/Hr (2.4 mL/Hr) IV Continuous <Continuous>  lactated ringers. 1000 milliLiter(s) (75 mL/Hr) IV Continuous <Continuous>  norepinephrine Infusion 0.05 MICROgram(s)/kG/Min (4.49 mL/Hr) IV Continuous <Continuous>  pantoprazole Infusion 8 mG/Hr (10 mL/Hr) IV Continuous <Continuous>    MEDICATIONS  (PRN):      PHYSICAL EXAM:  GENERAL:   HEAD:  Atraumatic, Normocephalic  EYES: EOMI, PERRLA, conjunctiva and sclera clear  NECK: Supple, No JVD, Normal thyroid, no enlarged nodes  NERVOUS SYSTEM:  Alert & Awake.   CHEST/LUNG: B/L good air entry; No rales, rhonchi, or wheezing  HEART: S1S2 normal, no S3, Regular rate and rhythm; No murmurs  ABDOMEN: Soft, Nontender, Nondistended; Bowel sounds present  EXTREMITIES:  2+ Peripheral Pulses, No clubbing, cyanosis, or edema  LYMPH: No lymphadenopathy noted  SKIN: No rashes or lesions    LABS:                        8.9    29.44 )-----------( 111      ( 2022 11:48 )             26.0         142  |  108  |  21<H>  ----------------------------<  150<H>  4.2   |  27  |  1.6<H>    Ca    6.2<L>      2022 11:48  Phos  2.6       Mg     1.7         TPro  3.5<L>  /  Alb  2.3<L>  /  TBili  0.7  /  DBili  x   /  AST  164<H>  /  ALT  165<H>  /  AlkPhos  56  24    LIVER FUNCTIONS - ( 2022 11:48 )  Alb: 2.3 g/dL / Pro: 3.5 g/dL / ALK PHOS: 56 U/L / ALT: 165 U/L / AST: 164 U/L / GGT: x           PT/INR - ( 2022 04:20 )   PT: 21.40 sec;   INR: 1.84 ratio         PTT - ( 2022 04:20 )  PTT:26.8 sec  CAPILLARY BLOOD GLUCOSE        ABG - ( 2022 05:33 )  pH, Arterial: 7.43  pH, Blood: x     /  pCO2: 38    /  pO2: 162   / HCO3: 25    / Base Excess: 1.0   /  SaO2: 98.9                          RADIOLOGY & ADDITIONAL TESTS:  CXR:        Care Discussed with Consultants/Other Providers [ x] YES  [ ] NO           Patient is a 89y old  Female who presents with a chief complaint of GI bleed (2022 21:51)    HPI:  89F with phhx of Alzheimer's dementia, CHF, HTN , 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 Patient has advanced dementia and is a poor historian but is able to articulate pain and symptoms accurately per the son. unknown HT, unknown LOC, and she does not take anticoagulation. History was obtained from the patient, her son at the bedside, and medication reconcilation was done by calling [address] [phone], where she fills her medications per the son. Patient does not remember falling, as she has poor memory. She denies any current lightheadedness, fatigue, chest pain, SOB, nausea, vomiting, headache, back pain, abdominal pain, urinary sx. Of note, she does take celecoxib 100mg BID per pharmacist.     In the ED, she was found to have HGB 8, prior HGB was 12. Patient remained HD stable throughout ED stay. GI was consulted, and they plan to perform colonoscopy tomorrow. Cardiology cleared patient for colonoscopy procedure. She also has wbc 30 but has been afebrile. CTA abdomen/pelvis was negative for active bleeding or infectious etiology. She received 1 dose of 2g cefepime.  (2022 17:03)       INTERVAL HPI/OVERNIGHT EVENTS:   No overnight events, no further bleeding  Afebrile, hemodynamically stable     Subjective:    ICU Vital Signs Last 24 Hrs  T(C): 36.3 (2022 16:00), Max: 37.7 (2022 16:00)  T(F): 97.3 (2022 16:00), Max: 99.9 (2022 16:00)  HR: 79 (2022 15:00) (60 - 88)  BP: 119/72 (2022 15:00) (69/42 - 158/76)  BP(mean): 91 (2022 15:00) (50 - 109)  ABP: --  ABP(mean): --  RR: 20 (2022 15:00) (14 - 25)  SpO2: 98% (2022 15:00) (85% - 100%)    O2 Parameters below as of 2022 16:00  Patient On (Oxygen Delivery Method): ventilator    O2 Concentration (%): 40      I&O's Summary    2022 07:01  -  2022 07:00  --------------------------------------------------------  IN: 4109.4 mL / OUT: 800 mL / NET: 3309.4 mL    2022 07:01  -  2022 15:30  --------------------------------------------------------  IN: 1450.4 mL / OUT: 120 mL / NET: 1330.4 mL      Mode: AC/ CMV (Assist Control/ Continuous Mandatory Ventilation)  RR (machine): 14  TV (machine): 350  FiO2: 40  PEEP: 8  ITime: 1  MAP: 10  PIP: 24      Daily     Daily Weight in k.2 (2022 06:00)    Adult Advanced Hemodynamics Last 24 Hrs  CVP(mm Hg): --  CVP(cm H2O): --  CO: --  CI: --  PA: --  PA(mean): --  PCWP: --  SVR: --  SVRI: --  PVR: --  PVRI: --    EKG/Telemetry Events:    MEDICATIONS  (STANDING):  cefepime   IVPB      cefepime   IVPB 2000 milliGRAM(s) IV Intermittent every 24 hours  chlorhexidine 0.12% Liquid 15 milliLiter(s) Oral Mucosa every 12 hours  chlorhexidine 2% Cloths 1 Application(s) Topical <User Schedule>  dexMEDEtomidine Infusion 0.3 MICROgram(s)/kG/Hr (3.59 mL/Hr) IV Continuous <Continuous>  fentaNYL   Infusion. 0.5 MICROgram(s)/kG/Hr (2.4 mL/Hr) IV Continuous <Continuous>  lactated ringers. 1000 milliLiter(s) (75 mL/Hr) IV Continuous <Continuous>  norepinephrine Infusion 0.05 MICROgram(s)/kG/Min (4.49 mL/Hr) IV Continuous <Continuous>  pantoprazole Infusion 8 mG/Hr (10 mL/Hr) IV Continuous <Continuous>    MEDICATIONS  (PRN):      PHYSICAL EXAM:  GENERAL: In bed, intubated-on vent/sedated, +[facility] central line +OG tube +pallor  HEAD:  Atraumatic, Normocephalic  EYES: EOMI, PERRLA, conjunctiva and sclera clear, opening right eye more than left  NECK: Supple, No JVD, Normal thyroid, no enlarged nodes  CHEST/LUNG: CTA B/L   HEART: S1S2 normal, no S3, Regular rate and rhythm; No murmurs  ABDOMEN: Soft, Nontender, Nondistended; Bowel sounds present  EXTREMITIES:  2+ Peripheral Pulses, nonpitting pitting edema to B/L upper and lower extrem    LABS:                        8.9    29.44 )-----------( 111      ( 2022 11:48 )             26.0         142  |  108  |  21<H>  ----------------------------<  150<H>  4.2   |  27  |  1.6<H>    Ca    6.2<L>      2022 11:48  Phos  2.6       Mg     1.7         TPro  3.5<L>  /  Alb  2.3<L>  /  TBili  0.7  /  DBili  x   /  AST  164<H>  /  ALT  165<H>  /  AlkPhos  56  24    LIVER FUNCTIONS - ( 2022 11:48 )  Alb: 2.3 g/dL / Pro: 3.5 g/dL / ALK PHOS: 56 U/L / ALT: 165 U/L / AST: 164 U/L / GGT: x           PT/INR - ( 2022 04:20 )   PT: 21.40 sec;   INR: 1.84 ratio         PTT - ( 2022 04:20 )  PTT:26.8 sec  CAPILLARY BLOOD GLUCOSE        ABG - ( 2022 05:33 )  pH, Arterial: 7.43  pH, Blood: x     /  pCO2: 38    /  pO2: 162   / HCO3: 25    / Base Excess: 1.0   /  SaO2: 98.9                          RADIOLOGY & ADDITIONAL TESTS:  CXR:        Care Discussed with Consultants/Other Providers [ x] YES  [ ] NO

## 2022-11-24 NOTE — PROGRESS NOTE ADULT - SUBJECTIVE AND OBJECTIVE BOX
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 Patient has advanced dementia and is a poor historian but is able to articulate pain and symptoms accurately per the son. unknown HT, unknown LOC, and she does not take anticoagulation. History was obtained from the patient, her son at the bedside, and medication reconcilation was done by calling [address] [phone], where she fills her medications per the son. Patient does not remember falling, as she has poor memory. She denies any current lightheadedness, fatigue, chest pain, SOB, nausea, vomiting, headache, back pain, abdominal pain, urinary sx. Of note, she does take celecoxib 100mg BID per pharmacist.     In the ED, she was found to have HGB 8, prior HGB was 12. Patient remained HD stable throughout ED stay. GI was consulted, and they plan to perform colonoscopy tomorrow. Cardiology cleared patient for colonoscopy procedure. She also has wbc 30 but has been afebrile. CTA abdomen/pelvis was negative for active bleeding or infectious etiology. She received 1 dose of 2g cefepime.  (17 Nov 2022 17:03)      Pt evaluated on rounds.  I reviewed the radiology tests and hospital record.    I reviewed previous notes on this patient.    Interval Events: No overnight events.      CAM:    SAT:    SBT:      REVIEW OF SYSTEMS:   see HPI      OBJECTIVE:  ICU Vital Signs Last 24 Hrs  T(C): 36 (24 Nov 2022 05:00), Max: 38.6 (23 Nov 2022 11:00)  T(F): 96.8 (24 Nov 2022 05:00), Max: 101.5 (23 Nov 2022 11:00)  HR: 60 (24 Nov 2022 06:00) (60 - 105)  BP: 120/70 (24 Nov 2022 06:00) (89/62 - 158/76)  BP(mean): 89 (24 Nov 2022 06:00) (61 - 109)  ABP: --  ABP(mean): --  RR: 14 (24 Nov 2022 06:00) (14 - 22)  SpO2: 100% (24 Nov 2022 06:00) (96% - 100%)    O2 Parameters below as of 23 Nov 2022 12:00  Patient On (Oxygen Delivery Method): ventilator  O2 Flow (L/min): 40        Mode: AC/ CMV (Assist Control/ Continuous Mandatory Ventilation), RR (machine): 14, TV (machine): 350, FiO2: 40, PEEP: 8, ITime: 1, MAP: 10, PIP: 22    11-23 @ 07:01  -  11-24 @ 07:00  --------------------------------------------------------  IN: 4109.4 mL / OUT: 800 mL / NET: 3309.4 mL      CAPILLARY BLOOD GLUCOSE      POCT Blood Glucose.: 157 mg/dL (22 Nov 2022 20:55)        PHYSICAL EXAM:       · ENMT:   Airway patent,   Nasal mucosa clear.  Mouth with normal mucosa.   No thrush    · EYES:   Clear bilaterally,   pupils equal,   round and reactive to light.    · CARDIAC:   Normal rate,   regular rhythm.    Heart sounds S1, S2.   No murmurs, no rubs or gallops on auscultation  no edema        CAROTID:   normal systolic impulse  no bruits    · RESPIRATORY:   rales  normal chest expansion  no retractions or use of accessory muscles  percussion of chest demonstrates no hyperresonance or dullness    · GASTROINTESTINAL:  Abdomen soft,   non-tender,   + BS  liver/spleen not palpable    · MUSCULOSKELETAL:   no clubbing, cyanosis      · SKIN:   Skin normal color for race,   warm, dry   No evidence of rash.        · HEME LYMPH:   no splenomegaly.  No cervical  lymphadenopathy.  no inguinal lymphadenopathy    HOSPITAL MEDICATIONS:  MEDICATIONS  (STANDING):  cefepime   IVPB      cefepime   IVPB 2000 milliGRAM(s) IV Intermittent every 24 hours  chlorhexidine 0.12% Liquid 15 milliLiter(s) Oral Mucosa every 12 hours  chlorhexidine 2% Cloths 1 Application(s) Topical <User Schedule>  dexMEDEtomidine Infusion 0.3 MICROgram(s)/kG/Hr (3.59 mL/Hr) IV Continuous <Continuous>  fentaNYL   Infusion. 0.5 MICROgram(s)/kG/Hr (2.4 mL/Hr) IV Continuous <Continuous>  lactated ringers. 1000 milliLiter(s) (75 mL/Hr) IV Continuous <Continuous>  norepinephrine Infusion 0.05 MICROgram(s)/kG/Min (4.49 mL/Hr) IV Continuous <Continuous>  pantoprazole Infusion 8 mG/Hr (10 mL/Hr) IV Continuous <Continuous>  potassium chloride  20 mEq/100 mL IVPB 20 milliEquivalent(s) IV Intermittent every 2 hours    MEDICATIONS  (PRN):    lactated ringers.: Solution, 1000 milliLiter(s) infuse at 75 mL/Hr, Stop After 24 Hours  lactated ringers.: Solution, 1000 milliLiter(s) infuse at 75 mL/Hr  lactated ringers Bolus:   1000 milliLiter(s), IV Bolus, once, infuse over 60 Minute(s), Stop After 1 Doses  sodium chloride 0.9%.: Solution, 1000 milliLiter(s) infuse at 50 mL/Hr  lactated ringers.: Solution, 1000 milliLiter(s) infuse at 50 mL/Hr  lactated ringers.: Solution, 1000 milliLiter(s) infuse at 100 mL/Hr  Provider's Contact #: [phone]  dextrose 5% + sodium chloride 0.45%.: Solution, 1000 milliLiter(s) infuse at 60 mL/Hr  sodium chloride 0.9% Bolus:   1000 milliLiter(s), IV Bolus, once, infuse over 60 Minute(s), Stop After 1 Doses  Provider's Contact #: [phone]      LABS:                        9.3    26.90 )-----------( 121      ( 24 Nov 2022 05:30 )             26.5     11-24    144  |  106  |  19  ----------------------------<  135<H>  3.2<L>   |  27  |  1.5    Ca    6.5<L>      24 Nov 2022 05:30  Phos  2.6     11-22  Mg     1.3     11-24    TPro  3.6<L>  /  Alb  2.2<L>  /  TBili  1.1  /  DBili  x   /  AST  253<H>  /  ALT  177<H>  /  AlkPhos  55  11-24    PT/INR - ( 23 Nov 2022 04:20 )   PT: 21.40 sec;   INR: 1.84 ratio         PTT - ( 23 Nov 2022 04:20 )  PTT:26.8 sec    Arterial Blood Gas:  11-24 @ 05:33  7.43/38/162/25/98.9/1.0  ABG lactate: --  Arterial Blood Gas:  11-24 @ 03:29  7.61/25/125/25/99.5/4.9  ABG lactate: --  Arterial Blood Gas:  11-23 @ 10:30  7.52/28/207/23/99.5/0.5  ABG lactate: --  Arterial Blood Gas:  11-23 @ 03:50  7.04/41/67/11/92.7/-18.7  ABG lactate: --  Arterial Blood Gas:  11-23 @ 02:00  7.20/31/391/12/100.0/-14.7  ABG lactate: --      Mode: AC/ CMV (Assist Control/ Continuous Mandatory Ventilation), RR (machine): 14, TV (machine): 350, FiO2: 40, PEEP: 8, ITime: 1, MAP: 10, PIP: 22      COVID-19 PCR: NotDetec (17 Nov 2022 10:30)    Mode: AC/ CMV (Assist Control/ Continuous Mandatory Ventilation)  RR (machine): 14  TV (machine): 350  FiO2: 40  PEEP: 8  ITime: 1  MAP: 10  PIP: 22      ABG - ( 24 Nov 2022 05:33 )  pH, Arterial: 7.43  pH, Blood: x     /  pCO2: 38    /  pO2: 162   / HCO3: 25    / Base Excess: 1.0   /  SaO2: 98.9                RADIOLOGY: Today I personally interpreted the latest CXR and other pertinent films.               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 Patient has advanced dementia and is a poor historian but is able to articulate pain and symptoms accurately per the son. unknown HT, unknown LOC, and she does not take anticoagulation. History was obtained from the patient, her son at the bedside, and medication reconcilation was done by calling [address] [phone], where she fills her medications per the son. Patient does not remember falling, as she has poor memory. She denies any current lightheadedness, fatigue, chest pain, SOB, nausea, vomiting, headache, back pain, abdominal pain, urinary sx. Of note, she does take celecoxib 100mg BID per pharmacist.     In the ED, she was found to have HGB 8, prior HGB was 12. Patient remained HD stable throughout ED stay. GI was consulted, and they plan to perform colonoscopy tomorrow. Cardiology cleared patient for colonoscopy procedure. She also has wbc 30 but has been afebrile. CTA abdomen/pelvis was negative for active bleeding or infectious etiology. She received 1 dose of 2g cefepime.  (17 Nov 2022 17:03)      Pt evaluated on rounds.  I reviewed the radiology tests and hospital record.    I reviewed previous notes on this patient.    Interval Events: No overnight events.      CAM:+    SAT:+    SBT:n      REVIEW OF SYSTEMS:   see HPI      OBJECTIVE:  ICU Vital Signs Last 24 Hrs  T(C): 36 (24 Nov 2022 05:00), Max: 38.6 (23 Nov 2022 11:00)  T(F): 96.8 (24 Nov 2022 05:00), Max: 101.5 (23 Nov 2022 11:00)  HR: 60 (24 Nov 2022 06:00) (60 - 105)  BP: 120/70 (24 Nov 2022 06:00) (89/62 - 158/76)  BP(mean): 89 (24 Nov 2022 06:00) (61 - 109)  ABP: --  ABP(mean): --  RR: 14 (24 Nov 2022 06:00) (14 - 22)  SpO2: 100% (24 Nov 2022 06:00) (96% - 100%)    O2 Parameters below as of 23 Nov 2022 12:00  Patient On (Oxygen Delivery Method): ventilator  O2 Flow (L/min): 40        Mode: AC/ CMV (Assist Control/ Continuous Mandatory Ventilation), RR (machine): 14, TV (machine): 350, FiO2: 40, PEEP: 8, ITime: 1, MAP: 10, PIP: 22    11-23 @ 07:01  -  11-24 @ 07:00  --------------------------------------------------------  IN: 4109.4 mL / OUT: 800 mL / NET: 3309.4 mL      CAPILLARY BLOOD GLUCOSE      POCT Blood Glucose.: 157 mg/dL (22 Nov 2022 20:55)        PHYSICAL EXAM:       · ENMT:   Airway patent,   Nasal mucosa clear.  Mouth with normal mucosa.   No thrush    · EYES:   Clear bilaterally,   pupils equal,   round and reactive to light.    · CARDIAC:   Normal rate,   regular rhythm.    Heart sounds S1, S2.   No murmurs, no rubs or gallops on auscultation  no edema        CAROTID:   normal systolic impulse  no bruits    · RESPIRATORY:   rales  normal chest expansion  no retractions or use of accessory muscles  percussion of chest demonstrates no hyperresonance or dullness    · GASTROINTESTINAL:  Abdomen soft,   non-tender,   + BS  liver/spleen not palpable    · MUSCULOSKELETAL:   no clubbing, cyanosis      · SKIN:   Skin normal color for race,   warm, dry   No evidence of rash.        · HEME LYMPH:   no splenomegaly.  No cervical  lymphadenopathy.  no inguinal lymphadenopathy    HOSPITAL MEDICATIONS:  MEDICATIONS  (STANDING):  cefepime   IVPB      cefepime   IVPB 2000 milliGRAM(s) IV Intermittent every 24 hours  chlorhexidine 0.12% Liquid 15 milliLiter(s) Oral Mucosa every 12 hours  chlorhexidine 2% Cloths 1 Application(s) Topical <User Schedule>  dexMEDEtomidine Infusion 0.3 MICROgram(s)/kG/Hr (3.59 mL/Hr) IV Continuous <Continuous>  fentaNYL   Infusion. 0.5 MICROgram(s)/kG/Hr (2.4 mL/Hr) IV Continuous <Continuous>  lactated ringers. 1000 milliLiter(s) (75 mL/Hr) IV Continuous <Continuous>  norepinephrine Infusion 0.05 MICROgram(s)/kG/Min (4.49 mL/Hr) IV Continuous <Continuous>  pantoprazole Infusion 8 mG/Hr (10 mL/Hr) IV Continuous <Continuous>  potassium chloride  20 mEq/100 mL IVPB 20 milliEquivalent(s) IV Intermittent every 2 hours    MEDICATIONS  (PRN):    lactated ringers.: Solution, 1000 milliLiter(s) infuse at 75 mL/Hr, Stop After 24 Hours  lactated ringers.: Solution, 1000 milliLiter(s) infuse at 75 mL/Hr  lactated ringers Bolus:   1000 milliLiter(s), IV Bolus, once, infuse over 60 Minute(s), Stop After 1 Doses  sodium chloride 0.9%.: Solution, 1000 milliLiter(s) infuse at 50 mL/Hr  lactated ringers.: Solution, 1000 milliLiter(s) infuse at 50 mL/Hr  lactated ringers.: Solution, 1000 milliLiter(s) infuse at 100 mL/Hr  Provider's Contact #: [phone]  dextrose 5% + sodium chloride 0.45%.: Solution, 1000 milliLiter(s) infuse at 60 mL/Hr  sodium chloride 0.9% Bolus:   1000 milliLiter(s), IV Bolus, once, infuse over 60 Minute(s), Stop After 1 Doses  Provider's Contact #: [phone]      LABS:                        9.3    26.90 )-----------( 121      ( 24 Nov 2022 05:30 )             26.5     11-24    144  |  106  |  19  ----------------------------<  135<H>  3.2<L>   |  27  |  1.5    Ca    6.5<L>      24 Nov 2022 05:30  Phos  2.6     11-22  Mg     1.3     11-24    TPro  3.6<L>  /  Alb  2.2<L>  /  TBili  1.1  /  DBili  x   /  AST  253<H>  /  ALT  177<H>  /  AlkPhos  55  11-24    PT/INR - ( 23 Nov 2022 04:20 )   PT: 21.40 sec;   INR: 1.84 ratio         PTT - ( 23 Nov 2022 04:20 )  PTT:26.8 sec    Arterial Blood Gas:  11-24 @ 05:33  7.43/38/162/25/98.9/1.0  ABG lactate: --  Arterial Blood Gas:  11-24 @ 03:29  7.61/25/125/25/99.5/4.9  ABG lactate: --  Arterial Blood Gas:  11-23 @ 10:30  7.52/28/207/23/99.5/0.5  ABG lactate: --  Arterial Blood Gas:  11-23 @ 03:50  7.04/41/67/11/92.7/-18.7  ABG lactate: --  Arterial Blood Gas:  11-23 @ 02:00  7.20/31/391/12/100.0/-14.7  ABG lactate: --      Mode: AC/ CMV (Assist Control/ Continuous Mandatory Ventilation), RR (machine): 14, TV (machine): 350, FiO2: 40, PEEP: 8, ITime: 1, MAP: 10, PIP: 22      COVID-19 PCR: NotDetec (17 Nov 2022 10:30)    Mode: AC/ CMV (Assist Control/ Continuous Mandatory Ventilation)  RR (machine): 14  TV (machine): 350  FiO2: 40  PEEP: 8  ITime: 1  MAP: 10  PIP: 22      ABG - ( 24 Nov 2022 05:33 )  pH, Arterial: 7.43  pH, Blood: x     /  pCO2: 38    /  pO2: 162   / HCO3: 25    / Base Excess: 1.0   /  SaO2: 98.9                RADIOLOGY: Today I personally interpreted the latest CXR and other pertinent films.

## 2022-11-24 NOTE — CHART NOTE - NSCHARTNOTEFT_GEN_A_CORE
ABG:  pH 7.61, will keep off the bicarb drip, respiratory parameters adjusted as per respiratory to decrease RR decreasing minute ventilation and therefore decreasing the accelerated CO2 clearance. ABG:  pH 7.61, respiratory parameters adjusted as per respiratory to decrease RR decreasing minute ventilation and therefore decreasing the accelerated CO2 clearance. FU repeat ABG

## 2022-11-24 NOTE — PROGRESS NOTE ADULT - ASSESSMENT
IMPRESSION:  GI bleed  diverticular + duodenal ulcer   blood loss anemia   gastroduodenal bleed   metabolic acidosis   probable sepsis    PLAN:    CNS:  keep donta neg 1     HEENT: oral care     PULMONARY: no change in vent  continue O2 as necessary to maintain sats > 90%<94       CARDIOVASCULAR:   continue IV fluid Bicarb drip follow BMP     GI: GI prophylaxis.      son mayelin wants comfort care refusing any intervention understand the risk of bleed might not stop with out IR   follow LA     RENAL: follow is and os lytes     INFECTIOUS Disease empiriccefepime       HEMATOLOGICAL:  DVT prophylaxis.  on bicarb drip follow BMP   replace lytes   s/p transfusion   follow serial cbc    follow INR     ENDOCRINE:  Follow up FS.  Insulin protocol if needed.    MUSCULOSKELETAL:    palliative care consult     The patient is critically ill with a high probability of imminent or life threatening deterioration.

## 2022-11-24 NOTE — PROGRESS NOTE ADULT - ASSESSMENT
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 Will discuss possible IR intervention.      GI bleed Diverticular -Duodenal ulcer   blood loss anemia     GI recommendations appreciated:    -trend CBC, Transfuse to keep hemoglobin > 8    -PPI 40 mg IV bid    -advance to clear liquids    - Ernesto (son) states he does not want pt to undergo any further EGD or colon unless pt is significantly bleeding , he understand the risks and benefits including missing GI malignancy and possible recurrent bleeding     IR reccs appreciated:   -Team will remain on standby in case of need for emergent intervention for additional GI bleeding.  -s/p transfusion  x2  -follow INR     Leukocytosis, Fever spike on 11.20.22  +UA for E. coli  -F/U Bacterial Cx (NGTD 11/17)         # New 8 mm calculus within the proximal pancreatic duct in the region of the pancreatic head with new diffuse pancreatic parenchymal   atrophy and dilatation of the pancreatic duct to 1.1 cm   normal LFTs  Rec  - Further work up by MRI to rule out malignancy as outpatient if compatible with goals of care   - Follow up with GI MAP    DVT ppx: off AC d/t bleeding  GI PPX: Pantoprazole BID  Activity: IAT    Lines: D/C elie 11.20

## 2022-11-25 NOTE — PROGRESS NOTE ADULT - SUBJECTIVE AND OBJECTIVE BOX
INTERVAL HPI/OVERNIGHT EVENTS: Levo was weaned off overnight, blood pressure started to drop precipitously. Noted to be restless and not following commands when fentanyl was weaned. Planned for SBT and possible extubation if able to tolerate. I spoke with son Ernesto this AM regarding reintubation, stated that he does not want Dulce Hartley to be reintubated. GI was also contacted this AM and state that the patient will be okay to remain on the PPI drip.      SUBJECTIVE: Patient seen and examined at bedside.       OBJECTIVE:    VITAL SIGNS:  ICU Vital Signs Last 24 Hrs  T(C): 36.8 (25 Nov 2022 08:00), Max: 37.4 (24 Nov 2022 22:00)  T(F): 98.3 (25 Nov 2022 08:00), Max: 99.3 (24 Nov 2022 22:00)  HR: 60 (25 Nov 2022 09:00) (60 - 98)  BP: 97/54 (25 Nov 2022 09:00) (69/42 - 164/104)  BP(mean): 70 (25 Nov 2022 09:00) (50 - 121)  ABP: --  ABP(mean): --  RR: 14 (25 Nov 2022 09:00) (8 - 38)  SpO2: 100% (25 Nov 2022 09:00) (83% - 100%)    O2 Parameters below as of 25 Nov 2022 09:00  Patient On (Oxygen Delivery Method): ventilator  O2 Flow (L/min): 40        Mode: AC/ CMV (Assist Control/ Continuous Mandatory Ventilation), RR (machine): 14, TV (machine): 350, FiO2: 40, PEEP: 6, ITime: 1, MAP: 10, PIP: 23    11-24 @ 07:01  -  11-25 @ 07:00  --------------------------------------------------------  IN: 2458.2 mL / OUT: 425 mL / NET: 2033.2 mL      CAPILLARY BLOOD GLUCOSE      POCT Blood Glucose.: 143 mg/dL (24 Nov 2022 22:48)      PHYSICAL EXAM:    General: NAD. +sedated/+intubated  HEENT: NC/AT; PERRL, clear conjunctiva  Neck: supple  Respiratory: CTA b/l  Cardiovascular: +S1/S2; RRR  Abdomen: soft, NT/ND; +BS x4  Extremities: WWP, 2+ peripheral pulses b/l;   Skin: normal color and turgor; scattered skin discoloration in upper extremities.       MEDICATIONS:  MEDICATIONS  (STANDING):  cefepime   IVPB      cefepime   IVPB 2000 milliGRAM(s) IV Intermittent every 24 hours  chlorhexidine 0.12% Liquid 15 milliLiter(s) Oral Mucosa every 12 hours  chlorhexidine 2% Cloths 1 Application(s) Topical <User Schedule>  dexMEDEtomidine Infusion 0.3 MICROgram(s)/kG/Hr (3.59 mL/Hr) IV Continuous <Continuous>  dextrose 5% + sodium chloride 0.45%. 1000 milliLiter(s) (70 mL/Hr) IV Continuous <Continuous>  fentaNYL   Infusion. 0.5 MICROgram(s)/kG/Hr (2.4 mL/Hr) IV Continuous <Continuous>  norepinephrine Infusion 0.05 MICROgram(s)/kG/Min (4.49 mL/Hr) IV Continuous <Continuous>  pantoprazole Infusion 8 mG/Hr (10 mL/Hr) IV Continuous <Continuous>    MEDICATIONS  (PRN):      ALLERGIES:  Allergies    No Known Allergies    Intolerances        LABS:                        8.9    25.97 )-----------( 96       ( 25 Nov 2022 04:30 )             27.2     11-25    143  |  109  |  22<H>  ----------------------------<  128<H>  3.9   |  25  |  1.4    Ca    6.6<L>      25 Nov 2022 04:30  Phos  2.8     11-25  Mg     2.2     11-25    TPro  3.8<L>  /  Alb  2.5<L>  /  TBili  0.5  /  DBili  x   /  AST  85<H>  /  ALT  143<H>  /  AlkPhos  62  11-25          RADIOLOGY & ADDITIONAL TESTS: Reviewed.

## 2022-11-25 NOTE — PROGRESS NOTE ADULT - ASSESSMENT
#Acute GI bleed S/P 3U PRBC, 1 U of platelets, 1U FFP, 1 gram of TXA + 1g over 8 hours.  #CA + [Within the second segment of the duodenum there is an arterial   outpouching measuring 7 x 10 mm, presumably a gastroduodenal artery   pseudoaneurysm with distal pooling on venous phase compatible with active   extravasation.]    - c/w MICU monitoring  - trend CBC  -Transfuse to keep hemoglobin > 8   - cont. Cefipime    -Possible extubation today     #GOC  - palliative on board  - Meeting with son today regarding GOC       GIPPX: PPI infusion  DVT PPX: SCD  Diet: DASH   Pending: Palliative meeting with mayelin

## 2022-11-25 NOTE — CHART NOTE - NSCHARTNOTEFT_GEN_A_CORE
Registered Dietitian Follow-Up     Patient Profile Reviewed                           Yes [x]   No []     Nutrition History Previously Obtained        Yes []  No [x]       Pertinent Subjective Information: Per RN, plan to extubate today; off of sedation. Will need SLP eval to determine appropriate diet texture for po intake if within GOC. RN will continue to monitor for GI bleed. RN states pt had no BM within her care; last BM on 11/23 - 2x per flowsheet.      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    # GI bleed Diverticular - Duodenal ulcer   # Blood loss anemia   -- Per MD note 1//24 - Ernesto (son) states he does not want pt to undergo any further EGD or colon unless pt is significantly bleeding, he understand the risks and benefits including missing GI malignancy and possible recurrent bleeding.  # New 8 mm calculus within the proximal pancreatic duct in the region of the pancreatic head with new diffuse pancreatic parenchymal     Per Palliative Care note on 11/23: Palliative meet w adam Orozco Friday 11/25 at 12.      Diet order:   Diet, NPO (11-23-22 @ 02:46) [Active]    Anthropometrics:  - Ht: 157.5 cm  - Wt: 47.9 KG  - BMI: 19.3   - IBW: 47.7 KG     Pertinent Lab Data:  11/25 @ 04:30 - WBC 25.97; RBC 3.13; H/H 8.9/27.2; BUN 22; ; Ca 6.6; AST/SGOT 85; ALT/SGPT 143; eGFR 35    Finger Sticks:  CAPILLARY BLOOD GLUCOSE  POCT Blood Glucose.: 143 mg/dL (24 Nov 2022 22:48)    Pertinent Meds:  MEDICATIONS  (STANDING):  cefepime   IVPB      cefepime   IVPB 2000 milliGRAM(s) IV Intermittent every 24 hours  chlorhexidine 0.12% Liquid 15 milliLiter(s) Oral Mucosa every 12 hours  chlorhexidine 2% Cloths 1 Application(s) Topical <User Schedule>  dexMEDEtomidine Infusion 0.3 MICROgram(s)/kG/Hr (3.59 mL/Hr) IV Continuous <Continuous>  dextrose 5% + sodium chloride 0.45%. 1000 milliLiter(s) (70 mL/Hr) IV Continuous <Continuous>  fentaNYL   Infusion. 0.5 MICROgram(s)/kG/Hr (2.4 mL/Hr) IV Continuous <Continuous>  norepinephrine Infusion 0.05 MICROgram(s)/kG/Min (4.49 mL/Hr) IV Continuous <Continuous>  pantoprazole Infusion 8 mG/Hr (10 mL/Hr) IV Continuous <Continuous>     Physical Findings:  - Appearance: nonpitting edema to left arm; right arm; left hand; right hand per flowsheet   - GI function: WDL; last BM on 11/23 - 2x  - Tubes: OGT  - Oral/Mouth cavity: OGT; plan to extubate today per RN  - Skin: intact; no pressure injuries noted   - Cognitive: unable to speak; sedated per flowsheet      Nutrition Requirements  Weight Used: ABW 47.9 KG -- with consideration for crit care, age    Estimated Energy Needs    Continue []  Adjust [x]  ENERGY: 844-929 kcal/day (MSJ 1-1.1 SF) vs 958-1197 kcal/day (20-25 kcal/kg)     Estimated Protein Needs    Continue [x]  Adjust []  PROTEIN: 57-72 g/day (1.2-1.5 g/kg)     Estimated Fluid Needs        Continue [x]  Adjust []  FLUID: 1198 mL/day (25 mL/kg)     Nutrient Intake: Currently NPO     [x] Previous Nutrition Diagnosis: Inadequate Energy Intake             [x] Ongoing          [] Resolved    [] No active nutrition diagnosis identified at this time     Nutrition Intervention: meals, coordination of care     Goal/Expected Outcome: pt to meet at least 51-75% of estimated energy needs within 4 days     Indicator/Monitoring: diet order, PO intake, weights, labs, NFPF, body composition, BM, GOC, SLP recs    Recommendations:  1. Will monitor GOC and SLP recs closely  2. If within GOC and SLP recommends PO intake, add DASH/TLC + low fiber modulars to diet order  3. If within GOC and SLP recommends NPO with alternate means of nutrition/hydration, recommend TF regimen below:  Jevity 1.2 Chevy  Total Volume: 984mL  Continuous feeds starting at 25mL q24hrs  Goal rate 41mL q24hrs  -- to provide 1180.8 kcal/day, 54.6g pro/day, 794 mL free water  Additional water flushes: 102 q6hrs -- provides 1202 mL total water with additional flushes    Pt assessed to be at high nutrition risk. Will f/u in 4 days.     RD to remain available: Carol Ann Aguirre RDN [phone]

## 2022-11-25 NOTE — PROGRESS NOTE ADULT - SUBJECTIVE AND OBJECTIVE BOX
Patient is a 89y old  Female who presents with a chief complaint of GI bleed (23 Nov 2022 21:51)      Over Night Events:  Patient seen and examined.   on levo 0.03   on vent   precedex  and fentanyl     ROS:  See HPI    PHYSICAL EXAM    ICU Vital Signs Last 24 Hrs  T(C): 37.2 (25 Nov 2022 06:00), Max: 37.4 (24 Nov 2022 22:00)  T(F): 99 (25 Nov 2022 06:00), Max: 99.3 (24 Nov 2022 22:00)  HR: 60 (25 Nov 2022 07:00) (60 - 98)  BP: 119/71 (25 Nov 2022 07:00) (69/42 - 164/104)  BP(mean): 89 (25 Nov 2022 07:00) (50 - 121)  ABP: --  ABP(mean): --  RR: 14 (25 Nov 2022 07:00) (8 - 38)  SpO2: 100% (25 Nov 2022 07:00) (83% - 100%)    O2 Parameters below as of 25 Nov 2022 07:00  Patient On (Oxygen Delivery Method): ventilator  O2 Flow (L/min): 40          General: on sedation   HEENT:    et tube             Lymph Nodes: NO cervical LN   Lungs: Bilateral BS  Cardiovascular: Regular   Abdomen: Soft, Positive BS  Extremities: No clubbing   Skin: warm   Neurological: positive gag   Musculoskeletal: move all ext     I&O's Detail    24 Nov 2022 07:01  -  25 Nov 2022 07:00  --------------------------------------------------------  IN:    Dexmedetomidine: 237.5 mL    FentaNYL: 439.1 mL    IV PiggyBack: 250 mL    Lactated Ringers: 960 mL    Norepinephrine: 81.6 mL    Pantoprazole: 240 mL    Sodium Chloride 0.9% Bolus: 250 mL  Total IN: 2458.2 mL    OUT:    Indwelling Catheter - Urethral (mL): 420 mL    Nasogastric/Oral tube (mL): 5 mL  Total OUT: 425 mL    Total NET: 2033.2 mL          LABS:                          8.9    25.97 )-----------( 96       ( 25 Nov 2022 04:30 )             27.2         25 Nov 2022 04:30    143    |  109    |  22     ----------------------------<  128    3.9     |  25     |  1.4      Ca    6.6        25 Nov 2022 04:30  Phos  2.8       25 Nov 2022 04:30  Mg     2.2       25 Nov 2022 04:30    TPro  3.8    /  Alb  2.5    /  TBili  0.5    /  DBili  x      /  AST  85     /  ALT  143    /  AlkPhos  62     25 Nov 2022 04:30  Amylase x     lipase x                                                                                            Lactate, Blood: 2.8 mmol/L (11-24-22 @ 11:48)  Lactate, Blood: 8.7 mmol/L (11-23-22 @ 03:05)                                                                                                       Mode: AC/ CMV (Assist Control/ Continuous Mandatory Ventilation)  RR (machine): 14  TV (machine): 350  FiO2: 40  PEEP: 8  ITime: 1  MAP: 15  PIP: 25                                      ABG - ( 25 Nov 2022 04:30 )  pH, Arterial: 7.42  pH, Blood: x     /  pCO2: 36    /  pO2: 156   / HCO3: 23    / Base Excess: -0.7  /  SaO2: 99.5                MEDICATIONS  (STANDING):  cefepime   IVPB      cefepime   IVPB 2000 milliGRAM(s) IV Intermittent every 24 hours  chlorhexidine 0.12% Liquid 15 milliLiter(s) Oral Mucosa every 12 hours  chlorhexidine 2% Cloths 1 Application(s) Topical <User Schedule>  dexMEDEtomidine Infusion 0.3 MICROgram(s)/kG/Hr (3.59 mL/Hr) IV Continuous <Continuous>  fentaNYL   Infusion. 0.5 MICROgram(s)/kG/Hr (2.4 mL/Hr) IV Continuous <Continuous>  lactated ringers. 1000 milliLiter(s) (75 mL/Hr) IV Continuous <Continuous>  norepinephrine Infusion 0.05 MICROgram(s)/kG/Min (4.49 mL/Hr) IV Continuous <Continuous>  pantoprazole Infusion 8 mG/Hr (10 mL/Hr) IV Continuous <Continuous>    MEDICATIONS  (PRN):          Xrays:  TLC:  OG:  ET tube:                                                                                    mild b/l effusion    ECHO:  CAM ICU:

## 2022-11-25 NOTE — PROGRESS NOTE ADULT - ASSESSMENT
IMPRESSION:  GI bleed  diverticular + duodenal ulcer   blood loss anemia   gastroduodenal bleed   metabolic acidosis   probable sepsis    PLAN:    CNS:  keep donta neg 1   do SAT     HEENT: oral care     PULMONARY: no change in vent drop peep to 6   do SBT when pass SAT   continue O2 as necessary to maintain sats > 90%<94   follow with family regarding DNI     CARDIOVASCULAR:   D5 1/2 ns while NPO   70cc/ hr   GI: GI prophylaxis.     on PPI drip follow GI   family refused intervention     RENAL: follow is and os lytes     INFECTIOUS Disease : cefepime       HEMATOLOGICAL:  DVT prophylaxis.  replace lytes   s/p transfusion   follow serial cbc    follow INR     ENDOCRINE:  Follow up FS.  Insulin protocol if needed.    MUSCULOSKELETAL:    palliative care  follow      The patient is critically ill with a high probability of imminent or life threatening deterioration.

## 2022-11-26 NOTE — CHART NOTE - NSCHARTNOTEFT_GEN_A_CORE
Spoke with Son mayelin about plan for SBT today, was told that he was 20 minutes away from the hospital and will be coming, wants to hold off on extubation until he is physically present in the hospital. Spoke with Son mayelin about plan for SBT today, was told that he was 20 minutes away from the hospital and will be coming, wants to hold off on extubation until he is physically present in the hospital.    Conversation with son mayelin at 2:45pm  Per son since the source of bleeding has been found and patient is stable without pressors, he would like for his mother to undergo intervention Spoke with Son mayelin about plan for SBT today, was told that he was 20 minutes away from the hospital and will be coming, wants to hold off on extubation until he is physically present in the hospital.    Conversation with son mayelin at 2:45pm  Per son since the source of bleeding has been found and patient is stable without pressors, he would like for his mother to undergo intervention    Spoke with IR- gave # for attending (signed out the contact information- will reach out to them on Monday regarding procedure. Spoke with Son mayelin about plan for SBT today, was told that he was 20 minutes away from the hospital and will be coming, wants to hold off on extubation until he is physically present in the hospital.    Conversation with son mayelin at 2:45pm  Per son since the source of bleeding has been found and patient is stable without pressors, he would like for his mother to undergo intervention. Son also confirmed that he would like his mother to receive ppn.    Spoke with IR- gave # for attending (signed out the contact information- will reach out to them on Monday regarding procedure.

## 2022-11-26 NOTE — PROGRESS NOTE ADULT - ASSESSMENT
IMPRESSION:  GI bleed  diverticular + duodenal ulcer   blood loss anemia   gastroduodenal bleed   metabolic acidosis   probable sepsis    PLAN:    CNS:  keep donta neg 1   do SAT     HEENT: oral care     PULMONARY: no change in vent   do SBT when pass SAT   continue O2 as necessary to maintain sats > 90%<94   follow with family regarding DNI     CARDIOVASCULAR:   D5 1/2 ns while NPO   70cc/ hr   GI: GI prophylaxis.     on PPI drip follow GI    family refuses intervention for GIB    RENAL: follow is and os lytes     INFECTIOUS Disease : cefepime       HEMATOLOGICAL:  DVT prophylaxis.  replace lytes   s/p transfusion   follow serial cbc    follow INR     ENDOCRINE:  Follow up FS.  Insulin protocol if needed.    MUSCULOSKELETAL:    palliative care  follow      The patient is critically ill with a high probability of deterioration.       IMPRESSION:  GI bleed  diverticular + duodenal ulcer   blood loss anemia   gastroduodenal bleed   metabolic acidosis   probable sepsis    PLAN:    CNS:  keep donta neg 1   do SAT     HEENT: oral care     PULMONARY: no change in vent   do SBT when pass SAT   continue O2 as necessary to maintain sats > 90%<94   follow with family regarding DNI after extubation    CARDIOVASCULAR:   D5 1/2 ns while NPO   70cc/ hr   GI: GI prophylaxis.     on PPI drip follow GI    family refuses intervention for GIB    RENAL: follow is and os lytes     INFECTIOUS Disease : cefepime       HEMATOLOGICAL:  DVT prophylaxis.  replace lytes   s/p transfusion   follow serial cbc    follow INR     ENDOCRINE:  Follow up FS.  Insulin protocol if needed.    MUSCULOSKELETAL:    palliative care  follow      The patient is critically ill with a high probability of deterioration.

## 2022-11-26 NOTE — PROGRESS NOTE ADULT - SUBJECTIVE AND OBJECTIVE BOX
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 Patient has advanced dementia and is a poor historian but is able to articulate pain and symptoms accurately per the son. unknown HT, unknown LOC, and she does not take anticoagulation. History was obtained from the patient, her son at the bedside, and medication reconcilation was done by calling [address] [phone], where she fills her medications per the son. Patient does not remember falling, as she has poor memory. She denies any current lightheadedness, fatigue, chest pain, SOB, nausea, vomiting, headache, back pain, abdominal pain, urinary sx. Of note, she does take celecoxib 100mg BID per pharmacist.     In the ED, she was found to have HGB 8, prior HGB was 12. Patient remained HD stable throughout ED stay. GI was consulted, and they plan to perform colonoscopy tomorrow. Cardiology cleared patient for colonoscopy procedure. She also has wbc 30 but has been afebrile. CTA abdomen/pelvis was negative for active bleeding or infectious etiology. She received 1 dose of 2g cefepime.  (17 Nov 2022 17:03)      Pt evaluated on rounds.  I reviewed the radiology tests and hospital record.    I reviewed previous notes on this patient.    Interval Events: No overnight events.      CAM:+    SAT:+    SBT:      REVIEW OF SYSTEMS:   see HPI      OBJECTIVE:  ICU Vital Signs Last 24 Hrs  T(C): 37 (26 Nov 2022 05:00), Max: 37.1 (25 Nov 2022 19:00)  T(F): 98.6 (26 Nov 2022 05:00), Max: 98.7 (25 Nov 2022 19:00)  HR: 60 (26 Nov 2022 06:00) (60 - 63)  BP: 121/66 (26 Nov 2022 06:00) (81/55 - 125/60)  BP(mean): 87 (26 Nov 2022 06:00) (63 - 87)  ABP: --  ABP(mean): --  RR: 16 (26 Nov 2022 06:00) (14 - 23)  SpO2: 98% (26 Nov 2022 06:00) (81% - 100%)    O2 Parameters below as of 25 Nov 2022 18:00  Patient On (Oxygen Delivery Method): ventilator  O2 Flow (L/min): 40        Mode: AC/ CMV (Assist Control/ Continuous Mandatory Ventilation), RR (machine): 14, TV (machine): 350, FiO2: 40, PEEP: 6, MAP: 11, PIP: 24    11-25 @ 07:01  -  11-26 @ 07:00  --------------------------------------------------------  IN: 2256 mL / OUT: 665 mL / NET: 1591 mL      CAPILLARY BLOOD GLUCOSE      POCT Blood Glucose.: 143 mg/dL (24 Nov 2022 22:48)        PHYSICAL EXAM:       · ENMT:   Airway patent,   Nasal mucosa clear.  Mouth with normal mucosa.   No thrush    · EYES:   Clear bilaterally,   pupils equal,   round and reactive to light.    · CARDIAC:   Normal rate,   regular rhythm.    Heart sounds S1, S2.   No murmurs, no rubs or gallops on auscultation  no edema        CAROTID:   normal systolic impulse  no bruits    · RESPIRATORY:   rales  normal chest expansion  no retractions or use of accessory muscles  percussion of chest demonstrates no hyperresonance or dullness    · GASTROINTESTINAL:  Abdomen soft,   non-tender,   + BS  liver/spleen not palpable    · MUSCULOSKELETAL:   no clubbing, cyanosis      · SKIN:   Skin normal color for race,   warm, dry   No evidence of rash.        · HEME LYMPH:   no splenomegaly.  No cervical  lymphadenopathy.  no inguinal lymphadenopathy    HOSPITAL MEDICATIONS:  MEDICATIONS  (STANDING):  cefepime   IVPB      cefepime   IVPB 2000 milliGRAM(s) IV Intermittent every 24 hours  chlorhexidine 0.12% Liquid 15 milliLiter(s) Oral Mucosa every 12 hours  chlorhexidine 2% Cloths 1 Application(s) Topical <User Schedule>  dexMEDEtomidine Infusion 0.3 MICROgram(s)/kG/Hr (3.59 mL/Hr) IV Continuous <Continuous>  dextrose 5% + sodium chloride 0.45%. 1000 milliLiter(s) (70 mL/Hr) IV Continuous <Continuous>  norepinephrine Infusion 0.05 MICROgram(s)/kG/Min (4.49 mL/Hr) IV Continuous <Continuous>  pantoprazole Infusion 8 mG/Hr (10 mL/Hr) IV Continuous <Continuous>  vancomycin  IVPB 750 milliGRAM(s) IV Intermittent every 24 hours    MEDICATIONS  (PRN):    dextrose 5% + sodium chloride 0.45%.: Solution, 1000 milliLiter(s) infuse at 70 mL/Hr  sodium chloride 0.9% Bolus:   250 milliLiter(s), IV Bolus, once, infuse over 5 Minute(s), Stop After 1 Doses  Special Instructions: FOR CHEETAH  lactated ringers.: Solution, 1000 milliLiter(s) infuse at 75 mL/Hr, Stop After 24 Hours  lactated ringers.: Solution, 1000 milliLiter(s) infuse at 75 mL/Hr  lactated ringers Bolus:   1000 milliLiter(s), IV Bolus, once, infuse over 60 Minute(s), Stop After 1 Doses  sodium chloride 0.9%.: Solution, 1000 milliLiter(s) infuse at 50 mL/Hr  lactated ringers.: Solution, 1000 milliLiter(s) infuse at 50 mL/Hr  lactated ringers.: Solution, 1000 milliLiter(s) infuse at 100 mL/Hr  Provider's Contact #: [phone]  dextrose 5% + sodium chloride 0.45%.: Solution, 1000 milliLiter(s) infuse at 60 mL/Hr  sodium chloride 0.9% Bolus:   1000 milliLiter(s), IV Bolus, once, infuse over 60 Minute(s), Stop After 1 Doses  Provider's Contact #: [phone]      LABS:                        8.1    19.24 )-----------( 66       ( 26 Nov 2022 05:05 )             25.8     11-26    141  |  110  |  23<H>  ----------------------------<  166<H>  3.7   |  24  |  1.3    Ca    6.6<L>      26 Nov 2022 05:05  Phos  2.8     11-25  Mg     2.1     11-26    TPro  3.7<L>  /  Alb  2.2<L>  /  TBili  0.5  /  DBili  x   /  AST  42<H>  /  ALT  106<H>  /  AlkPhos  58  11-26        Arterial Blood Gas:  11-26 @ 04:30  7.40/35/72/22/96.9/-2.5  ABG lactate: --  Arterial Blood Gas:  11-25 @ 04:30  7.42/36/156/23/99.5/-0.7  ABG lactate: --      Mode: AC/ CMV (Assist Control/ Continuous Mandatory Ventilation), RR (machine): 14, TV (machine): 350, FiO2: 40, PEEP: 6, MAP: 11, PIP: 24      COVID-19 PCR: NotDetec (17 Nov 2022 10:30)    Mode: AC/ CMV (Assist Control/ Continuous Mandatory Ventilation)  RR (machine): 14  TV (machine): 350  FiO2: 40  PEEP: 6  MAP: 11  PIP: 24      ABG - ( 26 Nov 2022 04:30 )  pH, Arterial: 7.40  pH, Blood: x     /  pCO2: 35    /  pO2: 72    / HCO3: 22    / Base Excess: -2.5  /  SaO2: 96.9                RADIOLOGY: Today I personally interpreted the latest CXR and other pertinent films.

## 2022-11-27 NOTE — PROGRESS NOTE ADULT - ASSESSMENT
IMPRESSION:  GI bleed  diverticular + duodenal ulcer   blood loss anemia   gastroduodenal bleed   metabolic acidosis   probable sepsis    PLAN:    CNS:  keep donta neg 1   do SAT     HEENT: oral care     PULMONARY: no change in vent   hold SBT until after IR  continue O2 as necessary to maintain sats > 90%<94   follow with family regarding DNI after extubation      CARDIOVASCULAR:   diet  GI: GI prophylaxis.     on PPI drip follow GI    family now agrees for IRintervention for GIB    RENAL: follow is and os lytes     INFECTIOUS Disease : cefepime       HEMATOLOGICAL:  DVT prophylaxis.  replace lytes   s/p transfusion   follow serial cbc    follow INR     ENDOCRINE:  Follow up FS.  Insulin protocol if needed.    MUSCULOSKELETAL:    palliative care  follow      The patient is critically ill with a high probability of deterioration.

## 2022-11-27 NOTE — PROGRESS NOTE ADULT - SUBJECTIVE AND OBJECTIVE BOX
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 Patient has advanced dementia and is a poor historian but is able to articulate pain and symptoms accurately per the son. unknown HT, unknown LOC, and she does not take anticoagulation. History was obtained from the patient, her son at the bedside, and medication reconcilation was done by calling [address] [phone], where she fills her medications per the son. Patient does not remember falling, as she has poor memory. She denies any current lightheadedness, fatigue, chest pain, SOB, nausea, vomiting, headache, back pain, abdominal pain, urinary sx. Of note, she does take celecoxib 100mg BID per pharmacist.     In the ED, she was found to have HGB 8, prior HGB was 12. Patient remained HD stable throughout ED stay. GI was consulted, and they plan to perform colonoscopy tomorrow. Cardiology cleared patient for colonoscopy procedure. She also has wbc 30 but has been afebrile. CTA abdomen/pelvis was negative for active bleeding or infectious etiology. She received 1 dose of 2g cefepime.  (17 Nov 2022 17:03)      Pt evaluated on rounds.  I reviewed the radiology tests and hospital record.    I reviewed previous notes on this patient.    Interval Events: No overnight events.      CAM:    SAT:    SBT:      REVIEW OF SYSTEMS:   see HPI      OBJECTIVE:  ICU Vital Signs Last 24 Hrs  T(C): 36.1 (27 Nov 2022 08:00), Max: 37.2 (27 Nov 2022 06:00)  T(F): 97 (27 Nov 2022 08:00), Max: 99 (27 Nov 2022 06:00)  HR: 60 (27 Nov 2022 09:00) (60 - 60)  BP: 128/66 (27 Nov 2022 09:00) (88/60 - 146/85)  BP(mean): 87 (27 Nov 2022 09:00) (63 - 105)  ABP: --  ABP(mean): --  RR: 19 (27 Nov 2022 09:00) (14 - 29)  SpO2: 97% (27 Nov 2022 09:00) (95% - 99%)    O2 Parameters below as of 27 Nov 2022 08:00  Patient On (Oxygen Delivery Method): ventilator    O2 Concentration (%): 40      Mode: AC/ CMV (Assist Control/ Continuous Mandatory Ventilation), RR (machine): 14, TV (machine): 350, FiO2: 40, PEEP: 6, ITime: 1, MAP: 9, PIP: 23    11-26 @ 07:01  -  11-27 @ 07:00  --------------------------------------------------------  IN: 2400 mL / OUT: 635 mL / NET: 1765 mL      CAPILLARY BLOOD GLUCOSE      POCT Blood Glucose.: 87 mg/dL (27 Nov 2022 05:05)        PHYSICAL EXAM:       · ENMT:   Airway patent,   Nasal mucosa clear.  Mouth with normal mucosa.   No thrush    · EYES:   Clear bilaterally,   pupils equal,   round and reactive to light.    · CARDIAC:   Normal rate,   regular rhythm.    Heart sounds S1, S2.   No murmurs, no rubs or gallops on auscultation  no edema        CAROTID:   normal systolic impulse  no bruits    · RESPIRATORY:   rales  normal chest expansion  no retractions or use of accessory muscles  percussion of chest demonstrates no hyperresonance or dullness    · GASTROINTESTINAL:  Abdomen soft,   non-tender,   + BS  liver/spleen not palpable    · MUSCULOSKELETAL:   no clubbing, cyanosis      · SKIN:   Skin normal color for race,   warm, dry   No evidence of rash.        · HEME LYMPH:   no splenomegaly.  No cervical  lymphadenopathy.  no inguinal lymphadenopathy    HOSPITAL MEDICATIONS:  MEDICATIONS  (STANDING):  chlorhexidine 0.12% Liquid 15 milliLiter(s) Oral Mucosa every 12 hours  chlorhexidine 2% Cloths 1 Application(s) Topical <User Schedule>  dexMEDEtomidine Infusion 0.3 MICROgram(s)/kG/Hr (3.59 mL/Hr) IV Continuous <Continuous>  dextrose 5% + sodium chloride 0.45%. 1000 milliLiter(s) (70 mL/Hr) IV Continuous <Continuous>  dextrose 5%. 1000 milliLiter(s) (50 mL/Hr) IV Continuous <Continuous>  dextrose 5%. 1000 milliLiter(s) (100 mL/Hr) IV Continuous <Continuous>  dextrose 50% Injectable 25 Gram(s) IV Push once  dextrose 50% Injectable 12.5 Gram(s) IV Push once  dextrose 50% Injectable 25 Gram(s) IV Push once  fentaNYL   Infusion. 0.5 MICROgram(s)/kG/Hr (3.15 mL/Hr) IV Continuous <Continuous>  glucagon  Injectable 1 milliGRAM(s) IntraMuscular once  insulin lispro (ADMELOG) corrective regimen sliding scale   SubCutaneous every 6 hours  norepinephrine Infusion 0.05 MICROgram(s)/kG/Min (4.49 mL/Hr) IV Continuous <Continuous>  pantoprazole Infusion 8 mG/Hr (10 mL/Hr) IV Continuous <Continuous>  vancomycin  IVPB 750 milliGRAM(s) IV Intermittent every 24 hours    MEDICATIONS  (PRN):  dextrose Oral Gel 15 Gram(s) Oral once PRN Blood Glucose LESS THAN 70 milliGRAM(s)/deciliter    dextrose 5% + sodium chloride 0.45%.: Solution, 1000 milliLiter(s) infuse at 70 mL/Hr  sodium chloride 0.9% Bolus:   250 milliLiter(s), IV Bolus, once, infuse over 5 Minute(s), Stop After 1 Doses  Special Instructions: FOR CHEETAH  lactated ringers.: Solution, 1000 milliLiter(s) infuse at 75 mL/Hr, Stop After 24 Hours  lactated ringers.: Solution, 1000 milliLiter(s) infuse at 75 mL/Hr  lactated ringers Bolus:   1000 milliLiter(s), IV Bolus, once, infuse over 60 Minute(s), Stop After 1 Doses  sodium chloride 0.9%.: Solution, 1000 milliLiter(s) infuse at 50 mL/Hr  lactated ringers.: Solution, 1000 milliLiter(s) infuse at 50 mL/Hr  lactated ringers.: Solution, 1000 milliLiter(s) infuse at 100 mL/Hr  Provider's Contact #: [phone]  dextrose 5% + sodium chloride 0.45%.: Solution, 1000 milliLiter(s) infuse at 60 mL/Hr  sodium chloride 0.9% Bolus:   1000 milliLiter(s), IV Bolus, once, infuse over 60 Minute(s), Stop After 1 Doses  Provider's Contact #: [phone]      LABS:                        7.9    17.11 )-----------( 62       ( 27 Nov 2022 05:30 )             24.9     11-27    139  |  108  |  21<H>  ----------------------------<  78  3.6   |  23  |  1.2    Ca    6.3<L>      27 Nov 2022 05:30  Mg     2.0     11-27    TPro  3.6<L>  /  Alb  2.0<L>  /  TBili  0.6  /  DBili  x   /  AST  24  /  ALT  71<H>  /  AlkPhos  57  11-27        Arterial Blood Gas:  11-27 @ 03:51  7.43/31/108/21/99.2/-2.7  ABG lactate: --  Arterial Blood Gas:  11-26 @ 04:30  7.40/35/72/22/96.9/-2.5  ABG lactate: --      Mode: AC/ CMV (Assist Control/ Continuous Mandatory Ventilation), RR (machine): 14, TV (machine): 350, FiO2: 40, PEEP: 6, ITime: 1, MAP: 9, PIP: 23      COVID-19 PCR: NotDetec (17 Nov 2022 10:30)    Mode: AC/ CMV (Assist Control/ Continuous Mandatory Ventilation)  RR (machine): 14  TV (machine): 350  FiO2: 40  PEEP: 6  ITime: 1  MAP: 9  PIP: 23      ABG - ( 27 Nov 2022 03:51 )  pH, Arterial: 7.43  pH, Blood: x     /  pCO2: 31    /  pO2: 108   / HCO3: 21    / Base Excess: -2.7  /  SaO2: 99.2                RADIOLOGY: Today I personally interpreted the latest CXR and other pertinent films.

## 2022-11-27 NOTE — PROGRESS NOTE ADULT - SUBJECTIVE AND OBJECTIVE BOX
MICHAEL MORTENSEN 89y Female  MRN#: [id number]   Hospital Day: 10d    HPI:  89F with phhx of Alzheimer's dementia, CHF, HTN , 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 Patient has advanced dementia and is a poor historian but is able to articulate pain and symptoms accurately per the son. unknown HT, unknown LOC, and she does not take anticoagulation. History was obtained from the patient, her son at the bedside, and medication reconcilation was done by calling [address] [phone], where she fills her medications per the son. Patient does not remember falling, as she has poor memory. She denies any current lightheadedness, fatigue, chest pain, SOB, nausea, vomiting, headache, back pain, abdominal pain, urinary sx. Of note, she does take celecoxib 100mg BID per pharmacist.     In the ED, she was found to have HGB 8, prior HGB was 12. Patient remained HD stable throughout ED stay. GI was consulted, and they plan to perform colonoscopy tomorrow. Cardiology cleared patient for colonoscopy procedure. She also has wbc 30 but has been afebrile. CTA abdomen/pelvis was negative for active bleeding or infectious etiology. She received 1 dose of 2g cefepime.  (17 Nov 2022 17:03)      SUBJECTIVE  sedated. intubated. no overnight events.    OBJECTIVE  PAST MEDICAL & SURGICAL HISTORY  HTN (hypertension)    Bradycardia    Pacemaker      ALLERGIES:  No Known Allergies    MEDICATIONS:  STANDING MEDICATIONS  cefepime   IVPB      cefepime   IVPB 2000 milliGRAM(s) IV Intermittent every 24 hours  chlorhexidine 0.12% Liquid 15 milliLiter(s) Oral Mucosa every 12 hours  chlorhexidine 2% Cloths 1 Application(s) Topical <User Schedule>  dexMEDEtomidine Infusion 0.3 MICROgram(s)/kG/Hr IV Continuous <Continuous>  dextrose 5% + sodium chloride 0.45%. 1000 milliLiter(s) IV Continuous <Continuous>  dextrose 5%. 1000 milliLiter(s) IV Continuous <Continuous>  dextrose 5%. 1000 milliLiter(s) IV Continuous <Continuous>  dextrose 50% Injectable 25 Gram(s) IV Push once  dextrose 50% Injectable 12.5 Gram(s) IV Push once  dextrose 50% Injectable 25 Gram(s) IV Push once  glucagon  Injectable 1 milliGRAM(s) IntraMuscular once  insulin lispro (ADMELOG) corrective regimen sliding scale   SubCutaneous every 6 hours  norepinephrine Infusion 0.05 MICROgram(s)/kG/Min IV Continuous <Continuous>  pantoprazole Infusion 8 mG/Hr IV Continuous <Continuous>  vancomycin  IVPB 750 milliGRAM(s) IV Intermittent every 24 hours    PRN MEDICATIONS  dextrose Oral Gel 15 Gram(s) Oral once PRN      VITAL SIGNS: Last 24 Hours  T(C): 36.7 (26 Nov 2022 21:00), Max: 36.7 (26 Nov 2022 08:00)  T(F): 98 (26 Nov 2022 21:00), Max: 98.1 (26 Nov 2022 08:00)  HR: 60 (27 Nov 2022 05:00) (60 - 60)  BP: 97/68 (27 Nov 2022 05:00) (88/69 - 146/85)  BP(mean): 77 (27 Nov 2022 05:00) (63 - 105)  RR: 17 (27 Nov 2022 05:00) (14 - 29)  SpO2: 96% (27 Nov 2022 05:00) (95% - 99%)    LABS:                        8.1    19.24 )-----------( 66       ( 26 Nov 2022 05:05 )             25.8     11-26    141  |  110  |  23<H>  ----------------------------<  166<H>  3.7   |  24  |  1.3    Ca    6.6<L>      26 Nov 2022 05:05  Mg     2.1     11-26    TPro  3.7<L>  /  Alb  2.2<L>  /  TBili  0.5  /  DBili  x   /  AST  42<H>  /  ALT  106<H>  /  AlkPhos  58  11-26        ABG - ( 27 Nov 2022 03:51 )  pH, Arterial: 7.43  pH, Blood: x     /  pCO2: 31    /  pO2: 108   / HCO3: 21    / Base Excess: -2.7  /  SaO2: 99.2                PHYSICAL EXAM:  General: NAD. +sedated/+intubated  HEENT: NC/AT; PERRL, clear conjunctiva  Neck: supple  Respiratory: CTA b/l  Cardiovascular: +S1/S2; RRR  Abdomen: soft, NT/ND; +BS x4  Extremities: WWP, 2+ peripheral pulses b/l;   Skin: normal color and turgor; scattered skin discoloration in upper extremities.         ASSESSMENT AND PLAN:  Impression:  GI bleed  diverticular + duodenal ulcer   blood loss anemia   gastroduodenal bleed   metabolic acidosis   probable sepsis    Plan:  GI bleed Diverticular -Duodenal ulcer   blood loss anemia   s/p EGD at presentation showing 2 cm duodenal bulb ulcer along the anterior wall, ulcer is deep, cratered, with friable base and pigmentation, no active GI bleeding noted. 3 cc of epinephrine injected, 2 Endoclip were deployed to secure hemostasis.  2 clean based ulcer seen in the second part of duodenum, 5 mm each.  s/p colonoscopy with severe diverticulosis , procedure aborted secondary to poor prep( old blood through the examined part of colon) and diverticulosis.  --->GI recommendations appreciated: Given high risk lesion, positive extravasation in CT scan, and failed endoscopic intervention patient would benefit from embolization w IR.   Patient's son refused any intervention at first then on 11/26 he agreed on IR procedure and PPN  trend CBC, Transfuse to keep hemoglobin > 8   PPI 40 mg IV bid   advance to clear liquids   f/u IR      #UTI  Leukocytosis, Fever spike on 11.20.22  +UA for E. coli  -F/U Bacterial Cx (NGTD 11/17)   cw cefepime and vanc      # New 8 mm calculus within the proximal pancreatic duct in the region of the pancreatic head with new diffuse pancreatic parenchymal atrophy and dilatation of the pancreatic duct to 1.1 cm   normal LFTs  - Further work up by MRI to rule out malignancy as outpatient if compatible with goals of care   - Follow up with GI MAP  - No further workup given current  age, clinical status and goals of cares       DVT ppx: off AC d/t bleeding  GI PPX: Pantoprazole BID  Activity: IAT    Lines: D/C elie 11.20     MICHAEL MORTENSEN 89y Female  MRN#: [id number]   Hospital Day: 10d    HPI:  89F with phhx of Alzheimer's dementia, CHF, HTN , 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 Patient has advanced dementia and is a poor historian but is able to articulate pain and symptoms accurately per the son. unknown HT, unknown LOC, and she does not take anticoagulation. History was obtained from the patient, her son at the bedside, and medication reconcilation was done by calling [address] [phone], where she fills her medications per the son. Patient does not remember falling, as she has poor memory. She denies any current lightheadedness, fatigue, chest pain, SOB, nausea, vomiting, headache, back pain, abdominal pain, urinary sx. Of note, she does take celecoxib 100mg BID per pharmacist.     In the ED, she was found to have HGB 8, prior HGB was 12. Patient remained HD stable throughout ED stay. GI was consulted, and they plan to perform colonoscopy tomorrow. Cardiology cleared patient for colonoscopy procedure. She also has wbc 30 but has been afebrile. CTA abdomen/pelvis was negative for active bleeding or infectious etiology. She received 1 dose of 2g cefepime.  (17 Nov 2022 17:03)      SUBJECTIVE  sedated. intubated. no overnight events.    OBJECTIVE  PAST MEDICAL & SURGICAL HISTORY  HTN (hypertension)    Bradycardia    Pacemaker      ALLERGIES:  No Known Allergies    MEDICATIONS:  STANDING MEDICATIONS  cefepime   IVPB      cefepime   IVPB 2000 milliGRAM(s) IV Intermittent every 24 hours  chlorhexidine 0.12% Liquid 15 milliLiter(s) Oral Mucosa every 12 hours  chlorhexidine 2% Cloths 1 Application(s) Topical <User Schedule>  dexMEDEtomidine Infusion 0.3 MICROgram(s)/kG/Hr IV Continuous <Continuous>  dextrose 5% + sodium chloride 0.45%. 1000 milliLiter(s) IV Continuous <Continuous>  dextrose 5%. 1000 milliLiter(s) IV Continuous <Continuous>  dextrose 5%. 1000 milliLiter(s) IV Continuous <Continuous>  dextrose 50% Injectable 25 Gram(s) IV Push once  dextrose 50% Injectable 12.5 Gram(s) IV Push once  dextrose 50% Injectable 25 Gram(s) IV Push once  glucagon  Injectable 1 milliGRAM(s) IntraMuscular once  insulin lispro (ADMELOG) corrective regimen sliding scale   SubCutaneous every 6 hours  norepinephrine Infusion 0.05 MICROgram(s)/kG/Min IV Continuous <Continuous>  pantoprazole Infusion 8 mG/Hr IV Continuous <Continuous>  vancomycin  IVPB 750 milliGRAM(s) IV Intermittent every 24 hours    PRN MEDICATIONS  dextrose Oral Gel 15 Gram(s) Oral once PRN      VITAL SIGNS: Last 24 Hours  T(C): 36.7 (26 Nov 2022 21:00), Max: 36.7 (26 Nov 2022 08:00)  T(F): 98 (26 Nov 2022 21:00), Max: 98.1 (26 Nov 2022 08:00)  HR: 60 (27 Nov 2022 05:00) (60 - 60)  BP: 97/68 (27 Nov 2022 05:00) (88/69 - 146/85)  BP(mean): 77 (27 Nov 2022 05:00) (63 - 105)  RR: 17 (27 Nov 2022 05:00) (14 - 29)  SpO2: 96% (27 Nov 2022 05:00) (95% - 99%)    LABS:                        8.1    19.24 )-----------( 66       ( 26 Nov 2022 05:05 )             25.8     11-26    141  |  110  |  23<H>  ----------------------------<  166<H>  3.7   |  24  |  1.3    Ca    6.6<L>      26 Nov 2022 05:05  Mg     2.1     11-26    TPro  3.7<L>  /  Alb  2.2<L>  /  TBili  0.5  /  DBili  x   /  AST  42<H>  /  ALT  106<H>  /  AlkPhos  58  11-26        ABG - ( 27 Nov 2022 03:51 )  pH, Arterial: 7.43  pH, Blood: x     /  pCO2: 31    /  pO2: 108   / HCO3: 21    / Base Excess: -2.7  /  SaO2: 99.2                PHYSICAL EXAM:  General: NAD. +sedated/+intubated  HEENT: NC/AT; PERRL, clear conjunctiva  Neck: supple  Respiratory: CTA b/l  Cardiovascular: +S1/S2; RRR  Abdomen: soft, NT/ND; +BS x4  Extremities: WWP, 2+ peripheral pulses b/l;   Skin: normal color and turgor; scattered skin discoloration in upper extremities.         ASSESSMENT AND PLAN:  Impression:  GI bleed  diverticular + duodenal ulcer   blood loss anemia   gastroduodenal bleed   metabolic acidosis   probable sepsis    Plan:  GI bleed Diverticular -Duodenal ulcer   blood loss anemia   s/p EGD at presentation showing 2 cm duodenal bulb ulcer along the anterior wall, ulcer is deep, cratered, with friable base and pigmentation, no active GI bleeding noted. 3 cc of epinephrine injected, 2 Endoclip were deployed to secure hemostasis.  2 clean based ulcer seen in the second part of duodenum, 5 mm each.  s/p colonoscopy with severe diverticulosis , procedure aborted secondary to poor prep( old blood through the examined part of colon) and diverticulosis.  --->GI recommendations appreciated: Given high risk lesion, positive extravasation in CT scan, and failed endoscopic intervention patient would benefit from embolization w IR.   Patient's son refused any intervention at first then on 11/26 he agreed on IR procedure and PPN  trend CBC, Transfuse to keep hemoglobin > 8   PPI 40 mg IV bid   advance to clear liquids   f/u IR      #UTI  Leukocytosis, Fever spike on 11.20.22  +UA for E. coli  -F/U Bacterial Cx (NGTD 11/17)   d/c cefepime   cw vanc anf f/u repeat bcx      # New 8 mm calculus within the proximal pancreatic duct in the region of the pancreatic head with new diffuse pancreatic parenchymal atrophy and dilatation of the pancreatic duct to 1.1 cm   normal LFTs  - Further work up by MRI to rule out malignancy as outpatient if compatible with goals of care   - Follow up with GI MAP  - No further workup given current  age, clinical status and goals of cares       DVT ppx: off AC d/t bleeding  GI PPX: Pantoprazole BID. start NGT feeds.   Activity: IAT    Lines: D/C elie 11.20

## 2022-11-28 NOTE — PROGRESS NOTE ADULT - SUBJECTIVE AND OBJECTIVE BOX
HPI:  ED course:     89F with phhx of Alzheimer's dementia, CHF, HTN , 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 Patient has advanced dementia and is a poor historian but is able to articulate pain and symptoms accurately per the son. unknown HT, unknown LOC, and she does not take anticoagulation. History was obtained from the patient, her son at the bedside, and medication reconcilation was done by calling [address] [phone], where she fills her medications per the son. Patient does not remember falling, as she has poor memory. She denies any current lightheadedness, fatigue, chest pain, SOB, nausea, vomiting, headache, back pain, abdominal pain, urinary sx. Of note, she does take celecoxib 100mg BID per pharmacist.     In the ED, she was found to have HGB 8, prior HGB was 12. Patient remained HD stable throughout ED stay. GI was consulted, and they plan to perform colonoscopy tomorrow. Cardiology cleared patient for colonoscopy procedure. She also has wbc 30 but has been afebrile. CTA abdomen/pelvis was negative for active bleeding or infectious etiology. She received 1 dose of 2g cefepime.        INTERVAL HPI/OVERNIGHT EVENTS:   episodes of desaturation, episode of hypotension down to 84/51  Afebrile, hemodynamically stable     ICU Vital Signs Last 24 Hrs  T(C): 37.3 (28 Nov 2022 08:00), Max: 37.3 (28 Nov 2022 08:00)  T(F): 99.1 (28 Nov 2022 08:00), Max: 99.1 (28 Nov 2022 08:00)  HR: 60 (28 Nov 2022 12:02) (60 - 68)  BP: 83/51 (28 Nov 2022 12:02) (80/55 - 136/80)  BP(mean): 64 (28 Nov 2022 12:02) (63 - 106)  ABP: --  ABP(mean): --  RR: 20 (28 Nov 2022 12:02) (15 - 35)  SpO2: 94% (28 Nov 2022 12:02) (94% - 100%)    O2 Parameters below as of 28 Nov 2022 11:00  Patient On (Oxygen Delivery Method): ventilator    O2 Concentration (%): 35      I&O's Summary    27 Nov 2022 07:01  -  28 Nov 2022 07:00  --------------------------------------------------------  IN: 3004 mL / OUT: 420 mL / NET: 2584 mL    28 Nov 2022 07:01  -  28 Nov 2022 13:17  --------------------------------------------------------  IN: 585.2 mL / OUT: 445 mL / NET: 140.2 mL      Mode: AC/ CMV (Assist Control/ Continuous Mandatory Ventilation)  RR (machine): 14  TV (machine): 350  FiO2: 40  PEEP: 6  ITime: 1  MAP: 9  PIP: 18      LABS:                        8.2    19.27 )-----------( 97       ( 28 Nov 2022 05:15 )             25.4     11-28    137  |  106  |  21<H>  ----------------------------<  194<H>  3.6   |  21  |  1.2    Ca    6.7<L>      28 Nov 2022 05:15  Mg     2.0     11-28    TPro  4.0<L>  /  Alb  2.3<L>  /  TBili  0.5  /  DBili  x   /  AST  16  /  ALT  53<H>  /  AlkPhos  63  11-28    PT/INR - ( 28 Nov 2022 06:30 )   PT: 14.60 sec;   INR: 1.27 ratio         PTT - ( 28 Nov 2022 06:30 )  PTT:24.3 sec    CAPILLARY BLOOD GLUCOSE      POCT Blood Glucose.: 196 mg/dL (28 Nov 2022 11:37)  POCT Blood Glucose.: 187 mg/dL (28 Nov 2022 06:49)  POCT Blood Glucose.: 233 mg/dL (28 Nov 2022 01:31)  POCT Blood Glucose.: 225 mg/dL (27 Nov 2022 18:48)    ABG - ( 28 Nov 2022 02:41 )  pH, Arterial: 7.48  pH, Blood: x     /  pCO2: 24    /  pO2: 138   / HCO3: 18    / Base Excess: -4.8  /  SaO2: 100.0               RADIOLOGY & ADDITIONAL TESTS:    Consultant(s) Notes Reviewed:  [x ] YES  [ ] NO    MEDICATIONS  (STANDING):  chlorhexidine 0.12% Liquid 15 milliLiter(s) Oral Mucosa every 12 hours  chlorhexidine 2% Cloths 1 Application(s) Topical <User Schedule>  dexMEDEtomidine Infusion 0.3 MICROgram(s)/kG/Hr (3.59 mL/Hr) IV Continuous <Continuous>  dextrose 5% + sodium chloride 0.45%. 1000 milliLiter(s) (70 mL/Hr) IV Continuous <Continuous>  dextrose 5%. 1000 milliLiter(s) (50 mL/Hr) IV Continuous <Continuous>  dextrose 5%. 1000 milliLiter(s) (100 mL/Hr) IV Continuous <Continuous>  dextrose 50% Injectable 25 Gram(s) IV Push once  dextrose 50% Injectable 12.5 Gram(s) IV Push once  dextrose 50% Injectable 25 Gram(s) IV Push once  fentaNYL   Infusion. 0.5 MICROgram(s)/kG/Hr (3.15 mL/Hr) IV Continuous <Continuous>  furosemide   Injectable 20 milliGRAM(s) IV Push two times a day  glucagon  Injectable 1 milliGRAM(s) IntraMuscular once  norepinephrine Infusion 0.05 MICROgram(s)/kG/Min (4.49 mL/Hr) IV Continuous <Continuous>  pantoprazole Infusion 8 mG/Hr (10 mL/Hr) IV Continuous <Continuous>  potassium chloride  20 mEq/100 mL IVPB 20 milliEquivalent(s) IV Intermittent every 2 hours  vancomycin  IVPB 750 milliGRAM(s) IV Intermittent every 24 hours    MEDICATIONS  (PRN):  dextrose Oral Gel 15 Gram(s) Oral once PRN Blood Glucose LESS THAN 70 milliGRAM(s)/deciliter      PHYSICAL EXAM:  GENERAL: awake responds to verbal stimuli, mumbles, incomprehensive speech   CHEST/LUNG: B/L good air entry; No rales, rhonchi, or wheezing  HEART: S1S2 normal, no S3, Regular rate and rhythm; No murmurs  ABDOMEN: Soft, Nontender, Nondistended; Bowel sounds present  EXTREMITIES:  2+ edema.    HPI:  ED course:     89F with phhx of Alzheimer's dementia, CHF, HTN , 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 Patient has advanced dementia and is a poor historian but is able to articulate pain and symptoms accurately per the son. unknown HT, unknown LOC, and she does not take anticoagulation. History was obtained from the patient, her son at the bedside, and medication reconcilation was done by calling [address] [phone], where she fills her medications per the son. Patient does not remember falling, as she has poor memory. She denies any current lightheadedness, fatigue, chest pain, SOB, nausea, vomiting, headache, back pain, abdominal pain, urinary sx. Of note, she does take celecoxib 100mg BID per pharmacist.     In the ED, she was found to have HGB 8, prior HGB was 12. Patient remained HD stable throughout ED stay. GI was consulted, and they plan to perform colonoscopy tomorrow. Cardiology cleared patient for colonoscopy procedure. She also has wbc 30 but has been afebrile. CTA abdomen/pelvis was negative for active bleeding or infectious etiology. She received 1 dose of 2g cefepime.        INTERVAL HPI/OVERNIGHT EVENTS:   episodes of desaturation, episode of hypotension down to 84/51  Afebrile, hemodynamically stable     ICU Vital Signs Last 24 Hrs  T(C): 37.3 (28 Nov 2022 08:00), Max: 37.3 (28 Nov 2022 08:00)  T(F): 99.1 (28 Nov 2022 08:00), Max: 99.1 (28 Nov 2022 08:00)  HR: 60 (28 Nov 2022 12:02) (60 - 68)  BP: 83/51 (28 Nov 2022 12:02) (80/55 - 136/80)  BP(mean): 64 (28 Nov 2022 12:02) (63 - 106)  ABP: --  ABP(mean): --  RR: 20 (28 Nov 2022 12:02) (15 - 35)  SpO2: 94% (28 Nov 2022 12:02) (94% - 100%)    O2 Parameters below as of 28 Nov 2022 11:00  Patient On (Oxygen Delivery Method): ventilator    O2 Concentration (%): 35      I&O's Summary    27 Nov 2022 07:01  -  28 Nov 2022 07:00  --------------------------------------------------------  IN: 3004 mL / OUT: 420 mL / NET: 2584 mL    28 Nov 2022 07:01  -  28 Nov 2022 13:17  --------------------------------------------------------  IN: 585.2 mL / OUT: 445 mL / NET: 140.2 mL      Mode: AC/ CMV (Assist Control/ Continuous Mandatory Ventilation)  RR (machine): 14  TV (machine): 350  FiO2: 40  PEEP: 6  ITime: 1  MAP: 9  PIP: 18      LABS:                        8.2    19.27 )-----------( 97       ( 28 Nov 2022 05:15 )             25.4     11-28    137  |  106  |  21<H>  ----------------------------<  194<H>  3.6   |  21  |  1.2    Ca    6.7<L>      28 Nov 2022 05:15  Mg     2.0     11-28    TPro  4.0<L>  /  Alb  2.3<L>  /  TBili  0.5  /  DBili  x   /  AST  16  /  ALT  53<H>  /  AlkPhos  63  11-28    PT/INR - ( 28 Nov 2022 06:30 )   PT: 14.60 sec;   INR: 1.27 ratio         PTT - ( 28 Nov 2022 06:30 )  PTT:24.3 sec    CAPILLARY BLOOD GLUCOSE      POCT Blood Glucose.: 196 mg/dL (28 Nov 2022 11:37)  POCT Blood Glucose.: 187 mg/dL (28 Nov 2022 06:49)  POCT Blood Glucose.: 233 mg/dL (28 Nov 2022 01:31)  POCT Blood Glucose.: 225 mg/dL (27 Nov 2022 18:48)    ABG - ( 28 Nov 2022 02:41 )  pH, Arterial: 7.48  pH, Blood: x     /  pCO2: 24    /  pO2: 138   / HCO3: 18    / Base Excess: -4.8  /  SaO2: 100.0               RADIOLOGY & ADDITIONAL TESTS:    Consultant(s) Notes Reviewed:  [x ] YES  [ ] NO    MEDICATIONS  (STANDING):  chlorhexidine 0.12% Liquid 15 milliLiter(s) Oral Mucosa every 12 hours  chlorhexidine 2% Cloths 1 Application(s) Topical <User Schedule>  dexMEDEtomidine Infusion 0.3 MICROgram(s)/kG/Hr (3.59 mL/Hr) IV Continuous <Continuous>  dextrose 5% + sodium chloride 0.45%. 1000 milliLiter(s) (70 mL/Hr) IV Continuous <Continuous>  dextrose 5%. 1000 milliLiter(s) (50 mL/Hr) IV Continuous <Continuous>  dextrose 5%. 1000 milliLiter(s) (100 mL/Hr) IV Continuous <Continuous>  dextrose 50% Injectable 25 Gram(s) IV Push once  dextrose 50% Injectable 12.5 Gram(s) IV Push once  dextrose 50% Injectable 25 Gram(s) IV Push once  fentaNYL   Infusion. 0.5 MICROgram(s)/kG/Hr (3.15 mL/Hr) IV Continuous <Continuous>  furosemide   Injectable 20 milliGRAM(s) IV Push two times a day  glucagon  Injectable 1 milliGRAM(s) IntraMuscular once  norepinephrine Infusion 0.05 MICROgram(s)/kG/Min (4.49 mL/Hr) IV Continuous <Continuous>  pantoprazole Infusion 8 mG/Hr (10 mL/Hr) IV Continuous <Continuous>  potassium chloride  20 mEq/100 mL IVPB 20 milliEquivalent(s) IV Intermittent every 2 hours  vancomycin  IVPB 750 milliGRAM(s) IV Intermittent every 24 hours    MEDICATIONS  (PRN):  dextrose Oral Gel 15 Gram(s) Oral once PRN Blood Glucose LESS THAN 70 milliGRAM(s)/deciliter      PHYSICAL EXAM:  GENERAL: awake responds to verbal stimuli, mumbles, incomprehensive speech   CHEST/LUNG: B/L poor air entry; mild bilateral basilar crackles   HEART: S1S2 normal, no S3, Regular rate and rhythm; No murmurs  ABDOMEN: Soft, Nontender, Nondistended; Bowel sounds present  EXTREMITIES:  2+ edema.

## 2022-11-28 NOTE — PROGRESS NOTE ADULT - SUBJECTIVE AND OBJECTIVE BOX
Patient is a 89y old  Female who presents with a chief complaint of GI bleed (25 Nov 2022 10:36)        Over Night Events:    Intubated on the vent   Not on pressors         ROS:  See HPI    PHYSICAL EXAM    ICU Vital Signs Last 24 Hrs  T(C): 37.3 (28 Nov 2022 08:00), Max: 37.3 (28 Nov 2022 08:00)  T(F): 99.1 (28 Nov 2022 08:00), Max: 99.1 (28 Nov 2022 08:00)  HR: 60 (28 Nov 2022 08:18) (60 - 68)  BP: 86/54 (28 Nov 2022 08:00) (84/51 - 136/80)  BP(mean): 65 (28 Nov 2022 08:00) (63 - 106)  ABP: --  ABP(mean): --  RR: 20 (28 Nov 2022 08:00) (15 - 35)  SpO2: 97% (28 Nov 2022 08:18) (96% - 100%)    O2 Parameters below as of 28 Nov 2022 08:00  Patient On (Oxygen Delivery Method): ventilator    O2 Concentration (%): 40        General: intubated, on precedex, awake   HEENT: EDGARDO             Lymphatic system: No cervical LN   Lungs: Bilateral BS, equal   Cardiovascular: Regular   Gastrointestinal: Soft, Positive BS  Extremities: No clubbing.  Moves extremities.  Full Range of motion   Skin: Warm, intact  Neurological: No motor or sensory deficit       11-27-22 @ 07:01  -  11-28-22 @ 07:00  --------------------------------------------------------  IN:    Dexmedetomidine: 414 mL    dextrose 5% + sodium chloride 0.45%: 1680 mL    IV PiggyBack: 350 mL    Jevity 1.2: 320 mL    Pantoprazole: 240 mL  Total IN: 3004 mL    OUT:    FentaNYL: 0 mL    Indwelling Catheter - Urethral (mL): 420 mL    Norepinephrine: 0 mL  Total OUT: 420 mL    Total NET: 2584 mL      11-28-22 @ 07:01  -  11-28-22 @ 08:21  --------------------------------------------------------  IN:    Dexmedetomidine: 18 mL    dextrose 5% + sodium chloride 0.45%: 70 mL    Pantoprazole: 10 mL  Total IN: 98 mL    OUT:    FentaNYL: 0 mL    Indwelling Catheter - Urethral (mL): 100 mL    Norepinephrine: 0 mL  Total OUT: 100 mL    Total NET: -2 mL          LABS:                            8.2    19.27 )-----------( 97       ( 28 Nov 2022 05:15 )             25.4                                               11-28    137  |  106  |  21<H>  ----------------------------<  194<H>  3.6   |  21  |  1.2    Ca    6.7<L>      28 Nov 2022 05:15  Mg     2.0     11-28    TPro  4.0<L>  /  Alb  2.3<L>  /  TBili  0.5  /  DBili  x   /  AST  16  /  ALT  53<H>  /  AlkPhos  63  11-28      PT/INR - ( 28 Nov 2022 06:30 )   PT: 14.60 sec;   INR: 1.27 ratio         PTT - ( 28 Nov 2022 06:30 )  PTT:24.3 sec                                                                                     LIVER FUNCTIONS - ( 28 Nov 2022 05:15 )  Alb: 2.3 g/dL / Pro: 4.0 g/dL / ALK PHOS: 63 U/L / ALT: 53 U/L / AST: 16 U/L / GGT: x                                                                                               Mode: AC/ CMV (Assist Control/ Continuous Mandatory Ventilation)  RR (machine): 14  TV (machine): 350  FiO2: 40  PEEP: 6  ITime: 1  MAP: 9  PIP: 18                                      ABG - ( 28 Nov 2022 02:41 )  pH, Arterial: 7.48  pH, Blood: x     /  pCO2: 24    /  pO2: 138   / HCO3: 18    / Base Excess: -4.8  /  SaO2: 100.0               MEDICATIONS  (STANDING):  chlorhexidine 0.12% Liquid 15 milliLiter(s) Oral Mucosa every 12 hours  chlorhexidine 2% Cloths 1 Application(s) Topical <User Schedule>  dexMEDEtomidine Infusion 0.3 MICROgram(s)/kG/Hr (3.59 mL/Hr) IV Continuous <Continuous>  dextrose 5% + sodium chloride 0.45%. 1000 milliLiter(s) (70 mL/Hr) IV Continuous <Continuous>  dextrose 5%. 1000 milliLiter(s) (50 mL/Hr) IV Continuous <Continuous>  dextrose 5%. 1000 milliLiter(s) (100 mL/Hr) IV Continuous <Continuous>  dextrose 50% Injectable 25 Gram(s) IV Push once  dextrose 50% Injectable 12.5 Gram(s) IV Push once  dextrose 50% Injectable 25 Gram(s) IV Push once  fentaNYL   Infusion. 0.5 MICROgram(s)/kG/Hr (3.15 mL/Hr) IV Continuous <Continuous>  glucagon  Injectable 1 milliGRAM(s) IntraMuscular once  insulin lispro (ADMELOG) corrective regimen sliding scale   SubCutaneous every 6 hours  norepinephrine Infusion 0.05 MICROgram(s)/kG/Min (4.49 mL/Hr) IV Continuous <Continuous>  pantoprazole Infusion 8 mG/Hr (10 mL/Hr) IV Continuous <Continuous>  vancomycin  IVPB 750 milliGRAM(s) IV Intermittent every 24 hours    MEDICATIONS  (PRN):  dextrose Oral Gel 15 Gram(s) Oral once PRN Blood Glucose LESS THAN 70 milliGRAM(s)/deciliter

## 2022-11-28 NOTE — PROGRESS NOTE ADULT - ASSESSMENT
ASSESSMENT AND PLAN:  Impression:  GI bleed  diverticular + Duodenal ulcer   blood loss anemia   gastroduodenal bleed   metabolic acidosis   probable sepsis    Plan:  GI bleed Diverticular -Duodenal ulcer   blood loss anemia   s/p EGD at presentation showing 2 cm duodenal bulb ulcer along the anterior wall, ulcer is deep, cratered, with friable base and pigmentation, no active GI bleeding noted. 3 cc of epinephrine injected, 2 Endoclip were deployed to secure hemostasis.  2 clean based ulcer seen in the second part of duodenum, 5 mm each.  s/p colonoscopy with severe diverticulosis , procedure aborted secondary to poor prep( old blood through the examined part of colon) and diverticulosis.  --->GI recommendations appreciated: Given high risk lesion, positive extravasation in CT scan, and failed endoscopic intervention patient would benefit from embolization w IR.   11/28: spoke to IR they are not planing on current intervention because Hb is stable and no evident signs of GI bleeding.   Patient's son refused any intervention at first then on 11/26 he agreed on IR procedure and PPN  will Discuss GOC and extubation trials with the family this afternoon after they come to visit (did a phone call)   trend CBC, Transfuse to keep hemoglobin > 8   cw protonix drip   can refeed as per GI     Acute hypoxemic respiratory failure:  - given lasix 2 mg IV BID today   - monitor urine output       #UTI  Leukocytosis, Fever spike on 11.20.22  +UA for E. coli  -F/U Bacterial Cx (NGTD 11/17) - dc cefepime   - will FU cultures if negative will DC vancomycin       # New 8 mm calculus within the proximal pancreatic duct in the region of the pancreatic head with new diffuse pancreatic parenchymal atrophy and dilatation of the pancreatic duct to 1.1 cm   normal LFTs  - Further work up by MRI to rule out malignancy as outpatient if compatible with goals of care   - Follow up with GI MAP  - No further workup given current  age, clinical status and goals of cares     # GOC;  - palliative care on board  - spoke to the Son Ernesto and will discuss extubation trials tomorrow  - No IR intervention as per this time   -    DVT ppx: off AC d/t bleeding  GI PPX: Protonix drip, continue NG tube feeds   Activity: IAT    Lines: D/C delgadillo 11.20     ASSESSMENT AND PLAN:  Impression:  GI bleed  diverticular + Duodenal ulcer   blood loss anemia   gastroduodenal bleed   metabolic acidosis   probable sepsis    Plan:  GI bleed Diverticular -Duodenal ulcer   blood loss anemia   s/p EGD at presentation showing 2 cm duodenal bulb ulcer along the anterior wall, ulcer is deep, cratered, with friable base and pigmentation, no active GI bleeding noted. 3 cc of epinephrine injected, 2 Endoclip were deployed to secure hemostasis.  2 clean based ulcer seen in the second part of duodenum, 5 mm each.  s/p colonoscopy with severe diverticulosis , procedure aborted secondary to poor prep( old blood through the examined part of colon) and diverticulosis.  --->GI recommendations appreciated: Given high risk lesion, positive extravasation in CT scan, and failed endoscopic intervention patient would benefit from embolization w IR.   11/28: spoke to IR they are not planing on current intervention because Hb is stable and no evident signs of GI bleeding.   Patient's son refused any intervention at first then on 11/26 he agreed on IR procedure and PPN  spoke to son ernesto and Mr. Orozco the HCP signed the DNI form agreeing not to reintubate the patient if need. patient is extubated to BiPAP after passing SBT   trend CBC, Transfuse to keep hemoglobin > 8   cw protonix drip   can refeed as per GI     Acute hypoxemic respiratory failure:  - given lasix 2 mg IV BID today   - monitor urine output   - 11/28/22 extubated to BiPAP after passing SBT   - DNI form signed and son decided not to reintubate if needed       #UTI  Leukocytosis, Fever spike on 11.20.22  +UA for E. coli  -F/U Bacterial Cx (NGTD 11/17) - dc cefepime   - will FU cultures if negative will DC vancomycin       # New 8 mm calculus within the proximal pancreatic duct in the region of the pancreatic head with new diffuse pancreatic parenchymal atrophy and dilatation of the pancreatic duct to 1.1 cm   normal LFTs  - Further work up by MRI to rule out malignancy as outpatient if compatible with goals of care   - Follow up with GI MAP  - No further workup given current  age, clinical status and goals of cares     # GOC;  - palliative care on board  - spoke to the Son Ernesto and the decision is not reintubate, patient is successfully extubate on 11/28 after passing SBT  to BiPAP   - No IR intervention as per this time       DVT ppx: off AC d/t bleeding  GI PPX: Protonix drip, continue NG tube feeds   Activity: IAT    Lines: D/C delgadillo 11.20

## 2022-11-28 NOTE — GOALS OF CARE CONVERSATION - ADVANCED CARE PLANNING - CONVERSATION DETAILS
Had a conversation with the family regarding possible extubation of the patient. They were adamant about extubating today and to not reintubate if the pt were to fail extubation. It was explained the patient can possibly die if extubation failed. Family endorses understanding and is still wanting to extubate today, Attending was notified. Pt will undergo SBT with extubation to Bipap. DNR/DNI in place.

## 2022-11-28 NOTE — PROGRESS NOTE ADULT - ASSESSMENT
IMPRESSION:  GI bleed  diverticular + duodenal ulcer   blood loss anemia   gastroduodenal bleed   metabolic acidosis   probable sepsis    PLAN:    CNS: Daily SATs. RASS -1; -2. Continue with precedex.     HEENT: oral care     PULMONARY: CXR with worsening infiltrates and effusions; fluid balance positive. Lower Fio2 to 35%; PEEP to 5.     CARDIOVASCULAR: Lasix 20mg IV BID today.     GI: NPO for now. Possible IR intervention for the GI bleed.    RENAL: Correct lytes as needed. Costa.     INFECTIOUS Disease: IF repeat cx is negative then DC Vancomycin.     HEMATOLOGICAL:  DVT prophylaxi: SCDs.s.  Keep hgb more than 7.     ENDOCRINE:  Follow up FS.  DC insulin     MUSCULOSKELETAL: bedrest for now.     Palliative care follow up.     Keep in CCU for now.      IMPRESSION:    GI bleed  diverticular + duodenal ulcer   Blood loss anemia   Gastroduodenal bleed   Metabolic acidosis   Probable sepsis  Acute respiratory failure     PLAN:    CNS: Daily SATs. RASS -1; -2. Continue with precedex.     HEENT: oral care     PULMONARY: CXR with worsening infiltrates and effusions; fluid balance positive. Lower Fio2 to 35%; PEEP to 5. SBT if not plans for intervention.     CARDIOVASCULAR: Lasix 20mg IV BID today.     GI: NPO for now. Possible IR intervention for the GI bleed.     RENAL: Correct lytes as needed. Costa.     INFECTIOUS Disease: IF repeat cx is negative then DC Vancomycin.     HEMATOLOGICAL:  DVT prophylaxis: SCDs..  Keep hgb more than 7.     ENDOCRINE:  Follow up FS.  DC insulin because of hypoglycemia.     MUSCULOSKELETAL: bedrest for now.     Palliative care follow up. DNR.     Keep in CCU for now.

## 2022-11-28 NOTE — CHART NOTE - NSCHARTNOTEFT_GEN_A_CORE
PALLIATIVE MEDICINE INTERDISCIPLINARY TEAM NOTE    Provider:                                         [ x  ] Initial visit        Met with: [ x  ] Patient  [   ] Family  [   ] Other:    Primary Language: [ x  ] English [   ] Other*:                      *Interpretation provided by:    SUPPORT DIAGNOSES            (Check all that apply)    [   ] EOL issues  [  x ] Advanced Illness  [   ] Cultural / spiritual concerns  [ x  ] Pain / suffering  [   ] Dementia / AMS  [   ] Other:  [ x  ] AD issues  [   ] Grief / loss / sadness  [   ] Discharge issues  [ x  ] Distress / coping    PSYCHOSOCIAL ASSESSMENT OF PATIENT         (Check all that apply)    [ x  ] Initial Assessment            [   ] Reassessment          [   ] Not Applicable this visit    Pain/suffering acuity:  [ x  ] None to mild (0-3)           [   ] Moderate (4-6)        [   ] High (7-10)    Mental Status:  [   ] Alert/oriented (x3)          [   ] Confused/Altered(x2/x1)         [  x ] Non-resp: intubated     Functional status:  [   ] Independent w ADLs      [   ] Needs Assistance             [  x ] Bedbound/Full Care: currently     Coping:   [   ] Coping well                     [   ] Coping w/difficulty            [   ] Poor coping   [ x ] unable to assess     Support system:  [   ] Strong                              [   ] Adequate                        [   ] Inadequate      [ x ] unable to assess       Past history and medications for:     [ ] Anxiety       [ ] Depression    [ ] Sleep disorders       SERVICE PROVIDED  [   ]Discharge support / facilitation  [   ]AD / goals of care counseling                                  [   ]EOL / death / bereavement counseling  [   ]Counseling / support                                                [   ] Family meeting  [   ]Prayer / sacrament / ritual                                      [   ] Referral   [x   ]Other                                                                       NOTE and Plan of Care (PoC):    patient is a 89 y/o F with noted pmhx, presenting s/p unwitnessed fall . visited patient earlier today. patient presently intubated. no family at bedside at time of visit. will f/u with son . [phone]

## 2022-11-29 NOTE — PROGRESS NOTE ADULT - SUBJECTIVE AND OBJECTIVE BOX
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 Patient has advanced dementia and is a poor historian but is able to articulate pain and symptoms accurately per the son. unknown HT, unknown LOC, and she does not take anticoagulation. History was obtained from the patient, her son at the bedside, and medication reconcilation was done by calling [address] [phone], where she fills her medications per the son. Patient does not remember falling, as she has poor memory. She denies any current lightheadedness, fatigue, chest pain, SOB, nausea, vomiting, headache, back pain, abdominal pain, urinary sx. Of note, she does take celecoxib 100mg BID per pharmacist.     In the ED, she was found to have HGB 8, prior HGB was 12. Patient remained HD stable throughout ED stay. GI was consulted, and they plan to perform colonoscopy tomorrow. Cardiology cleared patient for colonoscopy procedure. She also has wbc 30 but has been afebrile. CTA abdomen/pelvis was negative for active bleeding or infectious etiology. She received 1 dose of 2g cefepime.  (17 Nov 2022 17:03)    Vitals: T(F): 97.9 (11-29-22 @ 10:00), Max: 98.9 (11-28-22 @ 20:00)  HR: 83 (11-29-22 @ 14:00) (60 - 88)  BP: 146/101 (11-29-22 @ 14:00) (90/51 - 161/83)  RR: 29 (11-29-22 @ 14:00) (16 - 31)  SpO2: 99% (11-29-22 @ 14:00) (97% - 100%)  Wt(kg): --    LABS:                        10.6   19.70 )-----------( 147      ( 29 Nov 2022 12:24 )             32.3     11-29    136  |  108  |  21<H>  ----------------------------<  147<H>  3.1<L>   |  19  |  1.2    Ca    6.4<L>      29 Nov 2022 05:10  Phos  1.8     11-29  Mg     1.6     11-29    TPro  3.8<L>  /  Alb  2.2<L>  /  TBili  0.4  /  DBili  x   /  AST  12  /  ALT  37  /  AlkPhos  62  11-29    PT/INR - ( 28 Nov 2022 06:30 )   PT: 14.60 sec;   INR: 1.27 ratio         PTT - ( 28 Nov 2022 06:30 )  PTT:24.3 sec      Impression: 89y Female admitted with GI BLEED; ANEMIA DUE TO ACUTE BLOOD LOSS; SEPSIS    GI BLEED; ANEMIA DUE TO ACUTE BLOOD LOSS; SEPSIS,,,,,,,,    ASSESSMENT/PLAN:   89F with phhx of Alzheimer's dementia, CHF, HTN , 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 EGD findings were notable for a 2 cm duodenal bulb ulcer. CTA was negative for active extravasation. IR was consulted to follow in case of need for emergent intervention for additional GI bleeding. Chart and imaging reviewed, negative CTA for active GI bleed. Possible angiogram was discussed with the family previously and was not on board. Patient had one bloody BM this morning and was transfused 1u.   - can proceed with angio/ GDA embolization tomorrow pending the following:  - consent from family  - NPO after midnight tonight  - repeat CBC, CMP, PT/INR - trend Hg: (target >7), correct K: (currently 3.1)  - no anticoagulation/antiplt currently being given, do not initiate   - COVID negative 11/28 - ok for procedure      Please call Interventional Radiology [phone] with any questions, concerns, or issues regarding above.

## 2022-11-29 NOTE — PROGRESS NOTE ADULT - SUBJECTIVE AND OBJECTIVE BOX
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 Patient has advanced dementia and is a poor historian but is able to articulate pain and symptoms accurately per the son. unknown HT, unknown LOC, and she does not take anticoagulation. History was obtained from the patient, her son at the bedside, and medication reconcilation was done by calling [address] [phone], where she fills her medications per the son. Patient does not remember falling, as she has poor memory. She denies any current lightheadedness, fatigue, chest pain, SOB, nausea, vomiting, headache, back pain, abdominal pain, urinary sx. Of note, she does take celecoxib 100mg BID per pharmacist. In the ED, she was found to have HGB 8, prior HGB was 12. Patient remained HD stable throughout ED stay. GI was consulted, and they plan to perform colonoscopy tomorrow. Cardiology cleared patient for colonoscopy procedure. She also has wbc 30 but has been afebrile. CTA abdomen/pelvis was negative for active bleeding or infectious etiology. She received 1 dose of 2g cefepime.        INTERVAL HPI/OVERNIGHT EVENTS:   No overnight events   Afebrile, hemodynamically stable , had 1 episode of BP 78/48 to which a repeat within 5 minutes had SBP back to 90s     ICU Vital Signs Last 24 Hrs  T(C): 36.6 (29 Nov 2022 10:00), Max: 37.2 (28 Nov 2022 20:00)  T(F): 97.9 (29 Nov 2022 10:00), Max: 98.9 (28 Nov 2022 20:00)  HR: 87 (29 Nov 2022 11:00) (60 - 87)  BP: 149/67 (29 Nov 2022 11:00) (78/48 - 149/67)  BP(mean): 101 (29 Nov 2022 11:00) (58 - 101)  ABP: --  ABP(mean): --  RR: 28 (29 Nov 2022 11:00) (16 - 30)  SpO2: 100% (29 Nov 2022 11:00) (94% - 100%)    O2 Parameters below as of 29 Nov 2022 10:00  Patient On (Oxygen Delivery Method): nasal cannula  O2 Flow (L/min): 3        I&O's Summary    28 Nov 2022 07:01  -  29 Nov 2022 07:00  --------------------------------------------------------  IN: 2177.1 mL / OUT: 2155 mL / NET: 22.1 mL    29 Nov 2022 07:01  -  29 Nov 2022 13:42  --------------------------------------------------------  IN: 571 mL / OUT: 275 mL / NET: 296 mL      Mode: CPAP with PS  FiO2: 40  PEEP: 6      LABS:                        10.6   19.70 )-----------( 147      ( 29 Nov 2022 12:24 )             32.3     11-29    136  |  108  |  21<H>  ----------------------------<  147<H>  3.1<L>   |  19  |  1.2    Ca    6.4<L>      29 Nov 2022 05:10  Phos  1.8     11-29  Mg     1.6     11-29    TPro  3.8<L>  /  Alb  2.2<L>  /  TBili  0.4  /  DBili  x   /  AST  12  /  ALT  37  /  AlkPhos  62  11-29    PT/INR - ( 28 Nov 2022 06:30 )   PT: 14.60 sec;   INR: 1.27 ratio         PTT - ( 28 Nov 2022 06:30 )  PTT:24.3 sec    CAPILLARY BLOOD GLUCOSE      POCT Blood Glucose.: 157 mg/dL (29 Nov 2022 11:33)  POCT Blood Glucose.: 140 mg/dL (29 Nov 2022 06:23)  POCT Blood Glucose.: 184 mg/dL (28 Nov 2022 22:09)  POCT Blood Glucose.: 228 mg/dL (28 Nov 2022 18:46)    ABG - ( 28 Nov 2022 02:41 )  pH, Arterial: 7.48  pH, Blood: x     /  pCO2: 24    /  pO2: 138   / HCO3: 18    / Base Excess: -4.8  /  SaO2: 100.0               RADIOLOGY & ADDITIONAL TESTS:    Consultant(s) Notes Reviewed:  [x ] YES  [ ] NO    MEDICATIONS  (STANDING):  chlorhexidine 2% Cloths 1 Application(s) Topical <User Schedule>  dextrose 5% + sodium chloride 0.45%. 1000 milliLiter(s) (70 mL/Hr) IV Continuous <Continuous>  dextrose 5%. 1000 milliLiter(s) (50 mL/Hr) IV Continuous <Continuous>  dextrose 5%. 1000 milliLiter(s) (100 mL/Hr) IV Continuous <Continuous>  dextrose 50% Injectable 25 Gram(s) IV Push once  dextrose 50% Injectable 12.5 Gram(s) IV Push once  dextrose 50% Injectable 25 Gram(s) IV Push once  fentaNYL   Infusion. 0.5 MICROgram(s)/kG/Hr (3.15 mL/Hr) IV Continuous <Continuous>  glucagon  Injectable 1 milliGRAM(s) IntraMuscular once  norepinephrine Infusion 0.05 MICROgram(s)/kG/Min (4.49 mL/Hr) IV Continuous <Continuous>  pantoprazole Infusion 8 mG/Hr (10 mL/Hr) IV Continuous <Continuous>    MEDICATIONS  (PRN):  dextrose Oral Gel 15 Gram(s) Oral once PRN Blood Glucose LESS THAN 70 milliGRAM(s)/deciliter      PHYSICAL EXAM:  GENERAL:   HEAD:  Atraumatic, Normocephalic, AOx2  NERVOUS SYSTEM:  Alert & Awake.   CHEST/LUNG: B/L good air entry; No rales, rhonchi, or wheezing  HEART: S1S2 normal, no S3, Regular rate and rhythm; No murmurs  ABDOMEN: Soft, Nontender, Nondistended; Bowel sounds present  EXTREMITIES:  1+ edema, 2+ Peripheral Pulses, No clubbing, cyanosis

## 2022-11-29 NOTE — PROGRESS NOTE ADULT - ASSESSMENT
ASSESSMENT AND PLAN:  Impression:  GI bleed  diverticular + Duodenal ulcer   blood loss anemia   gastroduodenal bleed   metabolic acidosis   probable sepsis    Plan:  GI bleed Diverticular -Duodenal ulcer   blood loss anemia   s/p EGD at presentation showing 2 cm duodenal bulb ulcer along the anterior wall, ulcer is deep, cratered, with friable base and pigmentation, no active GI bleeding noted. 3 cc of epinephrine injected, 2 Endoclip were deployed to secure hemostasis.  2 clean based ulcer seen in the second part of duodenum, 5 mm each.  s/p colonoscopy with severe diverticulosis , procedure aborted secondary to poor prep( old blood through the examined part of colon) and diverticulosis.  --->GI recommendations appreciated: Given high risk lesion, positive extravasation in CT scan, and failed endoscopic intervention patient would benefit from embolization w IR.   11/28: spoke to IR they are not planing on current intervention because Hb is stable and no evident signs of GI bleeding.   Patient's son refused any intervention at first then on 11/26 he agreed on IR procedure and PPN  spoke to son ernesto and Mr. Orozco the HCP signed the DNI form agreeing not to reintubate the patient if need. patient is extubated to BiPAP after passing SBT   trend CBC, Transfuse to keep hemoglobin > 8, cw protonix drip , can refeed as per GI   11/29: patient Hb 7.8 ordered 1 unit pRBC to keep Hb > 8, repeat Hb 10.6, patient had an episode of melena today, called IR for recommendations, awaiting input.     Acute hypoxemic respiratory failure:  11/28 given lasix 2 mg IV BID today, monitor urine output, extubated to BiPAP after passing SBT, DNI form signed and son decided not to reintubate if needed   - patient is on NC 3L, satting well,       #UTI  Leukocytosis, Fever spike on 11.20.22  +UA for E. coli  -F/U Bacterial Cx (NGTD 11/17) - dc cefepime   11/29:  cultures negative will DC vancomycin ,  DC delgadillo     # hypokalemia:  11/29 K+ 3.1, gave 2 riders 20meq IV of potassium     # New 8 mm calculus within the proximal pancreatic duct in the region of the pancreatic head with new diffuse pancreatic parenchymal atrophy and dilatation of the pancreatic duct to 1.1 cm   normal LFTs  - Further work up by MRI to rule out malignancy as outpatient if compatible with goals of care   - Follow up with GI MAP  - No further workup given current  age, clinical status and goals of cares     # GOC;  - palliative care on board  - spoke to the Son Ernesto and the decision is not reintubate, patient is successfully extubate on 11/28 after passing SBT  to BiPAP   - patient is on 3 L NC satting well.   - No IR intervention as per this time, awaiting further recommendation   - S&S consulted, FU recs   - PT consulted FU recs       DVT ppx: off AC d/t bleeding  GI PPX: Protonix drip, continue NG tube feeds   Activity: IAT    Lines: D/C delgadillo 11.20

## 2022-11-29 NOTE — CHART NOTE - NSCHARTNOTEFT_GEN_A_CORE
Palliative care consult completed, spoke to son. After speaking to CCU team yesterday plan to extubate pt medically. Son made pt DNR/DNI. Explained that palliative care team available to discuss comfort care if needed. According to CCU team comfort care did not need to be discussed at this time    - Palliative care team will sign off at this time. Pt is DNR/DNI ongoing medical care as per CCU team meeting w family  - Reconsult palliative care team as needed x [phone]

## 2022-11-29 NOTE — PROGRESS NOTE ADULT - SUBJECTIVE AND OBJECTIVE BOX
Patient is a 89y old  Female who presents with a chief complaint of GI bleed (25 Nov 2022 10:36)      Over Night Events:  Patient seen and examined.     ROS:  See HPI    PHYSICAL EXAM    ICU Vital Signs Last 24 Hrs  T(C): 36.4 (29 Nov 2022 08:00), Max: 37.2 (28 Nov 2022 20:00)  T(F): 97.5 (29 Nov 2022 08:00), Max: 98.9 (28 Nov 2022 20:00)  HR: 83 (29 Nov 2022 09:00) (60 - 86)  BP: 144/72 (29 Nov 2022 09:00) (78/48 - 144/72)  BP(mean): 94 (29 Nov 2022 09:00) (58 - 99)  ABP: --  ABP(mean): --  RR: 26 (29 Nov 2022 09:00) (16 - 30)  SpO2: 99% (29 Nov 2022 09:00) (94% - 100%)    O2 Parameters below as of 29 Nov 2022 09:00  Patient On (Oxygen Delivery Method): nasal cannula  O2 Flow (L/min): 5          General: not in distress, appears weak  HEENT:    neck supple             Lungs: Bilateral BS  Cardiovascular: Regular   Abdomen: Soft, Positive BS  Extremities: trace edema bilateral  Skin: warm   Neurological: AAO*3  Musculoskeletal: move all ext       I&O's Detail    28 Nov 2022 07:01  -  29 Nov 2022 07:00  --------------------------------------------------------  IN:    Dexmedetomidine: 157.1 mL    dextrose 5% + sodium chloride 0.45%: 1680 mL    IV PiggyBack: 100 mL    Pantoprazole: 240 mL  Total IN: 2177.1 mL    OUT:    FentaNYL: 0 mL    Indwelling Catheter - Urethral (mL): 2155 mL    Norepinephrine: 0 mL  Total OUT: 2155 mL    Total NET: 22.1 mL      29 Nov 2022 07:01  -  29 Nov 2022 09:50  --------------------------------------------------------  IN:  Total IN: 0 mL    OUT:    Indwelling Catheter - Urethral (mL): 200 mL  Total OUT: 200 mL    Total NET: -200 mL          LABS:                          7.8    16.79 )-----------( 129      ( 29 Nov 2022 05:10 )             24.5         29 Nov 2022 05:10    136    |  108    |  21     ----------------------------<  147    3.1     |  19     |  1.2      Ca    6.4        29 Nov 2022 05:10  Phos  1.8       29 Nov 2022 05:10  Mg     1.6       29 Nov 2022 05:10    TPro  3.8    /  Alb  2.2    /  TBili  0.4    /  DBili  x      /  AST  12     /  ALT  37     /  AlkPhos  62     29 Nov 2022 05:10  Amylase x     lipase x                                                 PT/INR - ( 28 Nov 2022 06:30 )   PT: 14.60 sec;   INR: 1.27 ratio         PTT - ( 28 Nov 2022 06:30 )  PTT:24.3 sec                                                                                                   Culture - Blood (collected 27 Nov 2022 12:25)  Source: .Blood Blood  Preliminary Report (28 Nov 2022 23:01):    No growth to date.                                                                                         ABG - ( 28 Nov 2022 02:41 )  pH, Arterial: 7.48  pH, Blood: x     /  pCO2: 24    /  pO2: 138   / HCO3: 18    / Base Excess: -4.8  /  SaO2: 100.0               MEDICATIONS  (STANDING):  chlorhexidine 2% Cloths 1 Application(s) Topical <User Schedule>  dexMEDEtomidine Infusion 0.3 MICROgram(s)/kG/Hr (3.59 mL/Hr) IV Continuous <Continuous>  dextrose 5% + sodium chloride 0.45%. 1000 milliLiter(s) (70 mL/Hr) IV Continuous <Continuous>  dextrose 5%. 1000 milliLiter(s) (50 mL/Hr) IV Continuous <Continuous>  dextrose 5%. 1000 milliLiter(s) (100 mL/Hr) IV Continuous <Continuous>  dextrose 50% Injectable 25 Gram(s) IV Push once  dextrose 50% Injectable 12.5 Gram(s) IV Push once  dextrose 50% Injectable 25 Gram(s) IV Push once  fentaNYL   Infusion. 0.5 MICROgram(s)/kG/Hr (3.15 mL/Hr) IV Continuous <Continuous>  glucagon  Injectable 1 milliGRAM(s) IntraMuscular once  norepinephrine Infusion 0.05 MICROgram(s)/kG/Min (4.49 mL/Hr) IV Continuous <Continuous>  pantoprazole Infusion 8 mG/Hr (10 mL/Hr) IV Continuous <Continuous>  potassium chloride  20 mEq/100 mL IVPB 20 milliEquivalent(s) IV Intermittent once    MEDICATIONS  (PRN):  dextrose Oral Gel 15 Gram(s) Oral once PRN Blood Glucose LESS THAN 70 milliGRAM(s)/deciliter                 Patient is a 89y old  Female who presents with a chief complaint of GI bleed (25 Nov 2022 10:36)      Over Night Events:  Patient seen and examined.   extubated yesterday, doing well on 3L oxygen  got 1u PRBC this AM    ROS:  See HPI    PHYSICAL EXAM    ICU Vital Signs Last 24 Hrs  T(C): 36.4 (29 Nov 2022 08:00), Max: 37.2 (28 Nov 2022 20:00)  T(F): 97.5 (29 Nov 2022 08:00), Max: 98.9 (28 Nov 2022 20:00)  HR: 83 (29 Nov 2022 09:00) (60 - 86)  BP: 144/72 (29 Nov 2022 09:00) (78/48 - 144/72)  BP(mean): 94 (29 Nov 2022 09:00) (58 - 99)  ABP: --  ABP(mean): --  RR: 26 (29 Nov 2022 09:00) (16 - 30)  SpO2: 99% (29 Nov 2022 09:00) (94% - 100%)    O2 Parameters below as of 29 Nov 2022 09:00  Patient On (Oxygen Delivery Method): nasal cannula  O2 Flow (L/min): 5          General: not in distress, appears weak  HEENT:    neck supple             Lungs: Bilateral BS  Cardiovascular: Regular   Abdomen: Soft, Positive BS  Extremities: trace edema bilateral  Skin: warm   Neurological: AAO*3  Musculoskeletal: move all ext       I&O's Detail    28 Nov 2022 07:01  -  29 Nov 2022 07:00  --------------------------------------------------------  IN:    Dexmedetomidine: 157.1 mL    dextrose 5% + sodium chloride 0.45%: 1680 mL    IV PiggyBack: 100 mL    Pantoprazole: 240 mL  Total IN: 2177.1 mL    OUT:    FentaNYL: 0 mL    Indwelling Catheter - Urethral (mL): 2155 mL    Norepinephrine: 0 mL  Total OUT: 2155 mL    Total NET: 22.1 mL      29 Nov 2022 07:01  -  29 Nov 2022 09:50  --------------------------------------------------------  IN:  Total IN: 0 mL    OUT:    Indwelling Catheter - Urethral (mL): 200 mL  Total OUT: 200 mL    Total NET: -200 mL          LABS:                          7.8    16.79 )-----------( 129      ( 29 Nov 2022 05:10 )             24.5         29 Nov 2022 05:10    136    |  108    |  21     ----------------------------<  147    3.1     |  19     |  1.2      Ca    6.4        29 Nov 2022 05:10  Phos  1.8       29 Nov 2022 05:10  Mg     1.6       29 Nov 2022 05:10    TPro  3.8    /  Alb  2.2    /  TBili  0.4    /  DBili  x      /  AST  12     /  ALT  37     /  AlkPhos  62     29 Nov 2022 05:10  Amylase x     lipase x                                                 PT/INR - ( 28 Nov 2022 06:30 )   PT: 14.60 sec;   INR: 1.27 ratio         PTT - ( 28 Nov 2022 06:30 )  PTT:24.3 sec                                                                                                   Culture - Blood (collected 27 Nov 2022 12:25)  Source: .Blood Blood  Preliminary Report (28 Nov 2022 23:01):    No growth to date.                                                                                         ABG - ( 28 Nov 2022 02:41 )  pH, Arterial: 7.48  pH, Blood: x     /  pCO2: 24    /  pO2: 138   / HCO3: 18    / Base Excess: -4.8  /  SaO2: 100.0               MEDICATIONS  (STANDING):  chlorhexidine 2% Cloths 1 Application(s) Topical <User Schedule>  dexMEDEtomidine Infusion 0.3 MICROgram(s)/kG/Hr (3.59 mL/Hr) IV Continuous <Continuous>  dextrose 5% + sodium chloride 0.45%. 1000 milliLiter(s) (70 mL/Hr) IV Continuous <Continuous>  dextrose 5%. 1000 milliLiter(s) (50 mL/Hr) IV Continuous <Continuous>  dextrose 5%. 1000 milliLiter(s) (100 mL/Hr) IV Continuous <Continuous>  dextrose 50% Injectable 25 Gram(s) IV Push once  dextrose 50% Injectable 12.5 Gram(s) IV Push once  dextrose 50% Injectable 25 Gram(s) IV Push once  fentaNYL   Infusion. 0.5 MICROgram(s)/kG/Hr (3.15 mL/Hr) IV Continuous <Continuous>  glucagon  Injectable 1 milliGRAM(s) IntraMuscular once  norepinephrine Infusion 0.05 MICROgram(s)/kG/Min (4.49 mL/Hr) IV Continuous <Continuous>  pantoprazole Infusion 8 mG/Hr (10 mL/Hr) IV Continuous <Continuous>  potassium chloride  20 mEq/100 mL IVPB 20 milliEquivalent(s) IV Intermittent once    MEDICATIONS  (PRN):  dextrose Oral Gel 15 Gram(s) Oral once PRN Blood Glucose LESS THAN 70 milliGRAM(s)/deciliter

## 2022-11-29 NOTE — CHART NOTE - NSCHARTNOTEFT_GEN_A_CORE
Registered Dietitian Follow-Up     Patient Profile Reviewed                           Yes [x]   No []     Nutrition History Previously Obtained        Yes []  No [x]       Pertinent Subjective Information: Per RN, plan to extubate today; off of sedation. Will need SLP eval to determine appropriate diet texture for po intake if within GOC. RN will continue to monitor for GI bleed. RN states pt had no BM within her care; last BM on  - 2x per flowsheet.      Pertinent Medical Information: Pt brought to the ED after being found down by her health aide after an unwitnessed fall, covered with maroon colored stool. s/p EGD & colonoscopy. GI bleed noted - diverticular + duodenal ulcer noted. Blood loss anemia noted. Hypokalemia - s/p 2 riders 20meq IV of potassium today. Acute hypoxemic respiratory failure s/p intubation, now extubated to BiPAP as of .    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     Diet order:   Jevity 1.2 goal rate 25 mL/hr with 102 mL free water flush q6hrs. Regimen at goal to provide 720 kcal, 33 g protein, 894 mL free H2O. Tube feeds held at this time s/p extubation; not receiving nutrition support at this time. Currently with no feeding access; awaiting SLP eval.    Anthropometrics:  - Ht: 157.5 cm  - Dosing Wt: 47.9 KG ()  - BMI: 19.3   - IBW: 50 kg    Daily Weight in k (-29), Weight in k (-28), Weight in k (-), Weight in k.9 (-26), Weight in k.2 (-24), Weight in k.5 (-21), Weight in k (-20), Weight in k (11-19)    MEDICATIONS  (STANDING):  chlorhexidine 2% Cloths 1 Application(s) Topical <User Schedule>  dextrose 5% + sodium chloride 0.45%. 1000 milliLiter(s) (70 mL/Hr) IV Continuous <Continuous>  dextrose 5%. 1000 milliLiter(s) (50 mL/Hr) IV Continuous <Continuous>  dextrose 5%. 1000 milliLiter(s) (100 mL/Hr) IV Continuous <Continuous>  dextrose 50% Injectable 25 Gram(s) IV Push once  dextrose 50% Injectable 12.5 Gram(s) IV Push once  dextrose 50% Injectable 25 Gram(s) IV Push once  fentaNYL   Infusion. 0.5 MICROgram(s)/kG/Hr (3.15 mL/Hr) IV Continuous <Continuous>  glucagon  Injectable 1 milliGRAM(s) IntraMuscular once  norepinephrine Infusion 0.05 MICROgram(s)/kG/Min (4.49 mL/Hr) IV Continuous <Continuous>  pantoprazole Infusion 8 mG/Hr (10 mL/Hr) IV Continuous <Continuous>    MEDICATIONS  (PRN):  dextrose Oral Gel 15 Gram(s) Oral once PRN Blood Glucose LESS THAN 70 milliGRAM(s)/deciliter    Pertinent Labs:  @ 05:10: Na 136, BUN 21<H>, Cr 1.2, <H>, K+ 3.1<L>, Phos 1.8<L>, Mg 1.6<L>, Alk Phos 62, ALT/SGPT 37, AST/SGOT 12    Finger Sticks:  POCT Blood Glucose.: 157 mg/dL ( @ 11:33)  POCT Blood Glucose.: 140 mg/dL ( @ 06:23)  POCT Blood Glucose.: 184 mg/dL ( @ 22:09)     Physical Findings:  - Appearance: 1+ edema (generalized)  - GI function: Noted to have emesis . Last BM ; 2x melena noted today.  - Tubes: no feeding tube  - Oral/Mouth cavity: NPO, SLP consulted today to evaluate swallow function post-extubation. Will follow-up on SLP recommendations regarding diet advance.  - Skin: ecchymosis, otherwise intact; no pressure injuries noted      Nutrition Requirements  Weight Used: ABW 47.9 KG -- with consideration for crit care, age    Estimated Energy Needs    Continue []  Adjust [x]  ENERGY: 844-929 kcal/day (MSJ 1-1.1 SF) vs 958-1197 kcal/day (20-25 kcal/kg)     Estimated Protein Needs    Continue [x]  Adjust []  PROTEIN: 57-72 g/day (1.2-1.5 g/kg)     Estimated Fluid Needs        Continue [x]  Adjust []  FLUID: 1198 mL/day (25 mL/kg)     Nutrient Intake: Currently NPO     [x] Previous Nutrition Diagnosis: Inadequate Energy Intake             [x] Ongoing          [] Resolved    [] No active nutrition diagnosis identified at this time     Nutrition Intervention: meals, coordination of care     Goal/Expected Outcome: pt to meet at least 51-75% of estimated energy needs within 4 days     Indicator/Monitoring: diet order, PO intake, weights, labs, NFPF, body composition, BM, GOC, SLP recs    Recommendations:  1. Will monitor GOC and SLP recs closely  2. If within GOC and SLP recommends PO intake, add DASH/TLC + low fiber modulars to diet order  3. If within GOC and SLP recommends NPO with alternate means of nutrition/hydration, recommend TF regimen below:  Jevity 1.2 Chevy  Total Volume: 984mL  Continuous feeds starting at 25mL q24hrs  Goal rate 41mL q24hrs  -- to provide 1180.8 kcal/day, 54.6g pro/day, 794 mL free water  Additional water flushes: 102 q6hrs -- provides 1202 mL total water with additional flushes    Pt assessed to be at high nutrition risk. Will f/u in 4 days.     Registered Dietitian Follow-Up     Patient Profile Reviewed                           Yes [x]   No []     Nutrition History Previously Obtained        Yes []  No []       Pertinent Subjective Information:     Pertinent Medical Interventions:     Diet order:     Anthropometrics:    Weight (kg): 47.9 (11-27-22 @ 08:00)  IBW:     Daily Weight in k (11-29), Weight in k (11-28), Weight in k (11-27), Weight in k.9 (11-26), Weight in k.2 (11-24)  % Weight Change            Physical Findings:  - Appearance:  - GI function:  - Tubes:  - Oral/Mouth cavity:  - Skin:     Nutrition Requirements:  Weight Used:     Estimated Energy Needs    Continue []  Adjust []  Adjusted Energy Recommendations:   kcal/day        Estimated Protein Needs    Continue []  Adjust []  Adjusted Protein Recommendations:   gm/day        Estimated Fluid Needs        Continue []  Adjust []  Adjusted Fluid Recommendations:   mL/day     Nutrient Intake:     [] Previous Nutrition Diagnosis:            [] Ongoing          [] Resolved    [] No active nutrition diagnosis identified at this time     Nutrition Intervention      Goal/Expected Outcome:      Indicator/Monitoring:      Recommendation: Registered Dietitian Follow-Up     Patient Profile Reviewed                           Yes [x]   No []     Nutrition History Previously Obtained        Yes []  No [x]       Pertinent Subjective Information: Unable to obtain nutrition hx; pt disoriented at time of RD visit and unable to answer questions. No family at bedside during RD visit & no response from contact phone numbers listed in chart. Attempted to perform nutrition focused physical exam however pt was non-compliant at time of RD visit. Will attempt to obtain nutrition hx at follow-up. Nutrition education deferred at this time.    Pertinent Medical Information: Pt brought to the ED after being found down by her health aide after an unwitnessed fall, covered with maroon colored stool. s/p EGD & colonoscopy. GI bleed noted - diverticular + duodenal ulcer noted. Blood loss anemia noted. Hypokalemia - s/p 2 riders 20meq IV of potassium today. Acute hypoxemic respiratory failure s/p intubation, now extubated to BiPAP as of .    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     Diet order:   Jevity 1.2 goal rate 25 mL/hr with 102 mL free water flush q6hrs. Regimen at goal to provide 720 kcal, 33 g protein, 894 mL free H2O. Tube feeds held at this time s/p extubation; not receiving nutrition support at this time. Currently with no feeding access; awaiting SLP eval.    Anthropometrics:  - Ht: 157.5 cm  - Dosing Wt: 47.9 KG ()  - BMI: 19.3   - IBW: 50 kg    Daily Weight in k (-29), Weight in k (-28), Weight in k (-), Weight in k.9 (-26), Weight in k.2 (11-24), Weight in k.5 (11-21), Weight in k (11-20), Weight in k (11-19)    ? accuracy of severe wt gain over course of admission. Wt change may be r/t fluid shifts. Edema noted at this time, however currently noted as 1+. RD to continue to monitor.    Reviewed previous admit wt records:  46.3 kg 7/3/2018  45.4 kg 2018  45.5 kg 3/22/2018  45.4 kg 2018    Dosing wt 47.9 kg & initial daily wt 50 kg () appear consistent with previous admit wt records, however those wts were from >3 years ago. Will continue to assess nutrient needs using dosing wt and will continue to monitor wt trends.    MEDICATIONS  (STANDING):  chlorhexidine 2% Cloths 1 Application(s) Topical <User Schedule>  dextrose 5% + sodium chloride 0.45%. 1000 milliLiter(s) (70 mL/Hr) IV Continuous <Continuous>  dextrose 5%. 1000 milliLiter(s) (50 mL/Hr) IV Continuous <Continuous>  dextrose 5%. 1000 milliLiter(s) (100 mL/Hr) IV Continuous <Continuous>  dextrose 50% Injectable 25 Gram(s) IV Push once  dextrose 50% Injectable 12.5 Gram(s) IV Push once  dextrose 50% Injectable 25 Gram(s) IV Push once  fentaNYL   Infusion. 0.5 MICROgram(s)/kG/Hr (3.15 mL/Hr) IV Continuous <Continuous>  glucagon  Injectable 1 milliGRAM(s) IntraMuscular once  norepinephrine Infusion 0.05 MICROgram(s)/kG/Min (4.49 mL/Hr) IV Continuous <Continuous>  pantoprazole Infusion 8 mG/Hr (10 mL/Hr) IV Continuous <Continuous>    MEDICATIONS  (PRN):  dextrose Oral Gel 15 Gram(s) Oral once PRN Blood Glucose LESS THAN 70 milliGRAM(s)/deciliter    Pertinent Labs:  @ 05:10: Na 136, BUN 21<H>, Cr 1.2, <H>, K+ 3.1<L>, Phos 1.8<L>, Mg 1.6<L>, Alk Phos 62, ALT/SGPT 37, AST/SGOT 12    Finger Sticks:  POCT Blood Glucose.: 157 mg/dL ( @ 11:33)  POCT Blood Glucose.: 140 mg/dL ( @ 06:23)  POCT Blood Glucose.: 184 mg/dL ( @ 22:09)     Physical Findings:  - Appearance: 1+ edema (generalized)  - GI function: Noted to have emesis . Last BM ; 2x melena noted today.  - Tubes: no feeding tube  - Oral/Mouth cavity: NPO, SLP consulted today to evaluate swallow function post-extubation. Will follow-up on SLP recommendations regarding diet advance.  - Skin: ecchymosis, otherwise intact; no pressure injuries noted      Nutrition Requirements  Weight Used: ABW 47.9 KG    Estimated Energy Needs    Continue []  Adjust [x]  ENERGY: 929-998 kcal/day (MSJ 1.2-1.3 stress factor)     Estimated Protein Needs    Continue [x]  Adjust []  PROTEIN: 57-72 g/day (1.2-1.5 g/kg)     Estimated Fluid Needs        Continue [x]  Adjust []  FLUID: 958-1198 mL/day (20-25 mL/kg)     Nutrient Intake: Currently NPO with no access for nutrition support. Not meeting estimated nutrient needs. SLP eval ordered; will monitor plan of care following SLP eval.     [x] Previous Nutrition Diagnosis: Inadequate Energy Intake             [x] Ongoing          [] Resolved    Nutrition Intervention: meals & snacks vs enteral nutrition, coordination of care     Goal/Expected Outcome: Diet advanced as medically feasible; pt to demonstrate tolerance, meeting >85% of estimated nutrient needs over next 4 days. Pt at high nutrition risk, RD to follow-up in 2-4 days.     Indicator/Monitoring: diet order, swallow function, mental status, nutrition hx, nutrition focused physical findings, body composition, wt, labs, skin, BM, GI.    Recommendations:  (1) RD to monitor plan of care following SLP eval. If approved to advance to po diet, diet order texture/consistency per SLP. RD to monitor tolerance & need to add additional dietary restrictions (e.g. DASH/TLC diet). Would avoid adding dietary restrictions initially upon diet advance to monitor tolerance given recent extubation.  (2) If unable to advance to po diet & if enteral nutrition to be initiated, provide Peptamen AF continuous feeds initiated at 10 mL/hr. If tolerated at this rate, increase by 10 mL q6hrs as tolerated to goal rate 35 mL/hr. Provide 100 mL flushes q6hrs. Regimen at goal to provide 1008 kcal, 63 g protein, 1080 mL free H2O. Peptamen AF chosen as this formula is a "specialized enteral nutrition formula designed to support absorption and tolerance in individuals with a gastrointestinal disorder".  (3) If pt to remain NPO and unable to initiate enteral nutrition, please consult nutrition support team to evaluate.  (4) Obtain new tared bedscale weight.

## 2022-11-29 NOTE — PROGRESS NOTE ADULT - ASSESSMENT
IMPRESSION:    GI bleed  diverticular + duodenal ulcer   Blood loss anemia   Gastroduodenal bleed   Metabolic acidosis   Probable sepsis  Acute respiratory failure     PLAN:    CNS:  dc precedex, mental status improving    HEENT: oral care     PULMONARY: Incentive spirometry, wean oxygen    CARDIOVASCULAR: MAP adequate, keep equal balance    GI: speech and swallow, no intervention per GI    RENAL: Correct lytes as needed. dc delgadillo    INFECTIOUS Disease: off vancomycin, completed 5 day course of cefepime    HEMATOLOGICAL:  DVT prophylaxis: SCDs.  Keep hgb more than 7.     ENDOCRINE:  Follow up FS.      MUSCULOSKELETAL: activity increase as tolerated    Palliative care follow up. DNR.     keep in CCU    if can get 2 access DC TLC IMPRESSION:    GI bleed  diverticular + duodenal ulcer   Blood loss anemia   Gastroduodenal bleed   Metabolic acidosis   Probable sepsis  Acute respiratory failure     PLAN:    CNS:  dc precedex, mental status improving    HEENT: oral care     PULMONARY: Incentive spirometry, wean oxygen    CARDIOVASCULAR: MAP adequate, keep equal balance    GI: speech and swallow, no intervention per GI, endoscopy when able    RENAL: Correct lytes as needed. dc delgadillo    INFECTIOUS Disease: off vancomycin, completed 5 day course of cefepime    HEMATOLOGICAL:  DVT prophylaxis: SCDs.  Keep hgb more than 7.     ENDOCRINE:  Follow up FS.      MUSCULOSKELETAL: activity increase as tolerated    Palliative care follow up. DNR.     keep in ICU    if can get 2 access DC TLC

## 2022-11-30 NOTE — PROGRESS NOTE ADULT - ASSESSMENT
ASSESSMENT AND PLAN:  Impression:  GI bleed  diverticular + Duodenal ulcer   blood loss anemia   gastroduodenal bleed   metabolic acidosis   probable sepsis    Plan:  GI bleed Diverticular -Duodenal ulcer   blood loss anemia   s/p EGD at presentation showing 2 cm duodenal bulb ulcer along the anterior wall, ulcer is deep, cratered, with friable base and pigmentation, no active GI bleeding noted. 3 cc of epinephrine injected, 2 Endoclip were deployed to secure hemostasis.  2 clean based ulcer seen in the second part of duodenum, 5 mm each.  s/p colonoscopy with severe diverticulosis , procedure aborted secondary to poor prep( old blood through the examined part of colon) and diverticulosis.  --->GI recommendations appreciated: Given high risk lesion, positive extravasation in CT scan, and failed endoscopic intervention patient would benefit from embolization w IR.   11/28: spoke to IR they are not planing on current intervention because Hb is stable and no evident signs of GI bleeding.   Patient's son refused any intervention at first then on 11/26 he agreed on IR procedure and PPN  spoke to son ernesto and Mr. Orozco the HCP signed the DNI form agreeing not to reintubate the patient if need. patient is extubated to BiPAP after passing SBT   trend CBC, Transfuse to keep hemoglobin > 8, cw protonix drip , can refeed as per GI   11/29: patient Hb 7.8 ordered 1 unit pRBC to keep Hb > 8, repeat Hb 10.6, patient had an episode of melena today, called IR for recommendations, awaiting input.   11/30: IR decided that they do the embolization procedure for the patient, and the patient had to be intubated for the procedure. the DNI was reverted as per the discussion of IR with the Family.   >>>> FU after IR procedure     Acute hypoxemic respiratory failure:  11/28 given lasix 2 mg IV BID today, monitor urine output, extubated to BiPAP after passing SBT, DNI form signed and son decided not to reintubate if needed   11/30 patient is on NC 2L, satting well, AOx2,       #UTI  Leukocytosis, Fever spike on 11.20.22  +UA for E. coli  -F/U Bacterial Cx (NGTD 11/17) - dc cefepime   11/29:  cultures negative will DC vancomycin ,  DC delgadillo   11/30 patiet off antibiotics     # hypokalemia:  11/29 K+ 3.1, gave 2 riders 20meq IV of potassium   11/30 resolved     # New 8 mm calculus within the proximal pancreatic duct in the region of the pancreatic head with new diffuse pancreatic parenchymal atrophy and dilatation of the pancreatic duct to 1.1 cm   normal LFTs  - Further work up by MRI to rule out malignancy as outpatient if compatible with goals of care   - Follow up with GI MAP  - No further workup given current  age, clinical status and goals of cares   - FU OP     # GOC;  - palliative care on board  - spoke to the Son Ernesto and the decision is not reintubate, patient is successfully extubate on 11/28 after passing SBT  to BiPAP   - patient is on 2 L NC satting well.   - Patient went for IR procedure for embolization for bleeding   - S&S consulted, FU recs   - PT consulted FU recs     DVT ppx: off AC d/t bleeding  GI PPX: Protonix drip, oral feeding, FU speech and Swallow eval   Activity: IAT    Lines: D/C delgadillo 11.20

## 2022-11-30 NOTE — PROGRESS NOTE ADULT - SUBJECTIVE AND OBJECTIVE BOX
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 Patient has advanced dementia and is a poor historian but is able to articulate pain and symptoms accurately per the son. unknown HT, unknown LOC, and she does not take anticoagulation. History was obtained from the patient, her son at the bedside, and medication reconcilation was done by calling [address] [phone], where she fills her medications per the son. Patient does not remember falling, as she has poor memory. She denies any current lightheadedness, fatigue, chest pain, SOB, nausea, vomiting, headache, back pain, abdominal pain, urinary sx. Of note, she does take celecoxib 100mg BID per pharmacist.   In the ED, she was found to have HGB 8, prior HGB was 12. Patient remained HD stable throughout ED stay. GI was consulted, and they plan to perform colonoscopy tomorrow. Cardiology cleared patient for colonoscopy procedure. She also has wbc 30 but has been afebrile. CTA abdomen/pelvis was negative for active bleeding or infectious etiology. She received 1 dose of 2g cefepime.  (17 Nov 2022 17:03)      INTERVAL HPI/OVERNIGHT EVENTS:   No overnight events   Afebrile, hemodynamically stable     ICU Vital Signs Last 24 Hrs  T(C): 36.8 (30 Nov 2022 08:04), Max: 36.9 (30 Nov 2022 00:00)  T(F): 98 (30 Nov 2022 07:40), Max: 98.5 (30 Nov 2022 00:00)  HR: 101 (30 Nov 2022 08:04) (83 - 105)  BP: 136/89 (30 Nov 2022 08:04) (124/79 - 183/97)  BP(mean): 104 (30 Nov 2022 08:04) (95 - 124)  ABP: --  ABP(mean): --  RR: 35 (30 Nov 2022 08:04) (18 - 44)  SpO2: 97% (30 Nov 2022 08:04) (93% - 100%)    O2 Parameters below as of 30 Nov 2022 08:04    O2 Flow (L/min): 2  O2 Concentration (%): 35      I&O's Summary    29 Nov 2022 07:01  -  30 Nov 2022 07:00  --------------------------------------------------------  IN: 2151 mL / OUT: 915 mL / NET: 1236 mL    30 Nov 2022 07:01  -  30 Nov 2022 11:50  --------------------------------------------------------  IN: 80 mL / OUT: 15 mL / NET: 65 mL          LABS:                        10.6   24.38 )-----------( 170      ( 30 Nov 2022 05:35 )             32.0     11-30    135  |  105  |  20  ----------------------------<  166<H>  4.0   |  18  |  1.1    Ca    6.9<L>      30 Nov 2022 05:35  Phos  2.3     11-30  Mg     2.1     11-30    TPro  4.4<L>  /  Alb  2.4<L>  /  TBili  0.7  /  DBili  x   /  AST  18  /  ALT  33  /  AlkPhos  84  11-30    PT/INR - ( 29 Nov 2022 22:32 )   PT: 13.00 sec;   INR: 1.14 ratio         PTT - ( 29 Nov 2022 22:32 )  PTT:24.9 sec    CAPILLARY BLOOD GLUCOSE      POCT Blood Glucose.: 174 mg/dL (30 Nov 2022 06:15)        RADIOLOGY & ADDITIONAL TESTS:    Consultant(s) Notes Reviewed:  [x ] YES  [ ] NO    MEDICATIONS  (STANDING):  chlorhexidine 2% Cloths 1 Application(s) Topical <User Schedule>  dextrose 5% + sodium chloride 0.45%. 1000 milliLiter(s) (70 mL/Hr) IV Continuous <Continuous>  dextrose 5%. 1000 milliLiter(s) (50 mL/Hr) IV Continuous <Continuous>  dextrose 5%. 1000 milliLiter(s) (100 mL/Hr) IV Continuous <Continuous>  dextrose 50% Injectable 25 Gram(s) IV Push once  dextrose 50% Injectable 12.5 Gram(s) IV Push once  dextrose 50% Injectable 25 Gram(s) IV Push once  fentaNYL   Infusion. 0.5 MICROgram(s)/kG/Hr (3.15 mL/Hr) IV Continuous <Continuous>  glucagon  Injectable 1 milliGRAM(s) IntraMuscular once  norepinephrine Infusion 0.05 MICROgram(s)/kG/Min (4.49 mL/Hr) IV Continuous <Continuous>  pantoprazole Infusion 8 mG/Hr (10 mL/Hr) IV Continuous <Continuous>    MEDICATIONS  (PRN):  dextrose Oral Gel 15 Gram(s) Oral once PRN Blood Glucose LESS THAN 70 milliGRAM(s)/deciliter      PHYSICAL EXAM:  GENERAL:   HEAD:  Atraumatic, Normocephalic, AOx2  NERVOUS SYSTEM:  Alert & Awake.   CHEST/LUNG: B/L good air entry; No rales, rhonchi, or wheezing  HEART: S1S2 normal, no S3, Regular rate and rhythm; No murmurs  ABDOMEN: Soft, Nontender, Nondistended; Bowel sounds present  EXTREMITIES:  2+ edema, 2+ Peripheral Pulses, No clubbing, cyanosis

## 2022-11-30 NOTE — PROGRESS NOTE ADULT - ATTENDING COMMENTS
I edited the note
I edited the note.   Time-based billing (NON-critical care).   35 minutes spent on total encounter; more than 50% of the visit was spent counseling and / or coordinating care by the attending physician.  The necessity of the time spent during the encounter on this date of service was due to: Coordination of care.
I edited the note
Ms Hartley was seen & examined in the ICU today after her embolization procedure.  She remains intubated and on low-dose vasopressor s/p embolization.  The patient's family rescinded her DNR/DNI for the procedure today.  At this time we will aim for extubation in the AM, and continue to monitor for further bleeding.  Wean ventilator settings including FiO2 tonight as tolerated.  SBT in AM.  I agree with the resident note, with the exceptions listed in my attestation above.  The remainder of impression and plan per resident note.
I edited the note
Ms Hartley was seen & examined today, extubated yesterday, no further bleeding.  She remains at elevated risk of rebleeding with a high pre-endoscopy risk of mortality with Rockall score of 6 (~48% preendoscopy mortality).  We will keep her in the ICU for intensive monitoring given this risk and the need for likely frequent reassessments.  Maintain 2x large bore IV access, PPI BID, advance to clears when clear by SLP, maintain active type & screen, transfuse to maintain Hgb > 7.  Check CBC every 12 hours.  Resuscitation with crystalloid fluid as needed.  I agree with the fellow note, with the exceptions listed in my attestation above.  The remainder of impression and plan per fellow note.

## 2022-11-30 NOTE — PRE-ANESTHESIA EVALUATION ADULT - NSANTHVITALSIGNSFT_GEN_ALL_CORE
Pt sleepy, not oriented. HR 90/min ( PMR), /110, RA 36-40/min. SaO2 94% on O2 4l/min. ( Ra SaO2 86-88% )

## 2022-11-30 NOTE — PRE-ANESTHESIA EVALUATION ADULT - NSANTHPMHFT_GEN_ALL_CORE
Chart reviewed, Med/ GI/ CCM/ Pulmonary/ IR evaluation seen. Labs, EKG, ECHO report seen.  GI bleed with low H/H on admission, S/P Code fusion and Intubated and extubated on 11/28.

## 2022-11-30 NOTE — PROGRESS NOTE ADULT - SUBJECTIVE AND OBJECTIVE BOX
INTERVENTIONAL RADIOLOGY BRIEF-OPERATIVE NOTE    Procedure: Mesenteric angiogram with embolization.    Pre-Op Diagnosis: Upper GI bleed    Post-Op Diagnosis: Same    Attending: Jesus  Resident: Saran    Anesthesia (type):  [x] General Anesthesia - as provided by anesthesiologist. Please see records for details.   [ ] Sedation  [ ] Spinal Anesthesia  [x] Local/Regional    Contrast: 55 cc    Estimated Blood Loss: Minimal, < 20 cc    Condition:   [ ] Critical  [x] Serious  [ ] Fair   [ ] Good    Findings/Follow up Plan of Care: Mesenteric angiogram performed without definite evidence of contrast extravasation in the celiac artery and it's major branches or of the SMA. Prophylactic coil embolization of the GDA performed successfully. Post embolization angiogram demonstrating successful embolization of the GDA. SafeGuard pressure assisted device placed at site of right femoral access.     Specimens Removed: none.     Implants: embolization coils.     Complications: none immediate.     Disposition: No evidence to suggest active arterial hemorrhage arising from the major branches of the celiac or SMA. Successful prophylactic embolization of the GDA.   - Keep leg straight for 24 hours.   - Keep Safeguard pressure assisted device until approximately 9:00 PM tonight. Pressure bandage can be applied as need be if there is oozing from access site.   - Neurovascular checks q15 mins for 1 hour, 30 mins for 2 hours and per unit after.     Spoke with pt's son post-procedure and answered all questions.     Please call Interventional Radiology [phone] with any questions, concerns, or issues.

## 2022-12-01 NOTE — PROGRESS NOTE ADULT - SUBJECTIVE AND OBJECTIVE BOX
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 Patient has advanced dementia and is a poor historian but is able to articulate pain and symptoms accurately per the son. unknown HT, unknown LOC, and she does not take anticoagulation. History was obtained from the patient, her son at the bedside, and medication reconcilation was done by calling [address] [phone], where she fills her medications per the son. Patient does not remember falling, as she has poor memory. She denies any current lightheadedness, fatigue, chest pain, SOB, nausea, vomiting, headache, back pain, abdominal pain, urinary sx. Of note, she does take celecoxib 100mg BID per pharmacist.   In the ED, she was found to have HGB 8, prior HGB was 12. Patient remained HD stable throughout ED stay. GI was consulted, and they plan to perform colonoscopy tomorrow. Cardiology cleared patient for colonoscopy procedure. She also has wbc 30 but has been afebrile. CTA abdomen/pelvis was negative for active bleeding or infectious etiology. She received 1 dose of 2g cefepime.  (17 Nov 2022 17:03)    INTERVAL HPI/OVERNIGHT EVENTS:   patient had a very acidotic pH of 7.11, she was given bicarb drip and increased RR   hypotensive on pressors      ICU Vital Signs Last 24 Hrs  T(C): 36.3 (01 Dec 2022 12:00), Max: 37.3 (01 Dec 2022 08:36)  T(F): 97.4 (01 Dec 2022 12:00), Max: 99.1 (01 Dec 2022 08:36)  HR: 97 (01 Dec 2022 13:07) (90 - 118)  BP: 139/98 (01 Dec 2022 08:18) (77/45 - 139/98)  BP(mean): 107 (01 Dec 2022 08:18) (42 - 107)  ABP: 108/67 (01 Dec 2022 13:07) (65/45 - 129/83)  ABP(mean): 83 (01 Dec 2022 13:07) (54 - 115)  RR: 25 (01 Dec 2022 13:07) (8 - 31)  SpO2: 96% (01 Dec 2022 13:07) (89% - 100%)    O2 Parameters below as of 01 Dec 2022 12:00  Patient On (Oxygen Delivery Method): ventilator    O2 Concentration (%): 40      I&O's Summary    30 Nov 2022 07:01  -  01 Dec 2022 07:00  --------------------------------------------------------  IN: 763 mL / OUT: 1165 mL / NET: -402 mL    01 Dec 2022 07:01  -  01 Dec 2022 14:56  --------------------------------------------------------  IN: 425 mL / OUT: 160 mL / NET: 265 mL      Mode: CPAP with PS  FiO2: 40  PEEP: 8  PS: 10  MAP: 12  PIP: 28      LABS:                        10.2   31.47 )-----------( 153      ( 01 Dec 2022 05:11 )             32.1     12-01    137  |  104  |  23<H>  ----------------------------<  277<H>  4.2   |  19  |  1.5    Ca    6.6<L>      01 Dec 2022 05:11  Phos  4.1     12-01  Mg     2.0     12-01    TPro  4.3<L>  /  Alb  2.3<L>  /  TBili  0.4  /  DBili  x   /  AST  18  /  ALT  25  /  AlkPhos  94  12-01    PT/INR - ( 29 Nov 2022 22:32 )   PT: 13.00 sec;   INR: 1.14 ratio         PTT - ( 29 Nov 2022 22:32 )  PTT:24.9 sec  Urinalysis Basic - ( 01 Dec 2022 07:00 )    Color: Yellow / Appearance: Clear / SG: >1.050 / pH: x  Gluc: x / Ketone: Negative  / Bili: Negative / Urobili: <2 mg/dL   Blood: x / Protein: 30 mg/dL / Nitrite: Negative   Leuk Esterase: Negative / RBC: 1 /HPF / WBC 5 /HPF   Sq Epi: x / Non Sq Epi: 6 /HPF / Bacteria: Few      CAPILLARY BLOOD GLUCOSE      POCT Blood Glucose.: 295 mg/dL (01 Dec 2022 11:34)  POCT Blood Glucose.: 285 mg/dL (01 Dec 2022 06:18)  POCT Blood Glucose.: 318 mg/dL (01 Dec 2022 06:09)  POCT Blood Glucose.: 349 mg/dL (01 Dec 2022 06:08)    ABG - ( 01 Dec 2022 07:15 )  pH, Arterial: 7.31  pH, Blood: x     /  pCO2: 40    /  pO2: 86    / HCO3: 20    / Base Excess: -5.8  /  SaO2: 98.7                RADIOLOGY & ADDITIONAL TESTS:    Consultant(s) Notes Reviewed:  [x ] YES  [ ] NO    MEDICATIONS  (STANDING):  cefepime   IVPB      chlorhexidine 0.12% Liquid 15 milliLiter(s) Oral Mucosa every 12 hours  chlorhexidine 2% Cloths 1 Application(s) Topical <User Schedule>  dexMEDEtomidine Infusion 0.2 MICROgram(s)/kG/Hr (2.4 mL/Hr) IV Continuous <Continuous>  dextrose 5% + sodium chloride 0.45%. 1000 milliLiter(s) (70 mL/Hr) IV Continuous <Continuous>  dextrose 5%. 1000 milliLiter(s) (50 mL/Hr) IV Continuous <Continuous>  dextrose 5%. 1000 milliLiter(s) (100 mL/Hr) IV Continuous <Continuous>  dextrose 50% Injectable 25 Gram(s) IV Push once  dextrose 50% Injectable 12.5 Gram(s) IV Push once  dextrose 50% Injectable 25 Gram(s) IV Push once  fentaNYL   Infusion. 0.5 MICROgram(s)/kG/Hr (3.15 mL/Hr) IV Continuous <Continuous>  glucagon  Injectable 1 milliGRAM(s) IntraMuscular once  norepinephrine Infusion 0.05 MICROgram(s)/kG/Min (4.49 mL/Hr) IV Continuous <Continuous>  pantoprazole Infusion 8 mG/Hr (10 mL/Hr) IV Continuous <Continuous>  vancomycin  IVPB        MEDICATIONS  (PRN):  dextrose Oral Gel 15 Gram(s) Oral once PRN Blood Glucose LESS THAN 70 milliGRAM(s)/deciliter        GENERAL:   HEAD:  intubate responsive to verbal and painful stimuli   CHEST/LUNG: B/L good air entry; No rales, rhonchi, or wheezing  HEART: S1S2 normal, no S3, Regular rate and rhythm; No murmurs  ABDOMEN: Soft, Nontender, Nondistended; Bowel sounds present  EXTREMITIES:  2+ edema, 2+ Peripheral Pulses, No clubbing, cyanosis

## 2022-12-01 NOTE — PROGRESS NOTE ADULT - ASSESSMENT
Impression:  GI bleed  diverticular + Duodenal ulcer   blood loss anemia   gastroduodenal bleed   metabolic acidosis   probable sepsis    Plan:  GI bleed Diverticular: Duodenal ulcer   blood loss anemia   s/p EGD at presentation showing 2 cm duodenal bulb ulcer along the anterior wall, ulcer is deep, cratered, with friable base and pigmentation, no active GI bleeding noted. 3 cc of epinephrine injected, 2 Endoclip were deployed to secure hemostasis.  2 clean based ulcer seen in the second part of duodenum, 5 mm each.  s/p colonoscopy with severe diverticulosis , procedure aborted secondary to poor prep( old blood through the examined part of colon) and diverticulosis.  --->GI recommendations appreciated: Given high risk lesion, positive extravasation in CT scan, and failed endoscopic intervention patient would benefit from embolization w IR.   11/28: spoke to IR they are not planing on current intervention because Hb is stable and no evident signs of GI bleeding.   Patient's son refused any intervention at first then on 11/26 he agreed on IR procedure and PPN  spoke to son mayelin and Mr. Orozco the HCP signed the DNI form agreeing not to reintubate the patient if need. patient is extubated to BiPAP after passing SBT   trend CBC, Transfuse to keep hemoglobin > 8, cw protonix drip , can refeed as per GI   11/29: patient Hb 7.8 ordered 1 unit pRBC to keep Hb > 8, repeat Hb 10.6, patient had an episode of melena today, called IR for recommendations, awaiting input.   11/30: IR decided that they do the embolization procedure for the patient, and the patient had to be intubated for the procedure. the DNI was reverted as per the discussion of IR with the Family.   12/1 s/p embolization of the GDA, patient is intubated, patient in urinary retention,     Acute hypoxemic respiratory failure:  11/28 given lasix 2 mg IV BID today, monitor urine output, extubated to BiPAP after passing SBT, DNI form signed and son decided not to reintubate if needed   11/30 patient is on NC 2L, satting well, AOx2,   12/1 patient is intubated after surgery, pH overnight was 7.11, she was started on bicarb drip and increased RR, repeat ABG showed pH of 7.31, pCO2 of 40 and hco3 of 21. failed SBT today         #UTI  Leukocytosis, Fever spike on 11.20.22  +UA for E. coli  -F/U Bacterial Cx (NGTD 11/17) - dc cefepime   11/29:  cultures negative will DC vancomycin ,  DC delgadillo   11/30 patient off antibiotics   = > 12/1  repeat UA negative   patient is hypotensive on levo and WBC is 31k, started on cefepime 2g IV OD and vanco 750 mg IV OD, FU Bcx     # hypokalemia:  11/29 K+ 3.1, gave 2 riders 20meq IV of potassium   11/30 resolved     # New 8 mm calculus within the proximal pancreatic duct in the region of the pancreatic head with new diffuse pancreatic parenchymal atrophy and dilatation of the pancreatic duct to 1.1 cm   normal LFTs  - Further work up by MRI to rule out malignancy as outpatient if compatible with goals of care   - Follow up with GI MAP  - No further workup given current  age, clinical status and goals of cares   - FU OP     # GOC;  - patient intubated   - 11/30 IR  embolization for bleeding   - family does no wan to reintubate when extubated   - placed OG tube   - S&S consulted, FU recs   - PT consulted FU recs       GI PPX: Protonix drip  Activity: IAT    Lines: D/C delgadillo 11.20   Impression:  GI bleed  diverticular + Duodenal ulcer   blood loss anemia   gastroduodenal bleed   metabolic acidosis   probable sepsis    Plan:  GI bleed Diverticular: Duodenal ulcer   blood loss anemia   s/p EGD at presentation showing 2 cm duodenal bulb ulcer along the anterior wall, ulcer is deep, cratered, with friable base and pigmentation, no active GI bleeding noted. 3 cc of epinephrine injected, 2 Endoclip were deployed to secure hemostasis.  2 clean based ulcer seen in the second part of duodenum, 5 mm each.  s/p colonoscopy with severe diverticulosis , procedure aborted secondary to poor prep( old blood through the examined part of colon) and diverticulosis.  --->GI recommendations appreciated: Given high risk lesion, positive extravasation in CT scan, and failed endoscopic intervention patient would benefit from embolization w IR.   11/28: spoke to IR they are not planing on current intervention because Hb is stable and no evident signs of GI bleeding.   Patient's son refused any intervention at first then on 11/26 he agreed on IR procedure and PPN  spoke to son mayelin and Mr. Orozco the HCP signed the DNI form agreeing not to reintubate the patient if need. patient is extubated to BiPAP after passing SBT   trend CBC, Transfuse to keep hemoglobin > 8, cw protonix drip , can refeed as per GI   11/29: patient Hb 7.8 ordered 1 unit pRBC to keep Hb > 8, repeat Hb 10.6, patient had an episode of melena today, called IR for recommendations, awaiting input.   11/30: IR decided that they do the embolization procedure for the patient, and the patient had to be intubated for the procedure. the DNI was reverted as per the discussion of IR with the Family.   12/1 s/p embolization of the GDA, patient is intubated, patient in urinary retention, gave lasix 40 mg  IV push     Acute hypoxemic respiratory failure:  11/28 given lasix 2 mg IV BID today, monitor urine output, extubated to BiPAP after passing SBT, DNI form signed and son decided not to reintubate if needed   11/30 patient is on NC 2L, satting well, AOx2,   12/1 patient is intubated after surgery, pH overnight was 7.11, she was started on bicarb drip and increased RR, repeat ABG showed pH of 7.31, pCO2 of 40 and hco3 of 21. failed SBT today         #UTI  Leukocytosis, Fever spike on 11.20.22  +UA for E. coli  -F/U Bacterial Cx (NGTD 11/17) - dc cefepime   11/29:  cultures negative will DC vancomycin ,  DC delgadillo   11/30 patient off antibiotics   = > 12/1  repeat UA negative   patient is hypotensive on levo and WBC is 31k, started on cefepime 2g IV OD and vanco 750 mg IV OD, FU Bcx     # hypokalemia:  11/29 K+ 3.1, gave 2 riders 20meq IV of potassium   11/30 resolved     # New 8 mm calculus within the proximal pancreatic duct in the region of the pancreatic head with new diffuse pancreatic parenchymal atrophy and dilatation of the pancreatic duct to 1.1 cm   normal LFTs  - Further work up by MRI to rule out malignancy as outpatient if compatible with goals of care   - Follow up with GI MAP  - No further workup given current  age, clinical status and goals of cares   - FU OP     # GOC;  - patient intubated   - 11/30 IR  embolization for bleeding   - family does no wan to reintubate when extubated   - placed OG tube   - S&S consulted, FU recs   - PT consulted FU recs       GI PPX: Protonix drip  Activity: IAT    Lines: D/C delgadillo 11.20

## 2022-12-01 NOTE — PROGRESS NOTE ADULT - SUBJECTIVE AND OBJECTIVE BOX
Patient is a 89y old  Female who presents with a chief complaint of GI bleed (25 Nov 2022 10:36)        Over Night Events:  Became acidemic yesterday post procedure, bicarb gtt was started  Given broad spectrum Abx  UOP worsening, now anuric  increasing vasopressor requirements      ROS:     Unable to assess ROS: patient intubated.        PHYSICAL EXAM    ICU Vital Signs Last 24 Hrs  T(C): 36.6 (01 Dec 2022 08:00), Max: 36.6 (01 Dec 2022 08:00)  T(F): 97.9 (01 Dec 2022 08:00), Max: 97.9 (01 Dec 2022 08:00)  HR: 98 (01 Dec 2022 08:00) (90 - 118)  BP: 77/45 (01 Dec 2022 05:00) (77/45 - 100/67)  BP(mean): 55 (01 Dec 2022 05:00) (42 - 72)  ABP: 100/63 (01 Dec 2022 08:00) (65/45 - 129/83)  ABP(mean): 78 (01 Dec 2022 08:00) (54 - 102)  RR: 17 (01 Dec 2022 08:00) (8 - 22)  SpO2: 99% (01 Dec 2022 08:00) (89% - 100%)    O2 Parameters below as of 30 Nov 2022 20:00  Patient On (Oxygen Delivery Method): ventilator    O2 Concentration (%): 40      Constitutional: no acute distress, well nourished well developed  Neuro: moving all 4 limbs spontaneously.  following commands.  HEENT: NCAT, anicteric, ETT in place  Neck: no visible lymphadenopathy or goiter  Pulm: synchronous with ventilator, coarse bilateral breath sounds anteriorly  Cardiac: extremities appear pink and well-perfused.  regular rhythm and rate, no murmur detected  Abdomen: non-distended  Extremities: trace dependent edema  Skin: no visible rashes or lesions      11-30-22 @ 07:01  -  12-01-22 @ 07:00  --------------------------------------------------------  IN:    dextrose 5% + sodium chloride 0.45%: 420 mL    FentaNYL: 25 mL    Norepinephrine: 108 mL    Pantoprazole: 60 mL    Sodium Bicarbonate: 150 mL  Total IN: 763 mL    OUT:    Indwelling Catheter - Urethral (mL): 170 mL    Voided (mL): 995 mL  Total OUT: 1165 mL    Total NET: -402 mL      12-01-22 @ 07:01  -  12-01-22 @ 08:32  --------------------------------------------------------  IN:    IV PiggyBack: 50 mL    Norepinephrine: 36 mL    Pantoprazole: 10 mL  Total IN: 96 mL    OUT:    Dexmedetomidine: 0 mL    FentaNYL: 0 mL    Indwelling Catheter - Urethral (mL): 30 mL  Total OUT: 30 mL    Total NET: 66 mL          LABS:                            10.2   31.47 )-----------( 153      ( 01 Dec 2022 05:11 )             32.1                                               12-01    137  |  104  |  23<H>  ----------------------------<  277<H>  4.2   |  19  |  1.5    Ca    6.6<L>      01 Dec 2022 05:11  Phos  4.1     12-01  Mg     2.0     12-01    TPro  4.3<L>  /  Alb  2.3<L>  /  TBili  0.4  /  DBili  x   /  AST  18  /  ALT  25  /  AlkPhos  94  12-01      PT/INR - ( 29 Nov 2022 22:32 )   PT: 13.00 sec;   INR: 1.14 ratio         PTT - ( 29 Nov 2022 22:32 )  PTT:24.9 sec                                                                                     LIVER FUNCTIONS - ( 01 Dec 2022 05:11 )  Alb: 2.3 g/dL / Pro: 4.3 g/dL / ALK PHOS: 94 U/L / ALT: 25 U/L / AST: 18 U/L / GGT: x                                                                                               Mode: AC/ CMV (Assist Control/ Continuous Mandatory Ventilation)  RR (machine): 16  TV (machine): 350  FiO2: 40  PEEP: 8  ITime: 4.3  MAP: 12  PIP: 29                                      ABG - ( 01 Dec 2022 07:15 )  pH, Arterial: 7.31  pH, Blood: x     /  pCO2: 40    /  pO2: 86    / HCO3: 20    / Base Excess: -5.8  /  SaO2: 98.7                MEDICATIONS  (STANDING):  cefepime   IVPB      chlorhexidine 0.12% Liquid 15 milliLiter(s) Oral Mucosa every 12 hours  chlorhexidine 2% Cloths 1 Application(s) Topical <User Schedule>  dexMEDEtomidine Infusion 0.2 MICROgram(s)/kG/Hr (2.4 mL/Hr) IV Continuous <Continuous>  dextrose 5% + sodium chloride 0.45%. 1000 milliLiter(s) (70 mL/Hr) IV Continuous <Continuous>  dextrose 5%. 1000 milliLiter(s) (50 mL/Hr) IV Continuous <Continuous>  dextrose 5%. 1000 milliLiter(s) (100 mL/Hr) IV Continuous <Continuous>  dextrose 50% Injectable 25 Gram(s) IV Push once  dextrose 50% Injectable 12.5 Gram(s) IV Push once  dextrose 50% Injectable 25 Gram(s) IV Push once  fentaNYL   Infusion. 0.5 MICROgram(s)/kG/Hr (3.15 mL/Hr) IV Continuous <Continuous>  glucagon  Injectable 1 milliGRAM(s) IntraMuscular once  norepinephrine Infusion 0.05 MICROgram(s)/kG/Min (4.49 mL/Hr) IV Continuous <Continuous>  pantoprazole Infusion 8 mG/Hr (10 mL/Hr) IV Continuous <Continuous>  vancomycin  IVPB        MEDICATIONS  (PRN):  dextrose Oral Gel 15 Gram(s) Oral once PRN Blood Glucose LESS THAN 70 milliGRAM(s)/deciliter      New X-rays reviewed:       ECHO reviewed    CXR interpreted by me:    12/1 images and reads compared with 11/29, by my read showing stable bilateral lung fields with RLL infiltrate with possible small associated effusion

## 2022-12-01 NOTE — PROGRESS NOTE ADULT - ASSESSMENT
IMPRESSION:    GI bleed  diverticular + duodenal ulcer, now s/p embolization  Septic shock s/p procedure  Hypercapneic + NGM acidosis  Blood loss anemia   Gastroduodenal bleed   Metabolic acidosis   Probable sepsis  Acute respiratory failure     PLAN:    CNS:  SAT daily, no sedation currently    HEENT: oral care   ETT day #1    PULMONARY: Minimal vent settings, permissive hypercarbia  SBT today with intent to extubate  If extubated, patient will become DNI/DNR at that time    CARDIOVASCULAR: Recurrent septic shock, continue with norepinephrine, wean as tolerated to target MAP > 65    GI: speech and swallow when extubated, now s/p embolization for GIB, continue to monitor Hgb regularly    RENAL: Correct lytes as needed. New VIRGIL and decreased UOP, likely intraprocedural hypotension causing ATN    INFECTIOUS Disease: Cultures pending, back on broad spectrum Abx, likely bacterial translocation during embolization    HEMATOLOGICAL:  DVT prophylaxis: SCDs.  Keep hgb more than 7.     ENDOCRINE:  Follow up FS.      MUSCULOSKELETAL: activity increase as tolerated    Palliative care follow up. DNR.     keep in ICU    if can get 2 access DC TLC

## 2022-12-02 NOTE — PROGRESS NOTE ADULT - ASSESSMENT
Impression:  GI bleed  diverticular + Duodenal ulcer   blood loss anemia   gastroduodenal bleed   metabolic acidosis   probable sepsis    Plan:  GI bleed Diverticular: Duodenal ulcer   blood loss anemia   s/p EGD at presentation showing 2 cm duodenal bulb ulcer along the anterior wall, ulcer is deep, cratered, with friable base and pigmentation, no active GI bleeding noted. 3 cc of epinephrine injected, 2 Endoclip were deployed to secure hemostasis.  2 clean based ulcer seen in the second part of duodenum, 5 mm each.  s/p colonoscopy with severe diverticulosis , procedure aborted secondary to poor prep( old blood through the examined part of colon) and diverticulosis.  --->GI recommendations appreciated: Given high risk lesion, positive extravasation in CT scan, and failed endoscopic intervention patient would benefit from embolization w IR.   11/28: spoke to IR they are not planing on current intervention because Hb is stable and no evident signs of GI bleeding.   Patient's son refused any intervention at first then on 11/26 he agreed on IR procedure and PPN  spoke to son mayelin and Mr. Orozco the HCP signed the DNI form agreeing not to reintubate the patient if need. patient is extubated to BiPAP after passing SBT   trend CBC, Transfuse to keep hemoglobin > 8, cw protonix drip , can refeed as per GI   11/29: patient Hb 7.8 ordered 1 unit pRBC to keep Hb > 8, repeat Hb 10.6, patient had an episode of melena today, called IR for recommendations, awaiting input.   11/30: IR decided that they do the embolization procedure for the patient, and the patient had to be intubated for the procedure. the DNI was reverted as per the discussion of IR with the Family.   12/1 s/p embolization of the GDA, patient is intubated, patient in urinary retention, gave lasix 40 mg  IV push   12/2 patient had 1 episodes of dark stools (less likely Melena), BP maintaining on levophed     Acute hypoxemic respiratory failure:  11/28 given lasix 2 mg IV BID today, monitor urine output, extubated to BiPAP after passing SBT, DNI form signed and son decided not to reintubate if needed   11/30 patient is on NC 2L, satting well, AOx2,   12/1 patient is intubated after surgery, pH overnight was 7.11, she was started on bicarb drip and increased RR, repeat ABG showed pH of 7.31, pCO2 of 40 and hco3 of 21. failed SBT today   12/2 patient passed SBT, extubated to BiPAP, decrease sedation, patient has a 1.2 L urine output, gave another dose lasix 40 mg IV push, will dose daily due to increased Cr. Hb 9.2 no evidence of active bleeding, target diuresis to - 1L.     #UTI  Leukocytosis, Fever spike on 11.20.22  +UA for E. coli  -F/U Bacterial Cx (NGTD 11/17) - dc cefepime   11/29:  cultures negative will DC vancomycin ,  DC delgadillo   11/30 patient off antibiotics   = > 12/1  repeat UA negative   patient is hypotensive on levo and WBC is 31k, started on cefepime 2g IV OD and vanco 750 mg IV OD, FU Bcx   12/2 continue ABx for 5 days total till 12/4, WBC increasing 29.17, FU B cultures     # VIRGIL:  - Cr increasing 1.1>1.5>>1.7, will dose lasix accordingly.     # hypokalemia:  11/29 K+ 3.1, gave 2 riders 20meq IV of potassium   11/30 resolved   12/2 K+: 3.4 given 20 meqs     # New 8 mm calculus within the proximal pancreatic duct in the region of the pancreatic head with new diffuse pancreatic parenchymal atrophy and dilatation of the pancreatic duct to 1.1 cm   normal LFTs  - Further work up by MRI to rule out malignancy as outpatient if compatible with goals of care   - Follow up with GI MAP  - No further workup given current  age, clinical status and goals of cares   - FU OP     # GOC;  - patient extubated to BiPAP  - 11/30 IR  embolization for bleeding   - family does no want to reintubate if needed   - placed OG tube   - FU S&S   - PT consulted FU recs       GI PPX: Protonix drip might switch to IV push tomorrow   Activity: IAT    Lines: D/C delgadillo 11.20

## 2022-12-02 NOTE — PHYSICAL THERAPY INITIAL EVALUATION ADULT - GENERAL OBSERVATIONS, REHAB EVAL
Pt has just been extubated, currently on face mask, unable to participate. Need new PT orders for re-evaluation. PT will follow up
[id number] am. 89 y/o F received in bed, left in b/s reclined chair, nad, + tele, SCD, RN aware. vitals: in bed 118/64 68 100% on 2L O2; in chair 112/60 74 98% on 2L O2. pt is pleasant, cooperative, agreeable to PT evaluation.

## 2022-12-02 NOTE — PROGRESS NOTE ADULT - SUBJECTIVE AND OBJECTIVE BOX
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 Patient has advanced dementia and is a poor historian but is able to articulate pain and symptoms accurately per the son. unknown HT, unknown LOC, and she does not take anticoagulation. History was obtained from the patient, her son at the bedside, and medication reconcilation was done by calling [address] [phone], where she fills her medications per the son. Patient does not remember falling, as she has poor memory. She denies any current lightheadedness, fatigue, chest pain, SOB, nausea, vomiting, headache, back pain, abdominal pain, urinary sx. Of note, she does take celecoxib 100mg BID per pharmacist.  In the ED, she was found to have HGB 8, prior HGB was 12. Patient remained HD stable throughout ED stay. GI was consulted, and they plan to perform colonoscopy tomorrow. Cardiology cleared patient for colonoscopy procedure. She also has wbc 30 but has been afebrile. CTA abdomen/pelvis was negative for active bleeding or infectious etiology. She received 1 dose of 2g cefepime.  (17 Nov 2022 17:03)    Patient is a 89y old  Female who presents with a chief complaint of GI bleed (25 Nov 2022 10:36)      INTERVAL HPI/OVERNIGHT EVENTS:   No overnight events   Afebrile, hemodynamically stable     ICU Vital Signs Last 24 Hrs  T(C): 36.5 (02 Dec 2022 08:00), Max: 36.6 (01 Dec 2022 19:00)  T(F): 97.7 (02 Dec 2022 08:00), Max: 97.8 (01 Dec 2022 19:00)  HR: 90 (02 Dec 2022 09:00) (64 - 97)  BP: 120/62 (01 Dec 2022 19:00) (120/62 - 120/62)  BP(mean): --  ABP: 109/63 (02 Dec 2022 09:00) (89/59 - 124/74)  ABP(mean): 80 (02 Dec 2022 09:00) (72 - 93)  RR: 28 (02 Dec 2022 09:00) (15 - 30)  SpO2: 99% (02 Dec 2022 09:00) (92% - 100%)    O2 Parameters below as of 02 Dec 2022 09:00  Patient On (Oxygen Delivery Method): ventilator    O2 Concentration (%): 40      I&O's Summary    01 Dec 2022 07:01  -  02 Dec 2022 07:00  --------------------------------------------------------  IN: 1371 mL / OUT: 1200 mL / NET: 171 mL    02 Dec 2022 07:01  -  02 Dec 2022 13:55  --------------------------------------------------------  IN: 30 mL / OUT: 65 mL / NET: -35 mL      Mode: AC/ CMV (Assist Control/ Continuous Mandatory Ventilation)  RR (machine): 16  TV (machine): 350  FiO2: 40  PEEP: 8  ITime: 1  MAP: 13  PIP: 22      LABS:                        9.2    29.17 )-----------( 128      ( 02 Dec 2022 04:50 )             27.5     12-02    137  |  106  |  28<H>  ----------------------------<  164<H>  3.4<L>   |  21  |  1.7<H>    Ca    7.0<L>      02 Dec 2022 04:50  Phos  2.9     12-02  Mg     1.8     12-02    TPro  4.1<L>  /  Alb  2.2<L>  /  TBili  0.5  /  DBili  x   /  AST  14  /  ALT  19  /  AlkPhos  83  12-02      Urinalysis Basic - ( 01 Dec 2022 07:00 )    Color: Yellow / Appearance: Clear / SG: >1.050 / pH: x  Gluc: x / Ketone: Negative  / Bili: Negative / Urobili: <2 mg/dL   Blood: x / Protein: 30 mg/dL / Nitrite: Negative   Leuk Esterase: Negative / RBC: 1 /HPF / WBC 5 /HPF   Sq Epi: x / Non Sq Epi: 6 /HPF / Bacteria: Few      CAPILLARY BLOOD GLUCOSE      POCT Blood Glucose.: 168 mg/dL (02 Dec 2022 06:08)  POCT Blood Glucose.: 150 mg/dL (01 Dec 2022 23:32)    ABG - ( 02 Dec 2022 10:41 )  pH, Arterial: 7.39  pH, Blood: x     /  pCO2: 34    /  pO2: 119   / HCO3: 21    / Base Excess: -3.6  /  SaO2: 99.6                RADIOLOGY & ADDITIONAL TESTS:    Consultant(s) Notes Reviewed:  [x ] YES  [ ] NO    MEDICATIONS  (STANDING):  cefepime   IVPB      cefepime   IVPB 2000 milliGRAM(s) IV Intermittent every 24 hours  chlorhexidine 2% Cloths 1 Application(s) Topical <User Schedule>  dextrose 5%. 1000 milliLiter(s) (50 mL/Hr) IV Continuous <Continuous>  dextrose 5%. 1000 milliLiter(s) (100 mL/Hr) IV Continuous <Continuous>  dextrose 50% Injectable 25 Gram(s) IV Push once  dextrose 50% Injectable 12.5 Gram(s) IV Push once  dextrose 50% Injectable 25 Gram(s) IV Push once  glucagon  Injectable 1 milliGRAM(s) IntraMuscular once  insulin lispro (ADMELOG) corrective regimen sliding scale   SubCutaneous every 6 hours  norepinephrine Infusion 0.05 MICROgram(s)/kG/Min (4.49 mL/Hr) IV Continuous <Continuous>  pantoprazole Infusion 8 mG/Hr (10 mL/Hr) IV Continuous <Continuous>  vancomycin  IVPB      vancomycin  IVPB 750 milliGRAM(s) IV Intermittent every 24 hours    MEDICATIONS  (PRN):  dextrose Oral Gel 15 Gram(s) Oral once PRN Blood Glucose LESS THAN 70 milliGRAM(s)/deciliter      PHYSICAL EXAM:  General:  intubated responsive to verbal and painful stimuli, follows command    CHEST/LUNG: B/L crackles, good air entry; No rales, rhonchi, or wheezing  HEART: S1S2 normal, no S3, Regular rate and rhythm; No murmurs  ABDOMEN: Soft, Nontender, Nondistended; Bowel sounds present  EXTREMITIES:  3+ edema, 2+ Peripheral Pulses, No clubbing, cyanosis

## 2022-12-02 NOTE — PROGRESS NOTE ADULT - SUBJECTIVE AND OBJECTIVE BOX
Patient is a 89y old  Female who presents with a chief complaint of GI bleed (25 Nov 2022 10:36)        Over Night Events:  Hgb downtrending overnight, no overt clinical bleed  more edematous today  worsening renal function  was anuric for much of yesterday, UOP picked up overnight      ROS:     Unable to assess ROS: patient intubated.        PHYSICAL EXAM    ICU Vital Signs Last 24 Hrs  T(C): 36.4 (02 Dec 2022 04:00), Max: 37.3 (01 Dec 2022 08:36)  T(F): 97.5 (02 Dec 2022 04:00), Max: 99.1 (01 Dec 2022 08:36)  HR: 87 (02 Dec 2022 07:00) (64 - 112)  BP: 120/62 (01 Dec 2022 19:00) (120/62 - 139/98)  BP(mean): 107 (01 Dec 2022 08:18) (107 - 107)  ABP: 119/67 (02 Dec 2022 07:00) (82/54 - 119/67)  ABP(mean): 86 (02 Dec 2022 07:00) (65 - 115)  RR: 16 (02 Dec 2022 07:00) (15 - 31)  SpO2: 99% (02 Dec 2022 06:00) (92% - 100%)    O2 Parameters below as of 02 Dec 2022 07:00  Patient On (Oxygen Delivery Method): ventilator    O2 Concentration (%): 40        Constitutional: no acute distress, well nourished well developed  Neuro: moving all 4 limbs spontaneously.  Sedated.  HEENT: NCAT, anicteric, ETT in place  Neck: no visible lymphadenopathy or goiter  Pulm: synchronous with ventilator, coarse bilateral breath sounds anteriorly  Cardiac: extremities appear pink and well-perfused.  regular rhythm and rate, no murmur detected  Abdomen: non-distended  Extremities: trace dependent edema  Skin: no visible rashes or lesions        12-01-22 @ 07:01  -  12-02-22 @ 07:00  --------------------------------------------------------  IN:    Free Water: 200 mL    IV PiggyBack: 600 mL    Norepinephrine: 241 mL    Pantoprazole: 230 mL    Peptamen A.F.: 100 mL  Total IN: 1371 mL    OUT:    Dexmedetomidine: 0 mL    dextrose 5% + sodium chloride 0.45%: 0 mL    FentaNYL: 0 mL    Indwelling Catheter - Urethral (mL): 1200 mL    Jevity 1.2: 0 mL  Total OUT: 1200 mL    Total NET: 171 mL          LABS:                            9.2    29.17 )-----------( 128      ( 02 Dec 2022 04:50 )             27.5                                               12-02    137  |  106  |  28<H>  ----------------------------<  164<H>  3.4<L>   |  21  |  1.7<H>    Ca    7.0<L>      02 Dec 2022 04:50  Phos  2.9     12-02  Mg     1.8     12-02    TPro  4.1<L>  /  Alb  2.2<L>  /  TBili  0.5  /  DBili  x   /  AST  14  /  ALT  19  /  AlkPhos  83  12-02                                             Urinalysis Basic - ( 01 Dec 2022 07:00 )    Color: Yellow / Appearance: Clear / SG: >1.050 / pH: x  Gluc: x / Ketone: Negative  / Bili: Negative / Urobili: <2 mg/dL   Blood: x / Protein: 30 mg/dL / Nitrite: Negative   Leuk Esterase: Negative / RBC: 1 /HPF / WBC 5 /HPF   Sq Epi: x / Non Sq Epi: 6 /HPF / Bacteria: Few                                                  LIVER FUNCTIONS - ( 02 Dec 2022 04:50 )  Alb: 2.2 g/dL / Pro: 4.1 g/dL / ALK PHOS: 83 U/L / ALT: 19 U/L / AST: 14 U/L / GGT: x                                                                                               Mode: AC/ CMV (Assist Control/ Continuous Mandatory Ventilation)  RR (machine): 16  TV (machine): 350  FiO2: 40  PEEP: 8  ITime: 1  MAP: 13  PIP: 25                                      ABG - ( 02 Dec 2022 03:41 )  pH, Arterial: 7.42  pH, Blood: x     /  pCO2: 29    /  pO2: 89    / HCO3: 19    / Base Excess: -4.4  /  SaO2: 98.4                MEDICATIONS  (STANDING):  cefepime   IVPB      cefepime   IVPB 2000 milliGRAM(s) IV Intermittent every 24 hours  chlorhexidine 0.12% Liquid 15 milliLiter(s) Oral Mucosa every 12 hours  chlorhexidine 2% Cloths 1 Application(s) Topical <User Schedule>  dextrose 5%. 1000 milliLiter(s) (50 mL/Hr) IV Continuous <Continuous>  dextrose 5%. 1000 milliLiter(s) (100 mL/Hr) IV Continuous <Continuous>  dextrose 50% Injectable 25 Gram(s) IV Push once  dextrose 50% Injectable 12.5 Gram(s) IV Push once  dextrose 50% Injectable 25 Gram(s) IV Push once  glucagon  Injectable 1 milliGRAM(s) IntraMuscular once  norepinephrine Infusion 0.05 MICROgram(s)/kG/Min (4.49 mL/Hr) IV Continuous <Continuous>  pantoprazole Infusion 8 mG/Hr (10 mL/Hr) IV Continuous <Continuous>  vancomycin  IVPB      vancomycin  IVPB 750 milliGRAM(s) IV Intermittent every 24 hours    MEDICATIONS  (PRN):  dextrose Oral Gel 15 Gram(s) Oral once PRN Blood Glucose LESS THAN 70 milliGRAM(s)/deciliter      New X-rays reviewed:       ECHO reviewed    CXR interpreted by me:    12/2 images and reads reviewed, compared with 12/1, by my read improving LLL infiltrate/effusion, stable right lung

## 2022-12-03 NOTE — PROGRESS NOTE ADULT - ASSESSMENT
IMPRESSION:    GI bleed  diverticular + duodenal ulcer, now s/p embolization  Septic shock s/p procedure  Hypercapneic + NGM acidosis  Blood loss anemia   Gastroduodenal bleed   Metabolic acidosis   Probable sepsis  Acute oliguric renal failure    PLAN:    CNS:  Avoid all sedatives for metabolic encephalopathy    HEENT: oral care     PULMONARY: Continue with oxygen as needed, target spO2 92-96%  Now DNR/DNI    CARDIOVASCULAR:   Off vasopressors, restart as needed to maintain MAP > 65  Diuresis today to target -1L.  Lasix 80mg IV trial    GI: speech and swallow, now s/p embolization for GIB, continue to monitor Hgb regularly    RENAL: Correct lytes as needed. Slightly worsening VIRGIL with rising Cr and decreased UOP, likely intraprocedural hypotension causing ATN.  Diuresis as above, target net negative.    INFECTIOUS Disease: Cultures pending, back on broad spectrum Abx, likely bacterial translocation during embolization.  If BCx remain negative, empiric 5 day course of antibiotics to complete on 12/5.  Renally dose cefepime, possible cause of encephalopathy    HEMATOLOGICAL:  DVT prophylaxis: SCDs.  Keep hgb more than 7. Hold heparin 2/2 recent bleeding    ENDOCRINE:  Follow up FS.  Target -180, adjust insulin regimen PRN.    MUSCULOSKELETAL: activity increase as tolerated    Palliative care follow up. DNR/DNI    keep in ICU    if can get 2 access DC TLC

## 2022-12-03 NOTE — PROGRESS NOTE ADULT - SUBJECTIVE AND OBJECTIVE BOX
MICHAEL MORTENSEN 89y Female  MRN#: [id number]   Hospital Day: 16d    HPI:  89F with phhx of Alzheimer's dementia, CHF, HTN , 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 Patient has advanced dementia and is a poor historian but is able to articulate pain and symptoms accurately per the son. unknown HT, unknown LOC, and she does not take anticoagulation. History was obtained from the patient, her son at the bedside, and medication reconcilation was done by calling [address] [phone], where she fills her medications per the son. Patient does not remember falling, as she has poor memory. She denies any current lightheadedness, fatigue, chest pain, SOB, nausea, vomiting, headache, back pain, abdominal pain, urinary sx. Of note, she does take celecoxib 100mg BID per pharmacist.     In the ED, she was found to have HGB 8, prior HGB was 12. Patient remained HD stable throughout ED stay. GI was consulted, and they plan to perform colonoscopy tomorrow. Cardiology cleared patient for colonoscopy procedure. She also has wbc 30 but has been afebrile. CTA abdomen/pelvis was negative for active bleeding or infectious etiology. She received 1 dose of 2g cefepime.  (17 Nov 2022 17:03)      SUBJECTIVE      OBJECTIVE  PAST MEDICAL & SURGICAL HISTORY  HTN (hypertension)    Bradycardia    Pacemaker      ALLERGIES:  No Known Allergies    MEDICATIONS:  STANDING MEDICATIONS  cefepime   IVPB      cefepime   IVPB 2000 milliGRAM(s) IV Intermittent every 24 hours  chlorhexidine 2% Cloths 1 Application(s) Topical <User Schedule>  dextrose 5%. 1000 milliLiter(s) IV Continuous <Continuous>  dextrose 5%. 1000 milliLiter(s) IV Continuous <Continuous>  dextrose 50% Injectable 25 Gram(s) IV Push once  dextrose 50% Injectable 12.5 Gram(s) IV Push once  dextrose 50% Injectable 25 Gram(s) IV Push once  glucagon  Injectable 1 milliGRAM(s) IntraMuscular once  insulin lispro (ADMELOG) corrective regimen sliding scale   SubCutaneous every 6 hours  norepinephrine Infusion 0.05 MICROgram(s)/kG/Min IV Continuous <Continuous>  pantoprazole Infusion 8 mG/Hr IV Continuous <Continuous>    PRN MEDICATIONS  dextrose Oral Gel 15 Gram(s) Oral once PRN      VITAL SIGNS: Last 24 Hours  T(C): 36.3 (03 Dec 2022 04:00), Max: 36.6 (02 Dec 2022 16:00)  T(F): 97.3 (03 Dec 2022 04:00), Max: 97.9 (02 Dec 2022 16:00)  HR: 94 (03 Dec 2022 07:00) (60 - 98)  BP: 111/70 (02 Dec 2022 20:00) (80/58 - 126/55)  BP(mean): 82 (02 Dec 2022 20:00) (65 - 82)  RR: 35 (03 Dec 2022 07:00) (23 - 39)  SpO2: 96% (03 Dec 2022 07:00) (73% - 100%)    LABS:                        9.3    30.60 )-----------( 114      ( 03 Dec 2022 06:47 )             29.6     12-02    137  |  106  |  31<H>  ----------------------------<  159<H>  4.0   |  20  |  1.8<H>    Ca    6.6<L>      02 Dec 2022 21:06  Phos  2.9     12-02  Mg     2.0     12-02    TPro  4.2<L>  /  Alb  2.1<L>  /  TBili  0.4  /  DBili  x   /  AST  26  /  ALT  19  /  AlkPhos  89  12-02        ABG - ( 02 Dec 2022 14:15 )  pH, Arterial: 7.33  pH, Blood: x     /  pCO2: 35    /  pO2: 83    / HCO3: 18    / Base Excess: -6.6  /  SaO2: 97.4                  Culture - Blood (collected 01 Dec 2022 07:00)  Source: .Blood Blood-Peripheral  Preliminary Report (02 Dec 2022 18:01):    No growth to date.              PHYSICAL EXAM:  General:  intubated responsive to verbal and painful stimuli, follows command    CHEST/LUNG: B/L crackles, good air entry; No rales, rhonchi, or wheezing  HEART: S1S2 normal, no S3, Regular rate and rhythm; No murmurs  ABDOMEN: Soft, Nontender, Nondistended; Bowel sounds present  EXTREMITIES:  3+ edema, 2+ Peripheral Pulses, No clubbing, cyanosis    Assessment and Plan:   · Assessment	  Impression:  GI bleed  diverticular + Duodenal ulcer   blood loss anemia   gastroduodenal bleed   metabolic acidosis   probable sepsis    Plan:  GI bleed Diverticular: Duodenal ulcer   blood loss anemia   s/p EGD at presentation showing 2 cm duodenal bulb ulcer along the anterior wall, ulcer is deep, cratered, with friable base and pigmentation, no active GI bleeding noted. 3 cc of epinephrine injected, 2 Endoclip were deployed to secure hemostasis.  2 clean based ulcer seen in the second part of duodenum, 5 mm each.  s/p colonoscopy with severe diverticulosis , procedure aborted secondary to poor prep( old blood through the examined part of colon) and diverticulosis.  --->GI recommendations appreciated: Given high risk lesion, positive extravasation in CT scan, and failed endoscopic intervention patient would benefit from embolization w IR.   11/28: spoke to IR they are not planing on current intervention because Hb is stable and no evident signs of GI bleeding.   Patient's son refused any intervention at first then on 11/26 he agreed on IR procedure and PPN  spoke to son mayelin and Mr. Orozco the HCP signed the DNI form agreeing not to reintubate the patient if need. patient is extubated to BiPAP after passing SBT   trend CBC, Transfuse to keep hemoglobin > 8, cw protonix drip , can refeed as per GI   11/29: patient Hb 7.8 ordered 1 unit pRBC to keep Hb > 8, repeat Hb 10.6, patient had an episode of melena today, called IR for recommendations, awaiting input.   11/30: IR decided that they do the embolization procedure for the patient, and the patient had to be intubated for the procedure. the DNI was reverted as per the discussion of IR with the Family.   12/1 s/p embolization of the GDA, patient is intubated, patient in urinary retention, gave lasix 40 mg  IV push   12/2 patient had 1 episodes of dark stools (less likely Melena), BP maintaining on levophed     Acute hypoxemic respiratory failure:  11/28 given lasix 2 mg IV BID today, monitor urine output, extubated to BiPAP after passing SBT, DNI form signed and son decided not to reintubate if needed   11/30 patient is on NC 2L, satting well, AOx2,   12/1 patient is intubated after surgery, pH overnight was 7.11, she was started on bicarb drip and increased RR, repeat ABG showed pH of 7.31, pCO2 of 40 and hco3 of 21. failed SBT today   12/2 patient passed SBT, extubated to BiPAP, decrease sedation, patient has a 1.2 L urine output, gave another dose lasix 40 mg IV push, will dose daily due to increased Cr. Hb 9.2 no evidence of active bleeding, target diuresis to - 1L.     #UTI  Leukocytosis, Fever spike on 11.20.22  +UA for E. coli  -F/U Bacterial Cx (NGTD 11/17) - dc cefepime   11/29:  cultures negative will DC vancomycin ,  DC delgadillo   11/30 patient off antibiotics   = > 12/1  repeat UA negative   patient is hypotensive on levo and WBC is 31k, started on cefepime 2g IV OD and vanco 750 mg IV OD, FU Bcx   12/2 continue ABx for 5 days total till 12/4, WBC increasing 29.17, FU B cultures     # VIRGIL:  - Cr increasing 1.1>1.5>>1.7, will dose lasix accordingly.     # hypokalemia:  11/29 K+ 3.1, gave 2 riders 20meq IV of potassium   11/30 resolved   12/2 K+: 3.4 given 20 meqs     # New 8 mm calculus within the proximal pancreatic duct in the region of the pancreatic head with new diffuse pancreatic parenchymal atrophy and dilatation of the pancreatic duct to 1.1 cm   normal LFTs  - Further work up by MRI to rule out malignancy as outpatient if compatible with goals of care   - Follow up with GI MAP  - No further workup given current  age, clinical status and goals of cares   - FU OP     # GOC;  - patient extubated to BiPAP  - 11/30 IR  embolization for bleeding   - family does no want to reintubate if needed   - placed OG tube   - FU S&S   - PT consulted FU recs       GI PPX: Protonix drip might switch to IV push tomorrow   Activity: IAT    Lines: D/C delgadillo 11.20

## 2022-12-03 NOTE — PROGRESS NOTE ADULT - CRITICAL CARE ATTENDING COMMENT
This patient is critically ill due to the following:  * Hemodynamic instability requiring titration of vasopressors or other vasoactive agents  * Respiratory instability requiring management of invasive ventilation  * Multiple organ failure requiring complex decision-making, and there is a high probability of imminent or life-threatening deterioration in the patient’s condition  * The patient required frequent reassessments and monitoring to ensure response to interventions and therapies.    Critical care time includes time spent evaluating and treating the patient's acute illness as well as time spent reviewing labs, radiology,  and discussing the case with a multidisciplinary team in an effort to prevent further life threatening deterioration or end organ damage. This time is independent of any procedures performed.
This patient is critically ill due to the following:  * Multiple organ failure requiring complex decision-making, and there is a high probability of imminent or life-threatening deterioration in the patient’s condition  * The patient required frequent reassessments and monitoring to ensure response to interventions and therapies.    Critical care time includes time spent evaluating and treating the patient's acute illness as well as time spent reviewing labs, radiology,  and discussing the case with a multidisciplinary team in an effort to prevent further life threatening deterioration or end organ damage. This time is independent of any procedures performed.
This patient is critically ill due to the following:  * Hemodynamic instability requiring titration of vasopressors or other vasoactive agents  * Respiratory instability requiring management of invasive ventilation  * Multiple organ failure requiring complex decision-making, and there is a high probability of imminent or life-threatening deterioration in the patient’s condition  * The patient required frequent reassessments and monitoring to ensure response to interventions and therapies.    Critical care time includes time spent evaluating and treating the patient's acute illness as well as time spent reviewing labs, radiology,  and discussing the case with a multidisciplinary team in an effort to prevent further life threatening deterioration or end organ damage. This time is independent of any procedures performed.
This patient is critically ill due to the following:  * Hemodynamic instability requiring titration of vasopressors or other vasoactive agents  * Respiratory instability requiring management of invasive ventilation  * Multiple organ failure requiring complex decision-making, and there is a high probability of imminent or life-threatening deterioration in the patient’s condition  * The patient required frequent reassessments and monitoring to ensure response to interventions and therapies.    Critical care time includes time spent evaluating and treating the patient's acute illness as well as time spent reviewing labs, radiology,  and discussing the case with a multidisciplinary team in an effort to prevent further life threatening deterioration or end organ damage. This time is independent of any procedures performed.
This patient is critically ill due to the following:  * Multiple organ failure requiring complex decision-making, and there is a high probability of imminent or life-threatening deterioration in the patient’s condition  * The patient required frequent reassessments and monitoring to ensure response to interventions and therapies.    Critical care time includes time spent evaluating and treating the patient's acute illness as well as time spent reviewing labs, radiology,  and discussing the case with a multidisciplinary team in an effort to prevent further life threatening deterioration or end organ damage. This time is independent of any procedures performed.

## 2022-12-03 NOTE — CHART NOTE - NSCHARTNOTEFT_GEN_A_CORE
Registered Dietitian Follow-Up     Patient Profile Reviewed                           Yes [x]   No []     Nutrition History Previously Obtained        Yes []  No [x]       Pertinent Subjective Information: pt seen somnolent, unable tot provide hx at this time     Pertinent Medical Interventions: diverticular + duodenal ulcer, now s/p embolization . Intubated for procedure, extubated . DNR/DNI reinstated.      Diet order:   Diet, NPO:   Except Medications (22 @ 22:45) [Active]    Anthropometrics:  Height (cm): 157.5 (22 @ 08:04)  Weight (kg): 47.9 (22 @ 08:04)  BMI (kg/m2): 19.3 (22 @ 08:04)  IBW: 47.7 kg  Daily Weight in k.4 (), Weight in k.6 (), Weight in k (-29), Weight in k (-28), Weight in k (-27)  Weight Change: trending up likely secondary to fluid retention    MEDICATIONS  (STANDING):  cefepime   IVPB      cefepime   IVPB 2000 milliGRAM(s) IV Intermittent every 24 hours  chlorhexidine 2% Cloths 1 Application(s) Topical <User Schedule>  dextrose 5%. 1000 milliLiter(s) (50 mL/Hr) IV Continuous <Continuous>  dextrose 5%. 1000 milliLiter(s) (100 mL/Hr) IV Continuous <Continuous>  dextrose 50% Injectable 25 Gram(s) IV Push once  dextrose 50% Injectable 12.5 Gram(s) IV Push once  dextrose 50% Injectable 25 Gram(s) IV Push once  glucagon  Injectable 1 milliGRAM(s) IntraMuscular once  insulin lispro (ADMELOG) corrective regimen sliding scale   SubCutaneous every 6 hours  norepinephrine Infusion 0.05 MICROgram(s)/kG/Min (4.49 mL/Hr) IV Continuous <Continuous>  pantoprazole Infusion 8 mG/Hr (10 mL/Hr) IV Continuous <Continuous>    MEDICATIONS  (PRN):  dextrose Oral Gel 15 Gram(s) Oral once PRN Blood Glucose LESS THAN 70 milliGRAM(s)/deciliter    Pertinent Labs:  @ 08:30: Na 137, BUN 36<H>, Cr 2.1<H>, <H>, K+ 4.2, Phos --, Mg --, Alk Phos --, ALT/SGPT --, AST/SGOT --, HbA1c --   @ 21:06: Na 137, BUN 31<H>, Cr 1.8<H>, <H>, K+ 4.0, Phos --, Mg 2.0, Alk Phos 89, ALT/SGPT 19, AST/SGOT 26, HbA1c --    Finger Sticks:  POCT Blood Glucose.: 197 mg/dL ( @ 18:30)    Physical Findings:  - Appearance: lethargic; 3+ generalized edema, 4+ edema to left arm; right arm; left hand  - GI function: abdomen WDL; last BM 02-Dec-2022  - Tubes: no feeding tube  - Oral/Mouth cavity: NPO pending S+S  - Skin: surgical incision     Nutrition Requirements:  Weight Used: dosing wt    47.9 KG    Estimated Energy Needs    Continue []  Adjust [x]  ENERGY: 929-998 kcal/day (MSJ 1.2-1.3 stress factor)     Estimated Protein Needs    Continue [x]  Adjust []  PROTEIN: 57-72 g/day (1.2-1.5 g/kg)     Estimated Fluid Needs        Continue [x]  Adjust []  FLUID: 958-1198 mL/day (20-25 mL/kg)     Nutrient Intake: pt was NPO -. TF infused x12hrs on , but did not reach goal rate. Now NPO, pending S+S     [] Previous Nutrition Diagnosis: Inadequate Energy Intake             [x] Ongoing          [] Resolved    Pt now meets criteria of severe malnutrition in the context of acute illness as evidenced by <50% estimated needs met over 5 days and severe edema masking weight loss.      Nutrition Intervention      Goal/Expected Outcome: to establish a route of nutrition and pt will meet >50% estimated needs in 4 days.     Indicator/Monitoring: Monitor diet order, kcal intake, body composition, NFPE, labs  (lytes, BG, renal indices)     Recommendation:  - S+S, would liberalize diet and not add any restrictions but only consistency per SLP  - If pt for TF, recommend Peptamen AF, start at 20ml/hr and titrate to goal 35ml/hr in 4 hour. This will provide 1008kcal, 64g protein, 765ml free water  -- Lispro sliding scale Q6H with TF     Patient assessed at high nutrition risk.  RD spectra [phone].

## 2022-12-03 NOTE — CONSULT NOTE ADULT - SUBJECTIVE AND OBJECTIVE BOX
89F with pmhx of Alzheimer's dementia, CHF, HTN , PPM secondary to heart block brought to the ED after being found down by her health aide after an unwitnessed fall  called for VIRGIL  PAST HISTORY  --------------------------------------------------------------------------------  PAST MEDICAL & SURGICAL HISTORY:  HTN (hypertension)      Bradycardia      Pacemaker        FAMILY HISTORY:    PAST SOCIAL HISTORY:    ALLERGIES & MEDICATIONS  --------------------------------------------------------------------------------  Allergies    No Known Allergies    Intolerances      Standing Inpatient Medications  cefepime   IVPB      cefepime   IVPB 2000 milliGRAM(s) IV Intermittent every 24 hours  chlorhexidine 2% Cloths 1 Application(s) Topical <User Schedule>  dextrose 5%. 1000 milliLiter(s) IV Continuous <Continuous>  dextrose 5%. 1000 milliLiter(s) IV Continuous <Continuous>  dextrose 50% Injectable 25 Gram(s) IV Push once  dextrose 50% Injectable 12.5 Gram(s) IV Push once  dextrose 50% Injectable 25 Gram(s) IV Push once  glucagon  Injectable 1 milliGRAM(s) IntraMuscular once  insulin lispro (ADMELOG) corrective regimen sliding scale   SubCutaneous every 6 hours  norepinephrine Infusion 0.05 MICROgram(s)/kG/Min IV Continuous <Continuous>  pantoprazole Infusion 8 mG/Hr IV Continuous <Continuous>    PRN Inpatient Medications  dextrose Oral Gel 15 Gram(s) Oral once PRN        All other systems were reviewed and are negative, except as noted.    VITALS/PHYSICAL EXAM  --------------------------------------------------------------------------------  T(C): 36.3 (12-03-22 @ 13:00), Max: 36.6 (12-02-22 @ 16:00)  HR: 91 (12-03-22 @ 14:00) (60 - 98)  BP: 122/90 (12-03-22 @ 10:00) (99/64 - 129/62)  RR: 40 (12-03-22 @ 14:00) (23 - 40)  SpO2: 94% (12-03-22 @ 14:00) (73% - 99%)  Wt(kg): --        12-02-22 @ 07:01  -  12-03-22 @ 07:00  --------------------------------------------------------  IN: 879 mL / OUT: 670 mL / NET: 209 mL    12-03-22 @ 07:01  -  12-03-22 @ 14:45  --------------------------------------------------------  IN: 90 mL / OUT: 220 mL / NET: -130 mL      Physical Exam:  	Gen: on venti Mask  	Pulm: decrease BS B/L  	CV:  S1S2; no rub  	Abd: +distended  	LE:  edema,  	    LABS/STUDIES  --------------------------------------------------------------------------------              9.3    30.60 >-----------<  114      [12-03-22 @ 06:47]              29.6     137  |  107  |  ----------------------------<  143      [12-03-22 @ 08:30]  4.2   |  21  |  2.1        Ca     7.1     [12-03-22 @ 08:30]      Mg     2.0     [12-02-22 @ 21:06]      Phos  2.9     [12-02-22 @ 04:50]    TPro  4.2  /  Alb  2.1  /  TBili  0.4  /  DBili  x   /  AST  26  /  ALT  19  /  AlkPhos  89  [12-02-22 @ 21:06]    Creatinine Trend:  SCr 2.1 [12-03 @ 08:30]  SCr 1.8 [12-02 @ 21:06]  SCr 1.7 [12-02 @ 04:50]  SCr 1.5 [12-01 @ 05:11]  SCr 1.1 [11-30 @ 05:35]    Urinalysis - [12-01-22 @ 07:00]      Color Yellow / Appearance Clear / SG >1.050 / pH 6.0      Gluc Negative / Ketone Negative  / Bili Negative / Urobili <2 mg/dL       Blood Small / Protein 30 mg/dL / Leuk Est Negative / Nitrite Negative      RBC 1 / WBC 5 / Hyaline 28 / Gran  / Sq Epi  / Non Sq Epi 6 / Bacteria Few      Iron 65, TIBC 275, %sat 24      [11-17-22 @ 19:33]

## 2022-12-03 NOTE — PROGRESS NOTE ADULT - SUBJECTIVE AND OBJECTIVE BOX
Patient is a 89y old  Female who presents with a chief complaint of GI bleed (25 Nov 2022 10:36)        Over Night Events:    still with poor UOP, did not improve with small lasix bolus  somnolent but arousable    ROS:     All ROS are negative except HPI         PHYSICAL EXAM    ICU Vital Signs Last 24 Hrs  T(C): 36.3 (03 Dec 2022 04:00), Max: 36.6 (02 Dec 2022 16:00)  T(F): 97.3 (03 Dec 2022 04:00), Max: 97.9 (02 Dec 2022 16:00)  HR: 94 (03 Dec 2022 07:00) (60 - 98)  BP: 111/70 (02 Dec 2022 20:00) (80/58 - 126/55)  BP(mean): 82 (02 Dec 2022 20:00) (65 - 82)  ABP: 101/84 (03 Dec 2022 07:00) (80/56 - 125/91)  ABP(mean): 95 (03 Dec 2022 07:00) (66 - 107)  RR: 35 (03 Dec 2022 07:00) (23 - 39)  SpO2: 96% (03 Dec 2022 07:00) (73% - 99%)    O2 Parameters below as of 03 Dec 2022 07:00  Patient On (Oxygen Delivery Method): mask, aerosol    O2 Concentration (%): 40        Constitutional: no acute distress, well nourished well developed  Neuro: moving all 4 limbs spontaneously, no facial droop or dysarthria  HEENT: NCAT, anicteric  Neck: no visible lymphadenopathy or goiter  Pulm: no respiratory distress. crackles to auscultation bilaterally  Cardiac: extremities appear pink and well-perfused.  regular rhythm and rate, no murmur detected  Abdomen: non-distended  Extremities: bilateral +3 peripheral edema      12-02-22 @ 07:01  -  12-03-22 @ 07:00  --------------------------------------------------------  IN:    IV PiggyBack: 100 mL    Norepinephrine: 229 mL    Pantoprazole: 240 mL    Peptamen A.F.: 60 mL    Sodium Chloride 0.9% Bolus: 250 mL  Total IN: 879 mL    OUT:    Indwelling Catheter - Urethral (mL): 670 mL  Total OUT: 670 mL    Total NET: 209 mL          LABS:                            9.3    30.60 )-----------( 114      ( 03 Dec 2022 06:47 )             29.6                                               12-03    137  |  107  |  36<H>  ----------------------------<  143<H>  4.2   |  21  |  2.1<H>    Ca    7.1<L>      03 Dec 2022 08:30  Phos  2.9     12-02  Mg     2.0     12-02    TPro  4.2<L>  /  Alb  2.1<L>  /  TBili  0.4  /  DBili  x   /  AST  26  /  ALT  19  /  AlkPhos  89  12-02                                                                                           LIVER FUNCTIONS - ( 02 Dec 2022 21:06 )  Alb: 2.1 g/dL / Pro: 4.2 g/dL / ALK PHOS: 89 U/L / ALT: 19 U/L / AST: 26 U/L / GGT: x                                                  Culture - Blood (collected 01 Dec 2022 07:00)  Source: .Blood Blood-Peripheral  Preliminary Report (02 Dec 2022 18:01):    No growth to date.                                                                                       ABG - ( 02 Dec 2022 14:15 )  pH, Arterial: 7.33  pH, Blood: x     /  pCO2: 35    /  pO2: 83    / HCO3: 18    / Base Excess: -6.6  /  SaO2: 97.4                MEDICATIONS  (STANDING):  cefepime   IVPB      cefepime   IVPB 2000 milliGRAM(s) IV Intermittent every 24 hours  chlorhexidine 2% Cloths 1 Application(s) Topical <User Schedule>  dextrose 5%. 1000 milliLiter(s) (50 mL/Hr) IV Continuous <Continuous>  dextrose 5%. 1000 milliLiter(s) (100 mL/Hr) IV Continuous <Continuous>  dextrose 50% Injectable 25 Gram(s) IV Push once  dextrose 50% Injectable 12.5 Gram(s) IV Push once  dextrose 50% Injectable 25 Gram(s) IV Push once  glucagon  Injectable 1 milliGRAM(s) IntraMuscular once  insulin lispro (ADMELOG) corrective regimen sliding scale   SubCutaneous every 6 hours  norepinephrine Infusion 0.05 MICROgram(s)/kG/Min (4.49 mL/Hr) IV Continuous <Continuous>  pantoprazole Infusion 8 mG/Hr (10 mL/Hr) IV Continuous <Continuous>    MEDICATIONS  (PRN):  dextrose Oral Gel 15 Gram(s) Oral once PRN Blood Glucose LESS THAN 70 milliGRAM(s)/deciliter      New X-rays reviewed:       ECHO reviewed    CXR interpreted by me:    12/3 images and reads reviewed, compared with 12/1.  No significant change to bibasilar opacities, interval removal of ETT

## 2022-12-03 NOTE — CONSULT NOTE ADULT - CONSULT REASON
GI bleeding, request for IR to follow.
Gi bleed
decreaseUO
Goals of care and Symptom Management
maroon colored stools
risk stratification for EGD/Colonoscopy

## 2022-12-03 NOTE — CONSULT NOTE ADULT - ASSESSMENT
89F with pmhx of Alzheimer's dementia, CHF, HTN , PPM secondary to heart block brought to the ED after being found down by her health aide after an unwitnessed fall  called for VIRGIL  GI bleed    #diverticular + duodenal ulcer, now s/p embolization  Acute oliguric renal failure  sepsis  # ATN in nature / hypotensive also received IV contrast  # cr trending up  # if UO remains on the low side, will start bumex 1 q12   # check renal and bladder sono   # ph at [facility]  # BP noted/ on pressors  #wbc noted/ on cefepime   # overall prognosis poor  # no acute indication for RRT   # will follow

## 2022-12-03 NOTE — CONSULT NOTE ADULT - CONSULT REQUESTED DATE/TIME
17-Nov-2022 10:51
03-Dec-2022 14:45
17-Nov-2022 14:51
23-Nov-2022 10:25
18-Nov-2022 15:50
18-Nov-2022 08:03

## 2022-12-04 NOTE — PROGRESS NOTE ADULT - NUTRITIONAL ASSESSMENT
This patient has been assessed with a concern for Malnutrition and has been determined to have a diagnosis/diagnoses of Severe protein-calorie malnutrition.    This patient is being managed with:   Diet NPO-  Except Medications  Entered: Dec  2 2022 10:44PM

## 2022-12-04 NOTE — PROGRESS NOTE ADULT - PROVIDER SPECIALTY LIST ADULT
CCU
CCU
Critical Care
Critical Care
Hospitalist
Internal Medicine
Intervent Radiology
Intervent Radiology
Pulmonology
CCU
Critical Care
Gastroenterology
Gastroenterology
Internal Medicine
Pulmonology
CCU
CCU
Critical Care
Gastroenterology
Gastroenterology
Hospitalist
Internal Medicine
CCU
Pulmonology

## 2022-12-04 NOTE — DISCHARGE NOTE FOR THE EXPIRED PATIENT - HOSPITAL COURSE
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 Patient has advanced dementia and is a poor historian but is able to articulate pain and symptoms accurately per the son. unknown HT, unknown LOC, and she does not take anticoagulation. History was obtained from the patient, her son at the bedside, and medication reconcilation was done by calling [address] [phone], where she fills her medications per the son. Patient does not remember falling, as she has poor memory. She denies any current lightheadedness, fatigue, chest pain, SOB, nausea, vomiting, headache, back pain, abdominal pain, urinary sx. Of note, she does take celecoxib 100mg BID per pharmacist.     In the ED, she was found to have HGB 8, prior HGB was 12. Patient remained HD stable throughout ED stay. GI was consulted, and they plan to perform colonoscopy tomorrow. Cardiology cleared patient for colonoscopy procedure. She also has wbc 30 but has been afebrile. CTA abdomen/pelvis was negative for active bleeding or infectious etiology. She received 1 dose of 2g cefepime PAtient underwent IR embolization for GI bleed and remained intubated post procedure. She was later extubated and developed sepsis. Patient developed septic shock with hypotension. She was made CMO by family at bedside and passed away at 11:02 am.

## 2022-12-04 NOTE — PROGRESS NOTE ADULT - ASSESSMENT
IMPRESSION:    GI bleed  diverticular + duodenal ulcer, now s/p embolization  Septic shock s/p procedure  Hypercapneic + NGM acidosis  Blood loss anemia   Gastroduodenal bleed   Metabolic acidosis   Probable sepsis  Acute oliguric renal failure    PLAN:    CNS:  morphine gtt    HEENT: oral care     PULMONARY: Continue with oxygen as needed, target comfort  Now CMO    CARDIOVASCULAR:   Wean of norepi once respiratory symptoms improved    GI: npo    MUSCULOSKELETAL: bedrest    CMO    keep in ICU

## 2022-12-06 LAB
CULTURE RESULTS: SIGNIFICANT CHANGE UP
GLUCOSE BLDC GLUCOMTR-MCNC: 148 MG/DL — HIGH (ref 70–99)
GLUCOSE BLDC GLUCOMTR-MCNC: 176 MG/DL — HIGH (ref 70–99)
SPECIMEN SOURCE: SIGNIFICANT CHANGE UP

## 2022-12-12 DIAGNOSIS — K26.4 CHRONIC OR UNSPECIFIED DUODENAL ULCER WITH HEMORRHAGE: ICD-10-CM

## 2022-12-12 DIAGNOSIS — R34 ANURIA AND OLIGURIA: ICD-10-CM

## 2022-12-12 DIAGNOSIS — N39.0 URINARY TRACT INFECTION, SITE NOT SPECIFIED: ICD-10-CM

## 2022-12-12 DIAGNOSIS — I45.9 CONDUCTION DISORDER, UNSPECIFIED: ICD-10-CM

## 2022-12-12 DIAGNOSIS — Z51.5 ENCOUNTER FOR PALLIATIVE CARE: ICD-10-CM

## 2022-12-12 DIAGNOSIS — G89.29 OTHER CHRONIC PAIN: ICD-10-CM

## 2022-12-12 DIAGNOSIS — Z66 DO NOT RESUSCITATE: ICD-10-CM

## 2022-12-12 DIAGNOSIS — T39.395A ADVERSE EFFECT OF OTHER NONSTEROIDAL ANTI-INFLAMMATORY DRUGS [NSAID], INITIAL ENCOUNTER: ICD-10-CM

## 2022-12-12 DIAGNOSIS — R65.21 SEVERE SEPSIS WITH SEPTIC SHOCK: ICD-10-CM

## 2022-12-12 DIAGNOSIS — I11.0 HYPERTENSIVE HEART DISEASE WITH HEART FAILURE: ICD-10-CM

## 2022-12-12 DIAGNOSIS — J96.01 ACUTE RESPIRATORY FAILURE WITH HYPOXIA: ICD-10-CM

## 2022-12-12 DIAGNOSIS — E43 UNSPECIFIED SEVERE PROTEIN-CALORIE MALNUTRITION: ICD-10-CM

## 2022-12-12 DIAGNOSIS — E87.0 HYPEROSMOLALITY AND HYPERNATREMIA: ICD-10-CM

## 2022-12-12 DIAGNOSIS — E83.42 HYPOMAGNESEMIA: ICD-10-CM

## 2022-12-12 DIAGNOSIS — F02.80 DEMENTIA IN OTHER DISEASES CLASSIFIED ELSEWHERE, UNSPECIFIED SEVERITY, WITHOUT BEHAVIORAL DISTURBANCE, PSYCHOTIC DISTURBANCE, MOOD DISTURBANCE, AND ANXIETY: ICD-10-CM

## 2022-12-12 DIAGNOSIS — B96.20 UNSPECIFIED ESCHERICHIA COLI [E. COLI] AS THE CAUSE OF DISEASES CLASSIFIED ELSEWHERE: ICD-10-CM

## 2022-12-12 DIAGNOSIS — A41.9 SEPSIS, UNSPECIFIED ORGANISM: ICD-10-CM

## 2022-12-12 DIAGNOSIS — I50.32 CHRONIC DIASTOLIC (CONGESTIVE) HEART FAILURE: ICD-10-CM

## 2022-12-12 DIAGNOSIS — K86.89 OTHER SPECIFIED DISEASES OF PANCREAS: ICD-10-CM

## 2022-12-12 DIAGNOSIS — E16.2 HYPOGLYCEMIA, UNSPECIFIED: ICD-10-CM

## 2022-12-12 DIAGNOSIS — R45.1 RESTLESSNESS AND AGITATION: ICD-10-CM

## 2022-12-12 DIAGNOSIS — D62 ACUTE POSTHEMORRHAGIC ANEMIA: ICD-10-CM

## 2022-12-12 DIAGNOSIS — M25.561 PAIN IN RIGHT KNEE: ICD-10-CM

## 2022-12-12 DIAGNOSIS — E87.6 HYPOKALEMIA: ICD-10-CM

## 2022-12-12 DIAGNOSIS — J96.02 ACUTE RESPIRATORY FAILURE WITH HYPERCAPNIA: ICD-10-CM

## 2022-12-12 DIAGNOSIS — G30.9 ALZHEIMER'S DISEASE, UNSPECIFIED: ICD-10-CM

## 2022-12-12 DIAGNOSIS — E87.20 ACIDOSIS, UNSPECIFIED: ICD-10-CM

## 2022-12-12 DIAGNOSIS — K57.31 DIVERTICULOSIS OF LARGE INTESTINE WITHOUT PERFORATION OR ABSCESS WITH BLEEDING: ICD-10-CM

## 2022-12-12 DIAGNOSIS — N17.0 ACUTE KIDNEY FAILURE WITH TUBULAR NECROSIS: ICD-10-CM

## 2022-12-30 NOTE — CONSULT NOTE ADULT - ASSESSMENT
Complete heart block  - ongoing for one month  - syncopized today  - CCU monitoring  - NPO after midnight for DDD PPM placement tomorrow  - 2d echo to assess LV function patient

## 2023-02-08 ENCOUNTER — APPOINTMENT (OUTPATIENT)
Dept: CARDIOLOGY | Facility: CLINIC | Age: 88
End: 2023-02-08

## 2023-04-17 NOTE — DISCHARGE NOTE ADULT - NS AS DC AMI YN
Daughter walked in to clarify pt has only been taking 2.5 mg amlodipine daily. Order noted for amlodipine 5 mg daily and update BP in 1-2 weeks. Refill of lisinopril sent as requested  
no

## 2024-02-15 NOTE — ED ADULT NURSE NOTE - TEMPLATE LIST FOR HEAD TO TOE ASSESSMENT
[de-identified] : left shoulder:  MRI eval for SLAP, impingement. NSAIDS, rest, activities reviewed. 
Back Pain

## 2024-03-21 NOTE — RAPID RESPONSE TEAM SUMMARY - NSSITUATIONBACKGROUNDRRT_GEN_ALL_CORE
89F w/ PMHx of Alzheimer's dementia, CHF, HTN, PPM 2/2 heart block presenting after unwitnessed fall, found to have GIB. CT A/P w/o active bleed/infectious etiology. S/p EGD/colonoscopy w/ gastric ulcers, severe diverticulosis. Required 3u pRBC so far.    She had two episodes of significant hematemesis suddenly on her body. rapid response was called. Suctioning to clear airways was done yield 50cc. Vitals showed BP of 70/40. two lines placed, labs were sent. Code fusion was called and 1 unit of PRBC was transfused along with 1 L of LR. GI was called urgently, they recommended  89F w/ PMHx of Alzheimer's dementia, CHF, HTN, PPM 2/2 heart block presenting after unwitnessed fall, found to have GIB. CT A/P w/o active bleed/infectious etiology. S/p EGD/colonoscopy w/ gastric ulcers, severe diverticulosis. Required 3u pRBC so far.    She had two episodes of significant hematemesis suddenly on her body. rapid response was called. Suctioning to clear airways was done yield 50cc. Vitals showed BP of 70/40. two lines placed, labs were sent. Code fusion was called and 1 unit of PRBC was transfused along with 1 L of LR. GI was called urgently, they recommended IR evaluation. IR was consulted. Vitals repeat was stable. CTA abdomen was done. Upon arrival to CCU another episode of active upper and lower GI bleed happened.  2

## 2025-07-11 NOTE — ANESTHESIA FOLLOW-UP NOTE - NSINTERVIEW_GEN_ALL_CORE
Interviewed and evaluated Please call the patient.  Hemoglobin A1c has slightly worsened, is now up to 6.3%.  I would suggest restarting metformin  mg once daily, along with watching diet and exercising regularly.  If the patient agrees, please send in the prescription, for 90 days with a refill.    Magnesium level is slightly up, if she is currently taking any magnesium supplements, I would suggest her to discontinue them.  Thyroid function, blood counts, kidney function, liver function tests normal.  Urine looks good.    Cholesterol looks good as well.